# Patient Record
Sex: MALE | Race: WHITE | Employment: OTHER | URBAN - METROPOLITAN AREA
[De-identification: names, ages, dates, MRNs, and addresses within clinical notes are randomized per-mention and may not be internally consistent; named-entity substitution may affect disease eponyms.]

---

## 2017-02-28 ENCOUNTER — OFFICE VISIT (OUTPATIENT)
Dept: INTERNAL MEDICINE CLINIC | Age: 77
End: 2017-02-28

## 2017-02-28 ENCOUNTER — HOSPITAL ENCOUNTER (OUTPATIENT)
Dept: OTHER | Age: 77
Discharge: HOME | End: 2017-02-28
Attending: INTERNAL MEDICINE | Admitting: INTERNAL MEDICINE

## 2017-02-28 VITALS
HEIGHT: 74 IN | SYSTOLIC BLOOD PRESSURE: 130 MMHG | OXYGEN SATURATION: 98 % | HEART RATE: 72 BPM | RESPIRATION RATE: 14 BRPM | WEIGHT: 180 LBS | BODY MASS INDEX: 23.1 KG/M2 | DIASTOLIC BLOOD PRESSURE: 80 MMHG

## 2017-02-28 DIAGNOSIS — R43.0 ANOSMIA: ICD-10-CM

## 2017-02-28 DIAGNOSIS — I35.1 NONRHEUMATIC AORTIC VALVE INSUFFICIENCY: ICD-10-CM

## 2017-02-28 DIAGNOSIS — L71.9 ROSACEA: ICD-10-CM

## 2017-02-28 DIAGNOSIS — J20.9 ACUTE BRONCHITIS, UNSPECIFIED ORGANISM: ICD-10-CM

## 2017-02-28 DIAGNOSIS — I10 ESSENTIAL HYPERTENSION: Primary | ICD-10-CM

## 2017-02-28 DIAGNOSIS — R09.89 BILATERAL CAROTID BRUITS: ICD-10-CM

## 2017-02-28 DIAGNOSIS — I10 ESSENTIAL HYPERTENSION: ICD-10-CM

## 2017-02-28 DIAGNOSIS — N18.2 CHRONIC KIDNEY DISEASE, STAGE 2 (MILD): ICD-10-CM

## 2017-02-28 DIAGNOSIS — E78.5 HYPERLIPIDEMIA, UNSPECIFIED HYPERLIPIDEMIA TYPE: ICD-10-CM

## 2017-02-28 LAB
BASOPHILS ABSOLUTE: 0 K/UL (ref 0–0.2)
BASOPHILS RELATIVE PERCENT: 0.5 %
EOSINOPHILS ABSOLUTE: 0 K/UL (ref 0–0.6)
EOSINOPHILS RELATIVE PERCENT: 0.4 %
HCT VFR BLD CALC: 43.3 % (ref 40.5–52.5)
HEMOGLOBIN: 14.5 G/DL (ref 13.5–17.5)
LYMPHOCYTES ABSOLUTE: 1.6 K/UL (ref 1–5.1)
LYMPHOCYTES RELATIVE PERCENT: 25 %
MCH RBC QN AUTO: 29.6 PG (ref 26–34)
MCHC RBC AUTO-ENTMCNC: 33.5 G/DL (ref 31–36)
MCV RBC AUTO: 88.3 FL (ref 80–100)
MONOCYTES ABSOLUTE: 0.5 K/UL (ref 0–1.3)
MONOCYTES RELATIVE PERCENT: 7.9 %
NEUTROPHILS ABSOLUTE: 4.2 K/UL (ref 1.7–7.7)
NEUTROPHILS RELATIVE PERCENT: 66.2 %
PDW BLD-RTO: 13.5 % (ref 12.4–15.4)
PLATELET # BLD: 219 K/UL (ref 135–450)
PMV BLD AUTO: 9.5 FL (ref 5–10.5)
RBC # BLD: 4.91 M/UL (ref 4.2–5.9)
WBC # BLD: 6.4 K/UL (ref 4–11)

## 2017-02-28 PROCEDURE — 99214 OFFICE O/P EST MOD 30 MIN: CPT | Performed by: INTERNAL MEDICINE

## 2017-02-28 RX ORDER — FENOFIBRATE 160 MG/1
160 TABLET ORAL DAILY
Qty: 90 TABLET | Refills: 0 | Status: SHIPPED | OUTPATIENT
Start: 2017-02-28 | End: 2017-06-14 | Stop reason: SDUPTHER

## 2017-02-28 RX ORDER — HYDROCODONE BITARTRATE AND ACETAMINOPHEN 5; 325 MG/1; MG/1
1 TABLET ORAL 2 TIMES DAILY PRN
Qty: 60 TABLET | Refills: 0 | Status: SHIPPED | OUTPATIENT
Start: 2017-02-28 | End: 2017-04-05 | Stop reason: SDUPTHER

## 2017-02-28 RX ORDER — LISINOPRIL AND HYDROCHLOROTHIAZIDE 25; 20 MG/1; MG/1
1 TABLET ORAL DAILY
Qty: 90 TABLET | Refills: 0 | Status: SHIPPED | OUTPATIENT
Start: 2017-02-28 | End: 2017-06-14 | Stop reason: SDUPTHER

## 2017-02-28 RX ORDER — SILDENAFIL 100 MG/1
100 TABLET, FILM COATED ORAL PRN
Qty: 10 TABLET | Refills: 2 | Status: SHIPPED | OUTPATIENT
Start: 2017-02-28 | End: 2017-09-13 | Stop reason: SDUPTHER

## 2017-02-28 RX ORDER — AZITHROMYCIN 250 MG/1
TABLET, FILM COATED ORAL
Qty: 1 PACKET | Refills: 0 | Status: SHIPPED | OUTPATIENT
Start: 2017-02-28 | End: 2017-03-10

## 2017-02-28 ASSESSMENT — ENCOUNTER SYMPTOMS
WHEEZING: 1
ABDOMINAL PAIN: 0
RHINORRHEA: 0
BLOOD IN STOOL: 0
SHORTNESS OF BREATH: 0
CHEST TIGHTNESS: 0
DIARRHEA: 0
COUGH: 1
BACK PAIN: 1
NAUSEA: 0
VOMITING: 0

## 2017-02-28 ASSESSMENT — PATIENT HEALTH QUESTIONNAIRE - PHQ9
2. FEELING DOWN, DEPRESSED OR HOPELESS: 0
SUM OF ALL RESPONSES TO PHQ QUESTIONS 1-9: 0
SUM OF ALL RESPONSES TO PHQ9 QUESTIONS 1 & 2: 0
1. LITTLE INTEREST OR PLEASURE IN DOING THINGS: 0

## 2017-03-01 LAB
A/G RATIO: 1.3 (ref 1.1–2.2)
ALBUMIN SERPL-MCNC: 4 G/DL (ref 3.4–5)
ALP BLD-CCNC: 91 U/L (ref 40–129)
ALT SERPL-CCNC: 7 U/L (ref 10–40)
ANION GAP SERPL CALCULATED.3IONS-SCNC: 15 MMOL/L (ref 3–16)
AST SERPL-CCNC: 15 U/L (ref 15–37)
BILIRUB SERPL-MCNC: 0.9 MG/DL (ref 0–1)
BUN BLDV-MCNC: 17 MG/DL (ref 7–20)
CALCIUM SERPL-MCNC: 9.7 MG/DL (ref 8.3–10.6)
CHLORIDE BLD-SCNC: 99 MMOL/L (ref 99–110)
CHOLESTEROL, TOTAL: 185 MG/DL (ref 0–199)
CO2: 26 MMOL/L (ref 21–32)
CREAT SERPL-MCNC: 1.3 MG/DL (ref 0.8–1.3)
GFR AFRICAN AMERICAN: >60
GFR NON-AFRICAN AMERICAN: 53
GLOBULIN: 3.1 G/DL
GLUCOSE BLD-MCNC: 104 MG/DL (ref 70–99)
HDLC SERPL-MCNC: 24 MG/DL (ref 40–60)
LDL CHOLESTEROL CALCULATED: 134 MG/DL
POTASSIUM SERPL-SCNC: 4.3 MMOL/L (ref 3.5–5.1)
SODIUM BLD-SCNC: 140 MMOL/L (ref 136–145)
TOTAL PROTEIN: 7.1 G/DL (ref 6.4–8.2)
TRIGL SERPL-MCNC: 137 MG/DL (ref 0–150)
URIC ACID, SERUM: 5.8 MG/DL (ref 3.5–7.2)
VLDLC SERPL CALC-MCNC: 27 MG/DL

## 2017-04-05 RX ORDER — HYDROCODONE BITARTRATE AND ACETAMINOPHEN 5; 325 MG/1; MG/1
1 TABLET ORAL 2 TIMES DAILY PRN
Qty: 60 TABLET | Refills: 0 | Status: SHIPPED | OUTPATIENT
Start: 2017-04-05 | End: 2017-06-14 | Stop reason: SDUPTHER

## 2017-06-14 ENCOUNTER — OFFICE VISIT (OUTPATIENT)
Dept: INTERNAL MEDICINE CLINIC | Age: 77
End: 2017-06-14

## 2017-06-14 VITALS
BODY MASS INDEX: 24.51 KG/M2 | DIASTOLIC BLOOD PRESSURE: 80 MMHG | SYSTOLIC BLOOD PRESSURE: 130 MMHG | HEIGHT: 74 IN | RESPIRATION RATE: 14 BRPM | HEART RATE: 70 BPM | WEIGHT: 191 LBS

## 2017-06-14 DIAGNOSIS — R43.0 ANOSMIA: ICD-10-CM

## 2017-06-14 DIAGNOSIS — E78.5 HYPERLIPIDEMIA, UNSPECIFIED HYPERLIPIDEMIA TYPE: ICD-10-CM

## 2017-06-14 DIAGNOSIS — N18.2 CHRONIC KIDNEY DISEASE, STAGE 2 (MILD): ICD-10-CM

## 2017-06-14 DIAGNOSIS — I10 ESSENTIAL HYPERTENSION: Primary | ICD-10-CM

## 2017-06-14 DIAGNOSIS — L71.9 ROSACEA: ICD-10-CM

## 2017-06-14 DIAGNOSIS — I35.1 NONRHEUMATIC AORTIC VALVE INSUFFICIENCY: ICD-10-CM

## 2017-06-14 PROCEDURE — 99214 OFFICE O/P EST MOD 30 MIN: CPT | Performed by: INTERNAL MEDICINE

## 2017-06-14 RX ORDER — FENOFIBRATE 160 MG/1
160 TABLET ORAL DAILY
Qty: 90 TABLET | Refills: 0 | Status: SHIPPED | OUTPATIENT
Start: 2017-06-14 | End: 2017-09-14 | Stop reason: SDUPTHER

## 2017-06-14 RX ORDER — HYDROCODONE BITARTRATE AND ACETAMINOPHEN 5; 325 MG/1; MG/1
1 TABLET ORAL 2 TIMES DAILY PRN
Qty: 60 TABLET | Refills: 0 | Status: SHIPPED | OUTPATIENT
Start: 2017-06-14 | End: 2017-09-25 | Stop reason: SDUPTHER

## 2017-06-14 RX ORDER — LISINOPRIL AND HYDROCHLOROTHIAZIDE 25; 20 MG/1; MG/1
1 TABLET ORAL DAILY
Qty: 90 TABLET | Refills: 0 | Status: SHIPPED | OUTPATIENT
Start: 2017-06-14 | End: 2017-09-25 | Stop reason: SDUPTHER

## 2017-06-14 ASSESSMENT — ENCOUNTER SYMPTOMS
BACK PAIN: 1
RHINORRHEA: 0
NAUSEA: 0
CHEST TIGHTNESS: 0
WHEEZING: 0
DIARRHEA: 0
BLOOD IN STOOL: 0
VOMITING: 0
ABDOMINAL PAIN: 0
COUGH: 0
SHORTNESS OF BREATH: 0

## 2017-09-25 ENCOUNTER — OFFICE VISIT (OUTPATIENT)
Dept: INTERNAL MEDICINE CLINIC | Age: 77
End: 2017-09-25

## 2017-09-25 VITALS
HEART RATE: 70 BPM | DIASTOLIC BLOOD PRESSURE: 80 MMHG | RESPIRATION RATE: 14 BRPM | HEIGHT: 74 IN | WEIGHT: 186 LBS | SYSTOLIC BLOOD PRESSURE: 130 MMHG | BODY MASS INDEX: 23.87 KG/M2

## 2017-09-25 DIAGNOSIS — Z00.00 ROUTINE GENERAL MEDICAL EXAMINATION AT A HEALTH CARE FACILITY: ICD-10-CM

## 2017-09-25 DIAGNOSIS — I10 ESSENTIAL HYPERTENSION: ICD-10-CM

## 2017-09-25 DIAGNOSIS — I10 ESSENTIAL HYPERTENSION: Primary | ICD-10-CM

## 2017-09-25 LAB
A/G RATIO: 1.8 (ref 1.1–2.2)
ALBUMIN SERPL-MCNC: 4.2 G/DL (ref 3.4–5)
ALP BLD-CCNC: 105 U/L (ref 40–129)
ALT SERPL-CCNC: 8 U/L (ref 10–40)
ANION GAP SERPL CALCULATED.3IONS-SCNC: 13 MMOL/L (ref 3–16)
AST SERPL-CCNC: 18 U/L (ref 15–37)
BASOPHILS ABSOLUTE: 0.1 K/UL (ref 0–0.2)
BASOPHILS RELATIVE PERCENT: 1.1 %
BILIRUB SERPL-MCNC: 0.5 MG/DL (ref 0–1)
BUN BLDV-MCNC: 22 MG/DL (ref 7–20)
CALCIUM SERPL-MCNC: 10 MG/DL (ref 8.3–10.6)
CHLORIDE BLD-SCNC: 105 MMOL/L (ref 99–110)
CO2: 29 MMOL/L (ref 21–32)
CREAT SERPL-MCNC: 1.5 MG/DL (ref 0.8–1.3)
EOSINOPHILS ABSOLUTE: 0.1 K/UL (ref 0–0.6)
EOSINOPHILS RELATIVE PERCENT: 0.9 %
GFR AFRICAN AMERICAN: 55
GFR NON-AFRICAN AMERICAN: 45
GLOBULIN: 2.4 G/DL
GLUCOSE BLD-MCNC: 120 MG/DL (ref 70–99)
HCT VFR BLD CALC: 40.9 % (ref 40.5–52.5)
HEMOGLOBIN: 13.7 G/DL (ref 13.5–17.5)
LYMPHOCYTES ABSOLUTE: 1.6 K/UL (ref 1–5.1)
LYMPHOCYTES RELATIVE PERCENT: 25 %
MCH RBC QN AUTO: 30.7 PG (ref 26–34)
MCHC RBC AUTO-ENTMCNC: 33.6 G/DL (ref 31–36)
MCV RBC AUTO: 91.3 FL (ref 80–100)
MONOCYTES ABSOLUTE: 0.4 K/UL (ref 0–1.3)
MONOCYTES RELATIVE PERCENT: 5.6 %
NEUTROPHILS ABSOLUTE: 4.4 K/UL (ref 1.7–7.7)
NEUTROPHILS RELATIVE PERCENT: 67.4 %
PDW BLD-RTO: 13.6 % (ref 12.4–15.4)
PLATELET # BLD: 175 K/UL (ref 135–450)
PMV BLD AUTO: 10 FL (ref 5–10.5)
POTASSIUM SERPL-SCNC: 4 MMOL/L (ref 3.5–5.1)
RBC # BLD: 4.47 M/UL (ref 4.2–5.9)
SODIUM BLD-SCNC: 147 MMOL/L (ref 136–145)
TOTAL PROTEIN: 6.6 G/DL (ref 6.4–8.2)
URIC ACID, SERUM: 6.4 MG/DL (ref 3.5–7.2)
WBC # BLD: 6.5 K/UL (ref 4–11)

## 2017-09-25 PROCEDURE — G0438 PPPS, INITIAL VISIT: HCPCS | Performed by: INTERNAL MEDICINE

## 2017-09-25 RX ORDER — LISINOPRIL AND HYDROCHLOROTHIAZIDE 25; 20 MG/1; MG/1
1 TABLET ORAL DAILY
Qty: 90 TABLET | Refills: 0 | Status: SHIPPED | OUTPATIENT
Start: 2017-09-25 | End: 2017-12-26

## 2017-09-25 RX ORDER — HYDROCODONE BITARTRATE AND ACETAMINOPHEN 5; 325 MG/1; MG/1
1 TABLET ORAL 2 TIMES DAILY PRN
Qty: 60 TABLET | Refills: 0 | Status: SHIPPED | OUTPATIENT
Start: 2017-09-25 | End: 2018-01-24 | Stop reason: SDUPTHER

## 2017-09-25 ASSESSMENT — ANXIETY QUESTIONNAIRES: GAD7 TOTAL SCORE: 0

## 2017-09-25 ASSESSMENT — PATIENT HEALTH QUESTIONNAIRE - PHQ9: SUM OF ALL RESPONSES TO PHQ QUESTIONS 1-9: 0

## 2017-09-25 ASSESSMENT — LIFESTYLE VARIABLES: HOW OFTEN DO YOU HAVE A DRINK CONTAINING ALCOHOL: 0

## 2017-12-13 RX ORDER — FENOFIBRATE 160 MG/1
TABLET ORAL
Qty: 30 TABLET | Refills: 0 | Status: SHIPPED | OUTPATIENT
Start: 2017-12-13 | End: 2018-01-11 | Stop reason: SDUPTHER

## 2017-12-26 RX ORDER — LISINOPRIL AND HYDROCHLOROTHIAZIDE 25; 20 MG/1; MG/1
TABLET ORAL
Qty: 90 TABLET | Refills: 0 | Status: SHIPPED | OUTPATIENT
Start: 2017-12-26 | End: 2018-01-24 | Stop reason: SDUPTHER

## 2018-01-12 RX ORDER — FENOFIBRATE 160 MG/1
TABLET ORAL
Qty: 30 TABLET | Refills: 0 | Status: SHIPPED | OUTPATIENT
Start: 2018-01-12 | End: 2018-01-24 | Stop reason: SDUPTHER

## 2018-01-24 ENCOUNTER — OFFICE VISIT (OUTPATIENT)
Dept: INTERNAL MEDICINE CLINIC | Age: 78
End: 2018-01-24

## 2018-01-24 VITALS
DIASTOLIC BLOOD PRESSURE: 80 MMHG | RESPIRATION RATE: 14 BRPM | SYSTOLIC BLOOD PRESSURE: 128 MMHG | WEIGHT: 188 LBS | HEART RATE: 80 BPM | BODY MASS INDEX: 24.13 KG/M2 | HEIGHT: 74 IN

## 2018-01-24 DIAGNOSIS — L71.9 ROSACEA: ICD-10-CM

## 2018-01-24 DIAGNOSIS — E78.5 HYPERLIPIDEMIA, UNSPECIFIED HYPERLIPIDEMIA TYPE: ICD-10-CM

## 2018-01-24 DIAGNOSIS — N18.2 STAGE 2 CHRONIC KIDNEY DISEASE: ICD-10-CM

## 2018-01-24 DIAGNOSIS — R09.89 BILATERAL CAROTID BRUITS: ICD-10-CM

## 2018-01-24 DIAGNOSIS — G89.29 CHRONIC BILATERAL LOW BACK PAIN WITHOUT SCIATICA: ICD-10-CM

## 2018-01-24 DIAGNOSIS — I10 ESSENTIAL HYPERTENSION: Primary | ICD-10-CM

## 2018-01-24 DIAGNOSIS — I35.1 NONRHEUMATIC AORTIC VALVE INSUFFICIENCY: ICD-10-CM

## 2018-01-24 DIAGNOSIS — M54.50 CHRONIC BILATERAL LOW BACK PAIN WITHOUT SCIATICA: ICD-10-CM

## 2018-01-24 PROCEDURE — 99214 OFFICE O/P EST MOD 30 MIN: CPT | Performed by: INTERNAL MEDICINE

## 2018-01-24 RX ORDER — LISINOPRIL AND HYDROCHLOROTHIAZIDE 25; 20 MG/1; MG/1
1 TABLET ORAL DAILY
Qty: 90 TABLET | Refills: 0 | Status: SHIPPED | OUTPATIENT
Start: 2018-01-24 | End: 2019-01-23 | Stop reason: SDUPTHER

## 2018-01-24 RX ORDER — FENOFIBRATE 160 MG/1
160 TABLET ORAL DAILY
Qty: 30 TABLET | Refills: 2 | Status: SHIPPED | OUTPATIENT
Start: 2018-01-24 | End: 2019-01-23 | Stop reason: SDUPTHER

## 2018-01-24 RX ORDER — HYDROCODONE BITARTRATE AND ACETAMINOPHEN 5; 325 MG/1; MG/1
1 TABLET ORAL 2 TIMES DAILY PRN
Qty: 60 TABLET | Refills: 0 | Status: SHIPPED | OUTPATIENT
Start: 2018-01-24 | End: 2019-01-23 | Stop reason: SDUPTHER

## 2018-01-24 ASSESSMENT — ENCOUNTER SYMPTOMS
BLOOD IN STOOL: 0
WHEEZING: 0
COUGH: 0
VOMITING: 0
SHORTNESS OF BREATH: 0
BACK PAIN: 1
NAUSEA: 0
DIARRHEA: 0
CHEST TIGHTNESS: 0
ABDOMINAL PAIN: 0
RHINORRHEA: 0

## 2018-01-24 NOTE — PROGRESS NOTES
(MULTIVITAMIN PO), Take 1 tablet by mouth daily. , Disp: , Rfl:     Omega-3 Fatty Acids (FISH OIL PO), Take 1 tablet by mouth daily. , Disp: , Rfl:     Ascorbic Acid (VITAMIN C) 500 MG tablet, Take 1,000 mg by mouth daily. , Disp: , Rfl:     metronidazole (METROGEL) 1 % gel, Apply one a day, Disp: 1 Tube, Rfl: 2    /80 (Site: Right Arm, Position: Sitting, Cuff Size: Medium Adult)   Pulse 80   Resp 14   Ht 6' 2\" (1.88 m)   Wt 188 lb (85.3 kg)   BMI 24.14 kg/m²     Objective:   Physical Exam   Constitutional: He is oriented to person, place, and time. He appears well-developed and well-nourished. HENT:   Head: Normocephalic and atraumatic. Eyes: Pupils are equal, round, and reactive to light. Neck: Normal range of motion. Neck supple. Carotid bruit is present. No thyromegaly present. Cardiovascular: Normal rate and regular rhythm. Exam reveals no gallop and no friction rub. Murmur heard. Pulses:       Carotid pulses are on the right side with bruit, and on the left side with bruit. No carotid bruit   Pulmonary/Chest: Effort normal. No respiratory distress. He has wheezes. He has no rales. He exhibits no tenderness. Abdominal: Soft. Bowel sounds are normal. He exhibits no distension and no mass. There is no tenderness. There is no rebound and no guarding. Musculoskeletal: He exhibits no edema. Neurological: He is alert and oriented to person, place, and time. ECHO done on 5/18/16   Conclusions      Summary   Normal left ventricle size, wall thickness and systolic function with an   estimated ejection fraction of 55%.   No regional wall motion abnormalities are seen.   Diastolic filling parameters suggests grade I diastolic dysfunction.   Aortic valve leaflets appear mildly thickened.   Mild aortic regurgitation. Carotid US done 5/18/16      Summary        1.  There is minimal plaque seen in the right internal carotid artery with an    estimated diameter reduction of <50%.    2. There is minimal plaque seen in the left internal carotid artery with an    estimated diameter reduction of <50%.    3. The vertebral arteries are patent with antegrade flow bilaterally. Assessment:      1. Essential hypertension     2. Hyperlipidemia, unspecified hyperlipidemia type  Basic Metabolic Panel    Lipid Panel   3. Rosacea     4. Nonrheumatic aortic valve insufficiency     5. Bilateral carotid bruits     6. Stage 2 chronic kidney disease     7. Chronic bilateral low back pain without sciatica  HYDROcodone-acetaminophen (NORCO) 5-325 MG per tablet            Plan:        Bp is stable. Continue current meds. He has mild CKD. Hyperlipidemia- at goal.   Rosacea- stable on metrogel. Back pain-on vicodin prn. Carotid bruit: US is negative. Mild aortic regurgitation monitor for now. Decrease calorie intake. Exercise,weight loss recommended. The current medical regimen is effective;  continue present plan and medications. See orders.

## 2018-01-25 LAB
ANION GAP SERPL CALCULATED.3IONS-SCNC: 11 MMOL/L (ref 3–16)
BUN BLDV-MCNC: 17 MG/DL (ref 7–20)
CALCIUM SERPL-MCNC: 9.5 MG/DL (ref 8.3–10.6)
CHLORIDE BLD-SCNC: 103 MMOL/L (ref 99–110)
CHOLESTEROL, TOTAL: 176 MG/DL (ref 0–199)
CO2: 27 MMOL/L (ref 21–32)
CREAT SERPL-MCNC: 1.1 MG/DL (ref 0.8–1.3)
GFR AFRICAN AMERICAN: >60
GFR NON-AFRICAN AMERICAN: >60
GLUCOSE BLD-MCNC: 100 MG/DL (ref 70–99)
HDLC SERPL-MCNC: 36 MG/DL (ref 40–60)
LDL CHOLESTEROL CALCULATED: 109 MG/DL
POTASSIUM SERPL-SCNC: 4.1 MMOL/L (ref 3.5–5.1)
SODIUM BLD-SCNC: 141 MMOL/L (ref 136–145)
TRIGL SERPL-MCNC: 153 MG/DL (ref 0–150)
VLDLC SERPL CALC-MCNC: 31 MG/DL

## 2018-01-25 RX ORDER — ATORVASTATIN CALCIUM 10 MG/1
10 TABLET, FILM COATED ORAL DAILY
Qty: 30 TABLET | Refills: 2 | Status: SHIPPED | OUTPATIENT
Start: 2018-01-25 | End: 2019-01-23 | Stop reason: SDUPTHER

## 2018-04-13 RX ORDER — SILDENAFIL 100 MG/1
TABLET, FILM COATED ORAL
Qty: 10 TABLET | Refills: 0 | Status: SHIPPED | OUTPATIENT
Start: 2018-04-13 | End: 2019-01-23

## 2019-01-23 ENCOUNTER — OFFICE VISIT (OUTPATIENT)
Dept: INTERNAL MEDICINE CLINIC | Age: 79
End: 2019-01-23

## 2019-01-23 VITALS
DIASTOLIC BLOOD PRESSURE: 80 MMHG | RESPIRATION RATE: 14 BRPM | HEART RATE: 80 BPM | WEIGHT: 184 LBS | BODY MASS INDEX: 24.39 KG/M2 | SYSTOLIC BLOOD PRESSURE: 160 MMHG | HEIGHT: 73 IN

## 2019-01-23 DIAGNOSIS — F32.1 CURRENT MODERATE EPISODE OF MAJOR DEPRESSIVE DISORDER WITHOUT PRIOR EPISODE (HCC): ICD-10-CM

## 2019-01-23 DIAGNOSIS — I35.1 NONRHEUMATIC AORTIC VALVE INSUFFICIENCY: ICD-10-CM

## 2019-01-23 DIAGNOSIS — G89.29 CHRONIC BILATERAL LOW BACK PAIN WITHOUT SCIATICA: ICD-10-CM

## 2019-01-23 DIAGNOSIS — I10 ESSENTIAL HYPERTENSION: Primary | ICD-10-CM

## 2019-01-23 DIAGNOSIS — M54.50 CHRONIC BILATERAL LOW BACK PAIN WITHOUT SCIATICA: ICD-10-CM

## 2019-01-23 DIAGNOSIS — N18.2 STAGE 2 CHRONIC KIDNEY DISEASE: ICD-10-CM

## 2019-01-23 DIAGNOSIS — L71.9 ROSACEA: ICD-10-CM

## 2019-01-23 DIAGNOSIS — E78.5 HYPERLIPIDEMIA, UNSPECIFIED HYPERLIPIDEMIA TYPE: ICD-10-CM

## 2019-01-23 DIAGNOSIS — I10 ESSENTIAL HYPERTENSION: ICD-10-CM

## 2019-01-23 LAB
A/G RATIO: 1.7 (ref 1.1–2.2)
ALBUMIN SERPL-MCNC: 4.2 G/DL (ref 3.4–5)
ALP BLD-CCNC: 162 U/L (ref 40–129)
ALT SERPL-CCNC: 7 U/L (ref 10–40)
ANION GAP SERPL CALCULATED.3IONS-SCNC: 11 MMOL/L (ref 3–16)
AST SERPL-CCNC: 13 U/L (ref 15–37)
BASOPHILS ABSOLUTE: 0.1 K/UL (ref 0–0.2)
BASOPHILS RELATIVE PERCENT: 0.9 %
BILIRUB SERPL-MCNC: 1 MG/DL (ref 0–1)
BUN BLDV-MCNC: 12 MG/DL (ref 7–20)
CALCIUM SERPL-MCNC: 9.6 MG/DL (ref 8.3–10.6)
CHLORIDE BLD-SCNC: 102 MMOL/L (ref 99–110)
CHOLESTEROL, TOTAL: 209 MG/DL (ref 0–199)
CO2: 28 MMOL/L (ref 21–32)
CREAT SERPL-MCNC: 0.7 MG/DL (ref 0.8–1.3)
EOSINOPHILS ABSOLUTE: 0 K/UL (ref 0–0.6)
EOSINOPHILS RELATIVE PERCENT: 0.7 %
GFR AFRICAN AMERICAN: >60
GFR NON-AFRICAN AMERICAN: >60
GLOBULIN: 2.5 G/DL
GLUCOSE BLD-MCNC: 107 MG/DL (ref 70–99)
HCT VFR BLD CALC: 47.2 % (ref 40.5–52.5)
HDLC SERPL-MCNC: 44 MG/DL (ref 40–60)
HEMOGLOBIN: 15.7 G/DL (ref 13.5–17.5)
LDL CHOLESTEROL CALCULATED: 147 MG/DL
LYMPHOCYTES ABSOLUTE: 1.2 K/UL (ref 1–5.1)
LYMPHOCYTES RELATIVE PERCENT: 20.5 %
MCH RBC QN AUTO: 30.3 PG (ref 26–34)
MCHC RBC AUTO-ENTMCNC: 33.3 G/DL (ref 31–36)
MCV RBC AUTO: 90.9 FL (ref 80–100)
MONOCYTES ABSOLUTE: 0.3 K/UL (ref 0–1.3)
MONOCYTES RELATIVE PERCENT: 5.7 %
NEUTROPHILS ABSOLUTE: 4.3 K/UL (ref 1.7–7.7)
NEUTROPHILS RELATIVE PERCENT: 72.2 %
PDW BLD-RTO: 13.3 % (ref 12.4–15.4)
PLATELET # BLD: 163 K/UL (ref 135–450)
PMV BLD AUTO: 9.7 FL (ref 5–10.5)
POTASSIUM SERPL-SCNC: 4.5 MMOL/L (ref 3.5–5.1)
RBC # BLD: 5.2 M/UL (ref 4.2–5.9)
SODIUM BLD-SCNC: 141 MMOL/L (ref 136–145)
TOTAL PROTEIN: 6.7 G/DL (ref 6.4–8.2)
TRIGL SERPL-MCNC: 89 MG/DL (ref 0–150)
TSH SERPL DL<=0.05 MIU/L-ACNC: 1.33 UIU/ML (ref 0.27–4.2)
URIC ACID, SERUM: 6.6 MG/DL (ref 3.5–7.2)
VLDLC SERPL CALC-MCNC: 18 MG/DL
WBC # BLD: 6 K/UL (ref 4–11)

## 2019-01-23 PROCEDURE — 99214 OFFICE O/P EST MOD 30 MIN: CPT | Performed by: INTERNAL MEDICINE

## 2019-01-23 RX ORDER — METRONIDAZOLE 10 MG/G
GEL TOPICAL
Qty: 1 TUBE | Refills: 2 | Status: ON HOLD | OUTPATIENT
Start: 2019-01-23 | End: 2022-07-08 | Stop reason: HOSPADM

## 2019-01-23 RX ORDER — SILDENAFIL 100 MG/1
TABLET, FILM COATED ORAL
Qty: 10 TABLET | Refills: 0 | Status: CANCELLED | OUTPATIENT
Start: 2019-01-23

## 2019-01-23 RX ORDER — LISINOPRIL AND HYDROCHLOROTHIAZIDE 25; 20 MG/1; MG/1
1 TABLET ORAL DAILY
Qty: 90 TABLET | Refills: 0 | Status: SHIPPED | OUTPATIENT
Start: 2019-01-23 | End: 2020-02-27

## 2019-01-23 RX ORDER — SILDENAFIL 100 MG/1
100 TABLET, FILM COATED ORAL PRN
Qty: 10 TABLET | Refills: 0 | Status: SHIPPED | OUTPATIENT
Start: 2019-01-23 | End: 2021-04-26 | Stop reason: SDUPTHER

## 2019-01-23 RX ORDER — ESCITALOPRAM OXALATE 10 MG/1
10 TABLET ORAL DAILY
Qty: 30 TABLET | Refills: 1 | Status: SHIPPED | OUTPATIENT
Start: 2019-01-23 | End: 2020-04-06 | Stop reason: ALTCHOICE

## 2019-01-23 RX ORDER — FENOFIBRATE 160 MG/1
160 TABLET ORAL DAILY
Qty: 90 TABLET | Refills: 0 | Status: SHIPPED | OUTPATIENT
Start: 2019-01-23 | End: 2020-02-27

## 2019-01-23 RX ORDER — HYDROCODONE BITARTRATE AND ACETAMINOPHEN 5; 325 MG/1; MG/1
1 TABLET ORAL 2 TIMES DAILY PRN
Qty: 60 TABLET | Refills: 0 | Status: SHIPPED | OUTPATIENT
Start: 2019-01-23 | End: 2020-04-06 | Stop reason: SDUPTHER

## 2019-01-23 RX ORDER — ATORVASTATIN CALCIUM 10 MG/1
10 TABLET, FILM COATED ORAL DAILY
Qty: 90 TABLET | Refills: 0 | Status: SHIPPED | OUTPATIENT
Start: 2019-01-23 | End: 2020-02-27

## 2019-01-23 ASSESSMENT — ENCOUNTER SYMPTOMS
NAUSEA: 0
CHEST TIGHTNESS: 0
BACK PAIN: 1
COUGH: 0
VOMITING: 0
SHORTNESS OF BREATH: 0
RHINORRHEA: 0
ABDOMINAL PAIN: 0
WHEEZING: 0
BLOOD IN STOOL: 0
DIARRHEA: 0

## 2019-01-24 ENCOUNTER — TELEPHONE (OUTPATIENT)
Dept: INTERNAL MEDICINE CLINIC | Age: 79
End: 2019-01-24

## 2019-01-24 DIAGNOSIS — R74.8 ELEVATED ALKALINE PHOSPHATASE LEVEL: Primary | ICD-10-CM

## 2020-02-27 RX ORDER — LISINOPRIL AND HYDROCHLOROTHIAZIDE 25; 20 MG/1; MG/1
TABLET ORAL
Qty: 90 TABLET | Refills: 0 | Status: SHIPPED | OUTPATIENT
Start: 2020-02-27 | End: 2020-09-01 | Stop reason: SDUPTHER

## 2020-02-27 RX ORDER — FENOFIBRATE 160 MG/1
TABLET ORAL
Qty: 90 TABLET | Refills: 0 | Status: SHIPPED | OUTPATIENT
Start: 2020-02-27 | End: 2020-09-25 | Stop reason: SDUPTHER

## 2020-02-27 RX ORDER — ATORVASTATIN CALCIUM 10 MG/1
TABLET, FILM COATED ORAL
Qty: 90 TABLET | Refills: 0 | Status: SHIPPED | OUTPATIENT
Start: 2020-02-27 | End: 2020-09-25 | Stop reason: SDUPTHER

## 2020-04-02 ENCOUNTER — TELEPHONE (OUTPATIENT)
Dept: INTERNAL MEDICINE CLINIC | Age: 80
End: 2020-04-02

## 2020-04-03 ENCOUNTER — TELEPHONE (OUTPATIENT)
Dept: INTERNAL MEDICINE CLINIC | Age: 80
End: 2020-04-03

## 2020-04-03 NOTE — TELEPHONE ENCOUNTER
----- Message from Ana Gandara sent at 4/3/2020  8:23 AM EDT -----  Contact: [phone]  Cannot fill until seen. Needs appt. See if can make televisit per Dr. Klaus Webb  ----- Message -----  From: Ana Gandara  Sent: 4/2/2020   1:45 PM EDT  To: Mitali Ureña MD    Pt has not been seen since January 2019. Vicodin is showing in [facility] as last filled in Jan 2018. Pt requesting refill.   Please advise.  ----- Message -----  From: Brianda Petty  Sent: 4/2/2020   1:27 PM EDT  To: Ana Gandara    He needs a refill on his vicodin [facility] at [phone]-last appt- 9-44-81-next appt- 5-29-20-lr

## 2020-04-06 ENCOUNTER — VIRTUAL VISIT (OUTPATIENT)
Dept: INTERNAL MEDICINE CLINIC | Age: 80
End: 2020-04-06

## 2020-04-06 PROCEDURE — 99213 OFFICE O/P EST LOW 20 MIN: CPT | Performed by: INTERNAL MEDICINE

## 2020-04-06 RX ORDER — HYDROCODONE BITARTRATE AND ACETAMINOPHEN 5; 325 MG/1; MG/1
1 TABLET ORAL 2 TIMES DAILY PRN
Qty: 60 TABLET | Refills: 0 | Status: SHIPPED | OUTPATIENT
Start: 2020-04-06 | End: 2020-05-29 | Stop reason: SDUPTHER

## 2020-04-06 ASSESSMENT — ENCOUNTER SYMPTOMS
ABDOMINAL PAIN: 0
VOMITING: 0
RHINORRHEA: 0
SHORTNESS OF BREATH: 0
WHEEZING: 0
BACK PAIN: 1
NAUSEA: 0

## 2020-04-06 NOTE — PROGRESS NOTES
a year. Stressed compliance. Back pain  On Norco. He uses it very prn. Rosacea  Uses Metrogel prn. Mixed hyperlipidemia  He needs labs. Continue medication. Follow up with PCP    Bry Estrella is a 79 y.o. male being evaluated by a Virtual Visit (video visit) encounter to address concerns as mentioned above. A caregiver was present when appropriate. Due to this being a TeleHealth encounter ([facility]), evaluation of the following organ systems was limited: Vitals/Constitutional/EENT/Resp/CV/GI//MS/Neuro/Skin/Heme-Lymph-Imm. Pursuant to the emergency declaration under the [address] [place] [address] authority and the [employer] Act, this Virtual Visit was conducted with patient's (and/or legal guardian's) consent, to reduce the patient's risk of exposure to COVID-19 and provide necessary medical care. The patient (and/or legal guardian) has also been advised to contact this office for worsening conditions or problems, and seek emergency medical treatment and/or call 911 if deemed necessary. Services were provided through a video synchronous discussion virtually to substitute for in-person clinic visit. Patient and provider were located at their individual homes. --Robinson Alex MD on 4/6/2020 at 3:17 PM    An electronic signature was used to authenticate this note.

## 2020-05-29 ENCOUNTER — VIRTUAL VISIT (OUTPATIENT)
Dept: INTERNAL MEDICINE CLINIC | Age: 80
End: 2020-05-29

## 2020-05-29 VITALS
WEIGHT: 180 LBS | HEIGHT: 74 IN | SYSTOLIC BLOOD PRESSURE: 142 MMHG | DIASTOLIC BLOOD PRESSURE: 80 MMHG | BODY MASS INDEX: 23.1 KG/M2 | RESPIRATION RATE: 12 BRPM | HEART RATE: 70 BPM

## 2020-05-29 PROCEDURE — 99213 OFFICE O/P EST LOW 20 MIN: CPT | Performed by: INTERNAL MEDICINE

## 2020-05-29 RX ORDER — HYDROCODONE BITARTRATE AND ACETAMINOPHEN 5; 325 MG/1; MG/1
1 TABLET ORAL 2 TIMES DAILY PRN
Qty: 60 TABLET | Refills: 0 | Status: SHIPPED | OUTPATIENT
Start: 2020-05-29 | End: 2020-09-01 | Stop reason: SDUPTHER

## 2020-05-29 ASSESSMENT — ENCOUNTER SYMPTOMS
SHORTNESS OF BREATH: 0
ABDOMINAL PAIN: 0
WHEEZING: 0
BACK PAIN: 1
NAUSEA: 0
RHINORRHEA: 0
VOMITING: 0

## 2020-05-29 NOTE — PROGRESS NOTES
2020    TELEHEALTH EVALUATION -- Audio/Visual (During [facility]-27 public health emergency)    HPI:    Gaurav Arroyo (:  1940) has requested an audio/video evaluation for the following concern(s):    Patient is here for follow up. He has hypertension. He is compliant with medication. No med side effect. No chest pain or dyspnea. He has HLD. It needs to be checked. He has rosacea. It is controlled. Uses Metrogel prn. He has chronic back pain from degenerative arthritis. He uses Norco prn. No constipation. Review of Systems   Constitutional: Negative for activity change and appetite change. HENT: Negative for postnasal drip and rhinorrhea. Respiratory: Negative for shortness of breath and wheezing. Cardiovascular: Negative for chest pain, palpitations and leg swelling. Gastrointestinal: Negative for abdominal pain, nausea and vomiting. Genitourinary: Negative for difficulty urinating and frequency. Musculoskeletal: Positive for back pain. Negative for joint swelling. Skin: Negative for rash. Neurological: Negative for light-headedness. Psychiatric/Behavioral: Negative for sleep disturbance. Prior to Visit Medications    Medication Sig Taking? Authorizing Provider   HYDROcodone-acetaminophen (NORCO) 5-325 MG per tablet Take 1 tablet by mouth 2 times daily as needed for Pain for up to 30 days.   Maria Isabel Thomas MD   fenofibrate 160 MG tablet TAKE ONE TABLET BY MOUTH DAILY  Maria Isabel Thomas MD   lisinopril-hydroCHLOROthiazide (PRINZIDE;ZESTORETIC) 20-25 MG per tablet TAKE ONE TABLET BY MOUTH DAILY  Maria Isabel Thomas MD   atorvastatin (LIPITOR) 10 MG tablet TAKE ONE TABLET BY MOUTH DAILY  Maria Isabel Thomas MD   metroNIDAZOLE (METROGEL) 1 % gel Apply one a day  Maria Isabel Thomas MD   sildenafil (VIAGRA) 100 MG tablet Take 1 tablet by mouth as needed for Erectile Dysfunction  Maria Isabel Thomas MD   Multiple Vitamins-Minerals (MULTIVITAMIN PO) Take 1 tablet by mouth daily. Historical Provider, MD   Omega-3 Fatty Acids (FISH OIL PO) Take 1 tablet by mouth daily. Historical Provider, MD   Ascorbic Acid (VITAMIN C) 500 MG tablet Take 1,000 mg by mouth daily. 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    Backpain     Bilateral carotid bruits 9/6/2016    Chronic kidney disease 9/6/2016    [facility] of [facility]    Hyperlipidemia     Hypertension     Hypertrophy of prostate without urinary obstruction and other lower urinary tract symptoms (LUTS)     Nonrheumatic aortic valve insufficiency 9/6/2016    Rosacea     Taste perversion 8/19/2011   ,   Past Surgical History:   Procedure Laterality Date    COLONOSCOPY  2/11/2008    COLONOSCOPY  11/14/2014    HERNIA REPAIR     ,   Social History     Tobacco Use    Smoking status: Never Smoker    Smokeless tobacco: Never Used   Substance Use Topics    Alcohol use: No    Drug use: No   ,   Family History   Problem Relation Age of Onset    COPD Sister     COPD Brother         Birth defect       PHYSICAL EXAMINATION:  [ INSTRUCTIONS:  \"[x]\" Indicates a positive item  \"[]\" Indicates a negative item  -- DELETE ALL ITEMS NOT EXAMINED]  Vital Signs: (As obtained by patient/caregiver or practitioner observation)    BP (!) 142/80   Pulse 70   Resp 12   Ht 6' 2\" (1.88 m)   Wt 180 lb (81.6 kg)   BMI 23.11 kg/m²        Constitutional: [x] Appears well-developed and well-nourished [x] No apparent distress                           Mental status  [x] Alert and awake  [x] Oriented to person/place/time [x]Able to follow commands       Eyes:  EOM    [x]  Normal  [] Abnormal-  Sclera  [x]  Normal  [] Abnormal -             HENT:   [x] Normocephalic, atraumatic.   [] Abnormal   [x] Mouth/Throat: Mucous membranes are moist.      External Ears [x] Normal  [] Patient identification was verified at the start of the visit: Yes    Total time spent on this encounter: Not billed by time    Services were provided through a video synchronous discussion virtually to substitute for in-person clinic visit. Patient and provider were located at their individual homes. --[address], MD on 5/29/2020 at 2:34 PM    An electronic signature was used to authenticate this note.

## 2020-09-01 RX ORDER — LISINOPRIL AND HYDROCHLOROTHIAZIDE 25; 20 MG/1; MG/1
TABLET ORAL
Qty: 90 TABLET | Refills: 0 | Status: SHIPPED | OUTPATIENT
Start: 2020-09-01 | End: 2021-01-18

## 2020-09-01 RX ORDER — HYDROCODONE BITARTRATE AND ACETAMINOPHEN 5; 325 MG/1; MG/1
1 TABLET ORAL 2 TIMES DAILY PRN
Qty: 60 TABLET | Refills: 0 | Status: SHIPPED | OUTPATIENT
Start: 2020-09-01 | End: 2020-10-19 | Stop reason: SDUPTHER

## 2020-09-01 NOTE — TELEPHONE ENCOUNTER
----- Message from Edyta Petty sent at 9/1/2020 10:13 AM EDT -----  Contact: [phone]  He needs refills on his hydrocodone and lisinopril called [facility] - last appt- 5-29-20-next appt- 9-25-20-

## 2020-09-25 ENCOUNTER — OFFICE VISIT (OUTPATIENT)
Dept: INTERNAL MEDICINE CLINIC | Age: 80
End: 2020-09-25

## 2020-09-25 VITALS
RESPIRATION RATE: 12 BRPM | WEIGHT: 179 LBS | DIASTOLIC BLOOD PRESSURE: 80 MMHG | HEIGHT: 74 IN | TEMPERATURE: 97.8 F | BODY MASS INDEX: 22.97 KG/M2 | SYSTOLIC BLOOD PRESSURE: 122 MMHG | HEART RATE: 70 BPM

## 2020-09-25 DIAGNOSIS — I10 ESSENTIAL HYPERTENSION: ICD-10-CM

## 2020-09-25 DIAGNOSIS — E78.2 MIXED HYPERLIPIDEMIA: ICD-10-CM

## 2020-09-25 LAB
BASOPHILS ABSOLUTE: 0 K/UL (ref 0–0.2)
BASOPHILS RELATIVE PERCENT: 0.8 %
EOSINOPHILS ABSOLUTE: 0 K/UL (ref 0–0.6)
EOSINOPHILS RELATIVE PERCENT: 0.3 %
HCT VFR BLD CALC: 43.6 % (ref 40.5–52.5)
HEMOGLOBIN: 14.8 G/DL (ref 13.5–17.5)
LYMPHOCYTES ABSOLUTE: 1.1 K/UL (ref 1–5.1)
LYMPHOCYTES RELATIVE PERCENT: 19.5 %
MCH RBC QN AUTO: 30.8 PG (ref 26–34)
MCHC RBC AUTO-ENTMCNC: 33.9 G/DL (ref 31–36)
MCV RBC AUTO: 90.9 FL (ref 80–100)
MONOCYTES ABSOLUTE: 0.3 K/UL (ref 0–1.3)
MONOCYTES RELATIVE PERCENT: 5 %
NEUTROPHILS ABSOLUTE: 4.4 K/UL (ref 1.7–7.7)
NEUTROPHILS RELATIVE PERCENT: 74.4 %
PDW BLD-RTO: 13.8 % (ref 12.4–15.4)
PLATELET # BLD: 151 K/UL (ref 135–450)
PMV BLD AUTO: 9.6 FL (ref 5–10.5)
RBC # BLD: 4.8 M/UL (ref 4.2–5.9)
WBC # BLD: 5.9 K/UL (ref 4–11)

## 2020-09-25 PROCEDURE — 99214 OFFICE O/P EST MOD 30 MIN: CPT | Performed by: INTERNAL MEDICINE

## 2020-09-25 RX ORDER — FENOFIBRATE 160 MG/1
TABLET ORAL
Qty: 90 TABLET | Refills: 0 | Status: SHIPPED | OUTPATIENT
Start: 2020-09-25 | End: 2021-01-18

## 2020-09-25 RX ORDER — ATORVASTATIN CALCIUM 10 MG/1
TABLET, FILM COATED ORAL
Qty: 90 TABLET | Refills: 0 | Status: SHIPPED | OUTPATIENT
Start: 2020-09-25 | End: 2021-01-18

## 2020-09-25 ASSESSMENT — ENCOUNTER SYMPTOMS
DIARRHEA: 0
COUGH: 0
VOMITING: 0
BLOOD IN STOOL: 0
NAUSEA: 0
WHEEZING: 0
BACK PAIN: 1
RHINORRHEA: 0
CHEST TIGHTNESS: 0
SHORTNESS OF BREATH: 0
ABDOMINAL PAIN: 0

## 2020-09-25 NOTE — PROGRESS NOTES
Subjective:      Patient ID: Judge Bryant is a 79 y.o. male. HPI    Patient is here for follow up on hypertension. Hyerplipidemia, CKD, rosacea and back pain. Patient's BP is controlled as he has not been taking his medication. No chest pain or dyspnea. No ER visits. His hyperlipidemia is usually well controlled but he has been without medication. His rosacea has been stable with metrogel. He uses it off and on. He has issues with back pain and takes vicodin prn. No constipation. He continues to be depressed. He could not take Lexapro. It made him shaky. Review of Systems   Constitutional: Negative. Negative for activity change, appetite change, fatigue and fever. HENT: Negative for postnasal drip and rhinorrhea. Respiratory: Negative for cough, chest tightness, shortness of breath and wheezing. Cardiovascular: Negative for chest pain, palpitations and leg swelling. Gastrointestinal: Negative for abdominal pain, blood in stool, diarrhea, nausea and vomiting. Genitourinary: Negative for difficulty urinating and frequency. Musculoskeletal: Positive for back pain. Negative for joint swelling. Skin: Negative for rash. Neurological: Negative for light-headedness. Psychiatric/Behavioral: Positive for sleep disturbance. There are no changes to past medical history, family history, social history or review of systems(except as noted in the history section) since prior note (all reviewed with patient). Current Outpatient Medications:     atorvastatin (LIPITOR) 10 MG tablet, TAKE ONE TABLET BY MOUTH DAILY, Disp: 90 tablet, Rfl: 0    fenofibrate (TRIGLIDE) 160 MG tablet, TAKE ONE TABLET BY MOUTH DAILY, Disp: 90 tablet, Rfl: 0    sertraline (ZOLOFT) 50 MG tablet, Take 1 tablet by mouth nightly, Disp: 30 tablet, Rfl: 1    HYDROcodone-acetaminophen (NORCO) 5-325 MG per tablet, Take 1 tablet by mouth 2 times daily as needed for Pain for up to 30 days. , Disp: 60 tablet, Rfl: 0   lisinopril-hydroCHLOROthiazide (PRINZIDE;ZESTORETIC) 20-25 MG per tablet, TAKE ONE TABLET BY MOUTH DAILY, Disp: 90 tablet, Rfl: 0    metroNIDAZOLE (METROGEL) 1 % gel, Apply one a day, Disp: 1 Tube, Rfl: 2    sildenafil (VIAGRA) 100 MG tablet, Take 1 tablet by mouth as needed for Erectile Dysfunction, Disp: 10 tablet, Rfl: 0    Multiple Vitamins-Minerals (MULTIVITAMIN PO), Take 1 tablet by mouth daily. , Disp: , Rfl:     Omega-3 Fatty Acids (FISH OIL PO), Take 1 tablet by mouth daily. , Disp: , Rfl:     Ascorbic Acid (VITAMIN C) 500 MG tablet, Take 1,000 mg by mouth daily. , Disp: , Rfl:     /80 (Site: Right Upper Arm, Position: Sitting, Cuff Size: Medium Adult)   Pulse 70   Temp 97.8 °F (36.6 °C)   Resp 12   Ht 6' 2\" (1.88 m)   Wt 179 lb (81.2 kg)   BMI 22.98 kg/m²     Objective:   Physical Exam   Constitutional: He is oriented to person, place, and time. He appears well-developed and well-nourished. HENT:   Head: Normocephalic and atraumatic. Eyes: Pupils are equal, round, and reactive to light. Neck: Normal range of motion. Neck supple. Carotid bruit is present. No thyromegaly present. Cardiovascular: Normal rate and regular rhythm. Exam reveals no gallop and no friction rub. Murmur heard. Pulses:       Carotid pulses are on the right side with bruit and on the left side with bruit. No carotid bruit   Pulmonary/Chest: Effort normal. No respiratory distress. He has wheezes. He has no rales. He exhibits no tenderness. Abdominal: Soft. Bowel sounds are normal. He exhibits no distension and no mass. There is no abdominal tenderness. There is no rebound and no guarding. Musculoskeletal:         General: No edema. Neurological: He is alert and oriented to person, place, and time.      ECHO done on 5/18/16   Conclusions      Summary   Normal left ventricle size, wall thickness and systolic function with an   estimated ejection fraction of 55%.   No regional wall motion abnormalities are seen.   Diastolic filling parameters suggests grade I diastolic dysfunction.   Aortic valve leaflets appear mildly thickened.   Mild aortic regurgitation. Carotid US done 5/18/16      Summary        1. There is minimal plaque seen in the right internal carotid artery with an    estimated diameter reduction of <50%.    2. There is minimal plaque seen in the left internal carotid artery with an    estimated diameter reduction of <50%.    3. The vertebral arteries are patent with antegrade flow bilaterally. Assessment:       Diagnosis Orders   1. Essential hypertension  Comprehensive Metabolic Panel    CBC Auto Differential    Uric Acid   2. Mixed hyperlipidemia  Lipid Panel   3. Rosacea     4. Chronic bilateral low back pain without sciatica     5. Current moderate episode of major depressive disorder without prior episode ([facility]              Plan:        Bp is controlled. Continue current meds. He has mild CKD. Check labs. Hyperlipidemia- at goal.   Rosacea- stable on metrogel. Back pain-on vicodin prn. Carotid bruit: US is negative. Mild aortic regurgitation monitor for now. Depression. Could not take Lexapro. I will start him on Zoloft. Decrease calorie intake. Exercise,weight loss recommended. The current medical regimen is effective;  continue present plan and medications. See orders.

## 2020-09-26 LAB
A/G RATIO: 2 (ref 1.1–2.2)
ALBUMIN SERPL-MCNC: 4.3 G/DL (ref 3.4–5)
ALP BLD-CCNC: 162 U/L (ref 40–129)
ALT SERPL-CCNC: 9 U/L (ref 10–40)
ANION GAP SERPL CALCULATED.3IONS-SCNC: 12 MMOL/L (ref 3–16)
AST SERPL-CCNC: 19 U/L (ref 15–37)
BILIRUB SERPL-MCNC: 0.9 MG/DL (ref 0–1)
BUN BLDV-MCNC: 17 MG/DL (ref 7–20)
CALCIUM SERPL-MCNC: 9.4 MG/DL (ref 8.3–10.6)
CHLORIDE BLD-SCNC: 105 MMOL/L (ref 99–110)
CHOLESTEROL, TOTAL: 187 MG/DL (ref 0–199)
CO2: 25 MMOL/L (ref 21–32)
CREAT SERPL-MCNC: 1 MG/DL (ref 0.8–1.3)
GFR AFRICAN AMERICAN: >60
GFR NON-AFRICAN AMERICAN: >60
GLOBULIN: 2.1 G/DL
GLUCOSE BLD-MCNC: 107 MG/DL (ref 70–99)
HDLC SERPL-MCNC: 36 MG/DL (ref 40–60)
LDL CHOLESTEROL CALCULATED: 129 MG/DL
POTASSIUM SERPL-SCNC: 4.2 MMOL/L (ref 3.5–5.1)
SODIUM BLD-SCNC: 142 MMOL/L (ref 136–145)
TOTAL PROTEIN: 6.4 G/DL (ref 6.4–8.2)
TRIGL SERPL-MCNC: 108 MG/DL (ref 0–150)
URIC ACID, SERUM: 7.8 MG/DL (ref 3.5–7.2)
VLDLC SERPL CALC-MCNC: 22 MG/DL

## 2020-09-28 ENCOUNTER — TELEPHONE (OUTPATIENT)
Dept: INTERNAL MEDICINE CLINIC | Age: 80
End: 2020-09-28

## 2020-09-28 NOTE — TELEPHONE ENCOUNTER
----- Message from Clotilde Aggarwal MD sent at 9/28/2020  2:13 PM EDT -----  US of the RUQ diagnosis of alk phos

## 2020-10-03 ENCOUNTER — HOSPITAL ENCOUNTER (OUTPATIENT)
Dept: ULTRASOUND IMAGING | Age: 80
Discharge: HOME | End: 2020-10-03
Payer: COMMERCIAL

## 2020-10-03 PROCEDURE — 76705 ECHO EXAM OF ABDOMEN: CPT

## 2020-10-19 RX ORDER — HYDROCODONE BITARTRATE AND ACETAMINOPHEN 5; 325 MG/1; MG/1
1 TABLET ORAL 2 TIMES DAILY PRN
Qty: 60 TABLET | Refills: 0 | Status: SHIPPED | OUTPATIENT
Start: 2020-10-19 | End: 2020-12-01 | Stop reason: SDUPTHER

## 2020-12-01 RX ORDER — HYDROCODONE BITARTRATE AND ACETAMINOPHEN 5; 325 MG/1; MG/1
1 TABLET ORAL 2 TIMES DAILY PRN
Qty: 60 TABLET | Refills: 0 | Status: SHIPPED | OUTPATIENT
Start: 2020-12-01 | End: 2021-01-08 | Stop reason: SDUPTHER

## 2021-01-08 DIAGNOSIS — M54.50 CHRONIC BILATERAL LOW BACK PAIN WITHOUT SCIATICA: ICD-10-CM

## 2021-01-08 DIAGNOSIS — G89.29 CHRONIC BILATERAL LOW BACK PAIN WITHOUT SCIATICA: ICD-10-CM

## 2021-01-08 RX ORDER — HYDROCODONE BITARTRATE AND ACETAMINOPHEN 5; 325 MG/1; MG/1
1 TABLET ORAL 2 TIMES DAILY PRN
Qty: 60 TABLET | Refills: 0 | Status: SHIPPED | OUTPATIENT
Start: 2021-01-08 | End: 2021-03-15 | Stop reason: SDUPTHER

## 2021-01-08 NOTE — TELEPHONE ENCOUNTER
----- Message from Ailyn Muñoz sent at 1/8/2021 11:12 AM EST -----  Contact: [phone]  Pt called requesting a refill on his HYDROcodone-acetaminophen (VICODIN) 5-500 MG per tablet     [phone]    Pharmacy: [address], [address] [place] [address] [phone] Cristi Stallings [phone]    Future appt- 3/8/2021  Past appt- 9/25/2020

## 2021-01-18 RX ORDER — FENOFIBRATE 160 MG/1
TABLET ORAL
Qty: 90 TABLET | Refills: 0 | Status: SHIPPED | OUTPATIENT
Start: 2021-01-18 | End: 2021-03-16

## 2021-01-18 RX ORDER — LISINOPRIL AND HYDROCHLOROTHIAZIDE 25; 20 MG/1; MG/1
TABLET ORAL
Qty: 90 TABLET | Refills: 0 | Status: SHIPPED | OUTPATIENT
Start: 2021-01-18 | End: 2021-03-16

## 2021-01-18 RX ORDER — ATORVASTATIN CALCIUM 10 MG/1
TABLET, FILM COATED ORAL
Qty: 90 TABLET | Refills: 0 | Status: SHIPPED | OUTPATIENT
Start: 2021-01-18 | End: 2021-03-16

## 2021-03-15 DIAGNOSIS — G89.29 CHRONIC BILATERAL LOW BACK PAIN WITHOUT SCIATICA: ICD-10-CM

## 2021-03-15 DIAGNOSIS — M54.50 CHRONIC BILATERAL LOW BACK PAIN WITHOUT SCIATICA: ICD-10-CM

## 2021-03-15 RX ORDER — HYDROCODONE BITARTRATE AND ACETAMINOPHEN 5; 325 MG/1; MG/1
1 TABLET ORAL 2 TIMES DAILY PRN
Qty: 60 TABLET | Refills: 0 | Status: SHIPPED | OUTPATIENT
Start: 2021-03-15 | End: 2021-04-28 | Stop reason: SDUPTHER

## 2021-03-15 NOTE — TELEPHONE ENCOUNTER
----- Message from Christiane Lara sent at 3/15/2021 12:06 PM EDT -----  Contact: [phone]  Refill    HYDROcodone-acetaminophen ([facility]) 5-325 MG per tablet      Brannon Jones [address] [place] [address] [address] [facility] [phone] - F [phone]

## 2021-03-16 RX ORDER — ATORVASTATIN CALCIUM 10 MG/1
TABLET, FILM COATED ORAL
Qty: 90 TABLET | Refills: 0 | Status: SHIPPED | OUTPATIENT
Start: 2021-03-16 | End: 2021-06-22 | Stop reason: SDUPTHER

## 2021-03-16 RX ORDER — FENOFIBRATE 160 MG/1
TABLET ORAL
Qty: 90 TABLET | Refills: 0 | Status: SHIPPED | OUTPATIENT
Start: 2021-03-16 | End: 2021-06-22 | Stop reason: SDUPTHER

## 2021-03-16 RX ORDER — LISINOPRIL AND HYDROCHLOROTHIAZIDE 25; 20 MG/1; MG/1
TABLET ORAL
Qty: 90 TABLET | Refills: 0 | Status: SHIPPED | OUTPATIENT
Start: 2021-03-16 | End: 2021-06-22 | Stop reason: SDUPTHER

## 2021-04-21 ENCOUNTER — TELEPHONE (OUTPATIENT)
Dept: INTERNAL MEDICINE CLINIC | Age: 81
End: 2021-04-21

## 2021-04-21 NOTE — TELEPHONE ENCOUNTER
----- Message from Edmundo Gaming sent at 4/21/2021 11:35 AM EDT -----  Contact: Debra Stevenson [phone]  Per Dr. Jonathan Martino do.  ----- Message -----  From: Radu Martinez  Sent: 4/21/2021   8:44 AM EDT  To: Josiah Peterson MD    Patient states he went to the [facility] and got a physical for his 's License renewal. He put on his medication list that he occasionally took prescribed Vicodin for back pain. He states they are now requiring a letter from the prescribing physician that it is ok for him to  drive while taking Vicodin.    Thank you

## 2021-04-26 RX ORDER — SILDENAFIL 100 MG/1
100 TABLET, FILM COATED ORAL PRN
Qty: 10 TABLET | Refills: 0 | Status: SHIPPED | OUTPATIENT
Start: 2021-04-26 | End: 2021-06-22 | Stop reason: SDUPTHER

## 2021-04-28 DIAGNOSIS — G89.29 CHRONIC BILATERAL LOW BACK PAIN WITHOUT SCIATICA: ICD-10-CM

## 2021-04-28 DIAGNOSIS — M54.50 CHRONIC BILATERAL LOW BACK PAIN WITHOUT SCIATICA: ICD-10-CM

## 2021-04-28 RX ORDER — HYDROCODONE BITARTRATE AND ACETAMINOPHEN 5; 325 MG/1; MG/1
1 TABLET ORAL 2 TIMES DAILY PRN
Qty: 60 TABLET | Refills: 0 | Status: SHIPPED | OUTPATIENT
Start: 2021-04-28 | End: 2021-06-17 | Stop reason: SDUPTHER

## 2021-04-28 NOTE — TELEPHONE ENCOUNTER
----- Message from Lennard Mcburney sent at 4/28/2021  9:47 AM EDT -----  Contact: [phone]  Pt called requesting a refill on his HYDROcodone-acetaminophen ([facility]) 5-325 MG per tablet    [phone]    Pharmacy: [place] [phone] [facility] [phone] (Ph: [phone])    Future appt-6/22/2021  Past appt- 9/25/2020

## 2021-06-17 DIAGNOSIS — M54.50 CHRONIC BILATERAL LOW BACK PAIN WITHOUT SCIATICA: ICD-10-CM

## 2021-06-17 DIAGNOSIS — G89.29 CHRONIC BILATERAL LOW BACK PAIN WITHOUT SCIATICA: ICD-10-CM

## 2021-06-17 RX ORDER — HYDROCODONE BITARTRATE AND ACETAMINOPHEN 5; 325 MG/1; MG/1
1 TABLET ORAL 2 TIMES DAILY PRN
Qty: 60 TABLET | Refills: 0 | Status: SHIPPED | OUTPATIENT
Start: 2021-06-17 | End: 2021-07-23 | Stop reason: SDUPTHER

## 2021-06-22 ENCOUNTER — OFFICE VISIT (OUTPATIENT)
Dept: INTERNAL MEDICINE CLINIC | Age: 81
End: 2021-06-22

## 2021-06-22 VITALS
BODY MASS INDEX: 23.1 KG/M2 | RESPIRATION RATE: 12 BRPM | WEIGHT: 180 LBS | HEART RATE: 70 BPM | SYSTOLIC BLOOD PRESSURE: 138 MMHG | DIASTOLIC BLOOD PRESSURE: 80 MMHG | HEIGHT: 74 IN

## 2021-06-22 DIAGNOSIS — I10 ESSENTIAL HYPERTENSION: ICD-10-CM

## 2021-06-22 DIAGNOSIS — E78.2 MIXED HYPERLIPIDEMIA: ICD-10-CM

## 2021-06-22 DIAGNOSIS — F32.1 CURRENT MODERATE EPISODE OF MAJOR DEPRESSIVE DISORDER WITHOUT PRIOR EPISODE (HCC): ICD-10-CM

## 2021-06-22 DIAGNOSIS — L71.9 ROSACEA: ICD-10-CM

## 2021-06-22 DIAGNOSIS — M54.50 CHRONIC BILATERAL LOW BACK PAIN WITHOUT SCIATICA: ICD-10-CM

## 2021-06-22 DIAGNOSIS — G89.29 CHRONIC BILATERAL LOW BACK PAIN WITHOUT SCIATICA: ICD-10-CM

## 2021-06-22 DIAGNOSIS — I10 ESSENTIAL HYPERTENSION: Primary | ICD-10-CM

## 2021-06-22 LAB
BASOPHILS ABSOLUTE: 0 K/UL (ref 0–0.2)
BASOPHILS RELATIVE PERCENT: 0.5 %
EOSINOPHILS ABSOLUTE: 0 K/UL (ref 0–0.6)
EOSINOPHILS RELATIVE PERCENT: 0.2 %
HCT VFR BLD CALC: 41.4 % (ref 40.5–52.5)
HEMOGLOBIN: 14.2 G/DL (ref 13.5–17.5)
LYMPHOCYTES ABSOLUTE: 1.1 K/UL (ref 1–5.1)
LYMPHOCYTES RELATIVE PERCENT: 14.7 %
MCH RBC QN AUTO: 31.6 PG (ref 26–34)
MCHC RBC AUTO-ENTMCNC: 34.4 G/DL (ref 31–36)
MCV RBC AUTO: 91.9 FL (ref 80–100)
MONOCYTES ABSOLUTE: 0.3 K/UL (ref 0–1.3)
MONOCYTES RELATIVE PERCENT: 4.5 %
NEUTROPHILS ABSOLUTE: 6 K/UL (ref 1.7–7.7)
NEUTROPHILS RELATIVE PERCENT: 80.1 %
PDW BLD-RTO: 13.7 % (ref 12.4–15.4)
PLATELET # BLD: 151 K/UL (ref 135–450)
PMV BLD AUTO: 9.6 FL (ref 5–10.5)
RBC # BLD: 4.5 M/UL (ref 4.2–5.9)
WBC # BLD: 7.5 K/UL (ref 4–11)

## 2021-06-22 PROCEDURE — 99214 OFFICE O/P EST MOD 30 MIN: CPT | Performed by: INTERNAL MEDICINE

## 2021-06-22 RX ORDER — VENLAFAXINE HYDROCHLORIDE 75 MG/1
75 CAPSULE, EXTENDED RELEASE ORAL DAILY
Qty: 30 CAPSULE | Refills: 1 | Status: SHIPPED | OUTPATIENT
Start: 2021-06-22 | End: 2021-07-23 | Stop reason: SDUPTHER

## 2021-06-22 RX ORDER — ATORVASTATIN CALCIUM 10 MG/1
TABLET, FILM COATED ORAL
Qty: 90 TABLET | Refills: 0 | Status: SHIPPED | OUTPATIENT
Start: 2021-06-22 | End: 2021-09-17 | Stop reason: SDUPTHER

## 2021-06-22 RX ORDER — LISINOPRIL AND HYDROCHLOROTHIAZIDE 25; 20 MG/1; MG/1
TABLET ORAL
Qty: 90 TABLET | Refills: 0 | Status: SHIPPED | OUTPATIENT
Start: 2021-06-22 | End: 2021-07-23 | Stop reason: SDUPTHER

## 2021-06-22 RX ORDER — FENOFIBRATE 160 MG/1
TABLET ORAL
Qty: 90 TABLET | Refills: 0 | Status: SHIPPED | OUTPATIENT
Start: 2021-06-22 | End: 2021-09-17 | Stop reason: SDUPTHER

## 2021-06-22 RX ORDER — SILDENAFIL 100 MG/1
100 TABLET, FILM COATED ORAL PRN
Qty: 10 TABLET | Refills: 0 | Status: SHIPPED | OUTPATIENT
Start: 2021-06-22 | End: 2021-07-23 | Stop reason: SDUPTHER

## 2021-06-22 ASSESSMENT — ENCOUNTER SYMPTOMS
RHINORRHEA: 0
NAUSEA: 0
BLOOD IN STOOL: 0
WHEEZING: 0
ABDOMINAL PAIN: 0
VOMITING: 0
COUGH: 0
BACK PAIN: 1
SHORTNESS OF BREATH: 0
CHEST TIGHTNESS: 0
DIARRHEA: 0

## 2021-06-22 NOTE — PROGRESS NOTES
Subjective:      Patient ID: Belia Gilbert is a 79 y.o. male. HPI    Patient is here for follow up on hypertension. Hyerplipidemia, CKD, rosacea and back pain. Patient's BP is controlled as he has not been taking his medication. No chest pain or dyspnea. No ER visits. His hyperlipidemia is usually well controlled but he has been without medication. His rosacea has been stable with metrogel. He uses it off and on. He has issues with back pain and takes vicodin prn. No constipation. He takes Viagra prn for ED    Review of Systems   Constitutional: Negative. Negative for activity change, appetite change, fatigue and fever. HENT: Negative for postnasal drip and rhinorrhea. Respiratory: Negative for cough, chest tightness, shortness of breath and wheezing. Cardiovascular: Negative for chest pain, palpitations and leg swelling. Gastrointestinal: Negative for abdominal pain, blood in stool, diarrhea, nausea and vomiting. Genitourinary: Negative for difficulty urinating and frequency. Musculoskeletal: Positive for back pain. Negative for joint swelling. Skin: Negative for rash. Neurological: Negative for light-headedness. Psychiatric/Behavioral: Positive for sleep disturbance. There are no changes to past medical history, family history, social history or review of systems(except as noted in the history section) since prior note (all reviewed with patient).       Current Outpatient Medications:     sildenafil (VIAGRA) 100 MG tablet, Take 1 tablet by mouth as needed for Erectile Dysfunction, Disp: 10 tablet, Rfl: 0    atorvastatin (LIPITOR) 10 MG tablet, TAKE ONE TABLET BY MOUTH DAILY, Disp: 90 tablet, Rfl: 0    fenofibrate (TRIGLIDE) 160 MG tablet, TAKE ONE TABLET BY MOUTH DAILY, Disp: 90 tablet, Rfl: 0    lisinopril-hydroCHLOROthiazide (PRINZIDE;ZESTORETIC) 20-25 MG per tablet, TAKE ONE TABLET BY MOUTH DAILY, Disp: 90 tablet, Rfl: 0    HYDROcodone-acetaminophen (NORCO) 5-325 MG per tablet, Take 1 tablet by mouth 2 times daily as needed for Pain for up to 30 days. , Disp: 60 tablet, Rfl: 0    sertraline (ZOLOFT) 50 MG tablet, Take 1 tablet by mouth nightly, Disp: 30 tablet, Rfl: 1    metroNIDAZOLE (METROGEL) 1 % gel, Apply one a day, Disp: 1 Tube, Rfl: 2    Multiple Vitamins-Minerals (MULTIVITAMIN PO), Take 1 tablet by mouth daily. , Disp: , Rfl:     Omega-3 Fatty Acids (FISH OIL PO), Take 1 tablet by mouth daily. , Disp: , Rfl:     Ascorbic Acid (VITAMIN C) 500 MG tablet, Take 1,000 mg by mouth daily. , Disp: , Rfl:     /80 (Site: Right Upper Arm, Position: Sitting, Cuff Size: Medium Adult)   Pulse 70   Resp 12   Ht 6' 2\" (1.88 m)   Wt 180 lb (81.6 kg)   BMI 23.11 kg/m²     Objective:   Physical Exam  Constitutional:       Appearance: He is well-developed. HENT:      Head: Normocephalic and atraumatic. Eyes:      Pupils: Pupils are equal, round, and reactive to light. Neck:      Thyroid: No thyromegaly. Vascular: Carotid bruit present. Cardiovascular:      Rate and Rhythm: Normal rate and regular rhythm. Pulses:           Carotid pulses are on the right side with bruit and on the left side with bruit. Heart sounds: Murmur heard. No friction rub. No gallop. Comments: No carotid bruit  Pulmonary:      Effort: Pulmonary effort is normal. No respiratory distress. Breath sounds: Wheezing present. No rales. Chest:      Chest wall: No tenderness. Abdominal:      General: Bowel sounds are normal. There is no distension. Palpations: Abdomen is soft. There is no mass. Tenderness: There is no abdominal tenderness. There is no guarding or rebound. Musculoskeletal:      Cervical back: Normal range of motion and neck supple. Neurological:      Mental Status: He is alert and oriented to person, place, and time.        ECHO done on 5/18/16   Conclusions      Summary   Normal left ventricle size, wall thickness and systolic function with

## 2021-06-23 LAB
A/G RATIO: 1.8 (ref 1.1–2.2)
ALBUMIN SERPL-MCNC: 4.5 G/DL (ref 3.4–5)
ALP BLD-CCNC: 134 U/L (ref 40–129)
ALT SERPL-CCNC: 7 U/L (ref 10–40)
ANION GAP SERPL CALCULATED.3IONS-SCNC: 13 MMOL/L (ref 3–16)
AST SERPL-CCNC: 17 U/L (ref 15–37)
BILIRUB SERPL-MCNC: 0.8 MG/DL (ref 0–1)
BUN BLDV-MCNC: 18 MG/DL (ref 7–20)
CALCIUM SERPL-MCNC: 9.6 MG/DL (ref 8.3–10.6)
CHLORIDE BLD-SCNC: 102 MMOL/L (ref 99–110)
CO2: 27 MMOL/L (ref 21–32)
CREAT SERPL-MCNC: 1.2 MG/DL (ref 0.8–1.3)
GFR AFRICAN AMERICAN: >60
GFR NON-AFRICAN AMERICAN: 58
GLOBULIN: 2.5 G/DL
GLUCOSE BLD-MCNC: 109 MG/DL (ref 70–99)
POTASSIUM SERPL-SCNC: 4.5 MMOL/L (ref 3.5–5.1)
SODIUM BLD-SCNC: 142 MMOL/L (ref 136–145)
TOTAL PROTEIN: 7 G/DL (ref 6.4–8.2)
URIC ACID, SERUM: 6 MG/DL (ref 3.5–7.2)

## 2021-07-23 DIAGNOSIS — G89.29 CHRONIC BILATERAL LOW BACK PAIN WITHOUT SCIATICA: ICD-10-CM

## 2021-07-23 DIAGNOSIS — M54.50 CHRONIC BILATERAL LOW BACK PAIN WITHOUT SCIATICA: ICD-10-CM

## 2021-07-23 RX ORDER — SILDENAFIL 100 MG/1
100 TABLET, FILM COATED ORAL PRN
Qty: 10 TABLET | Refills: 0 | Status: SHIPPED | OUTPATIENT
Start: 2021-07-23 | End: 2021-09-17 | Stop reason: SDUPTHER

## 2021-07-23 RX ORDER — LISINOPRIL AND HYDROCHLOROTHIAZIDE 25; 20 MG/1; MG/1
TABLET ORAL
Qty: 90 TABLET | Refills: 0 | Status: SHIPPED | OUTPATIENT
Start: 2021-07-23 | End: 2021-09-17 | Stop reason: SDUPTHER

## 2021-07-23 RX ORDER — HYDROCODONE BITARTRATE AND ACETAMINOPHEN 5; 325 MG/1; MG/1
1 TABLET ORAL 2 TIMES DAILY PRN
Qty: 60 TABLET | Refills: 0 | Status: SHIPPED | OUTPATIENT
Start: 2021-07-23 | End: 2021-09-17 | Stop reason: SDUPTHER

## 2021-07-23 RX ORDER — VENLAFAXINE HYDROCHLORIDE 75 MG/1
75 CAPSULE, EXTENDED RELEASE ORAL DAILY
Qty: 30 CAPSULE | Refills: 1 | Status: SHIPPED | OUTPATIENT
Start: 2021-07-23 | End: 2021-09-17

## 2021-07-26 ENCOUNTER — TELEPHONE (OUTPATIENT)
Dept: INTERNAL MEDICINE CLINIC | Age: 81
End: 2021-07-26

## 2021-07-26 NOTE — TELEPHONE ENCOUNTER
----- Message from Prosper Ayoub MD sent at 7/26/2021 10:24 AM EDT -----  yes  ----- Message -----  From: Manuel Carrero  Sent: 7/23/2021  10:23 AM EDT  To: Prosper Ayoub MD    Pt wanting to know if he should be taking Lipitor? Please advise.

## 2021-09-17 ENCOUNTER — OFFICE VISIT (OUTPATIENT)
Dept: INTERNAL MEDICINE CLINIC | Age: 81
End: 2021-09-17

## 2021-09-17 VITALS
WEIGHT: 175 LBS | HEIGHT: 74 IN | BODY MASS INDEX: 22.46 KG/M2 | SYSTOLIC BLOOD PRESSURE: 130 MMHG | DIASTOLIC BLOOD PRESSURE: 80 MMHG | HEART RATE: 70 BPM | RESPIRATION RATE: 12 BRPM

## 2021-09-17 DIAGNOSIS — G89.29 CHRONIC BILATERAL LOW BACK PAIN WITHOUT SCIATICA: ICD-10-CM

## 2021-09-17 DIAGNOSIS — Z23 NEED FOR INFLUENZA VACCINATION: ICD-10-CM

## 2021-09-17 DIAGNOSIS — I10 ESSENTIAL HYPERTENSION: Primary | ICD-10-CM

## 2021-09-17 DIAGNOSIS — M54.50 CHRONIC BILATERAL LOW BACK PAIN WITHOUT SCIATICA: ICD-10-CM

## 2021-09-17 DIAGNOSIS — L71.9 ROSACEA: ICD-10-CM

## 2021-09-17 DIAGNOSIS — N52.9 ERECTILE DYSFUNCTION, UNSPECIFIED ERECTILE DYSFUNCTION TYPE: ICD-10-CM

## 2021-09-17 DIAGNOSIS — R09.89 BILATERAL CAROTID BRUITS: ICD-10-CM

## 2021-09-17 DIAGNOSIS — E78.2 MIXED HYPERLIPIDEMIA: ICD-10-CM

## 2021-09-17 PROCEDURE — 90662 IIV NO PRSV INCREASED AG IM: CPT | Performed by: INTERNAL MEDICINE

## 2021-09-17 PROCEDURE — 99214 OFFICE O/P EST MOD 30 MIN: CPT | Performed by: INTERNAL MEDICINE

## 2021-09-17 PROCEDURE — G0008 ADMIN INFLUENZA VIRUS VAC: HCPCS | Performed by: INTERNAL MEDICINE

## 2021-09-17 RX ORDER — FENOFIBRATE 160 MG/1
TABLET ORAL
Qty: 90 TABLET | Refills: 0 | Status: SHIPPED | OUTPATIENT
Start: 2021-09-17 | End: 2022-01-05 | Stop reason: SDUPTHER

## 2021-09-17 RX ORDER — ATORVASTATIN CALCIUM 10 MG/1
TABLET, FILM COATED ORAL
Qty: 90 TABLET | Refills: 0 | Status: SHIPPED
Start: 2021-09-17 | End: 2021-09-20 | Stop reason: DRUGHIGH

## 2021-09-17 RX ORDER — HYDROCODONE BITARTRATE AND ACETAMINOPHEN 5; 325 MG/1; MG/1
1 TABLET ORAL 2 TIMES DAILY PRN
Qty: 60 TABLET | Refills: 0 | Status: SHIPPED | OUTPATIENT
Start: 2021-09-17 | End: 2021-12-01 | Stop reason: SDUPTHER

## 2021-09-17 RX ORDER — SILDENAFIL 100 MG/1
100 TABLET, FILM COATED ORAL PRN
Qty: 10 TABLET | Refills: 0 | Status: SHIPPED | OUTPATIENT
Start: 2021-09-17 | End: 2021-12-01 | Stop reason: SDUPTHER

## 2021-09-17 RX ORDER — LISINOPRIL AND HYDROCHLOROTHIAZIDE 25; 20 MG/1; MG/1
TABLET ORAL
Qty: 90 TABLET | Refills: 0 | Status: SHIPPED | OUTPATIENT
Start: 2021-09-17 | End: 2022-01-05 | Stop reason: SDUPTHER

## 2021-09-17 ASSESSMENT — ENCOUNTER SYMPTOMS
DIARRHEA: 0
BACK PAIN: 1
NAUSEA: 0
COUGH: 0
BLOOD IN STOOL: 0
WHEEZING: 0
CHEST TIGHTNESS: 0
ABDOMINAL PAIN: 0
RHINORRHEA: 0
SHORTNESS OF BREATH: 0
VOMITING: 0

## 2021-09-17 NOTE — PROGRESS NOTES
Subjective:      Patient ID: Gama Fox is a 79 y.o. male. HPI    Patient is here for follow up on hypertension. Hyerplipidemia, CKD, rosacea and back pain. Patient's BP is controlled as he has not been taking his medication. No chest pain or dyspnea. No ER visits. His hyperlipidemia is usually well controlled but he has been without medication. His rosacea has been stable with metrogel. He uses it off and on. He has issues with back pain and takes vicodin prn. No constipation. He takes Viagra prn for ED. It is not working well. He wants to try injections. Review of Systems   Constitutional: Negative. Negative for activity change, appetite change, fatigue and fever. HENT: Negative for postnasal drip and rhinorrhea. Respiratory: Negative for cough, chest tightness, shortness of breath and wheezing. Cardiovascular: Negative for chest pain, palpitations and leg swelling. Gastrointestinal: Negative for abdominal pain, blood in stool, diarrhea, nausea and vomiting. Genitourinary: Negative for difficulty urinating and frequency. Musculoskeletal: Positive for back pain. Negative for joint swelling. Skin: Negative for rash. Neurological: Negative for light-headedness. Psychiatric/Behavioral: Positive for sleep disturbance. There are no changes to past medical history, family history, social history or review of systems(except as noted in the history section) since prior note (all reviewed with patient). Current Outpatient Medications:     HYDROcodone-acetaminophen (NORCO) 5-325 MG per tablet, Take 1 tablet by mouth 2 times daily as needed for Pain for up to 30 days. , Disp: 60 tablet, Rfl: 0    sildenafil (VIAGRA) 100 MG tablet, Take 1 tablet by mouth as needed for Erectile Dysfunction, Disp: 10 tablet, Rfl: 0    venlafaxine (EFFEXOR XR) 75 MG extended release capsule, Take 1 capsule by mouth daily, Disp: 30 capsule, Rfl: 1    lisinopril-hydroCHLOROthiazide alert and oriented to person, place, and time. ECHO done on 5/18/16   Conclusions      Summary   Normal left ventricle size, wall thickness and systolic function with an   estimated ejection fraction of 55%.   No regional wall motion abnormalities are seen.   Diastolic filling parameters suggests grade I diastolic dysfunction.   Aortic valve leaflets appear mildly thickened.   Mild aortic regurgitation. Carotid US done 5/18/16      Summary        1. There is minimal plaque seen in the right internal carotid artery with an    estimated diameter reduction of <50%.    2. There is minimal plaque seen in the left internal carotid artery with an    estimated diameter reduction of <50%.    3. The vertebral arteries are patent with antegrade flow bilaterally. Assessment:       Diagnosis Orders   1. Essential hypertension     2. Chronic bilateral low back pain without sciatica  HYDROcodone-acetaminophen (NORCO) 5-325 MG per tablet   3. Erectile dysfunction, unspecified erectile dysfunction type  AFL - Kb Tirado MD, Urology, [facility]   4. Mixed hyperlipidemia  Lipid Panel   5. Rosacea     6. Need for influenza vaccination  INFLUENZA, HIGH-DOSE, QUADV, 65 YRS +, IM, PF, 0.7ML (FLUZONE)   7. Bilateral carotid bruits              Plan:        Bp is controlled. Continue current meds. He has mild CKD. Hyperlipidemia- at goal.   Rosacea- stable on metrogel. Back pain-on vicodin prn. Carotid bruit: US is negative. Mild aortic regurgitation monitor for now. Depression. Stable. Stop Effexor. Doing ok without it. Decrease calorie intake. Exercise,weight loss recommended. The current medical regimen is effective;  continue present plan and medications. See orders.

## 2021-09-20 ENCOUNTER — TELEPHONE (OUTPATIENT)
Dept: INTERNAL MEDICINE CLINIC | Age: 81
End: 2021-09-20

## 2021-09-20 RX ORDER — ATORVASTATIN CALCIUM 40 MG/1
40 TABLET, FILM COATED ORAL DAILY
Qty: 30 TABLET | Refills: 1 | Status: SHIPPED | OUTPATIENT
Start: 2021-09-20 | End: 2022-01-05 | Stop reason: SDUPTHER

## 2021-09-20 NOTE — TELEPHONE ENCOUNTER
----- Message from Luis Oleary MD sent at 9/19/2021  7:55 PM EDT -----  Increase Lipitor to 40 daily check liver and lipids in 6 weeks

## 2021-12-01 DIAGNOSIS — M54.50 CHRONIC BILATERAL LOW BACK PAIN WITHOUT SCIATICA: ICD-10-CM

## 2021-12-01 DIAGNOSIS — G89.29 CHRONIC BILATERAL LOW BACK PAIN WITHOUT SCIATICA: ICD-10-CM

## 2021-12-01 RX ORDER — SILDENAFIL 100 MG/1
100 TABLET, FILM COATED ORAL PRN
Qty: 10 TABLET | Refills: 1 | Status: SHIPPED | OUTPATIENT
Start: 2021-12-01 | End: 2022-01-05 | Stop reason: SDUPTHER

## 2021-12-01 RX ORDER — HYDROCODONE BITARTRATE AND ACETAMINOPHEN 5; 325 MG/1; MG/1
1 TABLET ORAL 2 TIMES DAILY PRN
Qty: 60 TABLET | Refills: 0 | Status: SHIPPED | OUTPATIENT
Start: 2021-12-01 | End: 2022-01-05 | Stop reason: SDUPTHER

## 2022-01-05 ENCOUNTER — OFFICE VISIT (OUTPATIENT)
Dept: INTERNAL MEDICINE CLINIC | Age: 82
End: 2022-01-05

## 2022-01-05 VITALS
BODY MASS INDEX: 23.49 KG/M2 | SYSTOLIC BLOOD PRESSURE: 130 MMHG | WEIGHT: 183 LBS | HEIGHT: 74 IN | HEART RATE: 70 BPM | DIASTOLIC BLOOD PRESSURE: 80 MMHG | RESPIRATION RATE: 12 BRPM

## 2022-01-05 DIAGNOSIS — E78.2 MIXED HYPERLIPIDEMIA: ICD-10-CM

## 2022-01-05 DIAGNOSIS — Z71.89 ACP (ADVANCE CARE PLANNING): ICD-10-CM

## 2022-01-05 DIAGNOSIS — Z00.00 ROUTINE GENERAL MEDICAL EXAMINATION AT A HEALTH CARE FACILITY: ICD-10-CM

## 2022-01-05 DIAGNOSIS — I35.1 NONRHEUMATIC AORTIC VALVE INSUFFICIENCY: ICD-10-CM

## 2022-01-05 DIAGNOSIS — N40.1 ENLARGED PROSTATE WITH URINARY OBSTRUCTION: ICD-10-CM

## 2022-01-05 DIAGNOSIS — F32.1 CURRENT MODERATE EPISODE OF MAJOR DEPRESSIVE DISORDER WITHOUT PRIOR EPISODE (HCC): ICD-10-CM

## 2022-01-05 DIAGNOSIS — N13.8 ENLARGED PROSTATE WITH URINARY OBSTRUCTION: ICD-10-CM

## 2022-01-05 DIAGNOSIS — M54.50 CHRONIC BILATERAL LOW BACK PAIN WITHOUT SCIATICA: ICD-10-CM

## 2022-01-05 DIAGNOSIS — Z00.00 MEDICARE ANNUAL WELLNESS VISIT, SUBSEQUENT: Primary | ICD-10-CM

## 2022-01-05 DIAGNOSIS — G89.29 CHRONIC BILATERAL LOW BACK PAIN WITHOUT SCIATICA: ICD-10-CM

## 2022-01-05 DIAGNOSIS — I10 PRIMARY HYPERTENSION: ICD-10-CM

## 2022-01-05 DIAGNOSIS — L71.9 ROSACEA: ICD-10-CM

## 2022-01-05 PROCEDURE — 99497 ADVNCD CARE PLAN 30 MIN: CPT | Performed by: INTERNAL MEDICINE

## 2022-01-05 PROCEDURE — G0439 PPPS, SUBSEQ VISIT: HCPCS | Performed by: INTERNAL MEDICINE

## 2022-01-05 RX ORDER — HYDROCODONE BITARTRATE AND ACETAMINOPHEN 5; 325 MG/1; MG/1
1 TABLET ORAL 2 TIMES DAILY PRN
Qty: 60 TABLET | Refills: 0 | Status: SHIPPED | OUTPATIENT
Start: 2022-01-05 | End: 2022-01-11 | Stop reason: SDUPTHER

## 2022-01-05 RX ORDER — FENOFIBRATE 160 MG/1
TABLET ORAL
Qty: 90 TABLET | Refills: 0 | Status: SHIPPED | OUTPATIENT
Start: 2022-01-05 | End: 2022-07-28 | Stop reason: SDUPTHER

## 2022-01-05 RX ORDER — LISINOPRIL AND HYDROCHLOROTHIAZIDE 25; 20 MG/1; MG/1
TABLET ORAL
Qty: 90 TABLET | Refills: 0 | Status: SHIPPED | OUTPATIENT
Start: 2022-01-05 | End: 2022-06-06

## 2022-01-05 RX ORDER — ATORVASTATIN CALCIUM 40 MG/1
40 TABLET, FILM COATED ORAL DAILY
Qty: 90 TABLET | Refills: 0 | Status: ON HOLD
Start: 2022-01-05 | End: 2022-07-08 | Stop reason: HOSPADM

## 2022-01-05 RX ORDER — SILDENAFIL 100 MG/1
100 TABLET, FILM COATED ORAL PRN
Qty: 10 TABLET | Refills: 0 | Status: SHIPPED | OUTPATIENT
Start: 2022-01-05 | End: 2022-03-25 | Stop reason: SDUPTHER

## 2022-01-05 ASSESSMENT — PATIENT HEALTH QUESTIONNAIRE - PHQ9
SUM OF ALL RESPONSES TO PHQ9 QUESTIONS 1 & 2: 0
2. FEELING DOWN, DEPRESSED OR HOPELESS: 1
2. FEELING DOWN, DEPRESSED OR HOPELESS: 0
SUM OF ALL RESPONSES TO PHQ QUESTIONS 1-9: 2
SUM OF ALL RESPONSES TO PHQ QUESTIONS 1-9: 0
SUM OF ALL RESPONSES TO PHQ QUESTIONS 1-9: 0
1. LITTLE INTEREST OR PLEASURE IN DOING THINGS: 0
SUM OF ALL RESPONSES TO PHQ QUESTIONS 1-9: 2
SUM OF ALL RESPONSES TO PHQ9 QUESTIONS 1 & 2: 2
SUM OF ALL RESPONSES TO PHQ QUESTIONS 1-9: 2
SUM OF ALL RESPONSES TO PHQ QUESTIONS 1-9: 2
SUM OF ALL RESPONSES TO PHQ QUESTIONS 1-9: 0
1. LITTLE INTEREST OR PLEASURE IN DOING THINGS: 1
SUM OF ALL RESPONSES TO PHQ QUESTIONS 1-9: 0

## 2022-01-05 ASSESSMENT — LIFESTYLE VARIABLES: HOW OFTEN DO YOU HAVE A DRINK CONTAINING ALCOHOL: 0

## 2022-01-05 NOTE — PROGRESS NOTES
Medicare Annual Wellness Visit  Name: Ángel Figueroa Date: 2022   MRN: [id number] Sex: Male   Age: 79 y.o. Ethnicity: Non- / Non    : 1940 Race: White (non-)      Lisandra Santana is here for Medicare AWV    Screenings for behavioral, psychosocial and functional/safety risks, and cognitive dysfunction are all negative except as indicated below. These results, as well as other patient data from the [address] [facility] [address] form, are documented in Flowsheets linked to this Encounter. He is doing well. He has chronic back pain. He takes a pain med prn. No constipation. He has HTN. It is controlled. He has HLD. He takes Lipitor. He takes Zoloft for depression. No Known Allergies      Prior to Visit Medications    Medication Sig Taking? Authorizing Provider   sertraline (ZOLOFT) 50 MG tablet Take 1 tablet by mouth nightly Yes Tanvir Perry MD   fenofibrate (TRIGLIDE) 160 MG tablet TAKE ONE TABLET BY MOUTH DAILY Yes Tanvir Perry MD   lisinopril-hydroCHLOROthiazide (PRINZIDE;ZESTORETIC) 20-25 MG per tablet TAKE ONE TABLET BY MOUTH DAILY Yes Tanvir Perry MD   atorvastatin (LIPITOR) 40 MG tablet Take 1 tablet by mouth daily Yes Tanvir Perry MD   sildenafil (VIAGRA) 100 MG tablet Take 1 tablet by mouth as needed for Erectile Dysfunction Yes Tanvir Perry MD   HYDROcodone-acetaminophen (NORCO) 5-325 MG per tablet Take 1 tablet by mouth 2 times daily as needed for Pain for up to 30 days. Yes Tanvir Perry MD   metroNIDAZOLE (METROGEL) 1 % gel Apply one a day  Tanvir Perry MD   Multiple Vitamins-Minerals (MULTIVITAMIN PO) Take 1 tablet by mouth daily. Historical Provider, MD   Omega-3 Fatty Acids (FISH OIL PO) Take 1 tablet by mouth daily. Historical Provider, MD   Ascorbic Acid (VITAMIN C) 500 MG tablet Take 1,000 mg by mouth daily.   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    Backpain     Bilateral carotid bruits 9/6/2016    Chronic kidney disease 9/6/2016    [facility] of [facility] 1/23/2019    Hyperlipidemia     Hypertension     Hypertrophy of prostate without urinary obstruction and other lower urinary tract symptoms (LUTS)     Nonrheumatic aortic valve insufficiency 9/6/2016    Rosacea     Taste perversion 8/19/2011       Past Surgical History:   Procedure Laterality Date    COLONOSCOPY  2/11/2008    COLONOSCOPY  11/14/2014    HERNIA REPAIR           Family History   Problem Relation Age of Onset    COPD Sister     COPD Brother         Birth defect       CareTeam (Including outside providers/suppliers regularly involved in providing care):   Patient Care Team:  Alysha Ruiz MD as PCP - General (Internal Medicine)  Alysha Ruiz MD as PCP - [facility]    Wt Readings from Last 3 Encounters:   01/05/22 183 lb (83 kg)   09/17/21 175 lb (79.4 kg)   06/22/21 180 lb (81.6 kg)     Vitals:    01/05/22 1621   BP: 130/80   Site: Left Upper Arm   Position: Sitting   Cuff Size: Medium Adult   Pulse: 70   Resp: 12   Weight: 183 lb (83 kg)   Height: 6' 2\" (1.88 m)     Body mass index is 23.5 kg/m². Based upon direct observation of the patient, evaluation of cognition reveals recent and remote memory intact.     General Appearance: alert and oriented to person, place and time, well developed and well- nourished, in no acute distress  Skin: warm and dry, no rash or erythema  Head: normocephalic and atraumatic  Eyes: pupils equal, round, and reactive to light, extraocular eye movements intact, conjunctivae normal  ENT: tympanic membrane, external ear and ear canal normal bilaterally, nose without deformity, nasal mucosa and turbinates normal without polyps  Neck: supple and non-tender without mass, no thyromegaly or thyroid nodules, no cervical lymphadenopathy  Pulmonary/Chest: clear to auscultation bilaterally- no wheezes, rales or rhonchi, normal air movement, no respiratory distress  Cardiovascular: normal rate, regular rhythm, normal S1 and S2, EDM aortic area, no rubs, clicks, or gallops, distal pulses intact, no carotid bruits  Abdomen: soft, non-tender, non-distended, normal bowel sounds, no masses or organomegaly  Extremities: no cyanosis, clubbing or edema  Musculoskeletal: normal range of motion, no joint swelling, deformity or tenderness  Neurologic: reflexes normal and symmetric, no cranial nerve deficit, gait, coordination and speech normal    Patient's complete Health Risk Assessment and screening values have been reviewed and are found in Flowsheets. The following problems were reviewed today and where indicated follow up appointments were made and/or referrals ordered. Positive Risk Factor Screenings with Interventions:             Opioid Risk: (Low risk score <55)  Opioid risk score: 5    Patient is low risk for opioid use disorder or overdose. Click here to Document Controlled Substance Monitoring. Last PDMP Jb Preston as Reviewed:  Review User Review Instant Review Result   Cristina Padilla 2022  4:35 PM Reviewed PDMP [1]           General Health and ACP:  General  In general, how would you say your health is?: Very Good  In the past 7 days, have you experienced any of the following?  New or Increased Pain, New or Increased Fatigue, Loneliness, Social Isolation, Stress or Anger?: (!) Loneliness,Social Isolation  Do you get the social and emotional support that you need?: (!) No  Do you have a Living Will?: (!) No  Advance Directives     Power of  Living Will ACP-Advance Directive ACP-Power of     Not on File Not on File Not on File Not on File      General Health Risk Interventions:  · Social isolation: he feels lonely after his wife   · No Living Will: Patient referred to [place] Habits/Nutrition:  Health Habits/Nutrition  Do you exercise for at least 20 minutes 2-3 times per week?: Yes  Have you lost any weight without trying in the past 3 months?: No  Do you eat only one meal per day?: (!) Yes  Have you seen the dentist within the past year?: N/A - wear dentures  Body mass index: 23.49  Health Habits/Nutrition Interventions:  · Nutritional issues:  educational materials for healthy, well-balanced diet provided    Hearing/Vision:  No exam data present  Hearing/Vision  Do you or your family notice any trouble with your hearing that hasn't been managed with hearing aids?: (!) Yes  Do you have difficulty driving, watching TV, or doing any of your daily activities because of your eyesight?: No  Have you had an eye exam within the past year?: Yes  Hearing/Vision Interventions:  · Hearing concerns:  patient declines any further evaluation/treatment for hearing issues    Safety:  Safety  Do you have working smoke detectors?: (!) No  Have all throw rugs been removed or fastened?: Yes  Do you have non-slip mats or surfaces in all bathtubs/showers?: (!) No  Do all of your stairways have a railing or banister?: Yes  Are your doorways, halls and stairs free of clutter?: Yes  Do you always fasten your seatbelt when you are in a car?: (!) No  Safety Interventions:  · Home safety tips provided       Personalized Preventive Plan   Current Health Maintenance Status  Immunization History   Administered Date(s) Administered    COVID-19, Reed Peter, PF, 30mcg/0.3mL 03/04/2021, 03/25/2021    Influenza 08/27/2012, 10/30/2013    Influenza Virus Vaccine 11/17/2014, 09/16/2015    Influenza, High Dose (Fluzone 65 yrs and older) 09/06/2016    Influenza, High-dose, Quadv, 65 yrs +, IM (Fluzone) 09/17/2021    Pneumococcal Conjugate 13-valent ([id number]) 05/03/2016    Pneumococcal Polysaccharide ([id number]) 08/27/2012        Health Maintenance   Topic Date Due    Depression Monitoring  Never done    DTaP/Tdap/Td vaccine (1 - Tdap) Never done    Shingles Vaccine (1 of 2) Never done   [employer] Visit (AWV)  Never done    COVID-19 Vaccine (3 - Booster for Pfizer series) 09/25/2021    Potassium monitoring  06/22/2022    Creatinine monitoring  06/22/2022    Lipid screen  09/17/2022    Flu vaccine  Completed    Pneumococcal 65+ years Vaccine  Completed    Hepatitis A vaccine  Aged Out    Hepatitis B vaccine  Aged Out    Hib vaccine  Aged Out    Meningococcal (ACWY) vaccine  Aged Out     Recommendations for [employer] Due: see orders and patient instructions/AVS.  . Recommended screening schedule for the next 5-10 years is provided to the patient in written form: see Patient Instructions/AVS.    Isidro Elaine was seen today for medicare awv. Diagnoses and all orders for this visit:    Medicare annual wellness visit, subsequent    Chronic bilateral low back pain without sciatica  -     HYDROcodone-acetaminophen (NORCO) 5-325 MG per tablet; Take 1 tablet by mouth 2 times daily as needed for Pain for up to 30 days. Current moderate episode of major depressive disorder without prior episode ([facility]    Primary hypertension    Enlarged prostate with urinary obstruction    Mixed hyperlipidemia  -     Comprehensive Metabolic Panel; Future  -     CBC Auto Differential; Future  -     Uric Acid; Future    Nonrheumatic aortic valve insufficiency    Rosacea    ACP (advance care planning)  -     [place] ADVANCED CARE PLAN FACE TO FACE, 1ST 30MIN [id number]    Routine general medical examination at a health care facility    Other orders  -     sertraline (ZOLOFT) 50 MG tablet; Take 1 tablet by mouth nightly  -     fenofibrate (TRIGLIDE) 160 MG tablet; TAKE ONE TABLET BY MOUTH DAILY  -     lisinopril-hydroCHLOROthiazide (PRINZIDE;ZESTORETIC) 20-25 MG per tablet; TAKE ONE TABLET BY MOUTH DAILY  -     atorvastatin (LIPITOR) 40 MG tablet; Take 1 tablet by mouth daily  -     sildenafil (VIAGRA) 100 MG tablet;  Take 1 tablet by mouth as needed for Erectile Dysfunction                 Advance Care Planning   Advanced Care Planning: Discussed the patients choices for care and treatment in case of a health event that adversely affects decision-making abilities. Also discussed the patients long-term treatment options. Reviewed with the patient the [address] [place] [address] Declaration forms  Reviewed the process of designating a competent adult as an Agent (or -in-fact) that could take make health care decisions for the patient if incompetent. Patient was asked to complete the declaration forms, either acknowledge the forms by a public notary or an eligible witness and provide a signed copy to the practice office.   Time spent (minutes): 3

## 2022-01-05 NOTE — PATIENT INSTRUCTIONS
Advance Directives: Care Instructions  Overview  An advance directive is a legal way to state your wishes at the end of your life. It tells your family and your doctor what to do if you can't say what you want. There are two main types of advance directives. You can change them any time your wishes change. Living will. This form tells your family and your doctor your wishes about life support and other treatment. The form is also called a declaration. Medical power of . This form lets you name a person to make treatment decisions for you when you can't speak for yourself. This person is called a health care agent (health care proxy, health care surrogate). The form is also called a durable power of  for health care. If you do not have an advance directive, decisions about your medical care may be made by a family member, or by a doctor or a  who doesn't know you. It may help to think of an advance directive as a gift to the people who care for you. If you have one, they won't have to make tough decisions by themselves. Follow-up care is a key part of your treatment and safety. Be sure to make and go to all appointments, and call your doctor if you are having problems. It's also a good idea to know your test results and keep a list of the medicines you take. What should you include in an advance directive? Many states have a unique advance directive form. (It may ask you to address specific issues.) Or you might use a universal form that's approved by many states. If your form doesn't tell you what to address, it may be hard to know what to include in your advance directive. Use the questions below to help you get started. · Who do you want to make decisions about your medical care if you are not able to? · What life-support measures do you want if you have a serious illness that gets worse over time or can't be cured? · What are you most afraid of that might happen?  (Maybe you're afraid of having pain, losing your independence, or being kept alive by machines.)  · Where would you prefer to die? (Your home? A hospital? A nursing home?)  · Do you want to donate your organs when you die? · Do you want certain Rastafarian practices performed before you die? When should you call for help? Be sure to contact your doctor if you have any questions. Where can you learn more? Go to [email or web address] and sign in to your [employer] account. Enter R264 in the [employer] box to learn more about \"Advance Directives: Care Instructions. \"     If you do not have an account, please click on the \"Sign Up Now\" link. Current as of: March 17, 2021               Content Version: 13.1  © [id number] [employer]. Care instructions adapted under license by [facility] ([facility]). If you have questions about a medical condition or this instruction, always ask your healthcare professional. [place] [address] any warranty or liability for your use of this information. Learning About Medical Power of   What is a medical power of ? A medical power of , also called a durable power of  for health care, is one type of the legal forms called advance directives. It lets you name the person you want to make treatment decisions for you if you can't speak or decide for yourself. The person you choose is called your health care agent. This person is also called a health care proxy or health care surrogate. A medical power of  may be called something else in your state. How do you choose a health care agent? Choose your health care agent carefully. This person may or may not be a family member. Talk to the person before you make your final decision. Make sure he or she is comfortable with this responsibility. It's a good idea to choose someone who:  · Is at least 25years old.   · Knows you well and understands what makes life meaningful for you. · Understands your Gnosticism and moral values. · Will do what you want, not what he or she wants. · Will be able to make difficult choices at a stressful time. · Will be able to refuse or stop treatment, if that is what you would want, even if you could die. · Will be firm and confident with health professionals if needed. · Will ask questions to get needed information. · Lives near you or agrees to travel to you if needed. Your family may help you make medical decisions while you can still be part of that process. But it's important to choose one person to be your health care agent in case you aren't able to make decisions for yourself. If you don't fill out the legal form and name a health care agent, the decisions your family can make may be limited. A health care agent may be called something else in your state. Who will make decisions for you if you don't have a health care agent? If you don't have a health care agent or a living will, you may not get the care you want. Decisions may be made by family members who disagree about your medical care. Or decisions may be made by a medical professional who doesn't know you well. In some cases, a  makes the decisions. When you name a health care agent, it is very clear who has the power to make health decisions for you. How do you name a health care agent? You name your health care agent on a legal form. This form is usually called a medical power of . Ask your hospital, state bar association, or office on aging where to find these forms. You must sign the form to make it legal. Some states require you to get the form notarized. This means that a person called a  watches you sign the form and then he or she signs the form. Some states also require that two or more witnesses sign the form. Be sure to tell your family members and doctors who your health care agent is.   Where can you learn will.  · Some states may limit your right to refuse treatment in certain cases. For example, you may need to clearly state in your living will that you don't want artificial hydration and nutrition, such as being fed through a tube. Is a living will a legal document? A living will is a legal document. Each state has its own laws about living little. And a living will may be called something else in your state. Here are some things to know about living little. · You don't need an  to complete a living will. But legal advice can be helpful if your state's laws are unclear. It can also help if your health history is complicated or your family can't agree on what should be in your living will. · You can change your living will at any time. Some people find that their wishes about end-of-life care change as their health changes. If you make big changes to your living will, complete a new form. · If you move to another state, make sure that your living will is legal in the state where you now live. In most cases, doctors will respect your wishes even if you have a form from a different state. · You might use a universal form that has been approved by many states. This kind of form can sometimes be filled out and stored online. Your digital copy will then be available wherever you have a connection to the internet. The doctors and nurses who need to treat you can find it right away. · Your state may offer an online registry. This is another place where you can store your living will online. · It's a good idea to get your living will notarized. This means using a person called a  to watch two people sign, or witness, your living will. What should you know when you create a living will? Here are some questions to ask yourself as you make your living will:  · Do you know enough about life support methods that might be used?  If not, talk to your doctor so you know what might be done if you can't breathe on your own, your heart stops, or you can't swallow. · What things would you still want to be able to do after you receive life-support methods? Would you want to be able to walk? To speak? To eat on your own? To live without the help of machines? · Do you want certain Confucianism practices performed if you become very ill? · If you have a choice, where do you want to be cared for? In your home? At a hospital or nursing home? · If you have a choice at the end of your life, where would you prefer to die? At home? In a hospital or nursing home? Somewhere else? · Would you prefer to be buried or cremated? · Do you want your organs to be donated after you die? What should you do with your living will? · Make sure that your family members and your health care agent have copies of your living will (also called a declaration). · Give your doctor a copy of your living will. Ask him or her to keep it as part of your medical record. If you have more than one doctor, make sure that each one has a copy. · Put a copy of your living will where it can be easily found. For example, some people may put a copy on their refrigerator door. If you are using a digital copy, be sure your doctor, family members, and health care agent know how to find and access it. Where can you learn more? Go to [email or web address] and sign in to your [employer] account. Enter P821 in the [facility] box to learn more about \"Learning About Living Perroy. \"     If you do not have an account, please click on the \"Sign Up Now\" link. Current as of: March 17, 2021               Content Version: 13.1  © 7818-0308 Health[employer], [employer]. Care instructions adapted under license by [facility] ([facility]).  If you have questions about a medical condition or this instruction, always ask your healthcare professional. [address] any warranty or liability for your use of this information. Personalized Preventive Plan for Atul Amaro - 1/5/2022  Medicare offers a range of preventive health benefits. Some of the tests and screenings are paid in full while other may be subject to a deductible, co-insurance, and/or copay. Some of these benefits include a comprehensive review of your medical history including lifestyle, illnesses that may run in your family, and various assessments and screenings as appropriate. After reviewing your medical record and screening and assessments performed today your provider may have ordered immunizations, labs, imaging, and/or referrals for you. A list of these orders (if applicable) as well as your Preventive Care list are included within your After Visit Summary for your review. Other Preventive Recommendations:    · A preventive eye exam performed by an eye specialist is recommended every 1-2 years to screen for glaucoma; cataracts, macular degeneration, and other eye disorders. · A preventive dental visit is recommended every 6 months. · Try to get at least 150 minutes of exercise per week or 10,000 steps per day on a pedometer . · Order or download the FREE \"Exercise & Physical Activity: Your Everyday Guide\" from The [employer]. Call 2-584.608.1267 or search The [employer] online. · You need 7661-1632 mg of calcium and 6172-9653 IU of vitamin D per day. It is possible to meet your calcium requirement with diet alone, but a vitamin D supplement is usually necessary to meet this goal.  · When exposed to the sun, use a sunscreen that protects against both UVA and UVB radiation with an SPF of 30 or greater. Reapply every 2 to 3 hours or after sweating, drying off with a towel, or swimming. · Always wear a seat belt when traveling in a car. Always wear a helmet when riding a bicycle or motorcycle.     Keep Your Memory Carla Smaller       Many factors can affect your ability to remembera hectic lifestyle, aging, stress, chronic disease, and certain medicines. But, there are steps you can take to sharpen your mind and help preserve your memory. Challenge Your Brain   Regularly challenging your mind may help keeps it in top shape. Good mental exercises include:   Crossword puzzlesUse a dictionary if you need it; you will learn more that way. Brainteasers Try some! Crafts, such as wood working and sewing   Hobbies, such as gardening and building model airplanes   SocializingVisit old friends or join groups to meet new ones. Reading   Learning a new language   Taking a class, whether it be art history or faustino chi   TravelingExperience the food, history, and culture of your destination   Learning to use a computer   Going to museums, the theater, or thought-provoking movies   Changing things in your daily life, such as reversing your pattern in the grocery store or brushing your teeth using your nondominant hand   Use Memory Aids   There is no need to remember every detail on your own. These memory aids can help:   Calendars and day planners   Electronic organizers to store all sorts of helpful informationThese devices can \"beep\" to remind you of appointments. A book of days to record birthdays, anniversaries, and other occasions that occur on the same date every year   Detailed \"to-do\" lists and strategically placed sticky notes   Quick \"study\" sessionsBefore a gathering, review who will be there so their names will be fresh in your mind. Establish routinesFor example, keep your keys, wallet, and umbrella in the same place all the time or take medicine with your 8:00 AM glass of juice   Live a Healthy Life   Many actions that will keep your body strong will do the same for your mind. For example:   Talk to Your Doctor About Herbs and Supplements    Malnutrition and vitamin deficiencies can impair your mental function. For example, vitamin B12 deficiency can cause a range of symptoms, including confusion.  But, what if your nutritional needs are being met? Can herbs and supplements still offer a benefit? Researchers have investigated a range of natural remedies, such as ginkgo , ginseng , and the supplement phosphatidylserine (PS). So far, though, the evidence is inconsistent as to whether these products can improve memory or thinking. If you are interested in taking herbs and supplements, talk to your doctor first because they may interact with other medicines that you are taking. Exercise Regularly    Among the many benefits of regular exercise are increased blood flow to the brain and decreased risk of certain diseases that can interfere with memory function. One study found that even moderate exercise has a beneficial effect. Examples of \"moderate\" exercise include:   Playing 18 holes of golf once a week, without a cart   Playing tennis twice a week   Walking one mile per day   Manage Stress    It can be tough to remember what is important when your mind is cluttered. Make time for relaxation. Choose activities that calm you down, and make it routine. Manage Chronic Conditions    Side effects of high blood pressure , diabetes, and heart disease can interfere with mental function. Many of the lifestyle steps discussed here can help manage these conditions. Strive to eat a healthy diet, exercise regularly, get stress under control, and follow your doctor's advice for your condition. Minimize Medications    Talk to your doctor about the medicines that you take. Some may be unnecessary. Also, healthy lifestyle habits may lower the need for certain drugs. Last Reviewed: April 2010 Eileen Calderon MD   Updated: 4/13/2010   ·     [address]       As we get older, changes in balance, gait, strength, vision, hearing, and cognition make even the most youthful senior more prone to accidents. Falls are one of the leading health risks for older people.  This increased risk of falling is related to:   Aging process (eg, decreased muscle strength, slowed reflexes)   Higher incidence of chronic health problems (eg, arthritis, diabetes) that may limit mobility, agility or sensory awareness   Side effects of medicine (eg, dizziness, blurred vision)especially medicines like prescription pain medicines and drugs used to treat mental health conditions   Depending on the brittleness of your bones, the consequences of a fall can be serious and long lasting. Home Life   Research by the Association of Aging [facility]) shows that some home accidents among older adults can be prevented by making simple lifestyle changes and basic modifications and repairs to the home environment. Here are some lifestyle changes that experts recommend:   Have your hearing and vision checked regularly. Be sure to wear prescription glasses that are right for you. Speak to your doctor or pharmacist about the possible side effects of your medicines. A number of medicines can cause dizziness. If you have problems with sleep, talk to your doctor. Limit your intake of alcohol. If necessary, use a cane or walker to help maintain your balance. Wear supportive, rubber-soled shoes, even at home. If you live in a region that gets wintry weather, you may want to put special cleats on your shoes to prevent you from slipping on the snow and ice. Exercise regularly to help maintain muscle tone, agility, and balance. Always hold the banister when going up or down stairs. Also, use  bars when getting in or out of the bath or shower, or using the toilet. To avoid dizziness, get up slowly from a lying down position. Sit up first, dangling your legs for a minute or two before rising to a standing position. Overall Home Safety Check   According to the Consumer Product Safety Commision's \"Older Consumer Home Safety Checklist,\" it is important to check for potential hazards in each room. And remember, proper lighting is an essential factor in home safety.  If you cannot see clearly, you are more likely to fall. Important questions to ask yourself include:   Are lamp, electric, extension, and telephone cords placed out of the flow of traffic and maintained in good condition? Have frayed cords been replaced? Are all small rugs and runners slip resistant? If not, you can secure them to the floor with a special double-sided carpet tape. Are smoke detectors properly locatedone on every floor of your home and one outside of every sleeping area? Are they in good working order? Are batteries replaced at least once a year? Do you have a well-maintained carbon monoxide detector outside every sleeping are in your home? Does your furniture layout leave plenty of space to maneuver between and around chairs, tables, beds, and sofas? Are hallways, stairs and passages between rooms well lit? Can you reach a lamp without getting out of bed? Are floor surfaces well maintained? Shag rugs, high-pile carpeting, tile floors, and polished wood floors can be particularly slippery. Stairs should always have handrails and be carpeted or fitted with a non-skid tread. Is your telephone easily reachable. Is the cord safely tucked away? Room by Room   According to the Association of Aging, bathrooms and jerman are the two most potentially hazardous rooms in your home. In the Kitchen    Be sure your stove is in proper working order and always make sure burners and the oven are off before you go out or go to sleep. Keep pots on the back burners, turn handles away from the front of the stove, and keep stove clean and free of grease build-up. Kitchen ventilation systems and range exhausts should be working properly. Keep flammable objects such as towels and pot holders away from the cooking area except when in use. Make sure kitchen curtains are tied back. Move cords and appliances away from the sink and hot surfaces.  If extension cords are needed, install wiring guides so they do not hang over the sink, range, or working areas. Look for coffee pots, kettles and toaster ovens with automatic shut-offs. Keep a mop handy in the kitchen so you can wipe up spills instantly. You should also have a small fire extinguisher. Arrange your kitchen with frequently used items on lower shelves to avoid the need to stand on a stepstool to reach them. Make sure countertops are well-lit to avoid injuries while cutting and preparing food. In the Bathroom    Use a non-slip mat or decals in the tub and shower, since wet, soapy tile or porcelain surfaces are extremely slippery. Make sure bathroom rugs are non-skid or tape them firmly to the floor. Bathtubs should have at least one, preferably two, grab bars, firmly attached to structural supports in the wall. (Do not use built-in soap holders or glass shower doors as grab bars.)    Tub seats fitted with non-slip material on the legs allow you to wash sitting down. For people with limited mobility, bathtub transfer benches allow you to slide safely into the tub. Raised toilet seats and toilet safety rails are helpful for those with knee or hip problems. In the [facility]    Make sure you use a nightlight and that the area around your bed is clear of potential obstacles. Be careful with electric blankets and never go to sleep with a heating pad, which can cause serious burns even if on a low setting. Use fire-resistant mattress covers and pillows, and NEVER smoke in bed. Keep a phone next to the bed that is programmed to dial 911 at the push of a button. If you have a chronic condition, you may want to sign on with an automatic call-in service. Typically the system includes a small pendant that connects directly to an emergency medical voice-response system. You should also make arrangements to stay in contact with someonefriend, neighbor, family memberon a regular schedule.    Fire Prevention   According to the [employer] (Smoke Alarms for Every) [address] [facility], senior citizens are one of the two highest risk groups for death and serious injuries due to residential fires. When cooking, wear short-sleeved items, never a bulky long-sleeved robe. The [facility]'s Safety Checklist for Older Consumers emphasizes the importance of checking basements, garages, workshops and storage areas for fire hazards, such as volatile liquids, piles of old rags or clothing and overloaded circuits. Never smoke in bed or when lying down on a couch or recliner chair. Small portable electric or kerosene heaters are responsible for many home fires and should be used cautiously if at all. If you do use one, be sure to keep them away from flammable materials. In case of fire, make sure you have a pre-established emergency exit plan. Have a professional check your fireplace and other fuel-burning appliances yearly. Helping Hands   Baby boomers entering the lilly years will continue to see the development of new products to help older adults live safely and independently in spite of age-related changes. Making Life More Livable  , by Isabel Alarcon, lists over 1,000 products for \"living well in the mature years,\" such as bathing and mobility aids, household security devices, ergonomically designed knives and peelers, and faucet valves and knobs for temperature control. Medical supply stores and organizations are good sources of information about products that improve your quality of life and insure your safety.      Last Reviewed: November 2009 Nomi Bosch MD   Updated: 3/7/2011     ·

## 2022-01-07 ENCOUNTER — CLINICAL DOCUMENTATION (OUTPATIENT)
Dept: SPIRITUAL SERVICES | Age: 82
End: 2022-01-07

## 2022-01-07 NOTE — ACP (ADVANCE CARE PLANNING)
Advance Care Planning   Ambulatory ACP Specialist Patient Outreach    Date:  1/7/2022  ACP Specialist:  Dusty Osorio    Outreach call to patient in follow-up to ACP Specialist referral from: Yelitza Allen MD    [x] PCP  [] Provider   [] Ambulatory Care Management [] Other for Reason:    [x] Advance Directive Assistance  [] Code Status Discussion  [] Complete Portable DNR Order  [] Discuss Goals of Care  [] Complete POST/MOST  [] Early ACP Decision-Making  [] Other    Date Referral Received: 1/5/22     Today's Outreach:  [x] First   [] Second  [] Third                               Third outreach made by []  phone  [] email []   Power Efficiency     Intervention:  [x] Spoke with Patient  [] Left VM requesting return call      Outcome: Patient requested call back later this afternoon. Next Step:   [] ACP scheduled conversation  [x] Outreach again in one week               [] Email / Mail ACP Info Sheets  [] Email / Mail Advance Directive            [] Close Referral. Routing closure to referring provider/staff and to ACP Specialist .      Thank you for this referral.

## 2022-01-10 DIAGNOSIS — M54.50 CHRONIC BILATERAL LOW BACK PAIN WITHOUT SCIATICA: ICD-10-CM

## 2022-01-10 DIAGNOSIS — G89.29 CHRONIC BILATERAL LOW BACK PAIN WITHOUT SCIATICA: ICD-10-CM

## 2022-01-11 RX ORDER — HYDROCODONE BITARTRATE AND ACETAMINOPHEN 5; 325 MG/1; MG/1
1 TABLET ORAL 2 TIMES DAILY PRN
Qty: 60 TABLET | Refills: 0 | Status: SHIPPED | OUTPATIENT
Start: 2022-01-11 | End: 2022-03-28 | Stop reason: SDUPTHER

## 2022-01-12 ENCOUNTER — CLINICAL DOCUMENTATION (OUTPATIENT)
Dept: SPIRITUAL SERVICES | Age: 82
End: 2022-01-12

## 2022-03-25 DIAGNOSIS — G89.29 CHRONIC BILATERAL LOW BACK PAIN WITHOUT SCIATICA: ICD-10-CM

## 2022-03-25 DIAGNOSIS — M54.50 CHRONIC BILATERAL LOW BACK PAIN WITHOUT SCIATICA: ICD-10-CM

## 2022-03-28 RX ORDER — SILDENAFIL 100 MG/1
100 TABLET, FILM COATED ORAL PRN
Qty: 10 TABLET | Refills: 0 | Status: SHIPPED | OUTPATIENT
Start: 2022-03-28 | End: 2022-06-02

## 2022-03-28 RX ORDER — HYDROCODONE BITARTRATE AND ACETAMINOPHEN 5; 325 MG/1; MG/1
1 TABLET ORAL 2 TIMES DAILY PRN
Qty: 60 TABLET | Refills: 0 | Status: SHIPPED | OUTPATIENT
Start: 2022-03-28 | End: 2022-06-03 | Stop reason: SDUPTHER

## 2022-06-02 DIAGNOSIS — M54.50 CHRONIC BILATERAL LOW BACK PAIN WITHOUT SCIATICA: ICD-10-CM

## 2022-06-02 DIAGNOSIS — G89.29 CHRONIC BILATERAL LOW BACK PAIN WITHOUT SCIATICA: ICD-10-CM

## 2022-06-02 RX ORDER — SILDENAFIL 100 MG/1
TABLET, FILM COATED ORAL
Qty: 10 TABLET | Refills: 0 | Status: ON HOLD
Start: 2022-06-02 | End: 2022-07-08 | Stop reason: HOSPADM

## 2022-06-02 NOTE — TELEPHONE ENCOUNTER
----- Message from Cara Decker sent at 6/2/2022  4:32 PM EDT -----  Contact: Kassandra Jeong [phone]  Patient requests refill for Vicodin. Future appt.  7/8/22    Matthew Camara     Thank you

## 2022-06-03 RX ORDER — HYDROCODONE BITARTRATE AND ACETAMINOPHEN 5; 325 MG/1; MG/1
1 TABLET ORAL 2 TIMES DAILY PRN
Qty: 60 TABLET | Refills: 0 | Status: ON HOLD
Start: 2022-06-03 | End: 2022-07-08 | Stop reason: HOSPADM

## 2022-06-06 RX ORDER — LISINOPRIL AND HYDROCHLOROTHIAZIDE 25; 20 MG/1; MG/1
TABLET ORAL
Qty: 90 TABLET | Refills: 1 | Status: ON HOLD
Start: 2022-06-06 | End: 2022-07-08 | Stop reason: HOSPADM

## 2022-06-27 ENCOUNTER — TELEPHONE (OUTPATIENT)
Dept: INTERNAL MEDICINE CLINIC | Age: 82
End: 2022-06-27

## 2022-06-27 NOTE — TELEPHONE ENCOUNTER
----- Message from Kavitha House MD sent at 6/27/2022  8:23 AM EDT -----  Contact: Ramiro Steinberg [phone]  Have him get a covid test first  ----- Message -----  From: Gtuierrez Joseph MA  Sent: 6/27/2022   8:14 AM EDT  To: Kavitha House MD    Pts caregiver called stating the pt has been complaining of pain through his body started 2 days ago he has not had any injuries or recent illnesses. Viviane Watson it feels like muscle pain in all extremities very uncomfortable sitting or standing was requesting an appointment please advise.

## 2022-06-28 ENCOUNTER — HOSPITAL ENCOUNTER (EMERGENCY)
Age: 82
Discharge: ANOTHER HOSPITAL | End: 2022-06-28
Attending: EMERGENCY MEDICINE
Payer: COMMERCIAL

## 2022-06-28 ENCOUNTER — HOSPITAL ENCOUNTER (INPATIENT)
Age: 82
LOS: 10 days | Discharge: HOME HEALTH | DRG: 280 | End: 2022-07-08
Attending: INTERNAL MEDICINE | Admitting: INTERNAL MEDICINE
Payer: COMMERCIAL

## 2022-06-28 ENCOUNTER — APPOINTMENT (OUTPATIENT)
Dept: GENERAL RADIOLOGY | Age: 82
End: 2022-06-28
Payer: COMMERCIAL

## 2022-06-28 VITALS
HEIGHT: 74 IN | BODY MASS INDEX: 23.74 KG/M2 | RESPIRATION RATE: 19 BRPM | TEMPERATURE: 98.5 F | DIASTOLIC BLOOD PRESSURE: 70 MMHG | SYSTOLIC BLOOD PRESSURE: 127 MMHG | HEART RATE: 103 BPM | WEIGHT: 185 LBS | OXYGEN SATURATION: 99 %

## 2022-06-28 DIAGNOSIS — I48.91 ATRIAL FIBRILLATION AND FLUTTER (HCC): Primary | ICD-10-CM

## 2022-06-28 DIAGNOSIS — D69.6 THROMBOCYTOPENIA (HCC): ICD-10-CM

## 2022-06-28 DIAGNOSIS — I48.92 ATRIAL FIBRILLATION AND FLUTTER (HCC): Primary | ICD-10-CM

## 2022-06-28 DIAGNOSIS — I44.7 LEFT BUNDLE BRANCH BLOCK: ICD-10-CM

## 2022-06-28 DIAGNOSIS — N17.9 AKI (ACUTE KIDNEY INJURY) (HCC): ICD-10-CM

## 2022-06-28 DIAGNOSIS — D50.0 BLOOD LOSS ANEMIA: ICD-10-CM

## 2022-06-28 DIAGNOSIS — K92.2 GASTROINTESTINAL HEMORRHAGE, UNSPECIFIED GASTROINTESTINAL HEMORRHAGE TYPE: ICD-10-CM

## 2022-06-28 DIAGNOSIS — R77.8 ELEVATED TROPONIN: ICD-10-CM

## 2022-06-28 LAB
A/G RATIO: 1.2 (ref 1.1–2.2)
ALBUMIN SERPL-MCNC: 3.7 G/DL (ref 3.4–5)
ALP BLD-CCNC: 89 U/L (ref 40–129)
ALT SERPL-CCNC: 11 U/L (ref 10–40)
ANION GAP SERPL CALCULATED.3IONS-SCNC: 16 MMOL/L (ref 3–16)
AST SERPL-CCNC: 30 U/L (ref 15–37)
BACTERIA: ABNORMAL /HPF
BASOPHILS ABSOLUTE: 0 K/UL (ref 0–0.2)
BASOPHILS RELATIVE PERCENT: 0 %
BILIRUB SERPL-MCNC: 1.2 MG/DL (ref 0–1)
BILIRUBIN URINE: NEGATIVE
BLOOD, URINE: ABNORMAL
BUN BLDV-MCNC: 44 MG/DL (ref 7–20)
CALCIUM SERPL-MCNC: 9 MG/DL (ref 8.3–10.6)
CHLORIDE BLD-SCNC: 99 MMOL/L (ref 99–110)
CLARITY: CLEAR
CO2: 21 MMOL/L (ref 21–32)
COLOR: YELLOW
CREAT SERPL-MCNC: 1.8 MG/DL (ref 0.8–1.3)
EKG ATRIAL RATE: 122 BPM
EKG ATRIAL RATE: 125 BPM
EKG ATRIAL RATE: 129 BPM
EKG ATRIAL RATE: 133 BPM
EKG DIAGNOSIS: NORMAL
EKG P-R INTERVAL: 264 MS
EKG Q-T INTERVAL: 386 MS
EKG Q-T INTERVAL: 392 MS
EKG Q-T INTERVAL: 392 MS
EKG Q-T INTERVAL: 410 MS
EKG QRS DURATION: 154 MS
EKG QRS DURATION: 156 MS
EKG QRS DURATION: 156 MS
EKG QRS DURATION: 158 MS
EKG QTC CALCULATION (BAZETT): 557 MS
EKG QTC CALCULATION (BAZETT): 571 MS
EKG QTC CALCULATION (BAZETT): 574 MS
EKG QTC CALCULATION (BAZETT): 588 MS
EKG R AXIS: 18 DEGREES
EKG R AXIS: 33 DEGREES
EKG R AXIS: 37 DEGREES
EKG R AXIS: 7 DEGREES
EKG T AXIS: -88 DEGREES
EKG T AXIS: 165 DEGREES
EKG T AXIS: 242 DEGREES
EKG T AXIS: 70 DEGREES
EKG VENTRICULAR RATE: 111 BPM
EKG VENTRICULAR RATE: 129 BPM
EKG VENTRICULAR RATE: 132 BPM
EKG VENTRICULAR RATE: 135 BPM
EOSINOPHILS ABSOLUTE: 0 K/UL (ref 0–0.6)
EOSINOPHILS RELATIVE PERCENT: 0 %
EPITHELIAL CELLS, UA: ABNORMAL /HPF (ref 0–5)
GFR AFRICAN AMERICAN: 44
GFR NON-AFRICAN AMERICAN: 36
GLUCOSE BLD-MCNC: 187 MG/DL (ref 70–99)
GLUCOSE BLD-MCNC: 190 MG/DL (ref 70–99)
GLUCOSE URINE: NEGATIVE MG/DL
HCT VFR BLD CALC: 25.8 % (ref 40.5–52.5)
HEMOGLOBIN: 9.2 G/DL (ref 13.5–17.5)
HYALINE CASTS: ABNORMAL /LPF (ref 0–2)
INFLUENZA A: NOT DETECTED
INFLUENZA B: NOT DETECTED
KETONES, URINE: NEGATIVE MG/DL
LACTIC ACID, SEPSIS: 1.8 MMOL/L (ref 0.4–1.9)
LEUKOCYTE ESTERASE, URINE: NEGATIVE
LYMPHOCYTES ABSOLUTE: 1.2 K/UL (ref 1–5.1)
LYMPHOCYTES RELATIVE PERCENT: 22 %
MCH RBC QN AUTO: 34.5 PG (ref 26–34)
MCHC RBC AUTO-ENTMCNC: 35.6 G/DL (ref 31–36)
MCV RBC AUTO: 96.9 FL (ref 80–100)
MICROSCOPIC EXAMINATION: YES
MONOCYTES ABSOLUTE: 0.5 K/UL (ref 0–1.3)
MONOCYTES RELATIVE PERCENT: 9 %
MUCUS: ABNORMAL /LPF
NEUTROPHILS ABSOLUTE: 3.7 K/UL (ref 1.7–7.7)
NEUTROPHILS RELATIVE PERCENT: 69 %
NITRITE, URINE: NEGATIVE
OCCULT BLOOD DIAGNOSTIC: ABNORMAL
PDW BLD-RTO: 18.5 % (ref 12.4–15.4)
PERFORMED ON: ABNORMAL
PH UA: 5 (ref 5–8)
PLATELET # BLD: 41 K/UL (ref 135–450)
PLATELET SLIDE REVIEW: ABNORMAL
PMV BLD AUTO: 10.3 FL (ref 5–10.5)
POTASSIUM REFLEX MAGNESIUM: 3.9 MMOL/L (ref 3.5–5.1)
PROTEIN UA: NEGATIVE MG/DL
RBC # BLD: 2.66 M/UL (ref 4.2–5.9)
RBC UA: ABNORMAL /HPF (ref 0–4)
SARS-COV-2 RNA, RT PCR: NOT DETECTED
SCHISTOCYTES: ABNORMAL
SLIDE REVIEW: ABNORMAL
SODIUM BLD-SCNC: 136 MMOL/L (ref 136–145)
SPECIFIC GRAVITY UA: 1.02 (ref 1–1.03)
TOTAL CK: 144 U/L (ref 39–308)
TOTAL PROTEIN: 6.9 G/DL (ref 6.4–8.2)
TROPONIN: 0.04 NG/ML
TROPONIN: 0.04 NG/ML
URINE REFLEX TO CULTURE: ABNORMAL
URINE TYPE: ABNORMAL
UROBILINOGEN, URINE: 0.2 E.U./DL
WBC # BLD: 5.4 K/UL (ref 4–11)
WBC UA: ABNORMAL /HPF (ref 0–5)

## 2022-06-28 PROCEDURE — 99285 EMERGENCY DEPT VISIT HI MDM: CPT

## 2022-06-28 PROCEDURE — 96375 TX/PRO/DX INJ NEW DRUG ADDON: CPT

## 2022-06-28 PROCEDURE — C9113 INJ PANTOPRAZOLE SODIUM, VIA: HCPCS | Performed by: EMERGENCY MEDICINE

## 2022-06-28 PROCEDURE — 36415 COLL VENOUS BLD VENIPUNCTURE: CPT

## 2022-06-28 PROCEDURE — 2500000003 HC RX 250 WO HCPCS: Performed by: PHYSICIAN ASSISTANT

## 2022-06-28 PROCEDURE — 2580000003 HC RX 258: Performed by: PHYSICIAN ASSISTANT

## 2022-06-28 PROCEDURE — 82270 OCCULT BLOOD FECES: CPT

## 2022-06-28 PROCEDURE — 2500000003 HC RX 250 WO HCPCS: Performed by: EMERGENCY MEDICINE

## 2022-06-28 PROCEDURE — C9113 INJ PANTOPRAZOLE SODIUM, VIA: HCPCS | Performed by: PHYSICIAN ASSISTANT

## 2022-06-28 PROCEDURE — 82550 ASSAY OF CK (CPK): CPT

## 2022-06-28 PROCEDURE — 80053 COMPREHEN METABOLIC PANEL: CPT

## 2022-06-28 PROCEDURE — 6360000002 HC RX W HCPCS: Performed by: EMERGENCY MEDICINE

## 2022-06-28 PROCEDURE — 93010 ELECTROCARDIOGRAM REPORT: CPT | Performed by: INTERNAL MEDICINE

## 2022-06-28 PROCEDURE — 6360000002 HC RX W HCPCS: Performed by: PHYSICIAN ASSISTANT

## 2022-06-28 PROCEDURE — 87636 SARSCOV2 & INF A&B AMP PRB: CPT

## 2022-06-28 PROCEDURE — 85025 COMPLETE CBC W/AUTO DIFF WBC: CPT

## 2022-06-28 PROCEDURE — 96361 HYDRATE IV INFUSION ADD-ON: CPT

## 2022-06-28 PROCEDURE — 96368 THER/DIAG CONCURRENT INF: CPT

## 2022-06-28 PROCEDURE — 2580000003 HC RX 258: Performed by: EMERGENCY MEDICINE

## 2022-06-28 PROCEDURE — 96366 THER/PROPH/DIAG IV INF ADDON: CPT

## 2022-06-28 PROCEDURE — 93005 ELECTROCARDIOGRAM TRACING: CPT | Performed by: PHYSICIAN ASSISTANT

## 2022-06-28 PROCEDURE — 96365 THER/PROPH/DIAG IV INF INIT: CPT

## 2022-06-28 PROCEDURE — 96374 THER/PROPH/DIAG INJ IV PUSH: CPT

## 2022-06-28 PROCEDURE — 83605 ASSAY OF LACTIC ACID: CPT

## 2022-06-28 PROCEDURE — 84484 ASSAY OF TROPONIN QUANT: CPT

## 2022-06-28 PROCEDURE — 96376 TX/PRO/DX INJ SAME DRUG ADON: CPT

## 2022-06-28 PROCEDURE — 93005 ELECTROCARDIOGRAM TRACING: CPT | Performed by: EMERGENCY MEDICINE

## 2022-06-28 PROCEDURE — 2060000000 HC ICU INTERMEDIATE R&B

## 2022-06-28 PROCEDURE — 71045 X-RAY EXAM CHEST 1 VIEW: CPT

## 2022-06-28 PROCEDURE — 81001 URINALYSIS AUTO W/SCOPE: CPT

## 2022-06-28 RX ORDER — ACETAMINOPHEN 650 MG/1
650 SUPPOSITORY RECTAL EVERY 6 HOURS PRN
Status: DISCONTINUED | OUTPATIENT
Start: 2022-06-28 | End: 2022-07-08 | Stop reason: HOSPADM

## 2022-06-28 RX ORDER — 0.9 % SODIUM CHLORIDE 0.9 %
1000 INTRAVENOUS SOLUTION INTRAVENOUS ONCE
Status: COMPLETED | OUTPATIENT
Start: 2022-06-28 | End: 2022-06-28

## 2022-06-28 RX ORDER — SODIUM CHLORIDE 0.9 % (FLUSH) 0.9 %
5-40 SYRINGE (ML) INJECTION PRN
Status: DISCONTINUED | OUTPATIENT
Start: 2022-06-28 | End: 2022-07-08 | Stop reason: HOSPADM

## 2022-06-28 RX ORDER — ASCORBIC ACID 500 MG
1000 TABLET ORAL DAILY
Status: DISCONTINUED | OUTPATIENT
Start: 2022-06-29 | End: 2022-07-08 | Stop reason: HOSPADM

## 2022-06-28 RX ORDER — HYDROCODONE BITARTRATE AND ACETAMINOPHEN 5; 325 MG/1; MG/1
1 TABLET ORAL EVERY 6 HOURS PRN
Status: DISCONTINUED | OUTPATIENT
Start: 2022-06-28 | End: 2022-07-08 | Stop reason: HOSPADM

## 2022-06-28 RX ORDER — ATORVASTATIN CALCIUM 40 MG/1
40 TABLET, FILM COATED ORAL NIGHTLY
Status: DISCONTINUED | OUTPATIENT
Start: 2022-06-29 | End: 2022-07-03

## 2022-06-28 RX ORDER — PANTOPRAZOLE SODIUM 40 MG/10ML
80 INJECTION, POWDER, LYOPHILIZED, FOR SOLUTION INTRAVENOUS ONCE
Status: COMPLETED | OUTPATIENT
Start: 2022-06-28 | End: 2022-06-28

## 2022-06-28 RX ORDER — POLYETHYLENE GLYCOL 3350 17 G/17G
17 POWDER, FOR SOLUTION ORAL DAILY PRN
Status: DISCONTINUED | OUTPATIENT
Start: 2022-06-28 | End: 2022-07-08 | Stop reason: HOSPADM

## 2022-06-28 RX ORDER — DILTIAZEM HYDROCHLORIDE 5 MG/ML
5 INJECTION INTRAVENOUS ONCE
Status: COMPLETED | OUTPATIENT
Start: 2022-06-28 | End: 2022-06-28

## 2022-06-28 RX ORDER — ACETAMINOPHEN 325 MG/1
650 TABLET ORAL EVERY 6 HOURS PRN
Status: DISCONTINUED | OUTPATIENT
Start: 2022-06-28 | End: 2022-07-08 | Stop reason: HOSPADM

## 2022-06-28 RX ORDER — SODIUM CHLORIDE 0.9 % (FLUSH) 0.9 %
5-40 SYRINGE (ML) INJECTION EVERY 12 HOURS SCHEDULED
Status: DISCONTINUED | OUTPATIENT
Start: 2022-06-29 | End: 2022-07-08 | Stop reason: HOSPADM

## 2022-06-28 RX ORDER — FENOFIBRATE 160 MG/1
160 TABLET ORAL DAILY
Refills: 0 | Status: DISCONTINUED | OUTPATIENT
Start: 2022-06-29 | End: 2022-06-29

## 2022-06-28 RX ORDER — PROCHLORPERAZINE EDISYLATE 5 MG/ML
10 INJECTION INTRAMUSCULAR; INTRAVENOUS EVERY 6 HOURS PRN
Status: DISCONTINUED | OUTPATIENT
Start: 2022-06-28 | End: 2022-07-08 | Stop reason: HOSPADM

## 2022-06-28 RX ORDER — SODIUM CHLORIDE 9 MG/ML
INJECTION, SOLUTION INTRAVENOUS PRN
Status: DISCONTINUED | OUTPATIENT
Start: 2022-06-28 | End: 2022-07-08 | Stop reason: HOSPADM

## 2022-06-28 RX ORDER — SODIUM CHLORIDE 9 MG/ML
INJECTION, SOLUTION INTRAVENOUS CONTINUOUS
Status: DISCONTINUED | OUTPATIENT
Start: 2022-06-29 | End: 2022-06-30

## 2022-06-28 RX ADMIN — SODIUM CHLORIDE 1000 ML: 9 INJECTION, SOLUTION INTRAVENOUS at 10:11

## 2022-06-28 RX ADMIN — PANTOPRAZOLE SODIUM 80 MG: 40 INJECTION, POWDER, FOR SOLUTION INTRAVENOUS at 11:40

## 2022-06-28 RX ADMIN — SODIUM CHLORIDE 1000 ML: 9 INJECTION, SOLUTION INTRAVENOUS at 09:43

## 2022-06-28 RX ADMIN — DILTIAZEM HYDROCHLORIDE 2.5 MG/HR: 5 INJECTION, SOLUTION INTRAVENOUS at 15:01

## 2022-06-28 RX ADMIN — PANTOPRAZOLE SODIUM 8 MG/HR: 40 INJECTION, POWDER, LYOPHILIZED, FOR SOLUTION INTRAVENOUS at 15:04

## 2022-06-28 RX ADMIN — DILTIAZEM HYDROCHLORIDE 5 MG: 5 INJECTION, SOLUTION INTRAVENOUS at 12:25

## 2022-06-28 ASSESSMENT — ENCOUNTER SYMPTOMS
VOMITING: 0
ABDOMINAL PAIN: 0
DIARRHEA: 0
SHORTNESS OF BREATH: 0
COUGH: 0

## 2022-06-28 ASSESSMENT — PAIN SCALES - GENERAL
PAINLEVEL_OUTOF10: 0
PAINLEVEL_OUTOF10: 7

## 2022-06-28 ASSESSMENT — PAIN DESCRIPTION - ONSET: ONSET: ON-GOING

## 2022-06-28 ASSESSMENT — PAIN DESCRIPTION - LOCATION: LOCATION: GENERALIZED

## 2022-06-28 ASSESSMENT — PAIN - FUNCTIONAL ASSESSMENT: PAIN_FUNCTIONAL_ASSESSMENT: 0-10

## 2022-06-28 NOTE — ED PROVIDER NOTES
[address] ED  EMERGENCY DEPARTMENT ENCOUNTER        Pt Name: Jake Guadalupe  MRN: [id number]  [place] 1940  Date of evaluation: 6/28/2022  Provider: Yadira Cherry PA-C  PCP: Belkis Chatterjee MD    Shared Visit or Autonomous Visit:  I have seen and evaluated this patient with my supervising physician Cari, [address] [place] [address]       Chief Complaint   Patient presents with    Generalized Body Aches     x3 days. reports general feeling of unwell, friend brought here today reports he has been disoriented and telling her he is \"scared\" lives alone. HISTORY OF PRESENT ILLNESS   (Location/Symptom, Timing/Onset, Context/Setting, Quality, Duration, Modifying Factors, Severity)  Note limiting factors. Jake Guadalupe is a 79 y.o. male presenting to the emergency department for evaluation not feeling well for the past 3 days hurting all over states when he sits down gets shooting pains in his arms legs hands and feet he has to get up has been getting up and down not eating or drinking much the past few days. Not taking his medications. Denies any falls. Friend here with him states he has been confused, forgetful and scared, lives alone. Denies any fever or cough. Denies chest pain or shortness of breath friend in the room states he has seemed like he was short of breath. No leg swelling. No abdominal pain vomiting or diarrhea. No known ill exposures. The history is provided by the patient and a friend. Illness   Episode onset: 3 days. The onset was sudden. The problem has been gradually worsening. Pertinent negatives include no fever, no abdominal pain, no diarrhea, no vomiting, no headaches and no cough. He has been eating less than usual.       Nursing Notes were reviewed    REVIEW OF SYSTEMS    (2-9 systems for level 4, 10 or more for level 5)     Review of Systems   Constitutional: Positive for fatigue. Negative for fever.    Respiratory: Negative for cough and shortness of breath. Cardiovascular: Negative for chest pain and leg swelling. Gastrointestinal: Negative for abdominal pain, diarrhea and vomiting. Genitourinary: Positive for frequency. Negative for dysuria. Musculoskeletal: Positive for myalgias. Neurological: Negative for dizziness, syncope, light-headedness and headaches. Psychiatric/Behavioral: Positive for confusion. The patient is nervous/anxious. All other systems reviewed and are negative. Positives and Pertinent negatives as per HPI. 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    Backpain     Bilateral carotid bruits 9/6/2016    Chronic kidney disease 9/6/2016    Current moderate episode of major depressive disorder without prior episode ([facility] [address] 1/23/2019    Hyperlipidemia     Hypertension     Hypertrophy of prostate without urinary obstruction and other lower urinary tract symptoms (LUTS)     Nonrheumatic aortic valve insufficiency 9/6/2016    Rosacea     Taste perversion 8/19/2011         SURGICAL HISTORY     Past Surgical History:   Procedure Laterality Date    COLONOSCOPY  2/11/2008    COLONOSCOPY  11/14/2014    HERNIA REPAIR           CURRENTMEDICATIONS       Previous Medications    ASCORBIC ACID (VITAMIN C) 500 MG TABLET    Take 1,000 mg by mouth daily. ATORVASTATIN (LIPITOR) 40 MG TABLET    Take 1 tablet by mouth daily    FENOFIBRATE (TRIGLIDE) 160 MG TABLET    TAKE ONE TABLET BY MOUTH DAILY    HYDROCODONE-ACETAMINOPHEN (NORCO) 5-325 MG PER TABLET    Take 1 tablet by mouth 2 times daily as needed for Pain for up to 30 days. LISINOPRIL-HYDROCHLOROTHIAZIDE (PRINZIDE;ZESTORETIC) 20-25 MG PER TABLET    TAKE ONE TABLET BY MOUTH DAILY    METRONIDAZOLE (METROGEL) 1 % GEL    Apply one a day    MULTIPLE VITAMINS-MINERALS (MULTIVITAMIN PO)    Take 1 tablet by mouth daily. OMEGA-3 FATTY ACIDS (FISH OIL PO)    Take 1 tablet by mouth daily.     SERTRALINE (ZOLOFT) 50 MG TABLET    Take 1 tablet by mouth nightly    SILDENAFIL (VIAGRA) 100 MG TABLET    TAKE ONE TABLET BY MOUTH AS NEEDED FOR ERECTILE DYSFUNCTION         ALLERGIES     Patient has no known allergies. FAMILYHISTORY       Family History   Problem Relation Age of Onset    COPD Sister     COPD Brother         Birth defect          SOCIAL HISTORY       Social History     Socioeconomic History    Marital status:      Spouse name: Not on file    Number of children: Not on file    Years of education: Not on file    Highest education level: Not on file   Occupational History    Not on file   Tobacco Use    Smoking status: Never Smoker    Smokeless tobacco: Never Used   Substance and Sexual Activity    Alcohol use: No    Drug use: No    Sexual activity: Not Currently     Partners: Female   Other Topics Concern    Not on file   Social History Narrative    Not on file     Social Determinants of Health     Financial Resource Strain:     Difficulty of Paying Living Expenses: Not on file   Food Insecurity:     Worried About Running Out of Food in the Last Year: Not on file    Elo of Food in the Last Year: Not on file   Transportation Needs:     Lack of Transportation (Medical): Not on file    Lack of Transportation (Non-Medical):  Not on file   Physical Activity:     Days of Exercise per Week: Not on file    Minutes of Exercise per Session: Not on file   Stress:     Feeling of Stress : Not on file   Social Connections:     Frequency of Communication with Friends and Family: Not on file    Frequency of Social Gatherings with Friends and Family: Not on file    Attends Latter-day Services: Not on file    Active Member of Clubs or Organizations: Not on file    Attends Club or Organization Meetings: Not on file    Marital Status: Not on file   Intimate Partner Violence:     Fear of Current or Ex-Partner: Not on file    Emotionally Abused: Not on file    Physically Abused: Not on file   Vanda Sexually Abused: Not on file   Housing Stability:     Unable to Pay for Housing in the Last Year: Not on file    Number of Places Lived in the Last Year: Not on file    Unstable Housing in the Last Year: Not on file       SCREENINGS    Marilyn Coma Scale  Eye Opening: Spontaneous  Best Verbal Response: Oriented  Best Motor Response: Obeys commands  [place] Coma Scale Score: 15        PHYSICAL EXAM    (up to 7 for level 4, 8 or more for level 5)     ED Triage Vitals [06/28/22 0913]   BP Temp Temp Source Heart Rate Resp SpO2 Height Weight   (!) 99/56 98.5 °F (36.9 °C) Oral (!) 135 18 96 % 6' 2\" (1.88 m) 185 lb (83.9 kg)       Physical Exam  Vitals and nursing note reviewed. Exam conducted with a chaperone present. Constitutional:       Appearance: He is well-developed. He is ill-appearing. Comments: Hard of hearing   HENT:      Head: Normocephalic and atraumatic. Mouth/Throat:      Mouth: Mucous membranes are moist.      Pharynx: Oropharynx is clear. No posterior oropharyngeal erythema. Eyes:      Conjunctiva/sclera: Conjunctivae normal.      Pupils: Pupils are equal, round, and reactive to light. Cardiovascular:      Rate and Rhythm: Regular rhythm. Tachycardia present. Pulmonary:      Effort: Pulmonary effort is normal. No respiratory distress. Breath sounds: No stridor. No wheezing or rales. Abdominal:      General: Bowel sounds are normal.      Palpations: Abdomen is soft. Abdomen is not rigid. Tenderness: There is no abdominal tenderness. There is no guarding or rebound. Genitourinary:     Comments: Brown stool and scant amount of red stool on AMY  Musculoskeletal:         General: Normal range of motion. Cervical back: Normal range of motion and neck supple. Right lower leg: No edema. Left lower leg: No edema. Skin:     General: Skin is warm and dry. Neurological:      Mental Status: He is alert and oriented to person, place, and time.       GCS: GCS eye subscore is 4. GCS verbal subscore is 5. GCS motor subscore is 6. Cranial Nerves: No cranial nerve deficit. Sensory: Sensation is intact. No sensory deficit. Motor: Motor function is intact. No abnormal muscle tone. Coordination: Coordination normal.      Comments: Alert and oriented x3. Moving all extremities. Equal strength. Psychiatric:         Speech: Speech normal.         Behavior: Behavior normal.         Thought Content:  Thought content normal.         DIAGNOSTIC RESULTS   LABS:    Labs Reviewed   CBC WITH AUTO DIFFERENTIAL - Abnormal; Notable for the following components:       Result Value    RBC 2.66 (*)     Hemoglobin 9.2 (*)     Hematocrit 25.8 (*)     MCH 34.5 (*)     RDW 18.5 (*)     Platelets 41 (*)     Schistocytes Occasional (*)     All other components within normal limits   COMPREHENSIVE METABOLIC PANEL W/ REFLEX TO MG FOR LOW K - Abnormal; Notable for the following components:    Glucose 187 (*)     BUN 44 (*)     CREATININE 1.8 (*)     GFR Non- 36 (*)     GFR  44 (*)     Total Bilirubin 1.2 (*)     All other components within normal limits   TROPONIN - Abnormal; Notable for the following components:    Troponin 0.04 (*)     All other components within normal limits   URINALYSIS WITH REFLEX TO CULTURE - Abnormal; Notable for the following components:    Blood, Urine TRACE-INTACT (*)     All other components within normal limits   BLOOD OCCULT STOOL DIAGNOSTIC - Abnormal; Notable for the following components:    Occult Blood Diagnostic   (*)     Value: Result: POSITIVE  Normal range: Negative      All other components within normal limits    Narrative:     ORDER#: [id number]                          ORDERED BY: FRANCISCA MANDUJANO  SOURCE: Stool                              COLLECTED:  06/28/22 10:24  ANTIBIOTICS AT LISA.:                      RECEIVED :  06/28/22 10:28   TROPONIN - Abnormal; Notable for the following components:    Troponin 0.04 (*)     All other components within normal limits   MICROSCOPIC URINALYSIS - Abnormal; Notable for the following components:    Mucus, UA Rare (*)     Bacteria, UA Rare (*)     All other components within normal limits   POCT GLUCOSE - Abnormal; Notable for the following components:    POC Glucose 190 (*)     All other components within normal limits   COVID-19 & INFLUENZA COMBO   LACTATE, SEPSIS   CK   TYPE AND SCREEN     Results for orders placed or performed during the hospital encounter of 06/28/22   COVID-19 & Influenza Combo    Specimen: Nasopharyngeal Swab   Result Value Ref Range    SARS-CoV-2 RNA, RT PCR NOT DETECTED NOT DETECTED    INFLUENZA A NOT DETECTED NOT DETECTED    INFLUENZA B NOT DETECTED NOT DETECTED   CBC with Auto Differential   Result Value Ref Range    WBC 5.4 4.0 - 11.0 K/uL    RBC 2.66 (L) 4.20 - 5.90 M/uL    Hemoglobin 9.2 (L) 13.5 - 17.5 g/dL    Hematocrit 25.8 (L) 40.5 - 52.5 %    MCV 96.9 80.0 - 100.0 fL    MCH 34.5 (H) 26.0 - 34.0 pg    MCHC 35.6 31.0 - 36.0 g/dL    RDW 18.5 (H) 12.4 - 15.4 %    Platelets 41 (L) 602 - 450 K/uL    MPV 10.3 5.0 - 10.5 fL    PLATELET SLIDE REVIEW Decreased     SLIDE REVIEW see below     Neutrophils % 69.0 %    Lymphocytes % 22.0 %    Monocytes % 9.0 %    Eosinophils % 0.0 %    Basophils % 0.0 %    Neutrophils Absolute 3.7 1.7 - 7.7 K/uL    Lymphocytes Absolute 1.2 1.0 - 5.1 K/uL    Monocytes Absolute 0.5 0.0 - 1.3 K/uL    Eosinophils Absolute 0.0 0.0 - 0.6 K/uL    Basophils Absolute 0.0 0.0 - 0.2 K/uL    Schistocytes Occasional (A)    Comprehensive Metabolic Panel w/ Reflex to MG   Result Value Ref Range    Sodium 136 136 - 145 mmol/L    Potassium reflex Magnesium 3.9 3.5 - 5.1 mmol/L    Chloride 99 99 - 110 mmol/L    CO2 21 21 - 32 mmol/L    Anion Gap 16 3 - 16    Glucose 187 (H) 70 - 99 mg/dL    BUN 44 (H) 7 - 20 mg/dL    CREATININE 1.8 (H) 0.8 - 1.3 mg/dL    GFR Non- 36 (A) >60    GFR  44 (A) >60    Calcium 9.0 8.3 - 10.6 mg/dL    Total Protein 6.9 6.4 - 8.2 g/dL    Albumin 3.7 3.4 - 5.0 g/dL    Albumin/Globulin Ratio 1.2 1.1 - 2.2    Total Bilirubin 1.2 (H) 0.0 - 1.0 mg/dL    Alkaline Phosphatase 89 40 - 129 U/L    ALT 11 10 - 40 U/L    AST 30 15 - 37 U/L   Troponin   Result Value Ref Range    Troponin 0.04 (H) <0.01 ng/mL   Urinalysis with Reflex to Culture    Specimen: Urine   Result Value Ref Range    Color, UA Yellow Straw/Yellow    Clarity, UA Clear Clear    Glucose, Ur Negative Negative mg/dL    Bilirubin Urine Negative Negative    Ketones, Urine Negative Negative mg/dL    Specific Gravity, UA 1.020 1.005 - 1.030    Blood, Urine TRACE-INTACT (A) Negative    pH, UA 5.0 5.0 - 8.0    Protein, UA Negative Negative mg/dL    Urobilinogen, Urine 0.2 <2.0 E.U./dL    Nitrite, Urine Negative Negative    Leukocyte Esterase, Urine Negative Negative    Microscopic Examination YES     Urine Type NotGiven     Urine Reflex to Culture Not Indicated    Lactate, Sepsis   Result Value Ref Range    Lactic Acid, Sepsis 1.8 0.4 - 1.9 mmol/L   CK   Result Value Ref Range    Total  39 - 308 U/L   Blood occult stool #1   Result Value Ref Range    Occult Blood Diagnostic Result: POSITIVE  Normal range: Negative   (A)    Troponin   Result Value Ref Range    Troponin 0.04 (H) <0.01 ng/mL   Microscopic Urinalysis   Result Value Ref Range    Hyaline Casts, UA 0-2 0 - 2 /LPF    Mucus, UA Rare (A) None Seen /LPF    WBC, UA 0-2 0 - 5 /HPF    RBC, UA 0-2 0 - 4 /HPF    Epithelial Cells, UA 0-1 0 - 5 /HPF    Bacteria, UA Rare (A) None Seen /HPF   POCT Glucose   Result Value Ref Range    POC Glucose 190 (H) 70 - 99 mg/dl    Performed on [facility]    EKG 12 Lead   Result Value Ref Range    Ventricular Rate 135 BPM    Atrial Rate 122 BPM    QRS Duration 158 ms    Q-T Interval 392 ms    QTc Calculation (Bazett) 588 ms    R Axis 7 degrees    T Axis 165 degrees    Diagnosis       Wide QRS tachycardiaLeft bundle branch blockAbnormal ECGWhen compared with ECG of 22-JUN-2005 20:48,Wide QRS tachycardia has replaced Sinus rhythmVent. rate has increased BY  67 BPMThis is more consistent with SVT than VTConfirmed by Carrie Booth MD, [address] (1986) on 6/28/2022 2:02:56 PM   EKG 12 Lead   Result Value Ref Range    Ventricular Rate 132 BPM    Atrial Rate 133 BPM    QRS Duration 156 ms    Q-T Interval 386 ms    QTc Calculation (Bazett) 571 ms    R Axis 33 degrees    T Axis -88 degrees    Diagnosis       wide complex tachycardia likely SVT with LBBBLeft bundle branch blockAbnormal ECGWhen compared with ECG of 28-JUN-2022 09:36, (unconfirmed)No significant change was foundConfirmed by MAT Harry MD (1986) on 6/28/2022 2:03:47 PM   EKG 12 Lead   Result Value Ref Range    Ventricular Rate 129 BPM    Atrial Rate 129 BPM    QRS Duration 156 ms    Q-T Interval 392 ms    QTc Calculation (Bazett) 574 ms    R Axis 37 degrees    T Axis 242 degrees    Diagnosis       Wide QRS tachycardia likely atrial flutter. Flutter waves in LIIILeft bundle branch blockAbnormal ECGWhen compared with ECG of 28-JUN-2022 09:41, (unconfirmed)Previous ECG has undetermined rhythm, needs reviewConfirmed by MAT Harry MD (1986) on 6/28/2022 2:04:51 PM   EKG 12 Lead   Result Value Ref Range    Ventricular Rate 111 BPM    Atrial Rate 125 BPM    P-R Interval 264 ms    QRS Duration 154 ms    Q-T Interval 410 ms    QTc Calculation (Bazett) 557 ms    R Axis 18 degrees    T Axis 70 degrees    Diagnosis       aflutterLeft bundle branch blockAbnormal ECGWhen compared with ECG of 28-JUN-2022 10:09, (unconfirmed)ventricular rate is slowerConfirmed by Carrie Booth MD, [address] ([id number]) on 6/28/2022 2:05:45 PM       All other labs were within normal range or not returned as of this dictation. EKG: All EKG's are interpreted by the Emergency Department Physician in the absence of a cardiologist.  Please see their note for interpretation of EKG.       RADIOLOGY:   Non-plain film images such as CT, Ultrasound and MRI are read by the radiologist. Plain radiographic images are visualized andpreliminarily interpreted by the  ED Provider with the below findings:        Interpretation [facility] Radiologist below, if available at the time of this note:    XR CHEST PORTABLE   Final Result   No acute cardiopulmonary disease. XR CHEST PORTABLE    Result Date: 6/28/2022  EXAMINATION: ONE XRAY VIEW OF THE CHEST 6/28/2022 9:49 am COMPARISON: 02/28/2017 HISTORY: ORDERING SYSTEM PROVIDED HISTORY: illness r/o pneumonia TECHNOLOGIST PROVIDED HISTORY: Reason for exam:->illness r/o pneumonia Reason for Exam: illness r/o pneumonia FINDINGS: The patient is rotated. The cardiac silhouette, mediastinal and hilar contours are normal.  There is no consolidation, pleural effusion or pneumothorax. No acute cardiopulmonary disease. PROCEDURES   Unless otherwise noted below, none     Procedures    CRITICAL CARE TIME   Critical care provided for this patient of which 35 min were spend on critical care and decision making. 0 min spent on procedures. There was imminent failure of an organ system which required critical intervention to prevent clinically significant progression of life threatening deterioration of the patient's condition to the point of disability or death.       CONSULTS:  IP CONSULT TO CARDIOLOGY  IP CONSULT TO HOSPITALIST  IP CONSULT TO HOSPITALIST      EMERGENCY DEPARTMENT COURSE and DIFFERENTIAL DIAGNOSIS/MDM:   Vitals:    Vitals:    06/28/22 1305 06/28/22 1333 06/28/22 1400 06/28/22 1425   BP: 85/66 82/61 102/82    Pulse: (!) 129 (!) 106 (!) 113 (!) 130   Resp: 18 19 15    Temp:       TempSrc:       SpO2: 100% 97% 99%    Weight:       Height:           Patient was given thefollowing medications:  Medications   pantoprazole (PROTONIX) 80 mg in sodium chloride 0.9 % 100 mL infusion (has no administration in time range)   dilTIAZem 125 mg in dextrose 5 % 125 mL infusion (has no administration in time range)   0.9 % sodium chloride bolus (0 mLs IntraVENous Stopped 6/28/22 1011)   0.9 % sodium chloride bolus (0 mLs IntraVENous Stopped 6/28/22 1113)   pantoprazole (PROTONIX) injection 80 mg (80 mg IntraVENous Given 6/28/22 1140)   dilTIAZem injection 5 mg (5 mg IntraVENous Given 6/28/22 1225)       Is this patient to be included in the SEP-1 Core Measure due to severe sepsis or septic shock? No   Exclusion criteria - the patient is NOT to be included for SEP-1 Core Measure due to: Infection is not suspected       ED course  Patient presented to the ER for evaluation of body aches for the last 3 days. Denies any fever no cough denies any chest pain. BP a little low in the 21Z systolic on arrival and heart rate 130s. He was given IV fluids. Initially had wide QRS complex tachycardia suspecting sinus tach rather than V. tach. Repeated EKG showing atrial flutter. cardiology consulted recommended small dose diltiazem was given 5 mg bolus and heart rate stayed in the 110s for a while but has since gone back up to 130s diltiazem drip will be started. Patient asymptomatic here. He does have left bundle branch block on EKG no priors for comparison. Troponin was 0.04 repeated is the same no change. Held off on any antiplatelets or anticoagulants due to GI bleed. He also has VINNY creatinine 1.8. Chest x-ray no acute cardiopulmonary disease. Hgb is 9.2, previous was 14.2 1 yr ago. Platelets low 41. Hemoccult stool is positive. Protonix bolus and drip will be started. Plan for admission raegan Yates. Patient is stable. FINAL IMPRESSION      1. Atrial fibrillation and flutter ([address]    2. Blood loss anemia    3. Gastrointestinal hemorrhage, unspecified gastrointestinal hemorrhage type    4. Thrombocytopenia ([address]    5. VINNY (acute kidney injury) ([address]    6. Elevated troponin    7. Left bundle branch block          DISPOSITION/PLAN   DISPOSITION     PATIENT REFERREDTO:  No follow-up provider specified.     DISCHARGE MEDICATIONS:  New Prescriptions    No medications on file       DISCONTINUED MEDICATIONS:  Discontinued Medications    No medications on file              (Please note that portions ofthis note were completed with a voice recognition program.  Efforts were made to edit the dictations but occasionally words are mis-transcribed.)    aKna Barry PA-C (electronically signed)                Marielle Lo PA-C  06/28/22 0310

## 2022-06-28 NOTE — ED NOTES
Transport needs for transfer are:  No Oxygen  Yes Cardiac Monitor  IV Yes Drips Yes      Bety Horner  06/28/22 1422    New Drips started Protonix drip and Diltiazam Drip     Bety Horner  06/28/22 1434

## 2022-06-28 NOTE — ED NOTES
1211 - Called On call cell spoke with Emma Whitlock, She will get hold of Dr. Adama Sim  06/28/22 1212    21  - Dr. Maria Ines Velazco called back.       Cher Monahan  06/28/22 1243

## 2022-06-28 NOTE — ED NOTES
[facility] contacted, Hesham Carroll RN, for patient transfer to [address] [place] [address] hospitalist consult     [facility]  06/28/22 1341        1404  [facility] called back, Hesham Carroll RN, with Dr. Bernabe Newberry, hospitalist at [address]  06/28/22 1406

## 2022-06-29 PROBLEM — K92.2 GI BLEED: Status: ACTIVE | Noted: 2022-06-29

## 2022-06-29 PROBLEM — D64.9 ACUTE ANEMIA: Status: ACTIVE | Noted: 2022-06-29

## 2022-06-29 PROBLEM — D69.6 THROMBOCYTOPENIA (HCC): Status: ACTIVE | Noted: 2022-06-29

## 2022-06-29 PROBLEM — N17.9 AKI (ACUTE KIDNEY INJURY) (HCC): Status: ACTIVE | Noted: 2022-06-29

## 2022-06-29 LAB
A/G RATIO: 1.8 (ref 1.1–2.2)
ABO/RH: NORMAL
ABO/RH: NORMAL
ALBUMIN SERPL-MCNC: 3.7 G/DL (ref 3.4–5)
ALP BLD-CCNC: 74 U/L (ref 40–129)
ALT SERPL-CCNC: 10 U/L (ref 10–40)
ANION GAP SERPL CALCULATED.3IONS-SCNC: 10 MMOL/L (ref 3–16)
ANION GAP SERPL CALCULATED.3IONS-SCNC: 16 MMOL/L (ref 3–16)
ANTIBODY SCREEN: NORMAL
AST SERPL-CCNC: 30 U/L (ref 15–37)
BANDED NEUTROPHILS RELATIVE PERCENT: 1 % (ref 0–7)
BASOPHILS ABSOLUTE: 0 K/UL (ref 0–0.2)
BASOPHILS RELATIVE PERCENT: 0 %
BILIRUB SERPL-MCNC: 1 MG/DL (ref 0–1)
BUN BLDV-MCNC: 44 MG/DL (ref 7–20)
BUN BLDV-MCNC: 44 MG/DL (ref 7–20)
CALCIUM SERPL-MCNC: 8.7 MG/DL (ref 8.3–10.6)
CALCIUM SERPL-MCNC: 8.9 MG/DL (ref 8.3–10.6)
CHLORIDE BLD-SCNC: 102 MMOL/L (ref 99–110)
CHLORIDE BLD-SCNC: 104 MMOL/L (ref 99–110)
CO2: 21 MMOL/L (ref 21–32)
CO2: 24 MMOL/L (ref 21–32)
CREAT SERPL-MCNC: 1.5 MG/DL (ref 0.8–1.3)
CREAT SERPL-MCNC: 1.6 MG/DL (ref 0.8–1.3)
EKG ATRIAL RATE: 156 BPM
EKG ATRIAL RATE: 82 BPM
EKG DIAGNOSIS: NORMAL
EKG DIAGNOSIS: NORMAL
EKG P AXIS: 45 DEGREES
EKG P-R INTERVAL: 214 MS
EKG Q-T INTERVAL: 376 MS
EKG Q-T INTERVAL: 382 MS
EKG QRS DURATION: 110 MS
EKG QRS DURATION: 148 MS
EKG QTC CALCULATION (BAZETT): 446 MS
EKG QTC CALCULATION (BAZETT): 527 MS
EKG R AXIS: -7 DEGREES
EKG R AXIS: 48 DEGREES
EKG T AXIS: -32 DEGREES
EKG T AXIS: 94 DEGREES
EKG VENTRICULAR RATE: 118 BPM
EKG VENTRICULAR RATE: 82 BPM
EOSINOPHILS ABSOLUTE: 0 K/UL (ref 0–0.6)
EOSINOPHILS RELATIVE PERCENT: 0 %
GFR AFRICAN AMERICAN: 50
GFR AFRICAN AMERICAN: 54
GFR NON-AFRICAN AMERICAN: 41
GFR NON-AFRICAN AMERICAN: 45
GLUCOSE BLD-MCNC: 115 MG/DL (ref 70–99)
GLUCOSE BLD-MCNC: 122 MG/DL (ref 70–99)
HCT VFR BLD CALC: 22 % (ref 40.5–52.5)
HCT VFR BLD CALC: 25.6 % (ref 40.5–52.5)
HCT VFR BLD CALC: 25.6 % (ref 40.5–52.5)
HEMOGLOBIN: 7.7 G/DL (ref 13.5–17.5)
HEMOGLOBIN: 8.8 G/DL (ref 13.5–17.5)
HEMOGLOBIN: 8.8 G/DL (ref 13.5–17.5)
INR BLD: 1.22 (ref 0.87–1.14)
LV EF: 48 %
LVEF MODALITY: NORMAL
LYMPHOCYTES ABSOLUTE: 0.4 K/UL (ref 1–5.1)
LYMPHOCYTES RELATIVE PERCENT: 9 %
MAGNESIUM: 2.4 MG/DL (ref 1.8–2.4)
MAGNESIUM: 2.4 MG/DL (ref 1.8–2.4)
MCH RBC QN AUTO: 34.1 PG (ref 26–34)
MCHC RBC AUTO-ENTMCNC: 35.2 G/DL (ref 31–36)
MCV RBC AUTO: 97 FL (ref 80–100)
MONOCYTES ABSOLUTE: 0.6 K/UL (ref 0–1.3)
MONOCYTES RELATIVE PERCENT: 13 %
NEUTROPHILS ABSOLUTE: 3.4 K/UL (ref 1.7–7.7)
NEUTROPHILS RELATIVE PERCENT: 77 %
PDW BLD-RTO: 19 % (ref 12.4–15.4)
PLATELET # BLD: 40 K/UL (ref 135–450)
PLATELET SLIDE REVIEW: ABNORMAL
PMV BLD AUTO: 10.7 FL (ref 5–10.5)
POTASSIUM SERPL-SCNC: 3.8 MMOL/L (ref 3.5–5.1)
POTASSIUM SERPL-SCNC: 4.3 MMOL/L (ref 3.5–5.1)
PRO-BNP: ABNORMAL PG/ML (ref 0–449)
PROTHROMBIN TIME: 15.3 SEC (ref 11.7–14.5)
RBC # BLD: 2.27 M/UL (ref 4.2–5.9)
SCHISTOCYTES: ABNORMAL
SLIDE REVIEW: ABNORMAL
SODIUM BLD-SCNC: 138 MMOL/L (ref 136–145)
SODIUM BLD-SCNC: 139 MMOL/L (ref 136–145)
T4 FREE: 1.3 NG/DL (ref 0.9–1.8)
TOTAL PROTEIN: 5.8 G/DL (ref 6.4–8.2)
TROPONIN: 0.06 NG/ML
TROPONIN: 0.1 NG/ML
TROPONIN: 0.1 NG/ML
TSH SERPL DL<=0.05 MIU/L-ACNC: 1.65 UIU/ML (ref 0.27–4.2)
WBC # BLD: 4.3 K/UL (ref 4–11)

## 2022-06-29 PROCEDURE — 83880 ASSAY OF NATRIURETIC PEPTIDE: CPT

## 2022-06-29 PROCEDURE — 93005 ELECTROCARDIOGRAM TRACING: CPT | Performed by: INTERNAL MEDICINE

## 2022-06-29 PROCEDURE — 85610 PROTHROMBIN TIME: CPT

## 2022-06-29 PROCEDURE — 86850 RBC ANTIBODY SCREEN: CPT

## 2022-06-29 PROCEDURE — 6360000004 HC RX CONTRAST MEDICATION: Performed by: NURSE PRACTITIONER

## 2022-06-29 PROCEDURE — 93010 ELECTROCARDIOGRAM REPORT: CPT | Performed by: INTERNAL MEDICINE

## 2022-06-29 PROCEDURE — 6370000000 HC RX 637 (ALT 250 FOR IP): Performed by: NURSE PRACTITIONER

## 2022-06-29 PROCEDURE — 84439 ASSAY OF FREE THYROXINE: CPT

## 2022-06-29 PROCEDURE — 36415 COLL VENOUS BLD VENIPUNCTURE: CPT

## 2022-06-29 PROCEDURE — 6360000002 HC RX W HCPCS: Performed by: NURSE PRACTITIONER

## 2022-06-29 PROCEDURE — 80061 LIPID PANEL: CPT

## 2022-06-29 PROCEDURE — 85014 HEMATOCRIT: CPT

## 2022-06-29 PROCEDURE — 2060000000 HC ICU INTERMEDIATE R&B

## 2022-06-29 PROCEDURE — 99223 1ST HOSP IP/OBS HIGH 75: CPT | Performed by: INTERNAL MEDICINE

## 2022-06-29 PROCEDURE — C9113 INJ PANTOPRAZOLE SODIUM, VIA: HCPCS | Performed by: NURSE PRACTITIONER

## 2022-06-29 PROCEDURE — 85025 COMPLETE CBC W/AUTO DIFF WBC: CPT

## 2022-06-29 PROCEDURE — 93005 ELECTROCARDIOGRAM TRACING: CPT | Performed by: NURSE PRACTITIONER

## 2022-06-29 PROCEDURE — 84443 ASSAY THYROID STIM HORMONE: CPT

## 2022-06-29 PROCEDURE — 2500000003 HC RX 250 WO HCPCS: Performed by: NURSE PRACTITIONER

## 2022-06-29 PROCEDURE — 80053 COMPREHEN METABOLIC PANEL: CPT

## 2022-06-29 PROCEDURE — 83735 ASSAY OF MAGNESIUM: CPT

## 2022-06-29 PROCEDURE — 2580000003 HC RX 258: Performed by: NURSE PRACTITIONER

## 2022-06-29 PROCEDURE — 84484 ASSAY OF TROPONIN QUANT: CPT

## 2022-06-29 PROCEDURE — C8929 TTE W OR WO FOL WCON,DOPPLER: HCPCS

## 2022-06-29 PROCEDURE — 86901 BLOOD TYPING SEROLOGIC RH(D): CPT

## 2022-06-29 PROCEDURE — 86900 BLOOD TYPING SEROLOGIC ABO: CPT

## 2022-06-29 PROCEDURE — 85018 HEMOGLOBIN: CPT

## 2022-06-29 RX ORDER — PANTOPRAZOLE SODIUM 40 MG/10ML
40 INJECTION, POWDER, LYOPHILIZED, FOR SOLUTION INTRAVENOUS DAILY
Status: DISCONTINUED | OUTPATIENT
Start: 2022-06-29 | End: 2022-07-08 | Stop reason: HOSPADM

## 2022-06-29 RX ORDER — METOPROLOL SUCCINATE 25 MG/1
25 TABLET, EXTENDED RELEASE ORAL DAILY
Status: DISCONTINUED | OUTPATIENT
Start: 2022-06-30 | End: 2022-07-02

## 2022-06-29 RX ORDER — DILTIAZEM HYDROCHLORIDE 120 MG/1
120 CAPSULE, COATED, EXTENDED RELEASE ORAL DAILY
Status: DISCONTINUED | OUTPATIENT
Start: 2022-06-29 | End: 2022-06-30

## 2022-06-29 RX ADMIN — OXYCODONE HYDROCHLORIDE AND ACETAMINOPHEN 1000 MG: 500 TABLET ORAL at 08:58

## 2022-06-29 RX ADMIN — SERTRALINE 50 MG: 50 TABLET, FILM COATED ORAL at 20:27

## 2022-06-29 RX ADMIN — DILTIAZEM HYDROCHLORIDE 120 MG: 120 CAPSULE, COATED, EXTENDED RELEASE ORAL at 09:00

## 2022-06-29 RX ADMIN — SODIUM CHLORIDE 8 MG/HR: 9 INJECTION, SOLUTION INTRAVENOUS at 00:42

## 2022-06-29 RX ADMIN — PANTOPRAZOLE SODIUM 40 MG: 40 INJECTION, POWDER, FOR SOLUTION INTRAVENOUS at 15:01

## 2022-06-29 RX ADMIN — DILTIAZEM HYDROCHLORIDE 7.5 MG/HR: 5 INJECTION, SOLUTION INTRAVENOUS at 00:41

## 2022-06-29 RX ADMIN — ATORVASTATIN CALCIUM 40 MG: 40 TABLET, FILM COATED ORAL at 20:27

## 2022-06-29 RX ADMIN — SODIUM CHLORIDE, PRESERVATIVE FREE 10 ML: 5 INJECTION INTRAVENOUS at 08:58

## 2022-06-29 RX ADMIN — FENOFIBRATE 160 MG: 160 TABLET ORAL at 08:58

## 2022-06-29 RX ADMIN — SODIUM CHLORIDE: 9 INJECTION, SOLUTION INTRAVENOUS at 00:19

## 2022-06-29 RX ADMIN — PERFLUTREN 1.65 MG: 6.52 INJECTION, SUSPENSION INTRAVENOUS at 08:58

## 2022-06-29 RX ADMIN — HYDROCODONE BITARTRATE AND ACETAMINOPHEN 1 TABLET: 5; 325 TABLET ORAL at 20:26

## 2022-06-29 ASSESSMENT — PAIN SCALES - GENERAL
PAINLEVEL_OUTOF10: 5
PAINLEVEL_OUTOF10: 0

## 2022-06-29 ASSESSMENT — PAIN DESCRIPTION - LOCATION: LOCATION: BUTTOCKS

## 2022-06-29 NOTE — CONSULTS
Consultation Note    Patient Name: Sherrel Jeans  : 1940  Age: 79 y.o. Admitting Physician: Kristel Alexis MD   Date of Admission: 2022 11:01 PM   Primary Care Physician: Javier Rao MD        Sherrel Jeans is being seen at the request of Kristel Alexis MD for Acute blood loss anemia. History of Present Illness:  80 yo M w/ PMHx significant for HTN, HLD, CKD. Presented to hospital with generalized pain and fatigue. On admission found to be in AF with RVR and VINNY. Profound anemia thus reason for consult. Patient endorses presented with all overall body aches and discomfort, reports \"hard to describe\". Ended up taking 2 Lortabs and fell asleep, when he woke the following day found to be in a sweat but pain improved. No reported chills or known fever. Denies any nausea/vomiting, abdominal pain, hematemesis, hematochezia, melena by history. Chronic intermittent constipation for which he takes Dulcolax for as needed OTC. Reports some constipation about 1 week ago followed by Rectal discomfort, again no bleeding. Reports weight as stable. No dysphagia/odnynophagia by history. Previous Heavy ETOH use in past--stopped about 40 years ago. No tobacco abuse reported. Intermittent use of Advil for discomforts but states some weeks will take 1-2 daily and other weeks none at all. On admission: WBC 5.4, Hgb 9.2 down from baseline 14.2 last check 2021 Currently 7.7; MCV 96.9; Platelet ct 41 down from 151 last year, BUN 44, Creatinine 1.8 p from 1.2, T. Bili 1.2, AP 89, ALT 11, AST 30; FOBT POSITIVE; INR 1.22; Albumin 3.7. GI History:  · 2014 Colonoscopy: IMPRESSION:  Small colon polyp, PATH: Hyperplastic. · 10/3/2020 RUQ US: Increased echogenicity compatible with fatty steatosis on imaging, gallstones with 4 mm Duct noted. · Denies Family history of GI Malignancies.      Past Medical History:  Past Medical History:   Diagnosis Date    Anosmia 2011    Backpain     Bilateral carotid bruits 9/6/2016    Chronic kidney disease 9/6/2016    Current moderate episode of major depressive disorder without prior episode ([facility] [place] [address] 1/23/2019    Hyperlipidemia     Hypertension     Hypertrophy of prostate without urinary obstruction and other lower urinary tract symptoms (LUTS)     Nonrheumatic aortic valve insufficiency 9/6/2016    Rosacea     Taste perversion 8/19/2011        Past Surgical History:  Past Surgical History:   Procedure Laterality Date    COLONOSCOPY  2/11/2008    COLONOSCOPY  11/14/2014    HERNIA REPAIR          Historical Medications:  Prior to Visit Medications    Medication Sig Taking? Authorizing Provider   lisinopril-hydroCHLOROthiazide (PRINZIDE;ZESTORETIC) 20-25 MG per tablet TAKE ONE TABLET BY MOUTH DAILY  Laci Guy MD   HYDROcodone-acetaminophen (NORCO) 5-325 MG per tablet Take 1 tablet by mouth 2 times daily as needed for Pain for up to 30 days. Laci Guy MD   sildenafil (VIAGRA) 100 MG tablet TAKE ONE TABLET BY MOUTH AS NEEDED FOR ERECTILE DYSFUNCTION  Alix Crouch MD   sertraline (ZOLOFT) 50 MG tablet Take 1 tablet by mouth nightly  Laci Guy MD   fenofibrate (TRIGLIDE) 160 MG tablet TAKE ONE TABLET BY MOUTH DAILY  Laci Guy MD   atorvastatin (LIPITOR) 40 MG tablet Take 1 tablet by mouth daily  Laci Guy MD   metroNIDAZOLE (METROGEL) 1 % gel Apply one a day  Patient not taking: Reported on 6/28/2022  Laci Guy MD   Multiple Vitamins-Minerals (MULTIVITAMIN PO) Take 1 tablet by mouth daily. Historical Provider, MD   Omega-3 Fatty Acids (FISH OIL PO) Take 1 tablet by mouth daily. Historical Provider, MD   Ascorbic Acid (VITAMIN C) 500 MG tablet Take 1,000 mg by mouth daily.   Historical Provider, MD        Hospital Medications:  Current Facility-Administered Medications: dilTIAZem (CARDIZEM CD) extended release capsule 120 mg, 120 mg, Oral, Daily  pantoprazole (PROTONIX) injection 40 mg, 40 mg, IntraVENous, Daily  ascorbic acid (VITAMIN C) tablet 1,000 mg, 1,000 mg, Oral, Daily  atorvastatin (LIPITOR) tablet 40 mg, 40 mg, Oral, Nightly  HYDROcodone-acetaminophen (NORCO) 5-325 MG per tablet 1 tablet, 1 tablet, Oral, Q6H PRN  sertraline (ZOLOFT) tablet 50 mg, 50 mg, Oral, Nightly  dilTIAZem 125 mg in dextrose 5 % 125 mL infusion, 2.5-15 mg/hr, IntraVENous, Continuous  sodium chloride flush 0.9 % injection 5-40 mL, 5-40 mL, IntraVENous, 2 times per day  sodium chloride flush 0.9 % injection 5-40 mL, 5-40 mL, IntraVENous, PRN  0.9 % sodium chloride infusion, , IntraVENous, PRN  polyethylene glycol (GLYCOLAX) packet 17 g, 17 g, Oral, Daily PRN  acetaminophen (TYLENOL) tablet 650 mg, 650 mg, Oral, Q6H PRN **OR** acetaminophen (TYLENOL) suppository 650 mg, 650 mg, Rectal, Q6H PRN  0.9 % sodium chloride infusion, , IntraVENous, Continuous  prochlorperazine (COMPAZINE) injection 10 mg, 10 mg, IntraVENous, Q6H PRN     Social History:   Social History     Tobacco History     Smoking Status  Never Smoker    Smokeless Tobacco Use  Never Used          Alcohol History     Alcohol Use Status  No          Drug Use     Drug Use Status  No          Sexual Activity     Sexually Active  Not Currently Partners  Female                 Family History:  Family History   Problem Relation Age of Onset    COPD Sister     COPD Brother         Birth defect        Allergies:  No Known Allergies     ROS:   General: No fever or weight change  Hematologic: No unexpected submucosal bleeding or bruising  HEENT: No sore throat or facial pain  Respiratory: No cough or dyspnea  Cardiovascular: No angina or dependent edema  Gastrointestinal: See HPI  Musculoskeletal: No usual joint pain or stiffness  Skin: No skin eruptions or changing lesions  Neurologic: No focal weakness or numbness  Psychiatric: No anxiety or sleep disturbance    Physical Exam:  Vital Signs:   Vitals:    06/29/22 1147 BP: 139/64   Pulse: 73   Resp: 17   Temp: 98.2 °F (36.8 °C)   SpO2: 98%       General: Chronically ill appearance, no acute distress. HEENT: Sclera anicteric, mucosal membranes moist  Cardiovascular: Regular rate and rhythm. No murmurs. Respiratory: Respirations nonlabored, no crepitus  GI: Abdomen nondistended, soft, and nontender. Normal active bowel sounds. No masses palpable. Rectal: small Firm area posterior rectum on exam. No external hemorrhoids appreciated, Brown stool and hard stool on exam.   Musculoskeletal: + 1-2  pitting edema of the lower legs. Neurological: Gross memory appears intact. Patient is alert and oriented    Recent labs and imaging reviewed. Assessment:  80 yo M w/ PMHx significant for HTN, HLD, CKD. Presented to hospital with generalized pain and fatigue. On admission found to be in AF with RVR and VINNY. Profound anemia thus reason for consult. Hgb 7.7 down from 9.2 on admission and baseline of 14 one year ago. FOBT Positive. Noted thrombocytopenia as well with Platelet ct 40 this AM. Prior heavy ETOH Years ago, nothing in past 40 years. Prior Imaging reviewed from 2020 revealing increased echogenicity of Liver per US report. Denies any known Liver issues/cirrhosis. Brown stool on exam, small firm area posterior aspect on rectal exam.     Plan:  1. Recommend Hematology to further evaluate profound Thrombocytopenia. 2. Anticipate need for EGD/Colonoscopy (atypical region on rectal exam) to further evaluate Anemia but will wait until thrombocytopenia improves       MD Cristina Edge    [phone].  Also available via [employer]

## 2022-06-29 NOTE — ED NOTES
Report called to [facility] at [facility].       Lauren Mccarthy RN  06/28/22 2953

## 2022-06-29 NOTE — CONSULTS
Called [facility] and placed a consult with Lauren Rider regarding VINNY Ranjit Villa

## 2022-06-29 NOTE — CONSULTS
Patient seen and examined, consult note dictated. Assessment and Plan:    1- A/CKD: The patient has chronic kidney disease with a baseline creatinine of 1.2 to 1.4. His VINNY is likely secondary to a pre-renal component, along with renal hypoperfusion in the setting of hemodynamic instability. His urinary output is well maintained and his serum creatinine is slowly trending down. - Continue volume expansion.  - Avoid all nephrotoxic agents at this time. - Maintain systolic blood pressure > 120 mmHg. 2- No significant electrolytes disorders noted. 3- HTN: Blood pressure within acceptable range. 4- Atrial fibrillation: Management per cardiology.

## 2022-06-29 NOTE — ED NOTES
Patient called out angry wanting to leave. Provider made aware. Writer and provider offered patient a more comfortable bed, food, and medications and patient still refusing and wanting to leave AMA.       Adis Mcgrath RN  06/28/22 2028

## 2022-06-29 NOTE — CONSULTS
[facility], on protonix gtt  on 6/29 @ 9:42AM Sherrie Anaya

## 2022-06-29 NOTE — H&P
Hospital Medicine History & Physical      PCP: Alcira Alejandre MD    Date of Admission: 6/29/2022    Time of Service: 0100    Date of Service: Pt seen/examined on 6/29/2022 and admitted to Inpatient with expected LOS greater than two midnights due to medical therapy. Historian: Self    Chief Concern:  Direct admit from [facility];  AF w/ RVR, VINNY, and acute anemia with positive FOBT. History Of Present Illness:      Dhruv Lezama is an 23-year-old male with significant past medical history of hypertension, hyperlipidemia, and CKD who presents to [facility] as a direct admit from [facility].  He presented there earlier yesterday with concerns for 2 days worth of pain everywhere in his body. He states that he has been working hard at his job the last few months in order to try to make more money. 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 He denies any other symptoms such as true chest pain, nausea vomiting or diarrhea, or any fevers. He states simply just had malaise and myalgias. Upon arrival to [facility], he was noted to be in A. fib with RVR and have VINNY and anemia. He was started on Cardizem infusion, Protonix infusion, and IV fluids. He was transferred here for higher level care. Pertinent lab values prior to arrival include BUN 44, creatinine 1.8, GFR 36, and blood sugar 187. He had a troponin of 0.04 there. Cell count there showed hemoglobin of 9.2, hematocrit 24.8, WBC 5.4, and platelets 41. Fecal occult was positive. Flu a and B were negative, COVID was also negative. Urinalysis yesterday afternoon was negative for any acute findings to suggest possible infection. Hospital team was consulted to admit.     Past Medical History:          Diagnosis Date    Anosmia 8/19/2011    Backpain     Bilateral carotid bruits 9/6/2016    Chronic kidney disease 9/6/2016    Current moderate episode of major depressive disorder without prior episode ([facility] [address] 1/23/2019  Hyperlipidemia     Hypertension     Hypertrophy of prostate without urinary obstruction and other lower urinary tract symptoms (LUTS)     Nonrheumatic aortic valve insufficiency 9/6/2016    Rosacea     Taste perversion 8/19/2011     Past Surgical History:          Procedure Laterality Date    COLONOSCOPY  2/11/2008    COLONOSCOPY  11/14/2014    HERNIA REPAIR       Medications Prior to Admission:      Prior to Admission medications    Medication Sig Start Date End Date Taking? Authorizing Provider   lisinopril-hydroCHLOROthiazide (PRINZIDE;ZESTORETIC) 20-25 MG per tablet TAKE ONE TABLET BY MOUTH DAILY 6/6/22   Neha Carranza MD   HYDROcodone-acetaminophen (NORCO) 5-325 MG per tablet Take 1 tablet by mouth 2 times daily as needed for Pain for up to 30 days. 6/3/22 7/3/22  Neha Carranza MD   sildenafil (VIAGRA) 100 MG tablet TAKE ONE TABLET BY MOUTH AS NEEDED FOR ERECTILE DYSFUNCTION 6/2/22   Jostin Muniz MD   sertraline (ZOLOFT) 50 MG tablet Take 1 tablet by mouth nightly 1/5/22   Neha Carranza MD   fenofibrate (TRIGLIDE) 160 MG tablet TAKE ONE TABLET BY MOUTH DAILY 1/5/22   Neha Carranza MD   atorvastatin (LIPITOR) 40 MG tablet Take 1 tablet by mouth daily 1/5/22   Neha Carranza MD   metroNIDAZOLE (METROGEL) 1 % gel Apply one a day  Patient not taking: Reported on 6/28/2022 1/23/19   Neha Carranza MD   Multiple Vitamins-Minerals (MULTIVITAMIN PO) Take 1 tablet by mouth daily. Historical Provider, MD   Omega-3 Fatty Acids (FISH OIL PO) Take 1 tablet by mouth daily. Historical Provider, MD   Ascorbic Acid (VITAMIN C) 500 MG tablet Take 1,000 mg by mouth daily. Historical Provider, MD     Allergies:  Patient has no known allergies. Social History:      The patient currently lives at home. TOBACCO:   reports that he has never smoked.  He has never used smokeless tobacco.  ETOH:   reports no history of alcohol use.  E-cigarette/Vaping     Questions Responses    E-cigarette/Vaping Use Never User    Start Date     Passive Exposure     Quit Date     Counseling Given     Comments         Family History:      Reviewed in detail at bedside with patient; positive as follows:        Problem Relation Age of Onset    COPD Sister     COPD Brother         Birth defect     REVIEW OF SYSTEMS COMPLETED:   Pertinent positives as noted in the HPI. All other systems reviewed and negative. PHYSICAL EXAM PERFORMED:    BP (!) 140/68   Pulse 87   Temp 99.3 °F (37.4 °C) (Oral)   Resp 20   Ht 6' 2\" (1.88 m)   Wt 180 lb 14.4 oz (82.1 kg)   SpO2 98%   BMI 23.23 kg/m²     General appearance:  No apparent distress, appears stated age and cooperative. HEENT:  Normal cephalic, atraumatic without obvious deformity. Pupils equal, round, and reactive to light. Extra ocular muscles intact. Conjunctivae/corneas clear. Neck: Supple, with full range of motion. No jugular venous distention noted. Trachea midline. Respiratory:  Normal respiratory effort. Clear to auscultation, bilaterally without Rales/Wheezes/Rhonchi. Cardiovascular:  Regular rate and rhythm with normal S1/S2 without murmurs, rubs or gallops, had converted to NSR prior to entry to room, EKG noted. No LE edema. Abdomen: Soft, non-tender, non-distended with normal bowel sounds. Abdominal wall hernia noted, non-tender, reducible  Musculoskeletal:  No clubbing, cyanosis or edema bilaterally. Full range of motion without deformity. Skin: Skin color, texture, turgor normal.  No rashes or lesions. Neurologic:  Neurovascularly intact without any focal sensory/motor deficits.  Cranial nerves: II-XII intact, grossly non-focal.  Psychiatric:  Alert and oriented, thought content appropriate, normal insight  Capillary Refill: Brisk,3 seconds, normal  Peripheral Pulses: +2 palpable, equal bilaterally     Labs:     Recent Labs     06/28/22  0931 06/29/22  0031   WBC 5.4  --    HGB 9.2* 8.8*   HCT 25.8* 25.6*   PLT 41*  --      Recent Labs     06/28/22  0931 06/29/22  0031    139   K 3.9 3.8   CL 99 102   CO2 21 21   BUN 44* 44*   CREATININE 1.8* 1.5*   CALCIUM 9.0 8.9     Recent Labs     06/28/22  0931 06/29/22  0031   AST 30 30   ALT 11 10   BILITOT 1.2* 1.0   ALKPHOS 89 74     No results for input(s): INR in the last 72 hours.   Recent Labs     06/28/22  0931 06/28/22  1144 06/29/22  0031   CKTOTAL 144  --   --    TROPONINI 0.04* 0.04* 0.06*     Urinalysis:      Lab Results   Component Value Date    NITRU Negative 06/28/2022    WBCUA 0-2 06/28/2022    BACTERIA Rare 06/28/2022    RBCUA 0-2 06/28/2022    BLOODU TRACE-INTACT 06/28/2022    SPECGRAV 1.020 06/28/2022    GLUCOSEU Negative 06/28/2022     Radiology:     I have reviewed the radiographic results for this encounter with the following interpretation(s); see report(s) below:    No orders to display       EKG:  I have reviewed the EKG with the following interpretation in the absence of a cardiologist: Most recent EKG showed NSR, non-specific EKG    Consults:    IP CONSULT TO CARDIOLOGY  IP CONSULT TO GI  IP CONSULT TO NEPHROLOGY    ASSESSMENT:    Active Hospital Problems    Diagnosis Date Noted    VINNY (acute kidney injury) ([facility] [address] [N17.9] 06/29/2022     Priority: High    Acute anemia [D64.9] 06/29/2022     Priority: High    GI bleed [K92.2] 06/29/2022     Priority: High    Atrial fibrillation with RVR ([facility] [address] [I48.91] 06/28/2022     Priority: High    Thrombocytopenia ([facility] [address] [D69.6] 06/29/2022     Priority: Medium    Hypertension [I10]      Priority: Medium     PLAN:    Atrial Fibrillation with RVR, resolving  - Converted to sinus rhythm w/ 1st degree AV block  - Chest pain-free  - Trend troponin here  - Ordered echocardiogram for AM  - Consulted Cardiology for AM  - Continue to titrate Cardizem down as tolerated by HR    VINNY on CKD  - Unclear cause, notes excessive heat exposure at work and possible dehydration  - Continue IVF with NS  - Consulted Nephro for AM  - Repeat labs now and then in AM  - Holding Zestoretic    Acute blood loss anemia/GI bleed POA   - No report of hematemesis or hematochezia/melena, notes history of hemorrhoids but don't think they're bleeding, denies seeing blood   - Continue Protonix infusion  - Positive FOBT at [facility]  - Consulted GI in AM  - Check H/H now - 7.7 , then CBC in AM  -Continue to monitor serial hemoglobin and transfuse PRBC for hemoglobin <7    Thrombocytopenia  - No prior history on labs  - Check CBC in AM  - Consulted Heme/Onc in AM    DVT Prophylaxis: SCDs  Diet: Diet NPO  Code Status: Full Code    PT/OT Eval Status: N/A    Dispo - 2-3 days per clinical improvement    Jennifer Feng, APRN - CNP    Thank you Javier Rao MD for the opportunity to be involved in this patient's care.  If you have any questions or concerns please feel free to contact me at [phone].---Anticipated co-signer, Dr. Katlyn Edgar    (Please note that portions of this note were completed with a voice recognition program. Efforts were made to edit the dictations but occasionally words are mis-transcribed.)

## 2022-06-29 NOTE — CONSULTS
Electrophysiology Consultation   Date: 6/29/2022  Admit Date:  6/28/2022  Reason for Consultation: Paroxysmal atrial fibrillation/flutter/tachycardia  Consult Requesting Physician: Abhishek Ferrera MD     No chief complaint on file. HPI:   Mr. Nicole Jimenez is a pleasant 79year old male, without any family support, with a medical history significant for hypertension, hyperlipidemia, chronic renal insufficiency, and depression who presented to [facility] with generalized body aches. According to patient he began to suffer from with several days of body aches and weakness. He lives alone and reportedly was not able to care for himself. He presented to [facility] with a friend. Patient denies fevers, chest pain, orthopnea, PND, lower extremity edema, abdominal swelling, shortness of breath, dyspnea on exertion, chills, visual changes, headaches, sore throat, cough, abdominal pain, nausea, vomiting, bleeding, bruising, dysuria, muscle/joint pain, confusion, depression, anxiety, skin lesions, etc.    Emergency Room/Hospital Course:  Patient presented to [facility] from [facility] for atrial arrhythmias with RVR and wide complex. His labs were grossly abnormal.  His CMP was notable for elevated creatinine. His troponin enzymes were elevated but stable. His BNP was markedly elevated at 10,156. His CBC was notable for new normocytic anemia, and thrombocytopenia. His chest radiograph was reassuring. His FOB T was positive. GI (anemia and FOBT), cardiology (elevated troponin enzymes) and electrophysiology was consulted (atrial arrhythmias). Current rhythm: Paroxysmal atrial fibrillation, flutter, and paroxysms of atrial tachycardia.   Known history of atrial fibrillation: no  Valvular disease: No  Associated symptoms: unclear  Heart rate is  controlled  Previous cardioversion and/or ablation: no  History of CAD: No  History of sleep apnea: No  History of ETOH abuse/drug abuse: No  History of thyroid disease: No  Elevated BNP: Yes  Left atrial size is Normal    Past Medical History:   Diagnosis Date    Anosmia 8/19/2011    Backpain     Bilateral carotid bruits 9/6/2016    Chronic kidney disease 9/6/2016    Current moderate episode of major depressive disorder without prior episode ([facility] [address] 1/23/2019    Hyperlipidemia     Hypertension     Hypertrophy of prostate without urinary obstruction and other lower urinary tract symptoms (LUTS)     Nonrheumatic aortic valve insufficiency 9/6/2016    Rosacea     Taste perversion 8/19/2011        Past Surgical History:   Procedure Laterality Date    COLONOSCOPY  2/11/2008    COLONOSCOPY  11/14/2014    HERNIA REPAIR         No Known Allergies    Social History:  Reviewed. reports that he has never smoked. He has never used smokeless tobacco. He reports that he does not drink alcohol and does not use drugs. Family History:  Reviewed. family history includes COPD in his brother and sister. No premature CAD. Review of System:  All other systems reviewed except for that noted above. Pertinent negatives and positives are:     · General: negative for fever, chills   · Ophthalmic ROS: negative for - eye pain or loss of vision  · ENT ROS: negative for - headaches, sore throat   · Respiratory: negative for - cough, sputum  · Cardiovascular: Reviewed in HPI  · Gastrointestinal: negative for - abdominal pain, diarrhea, N/V  · Hematology: negative for - bleeding, blood clots, bruising or jaundice  · Genito-Urinary:  negative for - Dysuria or incontinence  · Musculoskeletal: negative for - Joint swelling, muscle pain  · Neurological: negative for - confusion, dizziness, headaches   · Psychiatric: No anxiety, no depression.   · Dermatological: negative for - rash    Physical Examination:  Vitals:    06/29/22 1643   BP: (!) 134/55   Pulse: 75   Resp: 16   Temp: 99.7 °F (37.6 °C)   SpO2: 100%        Intake/Output Summary (Last 24 hours) at 6/29/2022 1803  Last data filed at 2022 1744  Gross per 24 hour   Intake 120 ml   Output 520 ml   Net -400 ml     In: 120 [P.O.:120]  Out: 520    Wt Readings from Last 3 Encounters:   22 180 lb 14.4 oz (82.1 kg)   22 185 lb (83.9 kg)   22 183 lb (83 kg)     Temp  Av.8 °F (37.1 °C)  Min: 98.2 °F (36.8 °C)  Max: 99.7 °F (37.6 °C)  Pulse  Av.3  Min: 73  Max: 145  BP  Min: 109/50  Max: 150/87  SpO2  Av.3 %  Min: 96 %  Max: 100 %    · Telemetry: Sinus rhythm, atrial fibrillation with rate related LBBB, paroxysms of atrial tachycardia, and atrial flutter. · Constitutional: Alert. Oriented to person, place, and time. No distress. · Head: Normocephalic and atraumatic. · Mouth/Throat: Lips appear moist. Oropharynx is clear and moist.  · Eyes: Conjunctivae normal. EOM are normal.   · Neck: Neck supple. No lymphadenopathy. No rigidity. No JVD present. · Cardiovascular: Normal rate, regular rhythm. Normal S1&S2. · Pulmonary/Chest: Bilateral respiratory sounds present. No respiratory accessory muscle use. No wheezes, No rhonchi. · Abdominal: Soft. Normal bowel sounds present. No distension, No tenderness. · Musculoskeletal: No tenderness. No edema    · Neurological: Alert and oriented. Cranial nerve II-XII grossly intact. · Skin: Skin is warm and dry. No rash, lesions, ulcerations noted. · Psychiatric: No anxiety nor agitation. Labs:  Reviewed.    Recent Labs     22  0931 22  0031 22  0410    139 138   K 3.9 3.8 4.3   CL 99 102 104   CO2 21 21 24   BUN 44* 44* 44*   CREATININE 1.8* 1.5* 1.6*     Recent Labs     22  0931 22  0031 22  0410   WBC 5.4  --  4.3   HGB 9.2* 8.8* 7.7*   HCT 25.8* 25.6* 22.0*   MCV 96.9  --  97.0   PLT 41*  --  40*     Lab Results   Component Value Date/Time    CKTOTAL 144 2022 09:31 AM    TROPONINI 0.10 2022 04:10 AM    TROPONINI 0.10 2022 04:10 AM     No results found for: BNP  Lab Results   Component Value Assessment:   Patient Active Problem List    Diagnosis Date Noted    VINNY (acute kidney injury) ([address] 06/29/2022    Acute anemia 06/29/2022    GI bleed 06/29/2022    Thrombocytopenia ([address] 06/29/2022    Atrial fibrillation with RVR ([address] 06/28/2022    Current moderate episode of major depressive disorder without prior episode ([address] 01/23/2019    Nonrheumatic aortic valve insufficiency 09/06/2016    Bilateral carotid bruits 09/06/2016    Back pain 01/12/2012    Taste perversion 08/19/2011    Anosmia 08/19/2011    Hypertension     Rosacea     Mixed hyperlipidemia     Enlarged prostate with urinary obstruction       Active Hospital Problems    Diagnosis Date Noted    VINNY (acute kidney injury) ([address] [N17.9] 06/29/2022     Priority: Medium    Acute anemia [D64.9] 06/29/2022     Priority: Medium    GI bleed [K92.2] 06/29/2022     Priority: Medium    Thrombocytopenia ([address] [D69.6] 06/29/2022     Priority: Medium    Atrial fibrillation with RVR ([address] [I48.91] 06/28/2022     Priority: Medium    Hypertension [I10]      Mr. Yamil Pretty is a pleasant 79year old male, without any family support, with a medical history significant for hypertension, hyperlipidemia, chronic renal insufficiency, and depression who presented to [facility] with generalized body aches. Problem List:  1. Atrial fibrillation (course)/tachycardia/flutter. 2. New onset cardiomyopathy. 3. Normocytic anemia/thrombocytopenia. 4. Acute on chronic renal failure. Assessment and Plan:  1. Atrial fibrillation (course)/tachycardia/flutter. Patient is a pleasant 79year old male with a medical history significant for hypertension, hyperlipidemia, and chronic renal insufficiency who presents from home with generalized and was found to have thrombocytopenia, anemia, new onset atrial arrhythmia, and new onset cardiomyopathy.   Patient's EKG and telemetry shows salvos of atrial tachycardia, fibrillation and flutter with rate related bundle branch blockage. Afib risk factors including age, HTN, obesity, inactivity and ARIA were discussed with patient. Risk factor modification recommended. Patient's [id number] score is 4 with a stroke risk of 4.8%. We discussed oral anticoagulation to decrease the risk of thromboembolic events including stroke. Benefits and alternatives were discussed with patient. Risk of bleeding was discussed. Patient verbalized understanding. Different forms of anticoagulants including warfarin (Coumadin), Dabigatran (Pradaxa), Rivaroxaban (Xarelto), and Apixaban (Eliquis) were discussed. We will hold off on [facility] given anemia and possible GI bleeding. Rate control strategy options including cardioversion, atrial fibrillation ablation, pacemaker with AVN ablation, anti-arrhythmic strategy, and rate control strategy were discussed with patient. Risks, benefits and alternative of each treatment options were explained. All questions were answered. Patient has opted for rate control. - I will order thyroid function testing.  - I will order hemoglobin A1c.  - Start metoprolol succinate given heart failure and wean off diltiazem. - Hold off on [facility]. - We will continue to follow along with you. 2. New onset cardiomyopathy. Stable with stable troponin enzymes with elevated BNP and creatinine. Clinically doesn't appear to be volume overloaded and chest radiograph reassuring along with echocardiogram (from filling pressure standpoint). - Per cardiology. - Will need vasodilator but will hold off on lisinopril for now given renal failure but hopeful to add as renal function not so bad that we will need to avoid. 3. Normocytic anemia/thrombocytopenia. - Per primary team.    4. Acute on chronic renal failure. - Per primary team.    Thank you for allowing me to participate in the care of Gi Mcclendon . If you have any questions/comments, please do not hesitate to contact us.     Jenna Fong MD  Cardiac Electrophysiology  [address] [facility]  [phone] [facility]

## 2022-06-29 NOTE — PROGRESS NOTES
4 Eyes Skin Assessment     The patient is being assess for  Admission    I agree that 2 RN's have performed a thorough Head to Toe Skin Assessment on the patient. ALL assessment sites listed below have been assessed. Areas assessed by both nurses:   []   Head, Face, and Ears   []   Shoulders, Back, and Chest  []   Arms, Elbows, and Hands   []   Coccyx, Sacrum, and Ischum  []   Legs, Feet, and Heels        Does the Patient have Skin Breakdown?   No         Isma Prevention initiated:  No   Wound Care Orders initiated:  No      [facility] nurse consulted for Pressure Injury (Stage 3,4, Unstageable, DTI, NWPT, and Complex wounds):  No     Nurse 1 eSignature: Electronically signed by Ronald Marsh RN on 6/29/22 at 12:30 AM EDT    **SHARE this note so that the co-signing nurse is able to place an eSignature**    Nurse 2 eSignature: {Esignature:[id number]}

## 2022-06-29 NOTE — CONSULTS
[facility] [address]                                  CONSULTATION    PATIENT NAME: Vy Rodriguez                       :        1940  MED REC NO:   [id number]                          ROOM:       0  ACCOUNT NO:   [de-identified]                           ADMIT DATE: 2022  PROVIDER:     Michell Navarro MD    CONSULT DATE:  2022    REASON FOR CONSULTATION:  Acute-on-chronic kidney disease. HISTORY OF PRESENT ILLNESS:  The patient is an 79-year-old   male patient with a past medical history significant for mild chronic  kidney disease and a baseline creatinine ranging between 1.2 and 1.4  mg/dL. The patient presented to [facility] complaining of  progressive weakness and fatigue. He was noted to be in atrial  fibrillation with rapid ventricular response. His renal panel also  revealed an acute-on-chronic kidney injury with a serum creatinine of  1.8 mg/dL, which prompted Nephrology consultation. PAST MEDICAL HISTORY:  1. Chronic kidney disease. 2.  Hypertension. 3.  Hyperlipidemia. 4.  Major depressive disorder. 5.  Osteoarthritis. PAST SURGICAL HISTORY:  1. Hernia repair. 2.  Colonoscopy. ALLERGIES:  The patient has no known allergies. SOCIAL HISTORY:  The patient does not smoke or drink alcohol. FAMILY HISTORY:  Significant for COPD. REVIEW OF SYSTEMS:  The patient denied any fevers, chills, cough or  expectorations. Otherwise, a 10-point review of systems was relatively  unremarkable. PHYSICAL EXAMINATION:  VITAL SIGNS:  Blood pressure 139/64, heart rate 73, respirations 17,  temperature 98.2 Fahrenheit. The patient is satting 98% on room air. GENERAL APPEARANCE:  The patient is alert and oriented x3, not in acute  distress. HEENT:  Eyes revealed normal conjunctivae, reactive pupils. NECK:  Revealed midline trachea, nonpalpable thyroid.   LUNGS: Clear to anterior auscultation bilaterally, nonlabored  breathing. CARDIOVASCULAR EXAM:  Revealed S1 and S2, regular rate and rhythm. No  added murmurs or rubs. No peripheral edema. ABDOMEN:  Exam revealed soft abdomen. No organomegaly. SKIN:  Revealed no lesions or rashes. Warm to touch. PSYCHIATRIC:  Revealed good judgment and insight. LYMPHATICS:  Revealed no cervical or axillary adenopathies. ASSESSMENT AND PLAN:  1. Acute-on-chronic kidney disease. The patient has chronic kidney  disease with a baseline creatinine of 1.2 to 1.4 mg/dL. His acute  kidney injury is likely secondary to renal hypoperfusion in the setting  of hemodynamic instability. His urine output is well-maintained and his  serum creatinine is slowly trending down. RECOMMENDATIONS:  1. Continue volume expansion. 2.  Avoid all nephrotoxic agents at this time. 3.  Maintain systolic blood pressure above 120 mmHg. 4.  No significant electrolyte disorders noted. 5.  Hypertension, blood pressure within acceptable range. 6.  Atrial fibrillation, management per Cardiology.         Joe Finnegan MD    D: 06/29/2022 15:29:30       T: 06/29/2022 15:33:05     [email or web address]  Job#: [id number]     Doc#: [id number]    CC:

## 2022-06-29 NOTE — PROGRESS NOTES
Pt converted to SR at this time. EKG collected and writer titrating gtt down. Will continue to monitor.

## 2022-06-29 NOTE — CONSULTS
CARDIOLOGY CONSULTATION        Patient Name: Sherrel Jeans  Date of admission: 6/28/2022 11:01 PM  Admission Dx: Atrial fibrillation with RVR [facility]) [I48.91]  Requesting Physician: Lety Bray MD  Primary Care physician: Javier Rao MD    Reason for Consultation/Chief Complaint: Elevated troponin    History of Present Illness:     Sherrel Jeans is a 79 y.o. patient with a previous medical history notable for hypertension, hyperlipidemia, chronic kidney disease, who presented as a transfer from [facility] for atrial fibrillation with rapid ventricular rates, acute kidney injury and acute anemia with positive fecal occult. ED course/Vital signs on presentation: Afebrile, heart rate 135 bpm, BP 99/56, 96% on room air. Patient presents with complaints of malaise and myalgias ongoing for 2 days prior to arrival.  On admission, work-up notable for BUN 44, creatinine 1.8 > 1.6, troponin 0.04 >0.06 > 0.10 now peaked, Pro-BNP 10k, hemoglobin 9.2 > 8.8 > 7.7, platelets 40. Trace blood in urine. CK was normal.  Flu and COVID-19 testing were negative. ECG's reviewed. WCT with LBBB pattern ,regular on presentation. Converted to atrial flutter with narrowing of complex with lower rates (rate-dep LBBB). EKG today is sinus with 1st deg block, LVH, ST&T wave abnormality lateral leads, and possible pre-excitation pattern noted. Today, he reports he came in for diffuse pain of his legs, shoulders and arms. This was going on for 1 day prior to admission. 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 He is doing similar level exertion as has been his baseline. He is noted no chest pressure or heaviness with activity. He has no dyspnea with exertion since the spring, somewhat progressive. Notes he had to walk from his front to his backyard several times the other day because his lawnmower broke.   He was carrying things like batteries and other heavy materials no significant shortness of breath. Denies palpitations, presyncope, dizziness or loss of consciousness. Denies lower extremity edema that is worse than baseline. Denies rapid weight gain. No PND or orthopnea. He denies any evidence of hematemesis, hemoptysis, melena, hematochezia or hematuria with blood thinner. Review of systems negative for infectious symptoms, B symptoms. Past Medical History:   has a past medical history of Anosmia, Backpain, Bilateral carotid bruits, Chronic kidney disease, Current moderate episode of major depressive disorder without prior episode ([facility] [address] Hyperlipidemia, Hypertension, Hypertrophy of prostate without urinary obstruction and other lower urinary tract symptoms (LUTS), Nonrheumatic aortic valve insufficiency, Rosacea, and Taste perversion. Surgical History:   has a past surgical history that includes hernia repair; Colonoscopy (2/11/2008); and Colonoscopy (11/14/2014). Social History:   reports that he has never smoked. He has never used smokeless tobacco. He reports that he does not drink alcohol and does not use drugs. Tobacco use in teens, otherwise non-smoker most of his life. Drank alcohol heavily earlier in his life but has not done so for many decades. Family History:  family history includes COPD in his brother and sister. Reports no history of early onset coronary artery disease, myocardial infarction, cerebrovascular accident or sudden cardiac death among primary relatives. Home Medications:  Were reviewed and are listed in nursing record and/or below  Prior to Admission medications    Medication Sig Start Date End Date Taking? Authorizing Provider   lisinopril-hydroCHLOROthiazide (PRINZIDE;ZESTORETIC) 20-25 MG per tablet TAKE ONE TABLET BY MOUTH DAILY 6/6/22   Norma Jackman MD   HYDROcodone-acetaminophen (NORCO) 5-325 MG per tablet Take 1 tablet by mouth 2 times daily as needed for Pain for up to 30 days.  6/3/22 7/3/22  Maria Fernanda Thomas MD   sildenafil (VIAGRA) 100 MG tablet TAKE ONE TABLET BY MOUTH AS NEEDED FOR ERECTILE DYSFUNCTION 6/2/22   Jose Vasquez MD   sertraline (ZOLOFT) 50 MG tablet Take 1 tablet by mouth nightly 1/5/22   Maria Fernanda Thomas MD   fenofibrate (TRIGLIDE) 160 MG tablet TAKE ONE TABLET BY MOUTH DAILY 1/5/22   Maria Fernanda Thomas MD   atorvastatin (LIPITOR) 40 MG tablet Take 1 tablet by mouth daily 1/5/22   Maria Fernanda Thomas MD   metroNIDAZOLE (METROGEL) 1 % gel Apply one a day  Patient not taking: Reported on 6/28/2022 1/23/19   Maria Fernanda Thomas MD   Multiple Vitamins-Minerals (MULTIVITAMIN PO) Take 1 tablet by mouth daily. Historical Provider, MD   Omega-3 Fatty Acids (FISH OIL PO) Take 1 tablet by mouth daily. Historical Provider, MD   Ascorbic Acid (VITAMIN C) 500 MG tablet Take 1,000 mg by mouth daily. Historical Provider, MD        CURRENT Medications:  dilTIAZem (CARDIZEM CD) extended release capsule 120 mg, Daily  ascorbic acid (VITAMIN C) tablet 1,000 mg, Daily  atorvastatin (LIPITOR) tablet 40 mg, Nightly  fenofibrate (TRIGLIDE) tablet 160 mg, Daily  HYDROcodone-acetaminophen (NORCO) 5-325 MG per tablet 1 tablet, Q6H PRN  sertraline (ZOLOFT) tablet 50 mg, Nightly  dilTIAZem 125 mg in dextrose 5 % 125 mL infusion, Continuous  pantoprazole (PROTONIX) 80 mg in sodium chloride 0.9 % 100 mL infusion, Continuous  sodium chloride flush 0.9 % injection 5-40 mL, 2 times per day  sodium chloride flush 0.9 % injection 5-40 mL, PRN  0.9 % sodium chloride infusion, PRN  polyethylene glycol (GLYCOLAX) packet 17 g, Daily PRN  acetaminophen (TYLENOL) tablet 650 mg, Q6H PRN   Or  acetaminophen (TYLENOL) suppository 650 mg, Q6H PRN  [Held by provider] 0.9 % sodium chloride infusion, Continuous  prochlorperazine (COMPAZINE) injection 10 mg, Q6H PRN        Allergies:  Patient has no known allergies.      Review of Systems:   A 14 point review of symptoms completed. Pertinent positives identified in the HPI, all other review of symptoms negative as below. Objective:     Vitals:    06/29/22 0130 06/29/22 0445 06/29/22 0855 06/29/22 1147   BP: (!) 140/68 123/61 138/66 139/64   Pulse: 87 80 75 73   Resp:  18 18 17   Temp:  98.4 °F (36.9 °C) 98.2 °F (36.8 °C) 98.2 °F (36.8 °C)   TempSrc:  Oral  Oral   SpO2:  99% 99% 98%   Weight:       Height:          Weight: 180 lb 14.4 oz (82.1 kg)       PHYSICAL EXAM:      General:  Alert, cooperative, no distress, appears stated age   Head:  Normocephalic, atraumatic   Eyes:  Conjunctiva/corneas clear, anicteric sclerae    Nose: Nares normal, no drainage or sinus tenderness   Throat: No abnormalities of the lips, oral mucosa or tongue. Neck: Trachea midline. Neck supple with no lymphadenopathy, thyroid not enlarged, symmetric, no tenderness/mass/nodules   Lungs:   Clear to auscultation bilaterally, faint rales at the bases. Chest Wall:  No deformity or tenderness to palpation   Heart:  Regular rate and rhythm, normal S1, normal S2, 2/6 systolic ejection murmur at the left sternal border, no rub, no S3/S4, PMI non-palpable. Abdomen:   Soft, non-tender, with normoactive bowel sounds. No masses, no hepatosplenomegaly   Extremities: No cyanosis, clubbing or pitting edema. Vascular: 2+ radial, dorsalis pedis and posterior tibial pulses bilaterally. Positive carotid bruit. Skin: Skin color, texture, turgor are normal with no rashes or ulceration. Pysch: Euthymic mood, appropriate affect   Neurologic: Oriented to person, place and time. No slurred speech or facial asymmetry. No motor or sensory deficits on gross examination.          Labs:   CBC:   Lab Results   Component Value Date    WBC 4.3 06/29/2022    RBC 2.27 06/29/2022    HGB 7.7 06/29/2022    HCT 22.0 06/29/2022    MCV 97.0 06/29/2022    RDW 19.0 06/29/2022    PLT 40 06/29/2022     CMP:  Lab Results   Component Value Date     06/29/2022    K 4.3 06/29/2022    K 3.9 06/28/2022     06/29/2022    CO2 24 06/29/2022    BUN 44 06/29/2022    CREATININE 1.6 06/29/2022    GFRAA 50 06/29/2022    AGRATIO 1.8 06/29/2022    LABGLOM 41 06/29/2022    LABGLOM 53 05/03/2016    GLUCOSE 122 06/29/2022    GLUCOSE 78 01/12/2012    PROT 5.8 06/29/2022    PROT 7.3 01/12/2012    CALCIUM 8.7 06/29/2022    BILITOT 1.0 06/29/2022    ALKPHOS 74 06/29/2022    AST 30 06/29/2022    ALT 10 06/29/2022     PT/INR:  No results found for: PTINR  HgBA1c:No results found for: LABA1C  Lab Results   Component Value Date    CKTOTAL 144 06/28/2022    TROPONINI 0.10 (H) 06/29/2022    TROPONINI 0.10 (H) 06/29/2022       Lab Results   Component Value Date    CHOL 199 09/17/2021    CHOL 187 09/25/2020    CHOL 209 (H) 01/23/2019     Lab Results   Component Value Date    TRIG 100 09/17/2021    TRIG 108 09/25/2020    TRIG 89 01/23/2019     Lab Results   Component Value Date    HDL 39 (L) 09/17/2021    HDL 36 (L) 09/25/2020    HDL 44 01/23/2019     Lab Results   Component Value Date    LDLCALC 140 (H) 09/17/2021    LDLCALC 129 (H) 09/25/2020    LDLCALC 147 (H) 01/23/2019     Lab Results   Component Value Date    LABVLDL 20 09/17/2021    LABVLDL 22 09/25/2020    LABVLDL 18 01/23/2019     Lab Results   Component Value Date    CHOLHDLRATIO 4.8 12/18/2015    CHOLHDLRATIO 5.6 (H) 08/13/2014    CHOLHDLRATIO 3.9 01/12/2012        Cardiac Data:     EKGs were personally reviewed with my reads as above. Telemetry personally reviewed: Atrial flutter with variable block, left bundle branch block, with conversion to sinus rhythm around 1 in the morning with narrowing of QRS once heart rate slowed to the 80s (rate dependent left bundle branch block)    Echo: today   Summary   Normal left ventricular size with mild concentric left ventricular   hypertrophy. The left ventricular systolic function is mildly reduced with an ejection   fraction of 45 - 50 %.    Basal to mid inferior, basal inferoseptal hypokinesis. Septal bounce noted. Grade I diastolic dysfunction with normal filling pressure. Normal right ventricular size with normal function. Mild mitral and tricuspid regurgitation. Mild aortic stenosis. Systolic pulmonic artery pressure (SPAP) is estimated at 48 mmHg consistent   with mild pulmonary hypertension (Right atrial pressure of 8 mmHg). Compared with the previous study performed 5-, left ventricular   function decreased with new regional wall motion abnormalities noted. Echo 5/18/2016      Conclusions      Summary   Normal left ventricle size, wall thickness and systolic function with an   estimated ejection fraction of 55%. No regional wall motion abnormalities are seen. Diastolic filling parameters suggests grade I diastolic dysfunction. Aortic valve leaflets appear mildly thickened. Mild aortic regurgitation. Carotid disease 2016  Summary        1. There is minimal plaque seen in the right internal carotid artery with an    estimated diameter reduction of <50%.    2. There is minimal plaque seen in the left internal carotid artery with an    estimated diameter reduction of <50%.    3. The vertebral arteries are patent with antegrade flow bilaterally.               Impression and Plan:      NSTEMI Type I vs. Type II -favor type II event due to demand ischemia in the setting of acute anemia, hypotension, reduced renal clearance. He has not had any recent chest pain.   He likely has underlying coronary artery disease that we are just now discovering incidentally.  -troponin rise/fall and presumed new RWMA  -Challenging case as he is not a candidate for antiplatelet and anticoagulation based on hematologic abnormalities  -Continue statin and diltiazem   -Addressing anemia with GI and hematology to limit ongoing ischemia  -Heart rhythm now converted to sinus; EP consulted, will discuss   -Have Normalized BP  -Not a candidate for invasive angiography at this with RVR ([facility] [address]    VINNY (acute kidney injury) ([facility] [address]    Acute anemia    GI bleed    Thrombocytopenia ([address]         I will address the patient's cardiac risk factors and adjusted pharmacologic treatment as needed. In addition, I have reinforced the need for patient directed risk factor modification. All questions and concerns were addressed to the patient/family. Alternatives to my treatment were discussed. Thank you for allowing us to participate in the care of Kathlene Epley. Please call me with any questions [phone].     Nayely Deluca MD, [facility]  Cardiovascular Disease  [address]  [phone] [facility]  [phone] [address] [place] [address]  6/29/2022 11:37 AM

## 2022-06-29 NOTE — PLAN OF CARE
Pt seen. Full note to come.     Naz Evangelista MD  Cardiac Electrophysiology  [address]  [phone] [facility]

## 2022-06-29 NOTE — PROGRESS NOTES
Patient admitted to room 220 from [facility]. Patient oriented to room, call light, bed rails, phone, lights and bathroom. Patient instructed about the schedule of the day including: vital sign frequency, lab draws, possible tests, frequency of MD and staff rounds, including RN/MD rounding together at bedside, daily weights, and I &O's. Patient instructed about prescribed diet, how to use MENU, and television. bed alarm in place, patient aware of placement and reason. Telemetry box in place, patient aware of placement and reason. Bed locked, in lowest position, side rails up 2/4, call light within reach. Will continue to monitor.

## 2022-06-29 NOTE — CARE COORDINATION
CASE MANAGEMENT INITIAL ASSESSMENT    Reviewed chart and completed assessment with patient at bedside  Family present:  Yes, friends x 2  Explained Case Management role/services. yes    Primary contact information: friend, Darshana Rendon :   Primary Decision Maker: Hermelinda Nicolas - [place] - [phone]        Admit date/status: IPA 6/28/22  Diagnosis: atrial fibrillation     Is this a Readmission?:  No      Insurance: [facility] required for SNF: Yes       3 night stay required: No    Living arrangements, Adls, care needs, prior to admission: lives alone, [place], active     Durable Medical Equipment at home:  none    Services in the home and/or outpatient, prior to admission: none    Current PCP: Gissel Brown               Medications: Prescription coverage? Yes Will pt require financial assistance with medications No     Transportation needs:  Pt states friends can provide     PT/OT recs: [facility] Exemption Notification (HEN): na    Barriers to discharge: none    Plan/comments: pt intends to dc home without needs from [facility] [address] team. Please consult CM team if needs arise.      Sharon Cox RN

## 2022-06-29 NOTE — PROGRESS NOTES
Paged cross-cover at this time:     \"FYI  pt is here for afib RVR, converted to NSR on floor. Also has GIB. His trops have been bumped at 0.04 x2 then 0.06 but most recent just resulted at 0.10. \"         MD response: \"He needs a Heparin drip, but cant have one because of his GI bleed Ask the daytime RN to notify his attending so he/she is aware.  Thanks\"

## 2022-06-29 NOTE — CONSULTS
Sent PS to Almas Tamayo MD for Requesting eval for patient with ABLA likely 2/2 GI bleed, but new onset thrombocytopenia as well consult on 6/29 @ 9:28 AM  Heidy Gaming

## 2022-06-30 ENCOUNTER — APPOINTMENT (OUTPATIENT)
Dept: GENERAL RADIOLOGY | Age: 82
DRG: 280 | End: 2022-06-30
Attending: INTERNAL MEDICINE
Payer: COMMERCIAL

## 2022-06-30 ENCOUNTER — APPOINTMENT (OUTPATIENT)
Dept: ULTRASOUND IMAGING | Age: 82
DRG: 280 | End: 2022-06-30
Attending: INTERNAL MEDICINE
Payer: COMMERCIAL

## 2022-06-30 LAB
ANION GAP SERPL CALCULATED.3IONS-SCNC: 11 MMOL/L (ref 3–16)
ATYPICAL LYMPHOCYTE RELATIVE PERCENT: 1 % (ref 0–6)
BANDED NEUTROPHILS RELATIVE PERCENT: 5 % (ref 0–7)
BASOPHILS ABSOLUTE: 0 K/UL (ref 0–0.2)
BASOPHILS RELATIVE PERCENT: 0 %
BLOOD SMEAR REVIEW: NORMAL
BUN BLDV-MCNC: 33 MG/DL (ref 7–20)
CALCIUM SERPL-MCNC: 9 MG/DL (ref 8.3–10.6)
CHLORIDE BLD-SCNC: 105 MMOL/L (ref 99–110)
CHOLESTEROL, FASTING: 103 MG/DL (ref 0–199)
CO2: 22 MMOL/L (ref 21–32)
CREAT SERPL-MCNC: 1.2 MG/DL (ref 0.8–1.3)
EKG ATRIAL RATE: 79 BPM
EKG DIAGNOSIS: NORMAL
EKG P AXIS: 56 DEGREES
EKG P-R INTERVAL: 202 MS
EKG Q-T INTERVAL: 416 MS
EKG QRS DURATION: 114 MS
EKG QTC CALCULATION (BAZETT): 477 MS
EKG R AXIS: -6 DEGREES
EKG T AXIS: 84 DEGREES
EKG VENTRICULAR RATE: 79 BPM
EOSINOPHILS ABSOLUTE: 0 K/UL (ref 0–0.6)
EOSINOPHILS RELATIVE PERCENT: 0 %
ESTIMATED AVERAGE GLUCOSE: 119.8 MG/DL
FERRITIN: 1296 NG/ML (ref 30–400)
FOLATE: 12 NG/ML (ref 4.78–24.2)
GFR AFRICAN AMERICAN: >60
GFR NON-AFRICAN AMERICAN: 58
GLUCOSE BLD-MCNC: 140 MG/DL (ref 70–99)
HAPTOGLOBIN: 246 MG/DL (ref 30–200)
HBA1C MFR BLD: 5.8 %
HCT VFR BLD CALC: 23.5 % (ref 40.5–52.5)
HDLC SERPL-MCNC: 15 MG/DL (ref 40–60)
HEMATOLOGY PATH CONSULT: YES
HEMOGLOBIN: 8 G/DL (ref 13.5–17.5)
HEPATITIS C ANTIBODY INTERPRETATION: NORMAL
IMMATURE RETIC FRACT: 0.33 (ref 0.21–0.37)
IRON SATURATION: 39 % (ref 20–50)
IRON: 61 UG/DL (ref 59–158)
LACTATE DEHYDROGENASE: 269 U/L (ref 100–190)
LDL CHOLESTEROL CALCULATED: 54 MG/DL
LYMPHOCYTES ABSOLUTE: 1.1 K/UL (ref 1–5.1)
LYMPHOCYTES RELATIVE PERCENT: 29 %
MAGNESIUM: 2.2 MG/DL (ref 1.8–2.4)
MCH RBC QN AUTO: 34.1 PG (ref 26–34)
MCHC RBC AUTO-ENTMCNC: 34.2 G/DL (ref 31–36)
MCV RBC AUTO: 99.8 FL (ref 80–100)
MONOCYTES ABSOLUTE: 0.3 K/UL (ref 0–1.3)
MONOCYTES RELATIVE PERCENT: 9 %
NEUTROPHILS ABSOLUTE: 2.3 K/UL (ref 1.7–7.7)
NEUTROPHILS RELATIVE PERCENT: 56 %
PDW BLD-RTO: 18.9 % (ref 12.4–15.4)
PLATELET # BLD: 54 K/UL (ref 135–450)
PLATELET SLIDE REVIEW: ABNORMAL
PMV BLD AUTO: 10 FL (ref 5–10.5)
POTASSIUM SERPL-SCNC: 4.1 MMOL/L (ref 3.5–5.1)
RBC # BLD: 2.35 M/UL (ref 4.2–5.9)
RETICULOCYTE ABSOLUTE COUNT: 0.03 M/UL
RETICULOCYTE COUNT PCT: 1.11 % (ref 0.5–2.18)
SLIDE REVIEW: ABNORMAL
SODIUM BLD-SCNC: 138 MMOL/L (ref 136–145)
TOTAL IRON BINDING CAPACITY: 155 UG/DL (ref 260–445)
TRIGLYCERIDE, FASTING: 172 MG/DL (ref 0–150)
TSH REFLEX FT4: 0.88 UIU/ML (ref 0.27–4.2)
VITAMIN B-12: >2000 PG/ML (ref 211–911)
VLDLC SERPL CALC-MCNC: 34 MG/DL
WBC # BLD: 3.8 K/UL (ref 4–11)

## 2022-06-30 PROCEDURE — 6370000000 HC RX 637 (ALT 250 FOR IP): Performed by: INTERNAL MEDICINE

## 2022-06-30 PROCEDURE — 76705 ECHO EXAM OF ABDOMEN: CPT

## 2022-06-30 PROCEDURE — 99232 SBSQ HOSP IP/OBS MODERATE 35: CPT | Performed by: NURSE PRACTITIONER

## 2022-06-30 PROCEDURE — 2580000003 HC RX 258: Performed by: NURSE PRACTITIONER

## 2022-06-30 PROCEDURE — 83883 ASSAY NEPHELOMETRY NOT SPEC: CPT

## 2022-06-30 PROCEDURE — 94761 N-INVAS EAR/PLS OXIMETRY MLT: CPT

## 2022-06-30 PROCEDURE — 93005 ELECTROCARDIOGRAM TRACING: CPT | Performed by: INTERNAL MEDICINE

## 2022-06-30 PROCEDURE — 83540 ASSAY OF IRON: CPT

## 2022-06-30 PROCEDURE — 2500000003 HC RX 250 WO HCPCS: Performed by: NURSE PRACTITIONER

## 2022-06-30 PROCEDURE — 86702 HIV-2 ANTIBODY: CPT

## 2022-06-30 PROCEDURE — 84238 ASSAY NONENDOCRINE RECEPTOR: CPT

## 2022-06-30 PROCEDURE — 2060000000 HC ICU INTERMEDIATE R&B

## 2022-06-30 PROCEDURE — 85045 AUTOMATED RETICULOCYTE COUNT: CPT

## 2022-06-30 PROCEDURE — 6360000002 HC RX W HCPCS: Performed by: NURSE PRACTITIONER

## 2022-06-30 PROCEDURE — 82607 VITAMIN B-12: CPT

## 2022-06-30 PROCEDURE — 6370000000 HC RX 637 (ALT 250 FOR IP): Performed by: NURSE PRACTITIONER

## 2022-06-30 PROCEDURE — 87390 HIV-1 AG IA: CPT

## 2022-06-30 PROCEDURE — 80048 BASIC METABOLIC PNL TOTAL CA: CPT

## 2022-06-30 PROCEDURE — 36415 COLL VENOUS BLD VENIPUNCTURE: CPT

## 2022-06-30 PROCEDURE — 84443 ASSAY THYROID STIM HORMONE: CPT

## 2022-06-30 PROCEDURE — 82728 ASSAY OF FERRITIN: CPT

## 2022-06-30 PROCEDURE — 83036 HEMOGLOBIN GLYCOSYLATED A1C: CPT

## 2022-06-30 PROCEDURE — 86803 HEPATITIS C AB TEST: CPT

## 2022-06-30 PROCEDURE — 86038 ANTINUCLEAR ANTIBODIES: CPT

## 2022-06-30 PROCEDURE — 2700000000 HC OXYGEN THERAPY PER DAY

## 2022-06-30 PROCEDURE — C9113 INJ PANTOPRAZOLE SODIUM, VIA: HCPCS | Performed by: NURSE PRACTITIONER

## 2022-06-30 PROCEDURE — 84165 PROTEIN E-PHORESIS SERUM: CPT

## 2022-06-30 PROCEDURE — 83615 LACTATE (LD) (LDH) ENZYME: CPT

## 2022-06-30 PROCEDURE — 83735 ASSAY OF MAGNESIUM: CPT

## 2022-06-30 PROCEDURE — 85025 COMPLETE CBC W/AUTO DIFF WBC: CPT

## 2022-06-30 PROCEDURE — 83010 ASSAY OF HAPTOGLOBIN QUANT: CPT

## 2022-06-30 PROCEDURE — 71045 X-RAY EXAM CHEST 1 VIEW: CPT

## 2022-06-30 PROCEDURE — 86701 HIV-1ANTIBODY: CPT

## 2022-06-30 PROCEDURE — 82746 ASSAY OF FOLIC ACID SERUM: CPT

## 2022-06-30 PROCEDURE — 83550 IRON BINDING TEST: CPT

## 2022-06-30 PROCEDURE — 93010 ELECTROCARDIOGRAM REPORT: CPT | Performed by: INTERNAL MEDICINE

## 2022-06-30 PROCEDURE — 84155 ASSAY OF PROTEIN SERUM: CPT

## 2022-06-30 RX ORDER — METOPROLOL TARTRATE 5 MG/5ML
5 INJECTION INTRAVENOUS ONCE
Status: COMPLETED | OUTPATIENT
Start: 2022-06-30 | End: 2022-06-30

## 2022-06-30 RX ORDER — VALSARTAN 80 MG/1
80 TABLET ORAL DAILY
Status: DISCONTINUED | OUTPATIENT
Start: 2022-06-30 | End: 2022-07-02

## 2022-06-30 RX ORDER — FUROSEMIDE 10 MG/ML
40 INJECTION INTRAMUSCULAR; INTRAVENOUS ONCE
Status: COMPLETED | OUTPATIENT
Start: 2022-06-30 | End: 2022-06-30

## 2022-06-30 RX ADMIN — OXYCODONE HYDROCHLORIDE AND ACETAMINOPHEN 1000 MG: 500 TABLET ORAL at 09:53

## 2022-06-30 RX ADMIN — ATORVASTATIN CALCIUM 40 MG: 40 TABLET, FILM COATED ORAL at 20:59

## 2022-06-30 RX ADMIN — METOPROLOL SUCCINATE 25 MG: 25 TABLET, EXTENDED RELEASE ORAL at 09:53

## 2022-06-30 RX ADMIN — METOPROLOL TARTRATE 5 MG: 5 INJECTION INTRAVENOUS at 23:34

## 2022-06-30 RX ADMIN — SODIUM CHLORIDE, PRESERVATIVE FREE 10 ML: 5 INJECTION INTRAVENOUS at 21:00

## 2022-06-30 RX ADMIN — VALSARTAN 80 MG: 80 TABLET, FILM COATED ORAL at 16:23

## 2022-06-30 RX ADMIN — PROCHLORPERAZINE EDISYLATE 10 MG: 5 INJECTION INTRAMUSCULAR; INTRAVENOUS at 08:09

## 2022-06-30 RX ADMIN — ACETAMINOPHEN 650 MG: 325 TABLET ORAL at 09:52

## 2022-06-30 RX ADMIN — SERTRALINE 50 MG: 50 TABLET, FILM COATED ORAL at 20:59

## 2022-06-30 RX ADMIN — FUROSEMIDE 40 MG: 10 INJECTION, SOLUTION INTRAMUSCULAR; INTRAVENOUS at 20:59

## 2022-06-30 RX ADMIN — SODIUM CHLORIDE, PRESERVATIVE FREE 10 ML: 5 INJECTION INTRAVENOUS at 09:53

## 2022-06-30 RX ADMIN — ACETAMINOPHEN 650 MG: 325 TABLET ORAL at 23:33

## 2022-06-30 RX ADMIN — DILTIAZEM HYDROCHLORIDE 120 MG: 120 CAPSULE, COATED, EXTENDED RELEASE ORAL at 09:53

## 2022-06-30 RX ADMIN — PANTOPRAZOLE SODIUM 40 MG: 40 INJECTION, POWDER, FOR SOLUTION INTRAVENOUS at 09:52

## 2022-06-30 ASSESSMENT — PAIN SCALES - GENERAL
PAINLEVEL_OUTOF10: 0

## 2022-06-30 NOTE — CARE COORDINATION
Pt a/o, able to ambulate, has PCP and insurance. Pt w/multiple specialty consults and at this time has not DCP needs at this time. CM will continue follow for needs.   Yazmin Horne RN

## 2022-06-30 NOTE — PLAN OF CARE
Problem: Discharge Planning  Goal: Discharge to home or other facility with appropriate resources  Outcome: Not Progressing     Problem: Pain  Goal: Verbalizes/displays adequate comfort level or baseline comfort level  Outcome: Progressing

## 2022-06-30 NOTE — PROGRESS NOTES
Sherie Leon, APRN - CNP   40 mg at 06/29/22 2027    HYDROcodone-acetaminophen (NORCO) 5-325 MG per tablet 1 tablet  1 tablet Oral Q6H PRN Jennifer Samaniegoding, APRN - CNP   1 tablet at 06/29/22 2026    sertraline (ZOLOFT) tablet 50 mg  50 mg Oral Nightly Jennifer Samaniegoding, APRN - CNP   50 mg at 06/29/22 2027    dilTIAZem 125 mg in dextrose 5 % 125 mL infusion  2.5-15 mg/hr IntraVENous Continuous Jennifer Adending, APRN - CNP   Stopped at 06/29/22 0134    sodium chloride flush 0.9 % injection 5-40 mL  5-40 mL IntraVENous 2 times per day Jennifer Feng, APRN - CNP   10 mL at 06/30/22 0953    sodium chloride flush 0.9 % injection 5-40 mL  5-40 mL IntraVENous PRN Jennifer Samaniegoding, APRN - CNP        0.9 % sodium chloride infusion   IntraVENous PRN Jennifer Samaniegoding, APRN - CNP        polyethylene glycol (GLYCOLAX) packet 17 g  17 g Oral Daily PRN Jennifer Samaniegoding, APRN - CNP        acetaminophen (TYLENOL) tablet 650 mg  650 mg Oral Q6H PRN Jennifer Samaniegoding, APRN - CNP   650 mg at 06/30/22 4886    Or    acetaminophen (TYLENOL) suppository 650 mg  650 mg Rectal Q6H PRN Jennifer Samaniegoding, APRN - CNP        prochlorperazine (COMPAZINE) injection 10 mg  10 mg IntraVENous Q6H PRN Jennifer Samaniegoding, APRN - CNP   10 mg at 06/30/22 0809         Recent labs and imaging reviewed. Assessment:  78 yo M w/ PMHx significant for HTN, HLD, CKD. Presented to hospital with generalized pain and fatigue. On admission found to be in AF with RVR and VINNY. Profound anemia thus reason for consult. Hgb 7.7 down from 9.2 on admission and baseline of 14 one year ago. FOBT Positive. Noted thrombocytopenia. Currently Platelet ct 54 (40), Hgb 8.0(8.8); WBC 3.8. No overt signs of bleeding.    -- 6/30/2022 Abdominal US:   1. Cholelithiasis with equivocal cholecystitis. 2. Fatty infiltration of the liver. Plan:  1. Hematology currently with W/U underway, appreciate assistance. 2. No signs of overt bleeding.   Will Hold off on Endoscopic evaluation presently. Will need EGD/Colonoscopy at some point, possibly OP. 3. Ongoing supportive care. Dorinda Bourgeosi MD    [address] [facility]    [phone].  Also available via [employer]

## 2022-06-30 NOTE — PROGRESS NOTES
Department of Internal Medicine  Nephrology Progress Note        SUBJECTIVE:    We are following this patient for A/CKD. The patient was seen and examined; he was resting comfortably with no acute events noted overnight. ROS: No fever or chills. Social: No family at bedside. Physical Exam:    VITALS:  BP (!) 155/60   Pulse 76   Temp 99 °F (37.2 °C) (Oral)   Resp 16   Ht 6' 2\" (1.88 m)   Wt 180 lb 14.4 oz (82.1 kg)   SpO2 93%   BMI 23.23 kg/m²     General appearance: Seems comfortable, no acute distress. Neck: Trachea midline, thyroid normal.   Lungs:  Non labored breathing, CTA to anterior auscultation. Heart:  S1S2 normal, rub or gallop. No peripheral edema. Abdomen: Soft, non-tender, no organomegaly. Skin: No lesions or rashes, warm to touch. DATA:    CBC:   Lab Results   Component Value Date/Time    WBC 3.8 06/30/2022 09:14 AM    RBC 2.35 06/30/2022 09:14 AM    HGB 8.0 06/30/2022 09:14 AM    HCT 23.5 06/30/2022 09:14 AM    MCV 99.8 06/30/2022 09:14 AM    MCH 34.1 06/30/2022 09:14 AM    MCHC 34.2 06/30/2022 09:14 AM    RDW 18.9 06/30/2022 09:14 AM    PLT 54 06/30/2022 09:14 AM    MPV 10.0 06/30/2022 09:14 AM     BMP:    Lab Results   Component Value Date/Time     06/30/2022 05:32 AM    K 4.1 06/30/2022 05:32 AM    K 3.9 06/28/2022 09:31 AM     06/30/2022 05:32 AM    CO2 22 06/30/2022 05:32 AM    BUN 33 06/30/2022 05:32 AM    LABALBU 3.7 06/29/2022 12:31 AM    CREATININE 1.2 06/30/2022 05:32 AM    CALCIUM 9.0 06/30/2022 05:32 AM    GFRAA >60 06/30/2022 05:32 AM    LABGLOM 58 06/30/2022 05:32 AM    LABGLOM 53 05/03/2016 02:55 PM    GLUCOSE 140 06/30/2022 05:32 AM    GLUCOSE 78 01/12/2012 02:47 PM       IMPRESSION/RECOMMENDATIONS:      1- A/CKD: The patient has chronic kidney disease with a baseline creatinine of 1.2 to 1.4. His VINNY is likely secondary to a pre-renal component, along with renal hypoperfusion in the setting of hemodynamic instability.  His urinary output is well maintained and his serum creatinine is back to baseline, monitor. 2- No significant electrolytes disorders noted. 3- HTN: Blood pressure within acceptable range. 4- Atrial fibrillation: Management per cardiology.

## 2022-06-30 NOTE — ED PROVIDER NOTES
I personally saw Yesy Asencio and performed a substantive portion of the visit including all aspects of the medical decision making. .    In brief, this is an 79-year-old male presenting with fatigue and generalized body aches. The patient states he has been feeling this way for the past few weeks. A friend who lives in his apartment building checked on him, saw that he was not feeling well and has been a little bit confused recently apparently and she subsequently brought him here. The patient denies any chest pain, shortness of breath or dizziness he does states he has had body aches. He describes recent sweats and has had urinary frequency but otherwise denies fevers. On exam, he is ill but nontoxic appearing. He has a tachycardic rate and regular rhythm. Lungs are clear to auscultation diffusely. Distal pulses are intact. The Ekg interpreted by me shows  atrial fibrillation RVR with a rate of 111  Axis is   Normal  QTc is  563  Intervals and Durations show wide QRS  ST Segments: LBBB pattern with appropriate discordance   No prior ekg for comparison     The Ekg @ 1009 interpreted by me shows  Atrial flutter with 2:1 conduction with a rate of 129  Axis is   Normal  QTc is  574  Intervals and Durations show wide QRS  ST Segments: nonspecific changes    The Ekg @ 0941 interpreted by me shows  Tachycardia undetermined rhythm with a rate of 132  Axis is   Normal  QTc is  571  Intervals and Durations show wide QRS      ST Segments: nonspecific changes    The Ekg interpreted by me shows  Undetermined rhythm with a rate of 135  Axis is   Normal  QTc is  588  Intervals and Durations show wide QRS  ST Segments: nonspecific changes  No prior ekg for comparison       ED course: The patient is an 79-year-old male presenting for evaluation of fatigue and generalized weakness. He is noted to have a soft blood pressure on arrival, heart rate is 135.   Initial EKG shows a wide-complex tachycardia, similar QRS morphology is I do not see P waves however this does not appear to be V. tach I suspect more atrial flutter with aberrancy. The patient's blood pressure is soft but within normal limits and does improve with fluids he was given a total of 3 L of fluids. His heart rate did not really budge. I did give him a small dose of diltiazem to slow his rate down and have a more accurate representation of the underlying rhythm. The patient was found to be in A. fib I suspect he is flipping between A. fib and a flutter. He again denies any chest pain or shortness of breath. There are no obvious ischemic changes on his EKG however he does have a left bundle branch block and there are no prior EKGs for comparison and this could represent new ischemia, unclear at this time. His troponin is elevated at 0.04 he does have CKD and his troponin could be related to renal function it is flat here. He also was noted to have a drop in hemoglobin from prior, fecal occult was positive. He appears to be having a GI bleed as well and is given Protonix. We held off on giving aspirin or blood thinners due to the GI bleed. The patient will require admission for further work-up of both his arrhythmia as well as GI bleed. He was recommended that he be transferred to [facility] for EP. He remained hemodynamically stable here in the ED, was placed on a diltiazem drip with heart rates in the high 90s and low 100s. I spoke with Dr. Niles Hudson at [facility] who accepts the patient for transfer. I personally saw the patient and independently provided 35 minutes of non-concurrent critical care out of the total shared critical care time provided. All diagnostic, treatment, and disposition decisions were made by myself in conjunction with the advanced practice provider. For all further details of the patient's emergency department visit, please see the advanced practice provider's documentation.     Comment: Please note this report has been produced using speech recognition software and may contain errors related to that system including errors in grammar, punctuation, and spelling, as well as words and phrases that may be inappropriate. If there are any questions or concerns please feel free to contact the dictating provider for clarification.          Adri Al [place]  90/46/17 [id number]

## 2022-06-30 NOTE — PROGRESS NOTES
Hospitalist Progress Note    CC: Atrial fibrillation with RVR [facility])    Hospital course:  79-year-old male with significant past medical history of hypertension, hyperlipidemia, and CKD who presents to [address] [place] [address] as a direct admit from [facility].  He presented there earlier yesterday with concerns for 2 days worth of pain everywhere in his body. He states that he has been working hard at his job the last few months in order to try to make more money. 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 He denies any other symptoms such as true chest pain, nausea vomiting or diarrhea, or any fevers. He states simply just had malaise and myalgias. [place] [facility] he was noted to be in A. fib with RVR and have VINNY and anemia. He was started on Cardizem infusion, Protonix infusion, and IV fluids. He was transferred here for higher level care. Has had ongoing fevers and started on IV abx. Pancytopenia worsening. Pt feels worse - still in afib with RVR. Admit date: 6/28/2022  Days in hospital:  2  Status:  Inpatient       24 Hour Events: pt appears to be getting worse    Subjective: sleepy today, not feeling well per nursing - did not wake up for me    ROS:   Review of systems not obtained due to patient factors. pt sleeping. Objective:    BP (!) 155/60   Pulse 76   Temp 99 °F (37.2 °C) (Oral)   Resp 16   Ht 6' 2\" (1.88 m)   Wt 180 lb 14.4 oz (82.1 kg)   SpO2 93%   BMI 23.23 kg/m²     Gen: ill appearing  HEENT: NC/AT, moist mucous membranes, no oropharyngeal erythema or exudate  Neck: supple, trachea midline, no anterior cervical or SC LAD  Heart:  Normal s1/s2, IRR, no murmurs, gallops, or rubs.  no leg edema  Lungs:  diminished bilaterally, no wheeze, no rales, no rhonchi, no crackles, no use of accessory muscles  Abd: bowel sounds present, soft, nontender, nondistended, no masses  Extrem:  No clubbing, cyanosis, no edema  Skin: no rashes or lesions  Psych: unable to assess, but apparently alert and oriented x 3 when awake  Neuro:  Unable to fully assess due to pt sleeping. Assessment:    Principal Problem:    Atrial fibrillation with RVR (HCC)  Active Problems:    VINNY (acute kidney injury) ([facility] [address]    Acute anemia    GI bleed    Thrombocytopenia (HCC)    Hypertension  Resolved Problems:    * No resolved hospital problems. *      Plan:  1.  afib with RVR - on toprol XL and diltiazem drip - still with rapid HR intermittently - sepsis is driving this  2. Pancytopenia - worsening - viral?  Bone marrow likely would help - WBC worsening, hgb stable at 8.2, plts rebounding at 62 from low of 40  3. Fevers - unclear of source - pt on IV abx  4. Acute renal failure on CKDIII - stable  5. Sepsis based on fever, tachypnea, tachycardia, leukopenia, suspected pulm source -     Prognosis:  Fair    Code status:  Full code    DVT prophylaxis: SCDs  GI prophylaxis: Proton Pump Inhibitor  Antibiotic prophylaxis indicated:   yes - lactobacillus  Diet:  ADULT DIET; Regular;  Low Sodium (2 gm)    Disposition:  Other unclear - likely SNF    Medications:  Scheduled Meds:   dilTIAZem  120 mg Oral Daily    pantoprazole  40 mg IntraVENous Daily    metoprolol succinate  25 mg Oral Daily    vitamin C  1,000 mg Oral Daily    atorvastatin  40 mg Oral Nightly    sertraline  50 mg Oral Nightly    sodium chloride flush  5-40 mL IntraVENous 2 times per day       PRN Meds:  HYDROcodone-acetaminophen, sodium chloride flush, sodium chloride, polyethylene glycol, acetaminophen **OR** acetaminophen, prochlorperazine    IV:   dilTIAZem (CARDIZEM) 125 mg in dextrose 5% 125 mL infusion Stopped (06/29/22 0134)    sodium chloride      sodium chloride 60 mL/hr at 06/29/22 1501         Intake/Output Summary (Last 24 hours) at 6/30/2022 1315  Last data filed at 6/30/2022 0616  Gross per 24 hour   Intake 510 ml   Output 1295 ml   Net -785 ml Results:  CBC:   Recent Labs     06/28/22  0931 06/29/22  0031 06/29/22  0410 06/29/22  1803 06/30/22  0914   WBC 5.4  --  4.3  --  3.8*   HGB 9.2*   < > 7.7* 8.8* 8.0*   HCT 25.8*   < > 22.0* 25.6* 23.5*   MCV 96.9  --  97.0  --  99.8   PLT 41*  --  40*  --  54*    < > = values in this interval not displayed. BMP:   Recent Labs     06/29/22  0031 06/29/22  0410 06/30/22  0532    138 138   K 3.8 4.3 4.1    104 105   CO2 21 24 22   BUN 44* 44* 33*   CREATININE 1.5* 1.6* 1.2     Mag: No results for input(s): MAG in the last 72 hours. Phos: No results found for: PHOS  No results found for: GLU    LIVER PROFILE:   Recent Labs     06/28/22  0931 06/29/22  0031   AST 30 30   ALT 11 10   BILITOT 1.2* 1.0   ALKPHOS 89 74     PT/INR:   Recent Labs     06/29/22  0410   PROTIME 15.3*   INR 1.22*     APTT: No results for input(s): APTT in the last 72 hours.   UA:  Recent Labs     06/28/22  1415   COLORU Yellow   PHUR 5.0   WBCUA 0-2   RBCUA 0-2   MUCUS Rare*   BACTERIA Rare*   CLARITYU Clear   SPECGRAV 1.020   LEUKOCYTESUR Negative   UROBILINOGEN 0.2   BILIRUBINUR Negative   BLOODU TRACE-INTACT*   GLUCOSEU Negative       Invalid input(s): ABG  Lab Results   Component Value Date    CALCIUM 9.0 06/30/2022               Electronically signed by Georgie Mcardle, MD on 6/30/2022 at 1:15 PM

## 2022-06-30 NOTE — NURSE NAVIGATOR
Pt stated could not breathe well, hypoxic upon rn entering room.  b/p 160/77 temp 98.7 sp02 86-88% on RA rr 28. Placed 3L of 02, sp02 97% took 2 minutes to recover.

## 2022-06-30 NOTE — CONSULTS
ONCOLOGY HEMATOLOGY CARE CONSULT NOTE      Requesting Physician:  Dr. Darren Hurst:  Abnromal CBC        HISTORY OF PRESENT ILLNESS:      Mr. Abner Valle  is a 79 y.o. male we are seeing in consultation for an abnormal CBC. This patient was admitted to [facility] on 6/28/2022. He was accepted from [facility] as a direct admit due to atrial fib w/ RVR and VINNY. Admit CBC:WBC 5.4 K/mcL, HGB 9.2 g/dL, HCT 25.8%, PLT 41 K/mcL, ANC 3.7 K/mcL. The CBC has been stable. No transfusions. He reports that he has felt poorly for several days prior to admission. No energy. No appetite. He does not feel improved.     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    Backpain     Bilateral carotid bruits 9/6/2016    Chronic kidney disease 9/6/2016    Current moderate episode of major depressive disorder without prior episode ([facility] [address] 1/23/2019    Hyperlipidemia     Hypertension     Hypertrophy of prostate without urinary obstruction and other lower urinary tract symptoms (LUTS)     Nonrheumatic aortic valve insufficiency 9/6/2016    Rosacea     Taste perversion 8/19/2011     Past Surgical History:    Past Surgical History:   Procedure Laterality Date    COLONOSCOPY  2/11/2008    COLONOSCOPY  11/14/2014    HERNIA REPAIR         Current Medications:    Current Facility-Administered Medications   Medication Dose Route Frequency Provider Last Rate Last Admin    dilTIAZem (CARDIZEM CD) extended release capsule 120 mg  120 mg Oral Daily ALESSIA Quinones - CNP   120 mg at 06/29/22 0900    pantoprazole (PROTONIX) injection 40 mg  40 mg IntraVENous Daily ALESSIA Durand - CNP   40 mg at 06/29/22 1501    metoprolol succinate (TOPROL XL) extended release tablet 25 mg  25 mg Oral Daily VINITA Schmidt MD        ascorbic acid (VITAMIN C) tablet 1,000 mg  1,000 mg Oral Daily ALESSIA Wiley - CNP   1,000 mg at 06/29/22 0858    atorvastatin (LIPITOR) tablet 40 mg  40 mg Oral Nightly Kong Eubanks, APRN - CNP   40 mg at 06/29/22 2027    HYDROcodone-acetaminophen (NORCO) 5-325 MG per tablet 1 tablet  1 tablet Oral Q6H PRN Kong Eubanks, APRN - CNP   1 tablet at 06/29/22 2026    sertraline (ZOLOFT) tablet 50 mg  50 mg Oral Nightly Kong Eubanks, APRN - CNP   50 mg at 06/29/22 2027    dilTIAZem 125 mg in dextrose 5 % 125 mL infusion  2.5-15 mg/hr IntraVENous Continuous Kong Eubanks, APRN - CNP   Stopped at 06/29/22 0134    sodium chloride flush 0.9 % injection 5-40 mL  5-40 mL IntraVENous 2 times per day Kong Eubanks, APRN - CNP   10 mL at 06/29/22 0858    sodium chloride flush 0.9 % injection 5-40 mL  5-40 mL IntraVENous PRN Kong Eubanks, APRN - CNP        0.9 % sodium chloride infusion   IntraVENous PRN Kong Eubanks APRN - CNP        polyethylene glycol (GLYCOLAX) packet 17 g  17 g Oral Daily PRN Kong Eubanks, APRN - CNP        acetaminophen (TYLENOL) tablet 650 mg  650 mg Oral Q6H PRN Kong Eubanks, APRN - CNP        Or    acetaminophen (TYLENOL) suppository 650 mg  650 mg Rectal Q6H PRN Kong Eubanks, APRN - CNP        0.9 % sodium chloride infusion   IntraVENous Continuous Ana Fitch MD 60 mL/hr at 06/29/22 1501 Rate Change at 06/29/22 1501    prochlorperazine (COMPAZINE) injection 10 mg  10 mg IntraVENous Q6H PRN Kong Eubanks, APRN - CNP         Allergies:  No Known Allergies    Social History:   Social History     Socioeconomic History    Marital status:       Spouse name: Not on file    Number of children: Not on file    Years of education: Not on file    Highest education level: Not on file   Occupational History    Not on file   Tobacco Use    Smoking status: Never Smoker    Smokeless tobacco: Never Used   Vaping Use    Vaping Use: Never used   Substance and Sexual Activity    Alcohol use: No    Drug use: No    Sexual activity: Not Currently     Partners: Female   Other Topics Concern    Not on file   Social History Narrative    Not on file     Social Determinants of Health     Financial Resource Strain:     Difficulty of Paying Living Expenses: Not on file   Food Insecurity:     Worried About Running Out of Food in the Last Year: Not on file    Elo of Food in the Last Year: Not on file   Transportation Needs:     Lack of Transportation (Medical): Not on file    Lack of Transportation (Non-Medical):  Not on file   Physical Activity:     Days of Exercise per Week: Not on file    Minutes of Exercise per Session: Not on file   Stress:     Feeling of Stress : Not on file   Social Connections:     Frequency of Communication with Friends and Family: Not on file    Frequency of Social Gatherings with Friends and Family: Not on file    Attends Judaism Services: Not on file    Active Member of [address] [place] [address] [employer] or Organizations: Not on file    Attends Club or Organization Meetings: Not on file    Marital Status: Not on file   Intimate Partner Violence:     Fear of Current or Ex-Partner: Not on file    Emotionally Abused: Not on file    Physically Abused: Not on file    Sexually Abused: Not on file   Housing Stability:     Unable to Pay for Housing in the Last Year: Not on file    Number of [place] in the Last Year: Not on file    Unstable Housing in the Last Year: Not on file          Family History:     Family History   Problem Relation Age of Onset    COPD Sister     COPD Brother         Birth defect     REVIEW OF SYSTEMS:    Review of Systems    PHYSICAL EXAM:      Vitals:  BP (!) 161/65   Pulse 88   Temp 100.2 °F (37.9 °C) (Oral)   Resp 18   Ht 6' 2\" (1.88 m)   Wt 180 lb 14.4 oz (82.1 kg)   SpO2 93%   BMI 23.23 kg/m²     CONSTITUTIONAL:  awake, alert, cooperative, no apparent distress, and appears stated age NAD  EYES:  pupils equal, round and reactive to light, extra ocular muscles intact,sclera clear, conjunctiva normal  NECK:  Supple, symmetrical, trachea midline, no adenopathy, thyroid symmetric, not enlarged and no tenderness, skin normal  HEMATOLOGIC/LYMPHATICS:  no cervical lymphadenopathy, nosupraclavicular lymphadenopathy, no axillary lymphadenopathy and no inguinal lymphadenopathy  LUNGS:  No increased work of breathing, good air exchange, clear to auscultation bilaterally, no crackles or wheezing  CARDIOVASCULAR:  , regular rate and rhythm, normal S1 and S2, no S3 or S4, and no murmur noted  ABDOMEN:  No scars, normal bowel sounds, soft, non-distended, non-tender, no masses palpated, no hepatosplenomegally      MUSCULOSKELETAL:  There is no redness, warmth, or swelling of the joints. Full range of motion noted. Motor strength is 5 out of 5 all extremities bilaterally. NEUROLOGIC:  Awake, alert, oriented to name, place andtime. Cranial nerves II-XII are grossly intact. Motor is 5 out of 5 bilaterally. SKIN:  no bruising or bleeding      DATA:    PT/INR:    Recent Labs     06/28/22  0931 06/29/22  0031 06/29/22  0410   PROT 6.9 5.8*  --    INR  --   --  1.22*     PTT:No results for input(s): APTT in the last 72 hours.   CMP:    Lab Results   Component Value Date/Time     06/30/2022 05:32 AM    K 4.1 06/30/2022 05:32 AM    K 3.9 06/28/2022 09:31 AM     06/30/2022 05:32 AM    CO2 22 06/30/2022 05:32 AM    BUN 33 06/30/2022 05:32 AM    PROT 5.8 06/29/2022 12:31 AM    PROT 7.3 01/12/2012 02:47 PM     Magnesium:    Lab Results   Component Value Date/Time    MG 2.20 06/30/2022 05:32 AM     Phosphorus:  No components found for: PO4  Calcium:  No results found for: CA  CBC:    Lab Results   Component Value Date/Time    WBC 4.3 06/29/2022 04:10 AM    RBC 2.27 06/29/2022 04:10 AM    HGB 8.8 06/29/2022 06:03 PM    HCT 25.6 06/29/2022 06:03 PM    MCV 97.0 06/29/2022 04:10 AM    RDW 19.0 06/29/2022 04:10 AM    PLT 40 06/29/2022 04:10 AM     DIFF:    Lab Results   Component Value Date/Time    MCV 97.0 06/29/2022 04:10 AM    RDW 19.0 06/29/2022 04:10 AM      LDH: @labcrnt(LDH)@  Uric Acid:  @labcrnt(URIC)@    Radiology Review:  XR CHEST PORTABLE (6/28/2022): Impression   No acute cardiopulmonary disease.         Problem List  Patient Active Problem List   Diagnosis    Hypertension    Rosacea    Mixed hyperlipidemia    Enlarged prostate with urinary obstruction    Taste perversion    Anosmia    Back pain    Nonrheumatic aortic valve insufficiency    Bilateral carotid bruits    Current moderate episode of major depressive disorder without prior episode (HCC)    Atrial fibrillation with RVR (HCC)    VINNY (acute kidney injury) (HCC)    Acute anemia    GI bleed    Thrombocytopenia (HCC)       IMPRESSION/RECOMMENDATIONS:  1.) Abnormal CBC  - Admit CBC on 6/28/2022 showed: WBC 5.4 K/mcL, HGB 9.2 g/dL, HCT 25.8%, PLT 41 K/mcL, ANC 3.7 K/mcL. - Has not been transfused and CBC has been stable. - FOBT pos on 6/28/2022. - Check micronutrients, retic, LDH, hapto, SPEP, immunofixation, smear to path, ultrasound of the abdomen.  - Check antiplatelet antibodies, HIV, hep C, ESTEFANÍA.    2.) Atrial fib  - EP is managing rate control.  - Would hold off on any anticoag, as I am not clear that the PLTs can be improved. 3.) CM  - Echocardiogram, 6/28/2022, showed LVEF 45-50% w/ basal to mid inferior, basal inferoseptal hypokinesis. Grade 1 DD.  - Abnormal CBC is limiting the use of antiplatelet and LHC. 4.) CKD  - IVF per nephrology. - Avoid nephrotoxins.  - Creat 1.8 --> 1.6 --> 1.2    Thank you for asking me to see the patient.        Cj Christensen MD  PleaseContact Through [employer]

## 2022-06-30 NOTE — PROGRESS NOTES
[address]     Electrophysiology                                     Progress Note    Admission date:  2022    Reason for follow up visit: atrial arrhythmias    HPI/CC: Sabina Lefort was evaluated at [facility] on 2022 with generalized body aches and weakness. He was transferred to [facility] for atrial arrhythmias and WCT. Creatinine was elevated. Troponin and BNP were elevated. Echo showed an EF of 45-50%. Laboratory data also revealed anemia and thrombocytopenia. Hemoccult was positive. On 2022, he spontaneously converted from AT to sinus. Rhythm has been sinus with intermittent rate related bundle branch block. Subjective: He reports in some improvement in how he is feeling. Denies chest pain, palpitations, shortness of breath, and dizziness. Vitals:  Blood pressure (!) 155/60, pulse 76, temperature 99 °F (37.2 °C), temperature source Oral, resp. rate 16, height 6' 2\" (1.88 m), weight 180 lb 14.4 oz (82.1 kg), SpO2 93 %.   Temp  Av.7 °F (37.6 °C)  Min: 98.8 °F (37.1 °C)  Max: 100.8 °F (38.2 °C)  Pulse  Av.2  Min: 75  Max: 101  BP  Min: 134/55  Max: 175/54  SpO2  Av.3 %  Min: 93 %  Max: 100 %    24 hour I/O    Intake/Output Summary (Last 24 hours) at 2022 1249  Last data filed at 2022 0616  Gross per 24 hour   Intake 510 ml   Output 1295 ml   Net -785 ml     Current Facility-Administered Medications   Medication Dose Route Frequency Provider Last Rate Last Admin    dilTIAZem (CARDIZEM CD) extended release capsule 120 mg  120 mg Oral Daily ALESSIA Quinones CNP   120 mg at 22 0953    pantoprazole (PROTONIX) injection 40 mg  40 mg IntraVENous Daily ALESSIA Dawn - CNP   40 mg at 22 3083    metoprolol succinate (TOPROL XL) extended release tablet 25 mg  25 mg Oral Daily VINITA Franco MD   25 mg at 22    ascorbic acid (VITAMIN C) tablet 1,000 mg  1,000 mg Oral Daily ALESSAI Lau CNP   1,000 mg at 22 0192    atorvastatin (LIPITOR) tablet 40 mg  40 mg Oral Nightly Rosenda Froylanjose, APRN - CNP   40 mg at 06/29/22 2027    HYDROcodone-acetaminophen (NORCO) 5-325 MG per tablet 1 tablet  1 tablet Oral Q6H PRN Rosenda Luciano, APRN - CNP   1 tablet at 06/29/22 2026    sertraline (ZOLOFT) tablet 50 mg  50 mg Oral Nightly Rosenda Luciano, APRN - CNP   50 mg at 06/29/22 2027    dilTIAZem 125 mg in dextrose 5 % 125 mL infusion  2.5-15 mg/hr IntraVENous Continuous Otcayla Luciano, APRN - CNP   Stopped at 06/29/22 0134    sodium chloride flush 0.9 % injection 5-40 mL  5-40 mL IntraVENous 2 times per day Rosenda Luciano, APRN - CNP   10 mL at 06/30/22 0953    sodium chloride flush 0.9 % injection 5-40 mL  5-40 mL IntraVENous PRN Rosenda Luciano APRN - CNP        0.9 % sodium chloride infusion   IntraVENous PRN Rosenda Luciano APRN - CNP        polyethylene glycol (GLYCOLAX) packet 17 g  17 g Oral Daily PRN Rosenda Luciano, APRN - CNP        acetaminophen (TYLENOL) tablet 650 mg  650 mg Oral Q6H PRN Rosenda Luciano, APRN - CNP   650 mg at 06/30/22 5032    Or    acetaminophen (TYLENOL) suppository 650 mg  650 mg Rectal Q6H PRN Rosenda Luciano, APRN - CNP        0.9 % sodium chloride infusion   IntraVENous Continuous Susannah Wright MD 60 mL/hr at 06/29/22 1501 Rate Change at 06/29/22 1501    prochlorperazine (COMPAZINE) injection 10 mg  10 mg IntraVENous Q6H PRN Rosenda Froylanjoaquinaas, APRN - CNP   10 mg at 06/30/22 0809       Objective:     Telemetry monitor: SR    Physical Exam:  Constitutional and general appearance: alert, cooperative, no distress and appears stated age  [de-identified]: PERRL, no cervical lymphadenopathy. No masses palpable.  Normal oral mucosa  Respiratory:  · Normal excursion and expansion without use of accessory muscles  · Resp auscultation: Normal breath sounds without wheezing, rhonchi, and rales  Cardiovascular:  · The apical impulse is not displaced  · Heart tones are crisp and normal. regular S1 and S2.  · Jugular venous pulsation Normal  · The carotid upstroke is normal in amplitude and contour without delay or bruit  · Peripheral pulses are symmetrical and full   Abdomen:  · No masses or tenderness  · Bowel sounds present  Extremities:  ·  No cyanosis or clubbing  ·  No lower extremity edema  ·  Skin: warm and dry  Neurological:  · Alert and oriented  · Moves all extremities well  · No abnormalities of mood, affect, memory, mentation, or behavior are noted    Data  Echo 6/29/2022:     Summary   Normal left ventricular size with mild concentric left ventricular   hypertrophy. The left ventricular systolic function is mildly reduced with an ejection   fraction of 45 - 50 %. Basal to mid inferior, basal inferoseptal hypokinesis. Septal bounce noted. Grade I diastolic dysfunction with normal filling pressure. Normal right ventricular size with normal function. Mild mitral and tricuspid regurgitation. Mild aortic stenosis. Systolic pulmonic artery pressure (SPAP) is estimated at 48 mmHg consistent   with mild pulmonary hypertension (Right atrial pressure of 8 mmHg). Compared with the previous study performed 5-, left ventricular   function decreased with new regional wall motion abnormalities noted. All labs and testing reviewed.   Lab Review     Renal Profile:   Lab Results   Component Value Date/Time    CREATININE 1.2 06/30/2022 05:32 AM    BUN 33 06/30/2022 05:32 AM     06/30/2022 05:32 AM    K 4.1 06/30/2022 05:32 AM    K 3.9 06/28/2022 09:31 AM     06/30/2022 05:32 AM    CO2 22 06/30/2022 05:32 AM     CBC:    Lab Results   Component Value Date/Time    WBC 3.8 06/30/2022 09:14 AM    RBC 2.35 06/30/2022 09:14 AM    HGB 8.0 06/30/2022 09:14 AM    HCT 23.5 06/30/2022 09:14 AM    MCV 99.8 06/30/2022 09:14 AM    RDW 18.9 06/30/2022 09:14 AM    PLT 54 06/30/2022 09:14 AM     BNP:  No results found for: BNP  Fasting Lipid Panel:    Lab Results   Component Value Date/Time CHOL 199 09/17/2021 12:03 PM    HDL 15 06/29/2022 01:42 PM    TRIG 100 09/17/2021 12:03 PM     Cardiac Enzymes:  CK/MbTroponin  Lab Results   Component Value Date/Time    CKTOTAL 144 06/28/2022 09:31 AM    TROPONINI 0.10 06/29/2022 04:10 AM    TROPONINI 0.10 06/29/2022 04:10 AM     PT/ INR   Lab Results   Component Value Date/Time    INR 1.22 06/29/2022 04:10 AM    PROTIME 15.3 06/29/2022 04:10 AM     PTT No results found for: PTT   Lab Results   Component Value Date/Time    MG 2.20 06/30/2022 05:32 AM      Lab Results   Component Value Date/Time    TSH 1.65 06/29/2022 12:31 AM       Assessment:  Paroxsymal atrial arrhythmias (AF/AFL/AT):   -[id number] score 3 (age, HTN   -TSH 1.65  Rate related LBBB  Presumed CAD  Cardiomyopathy, unclear etiology:    -EF 45-50% on echo 6/2022  Elevated troponin (NSTEMI type I vs type II)  HTN: hypotensive this admission   HLD  VINNY on CKD: IVF per nephrology   Depression   GIB: GI following    Normocytic anemia: HemOnc following   Thrombocytopenia       Plan:   1. Continue Toprol   2. Stop Cardizem due to reduced EF   3. No AC given anemia and thrombocytopenia. Risk for bleeding and worsening anemia currently outweighs the benefit of stroke protection. 4. Continue to monitor on telemetry   5. Nothing further to add from am EP standpoint. We will sign off but remain available if needed.      Sudha Aponte, APRN-CNP  [address]  [phone]

## 2022-06-30 NOTE — PROGRESS NOTES
CARDIOLOGY FOLLOW UP        Patient Name: Zack Yadav  Date of admission: 6/28/2022 11:01 PM  Admission Dx: Atrial fibrillation with RVR [facility]) [I48.91]  Requesting Physician: Crys Gallego MD  Primary Care physician: Mindy Pimentel MD    Reason for Consultation/Chief Complaint: Elevated troponin    History of Present Illness:     Zack Yadav is a 79 y.o. patient with a previous medical history notable for hypertension, hyperlipidemia, chronic kidney disease, who presented as a transfer from [facility] for atrial fibrillation with rapid ventricular rates, acute kidney injury and acute anemia with positive fecal occult. ED course/Vital signs on presentation: Afebrile, heart rate 135 bpm, BP 99/56, 96% on room air. Patient presents with complaints of malaise and myalgias ongoing for 2 days prior to arrival.  On admission, work-up notable for BUN 44, creatinine 1.8 > 1.6, troponin 0.04 >0.06 > 0.10 now peaked, Pro-BNP 10k, hemoglobin 9.2 > 8.8 > 7.7, platelets 40. Trace blood in urine. CK was normal.  Flu and COVID-19 testing were negative. ECG's reviewed. WCT with LBBB pattern ,regular on presentation. Converted to atrial flutter with narrowing of complex with lower rates (rate-dep LBBB). EKG today is sinus with 1st deg block, LVH, ST&T wave abnormality lateral leads, and possible pre-excitation pattern noted. Patient was evaluated yesterday in and noted complaint of diffuse aches and pains. No chest pressure heaviness endorsed. Was having worsening dyspnea with exertion with time. EF noted 45 to 50%, basal to mid inferior/basal inferoseptal hypokinesis. Mild AS, mild MR. PASP 48. Regional wall motion normalities are new. Unfortunately patient noted to be with worsening pancytopenia limiting treatments for new atrial arrhythmia and possible NSTEMI. Hematology evaluated today, recommended holding off on anticoagulation in this setting.   Continue best medical management. Renal function is improving creatinine now 1.2. Hemoglobin 8.0. Platelets 50 4K. Patient evaluated this afternoon and was noted sleeping. Difficult to wake. Allowed him to rest.  Blood pressure is noted elevated today. Past Medical History:   has a past medical history of Anosmia, Backpain, Bilateral carotid bruits, Chronic kidney disease, Current moderate episode of major depressive disorder without prior episode ([address] Hyperlipidemia, Hypertension, Hypertrophy of prostate without urinary obstruction and other lower urinary tract symptoms (LUTS), Nonrheumatic aortic valve insufficiency, Rosacea, and Taste perversion. Surgical History:   has a past surgical history that includes hernia repair; Colonoscopy (2/11/2008); and Colonoscopy (11/14/2014). Social History:   reports that he has never smoked. He has never used smokeless tobacco. He reports that he does not drink alcohol and does not use drugs. Tobacco use in teens, otherwise non-smoker most of his life. Drank alcohol heavily earlier in his life but has not done so for many decades. Family History:  family history includes COPD in his brother and sister. Reports no history of early onset coronary artery disease, myocardial infarction, cerebrovascular accident or sudden cardiac death among primary relatives. Home Medications:  Were reviewed and are listed in nursing record and/or below  Prior to Admission medications    Medication Sig Start Date End Date Taking? Authorizing Provider   lisinopril-hydroCHLOROthiazide (PRINZIDE;ZESTORETIC) 20-25 MG per tablet TAKE ONE TABLET BY MOUTH DAILY 6/6/22   Nilson Lo MD   HYDROcodone-acetaminophen (NORCO) 5-325 MG per tablet Take 1 tablet by mouth 2 times daily as needed for Pain for up to 30 days.  6/3/22 7/3/22  Nilson Lo MD   sildenafil (VIAGRA) 100 MG tablet TAKE ONE TABLET BY MOUTH AS NEEDED FOR ERECTILE DYSFUNCTION 6/2/22   Alvarez Chiu MD   sertraline (ZOLOFT) 50 MG tablet Take 1 tablet by mouth nightly 1/5/22   Brent Ruffin MD   fenofibrate (TRIGLIDE) 160 MG tablet TAKE ONE TABLET BY MOUTH DAILY 1/5/22   Brent Ruffin MD   atorvastatin (LIPITOR) 40 MG tablet Take 1 tablet by mouth daily 1/5/22   Brent Ruffin MD   metroNIDAZOLE (METROGEL) 1 % gel Apply one a day  Patient not taking: Reported on 6/28/2022 1/23/19   Brent Ruffin MD   Multiple Vitamins-Minerals (MULTIVITAMIN PO) Take 1 tablet by mouth daily. Historical Provider, MD   Omega-3 Fatty Acids (FISH OIL PO) Take 1 tablet by mouth daily. Historical Provider, MD   Ascorbic Acid (VITAMIN C) 500 MG tablet Take 1,000 mg by mouth daily. Historical Provider, MD        CURRENT Medications:  dilTIAZem (CARDIZEM CD) extended release capsule 120 mg, Daily  pantoprazole (PROTONIX) injection 40 mg, Daily  metoprolol succinate (TOPROL XL) extended release tablet 25 mg, Daily  ascorbic acid (VITAMIN C) tablet 1,000 mg, Daily  atorvastatin (LIPITOR) tablet 40 mg, Nightly  HYDROcodone-acetaminophen (NORCO) 5-325 MG per tablet 1 tablet, Q6H PRN  sertraline (ZOLOFT) tablet 50 mg, Nightly  dilTIAZem 125 mg in dextrose 5 % 125 mL infusion, Continuous  sodium chloride flush 0.9 % injection 5-40 mL, 2 times per day  sodium chloride flush 0.9 % injection 5-40 mL, PRN  0.9 % sodium chloride infusion, PRN  polyethylene glycol (GLYCOLAX) packet 17 g, Daily PRN  acetaminophen (TYLENOL) tablet 650 mg, Q6H PRN   Or  acetaminophen (TYLENOL) suppository 650 mg, Q6H PRN  prochlorperazine (COMPAZINE) injection 10 mg, Q6H PRN        Allergies:  Patient has no known allergies. Review of Systems:   A 14 point review of symptoms completed. Pertinent positives identified in the HPI, all other review of symptoms negative as below.       Objective:     Vitals:    06/30/22 0026 06/30/22 0500 06/30/22 0800 06/30/22 1140   BP: (!) 147/66 (!) 161/65 (!) 174/83 (!) 155/60   Pulse: 81 88 (!) 101 76   Resp: 16 18 16 16   Temp: 98.8 °F (37.1 °C) 100.2 °F (37.9 °C) (!) 100.8 °F (38.2 °C) 99 °F (37.2 °C)   TempSrc: Oral Oral Oral Oral   SpO2: 94% 93% 94% 93%   Weight:       Height:          Weight: 180 lb 14.4 oz (82.1 kg)       PHYSICAL EXAM:      General:   Sleepy, difficult to arouse   Head:  Normocephalic, atraumatic   Eyes:  Conjunctiva/corneas clear, anicteric sclerae    Nose: Nares normal, no drainage or sinus tenderness   Throat: No abnormalities of the lips, oral mucosa or tongue. Neck: Trachea midline. Neck supple with no lymphadenopathy, thyroid not enlarged, symmetric, no tenderness/mass/nodules   Lungs:   Clear to auscultation bilaterally    Chest Wall:  No deformity or tenderness to palpation   Heart:  Regular rate and rhythm, normal S1, normal S2, 2/6 systolic ejection murmur at the left sternal border, no rub, no S3/S4, PMI non-palpable. Abdomen:   Soft, non-tender, with normoactive bowel sounds. No masses, no hepatosplenomegaly   Extremities: No cyanosis, clubbing or pitting edema. Vascular: 2+ radial, dorsalis pedis and posterior tibial pulses bilaterally. Positive carotid bruit. Skin: Skin color, texture, turgor are normal with no rashes or ulceration.     Pysch:  Did not assess   Neurologic:  Limited exam/did not assess        Labs:   CBC:   Lab Results   Component Value Date/Time    WBC 3.8 06/30/2022 09:14 AM    RBC 2.35 06/30/2022 09:14 AM    HGB 8.0 06/30/2022 09:14 AM    HCT 23.5 06/30/2022 09:14 AM    MCV 99.8 06/30/2022 09:14 AM    RDW 18.9 06/30/2022 09:14 AM    PLT 54 06/30/2022 09:14 AM     CMP:  Lab Results   Component Value Date/Time     06/30/2022 05:32 AM    K 4.1 06/30/2022 05:32 AM    K 3.9 06/28/2022 09:31 AM     06/30/2022 05:32 AM    CO2 22 06/30/2022 05:32 AM    BUN 33 06/30/2022 05:32 AM    CREATININE 1.2 06/30/2022 05:32 AM    GFRAA >60 06/30/2022 05:32 AM    AGRATIO 1.8 06/29/2022 12:31 AM    LABGLOM 58 06/30/2022 05:32 AM LABGLOM 53 05/03/2016 02:55 PM    GLUCOSE 140 06/30/2022 05:32 AM    GLUCOSE 78 01/12/2012 02:47 PM    PROT 5.8 06/29/2022 12:31 AM    PROT 7.3 01/12/2012 02:47 PM    CALCIUM 9.0 06/30/2022 05:32 AM    BILITOT 1.0 06/29/2022 12:31 AM    ALKPHOS 74 06/29/2022 12:31 AM    AST 30 06/29/2022 12:31 AM    ALT 10 06/29/2022 12:31 AM     PT/INR:  No results found for: PTINR  HgBA1c:  Lab Results   Component Value Date    LABA1C 5.8 06/30/2022     Lab Results   Component Value Date    CKTOTAL 144 06/28/2022    TROPONINI 0.10 (H) 06/29/2022    TROPONINI 0.10 (H) 06/29/2022       Lab Results   Component Value Date    CHOL 199 09/17/2021    CHOL 187 09/25/2020    CHOL 209 (H) 01/23/2019     Lab Results   Component Value Date    TRIG 100 09/17/2021    TRIG 108 09/25/2020    TRIG 89 01/23/2019     Lab Results   Component Value Date    HDL 15 (L) 06/29/2022    HDL 39 (L) 09/17/2021    HDL 36 (L) 09/25/2020     Lab Results   Component Value Date    LDLCALC 54 06/29/2022    LDLCALC 140 (H) 09/17/2021    LDLCALC 129 (H) 09/25/2020     Lab Results   Component Value Date    LABVLDL 34 06/29/2022    LABVLDL 20 09/17/2021    LABVLDL 22 09/25/2020     Lab Results   Component Value Date    CHOLHDLRATIO 4.8 12/18/2015    CHOLHDLRATIO 5.6 (H) 08/13/2014    CHOLHDLRATIO 3.9 01/12/2012        Cardiac Data:     EKGs were personally reviewed with my reads as above. Telemetry personally reviewed: Maintaining sinus    Echo: today   Summary   Normal left ventricular size with mild concentric left ventricular   hypertrophy. The left ventricular systolic function is mildly reduced with an ejection   fraction of 45 - 50 %. Basal to mid inferior, basal inferoseptal hypokinesis. Septal bounce noted. Grade I diastolic dysfunction with normal filling pressure. Normal right ventricular size with normal function. Mild mitral and tricuspid regurgitation. Mild aortic stenosis.    Systolic pulmonic artery pressure (SPAP) is estimated at 48 mmHg consistent   with mild pulmonary hypertension (Right atrial pressure of 8 mmHg). Compared with the previous study performed 5-, left ventricular   function decreased with new regional wall motion abnormalities noted. Echo 5/18/2016      Conclusions      Summary   Normal left ventricle size, wall thickness and systolic function with an   estimated ejection fraction of 55%. No regional wall motion abnormalities are seen. Diastolic filling parameters suggests grade I diastolic dysfunction. Aortic valve leaflets appear mildly thickened. Mild aortic regurgitation. Carotid disease 2016  Summary        1. There is minimal plaque seen in the right internal carotid artery with an    estimated diameter reduction of <50%.    2. There is minimal plaque seen in the left internal carotid artery with an    estimated diameter reduction of <50%.    3. The vertebral arteries are patent with antegrade flow bilaterally.               Impression and Plan:      NSTEMI Type I vs. Type II -favor type II event due to demand ischemia in the setting of acute anemia, hypotension, reduced renal clearance. He has not had any recent chest pain. He likely has underlying coronary artery disease that we are just now discovering incidentally.  -troponin rise/fall and presumed new RWMA  -Challenging case as he is not a candidate for antiplatelet and anticoagulation based on hematologic abnormalities  -Continue statin   -Addressing anemia with GI and hematology to limit ongoing ischemia  -Heart rhythm now converted to sinus; EP consulted, rate controlled this time with no anticoagulation  -Not a candidate for invasive angiography at this time likewise due to the above and renal injury    Cardiomyopathy, possibly ischemic  -Metoprolol XL once daily, adding valsartan for GDMT. Consider transition to [facility] and/or spironolactone pending course.  We will re-evaluate for myopathy and ischemic assessment on OP basis once acute illness addressed. WCT at presentation -favor atrial flutter with rate dependent left bundle branch block as telemetry supports. Noted atrial fibrillation as well  Possible pre-excitation pattern  -EP consulted for evaluation  -merits AC for Atrial flutter/fib but will withhold at this time given anemia    -TSH WNL    Hypotension  -Above goal.    Acute kidney injury  -Improving, appears back to baseline 6/22    Acute blood loss anemia   -Rx for GI bleed with PPI gtt at present, FOBT reportedly +  -GI and hematology consulted    Pancytopenia  -Hematology evaluated    [id number] Score for Atrial Fibrillation Stroke Risk   Risk   Factors  Component Value   C CHF No 0   H HTN Yes 1   A2 Age >= 76 Yes,  (79 y.o.) 2   D DM No 0   S2 Prior Stroke/TIA No 0   V Vascular Disease No 0   A Age 74-69 No,  (79 y.o.) 0   Sc Sex male 0    [id number]  Score  3   Score last updated 6/29/22 02:01 PM EDT    Click here for a link to the UpToDate guideline \"Atrial Fibrillation: Anticoagulation therapy to prevent embolization    Disclaimer: Risk Score calculation is dependent on accuracy of patient problem list and past encounter diagnosis. Cardiology will sign off. We will re-evaluate for myopathy and ischemic assessment on OP basis once acute illness addressed. Patient Active Problem List   Diagnosis    Hypertension    Rosacea    Mixed hyperlipidemia    Enlarged prostate with urinary obstruction    Taste perversion    Anosmia    Back pain    Nonrheumatic aortic valve insufficiency    Bilateral carotid bruits    Current moderate episode of major depressive disorder without prior episode (HCC)    Atrial fibrillation with RVR (HCC)    VINNY (acute kidney injury) ([address]    Acute anemia    GI bleed    Thrombocytopenia (HCC)         I will address the patient's cardiac risk factors and adjusted pharmacologic treatment as needed.  In addition, I have reinforced the need for patient directed risk factor modification. All questions and concerns were addressed to the patient/family. Alternatives to my treatment were discussed. Thank you for allowing us to participate in the care of Lor Brower. Please call me with any questions [phone].     Brigid Estrada MD, [facility]  Cardiovascular Disease  [facility]  [phone] [address] [facility]  [phone] [address] [place] [address]  6/30/2022 2:53 PM

## 2022-07-01 LAB
ALBUMIN SERPL-MCNC: 2.7 G/DL (ref 3.1–4.9)
ALPHA-1-GLOBULIN: 0.5 G/DL (ref 0.2–0.4)
ALPHA-2-GLOBULIN: 0.9 G/DL (ref 0.4–1.1)
ANION GAP SERPL CALCULATED.3IONS-SCNC: 11 MMOL/L (ref 3–16)
ANISOCYTOSIS: ABNORMAL
ANTI-NUCLEAR ANTIBODY (ANA): NEGATIVE
BASE EXCESS ARTERIAL: 0.3 MMOL/L (ref -3–3)
BASOPHILS ABSOLUTE: 0 K/UL (ref 0–0.2)
BASOPHILS RELATIVE PERCENT: 0 %
BETA GLOBULIN: 1 G/DL (ref 0.9–1.6)
BUN BLDV-MCNC: 35 MG/DL (ref 7–20)
CALCIUM SERPL-MCNC: 9 MG/DL (ref 8.3–10.6)
CARBOXYHEMOGLOBIN ARTERIAL: 0.4 % (ref 0–1.5)
CHLORIDE BLD-SCNC: 106 MMOL/L (ref 99–110)
CO2: 24 MMOL/L (ref 21–32)
CREAT SERPL-MCNC: 1.4 MG/DL (ref 0.8–1.3)
EKG ATRIAL RATE: 125 BPM
EKG ATRIAL RATE: 150 BPM
EKG DIAGNOSIS: NORMAL
EKG DIAGNOSIS: NORMAL
EKG Q-T INTERVAL: 368 MS
EKG Q-T INTERVAL: 376 MS
EKG QRS DURATION: 150 MS
EKG QRS DURATION: 156 MS
EKG QTC CALCULATION (BAZETT): 526 MS
EKG QTC CALCULATION (BAZETT): 540 MS
EKG R AXIS: 47 DEGREES
EKG R AXIS: 52 DEGREES
EKG T AXIS: -77 DEGREES
EKG T AXIS: 269 DEGREES
EKG VENTRICULAR RATE: 123 BPM
EKG VENTRICULAR RATE: 124 BPM
EOSINOPHILS ABSOLUTE: 0 K/UL (ref 0–0.6)
EOSINOPHILS RELATIVE PERCENT: 0 %
GAMMA GLOBULIN: 0.8 G/DL (ref 0.6–1.8)
GFR AFRICAN AMERICAN: 58
GFR NON-AFRICAN AMERICAN: 48
GLUCOSE BLD-MCNC: 185 MG/DL (ref 70–99)
HCO3 ARTERIAL: 24.8 MMOL/L (ref 21–29)
HCT VFR BLD CALC: 23.6 % (ref 40.5–52.5)
HEMATOLOGY PATH CONSULT: NO
HEMATOLOGY PATH CONSULT: NORMAL
HEMOGLOBIN, ART, EXTENDED: 8.3 G/DL (ref 13.5–17.5)
HEMOGLOBIN: 8.2 G/DL (ref 13.5–17.5)
HIV AG/AB: NORMAL
HIV ANTIGEN: NORMAL
HIV-1 ANTIBODY: NORMAL
HIV-2 AB: NORMAL
KAPPA, FREE LIGHT CHAINS, SERUM: 53.81 MG/L (ref 3.3–19.4)
KAPPA/LAMBDA RATIO: 1.3 (ref 0.26–1.65)
KAPPA/LAMBDA TEST COMMENT: ABNORMAL
LACTIC ACID, SEPSIS: 1.1 MMOL/L (ref 0.4–1.9)
LACTIC ACID, SEPSIS: 1.2 MMOL/L (ref 0.4–1.9)
LAMBDA, FREE LIGHT CHAINS, SERUM: 41.51 MG/L (ref 5.71–26.3)
LYMPHOCYTES ABSOLUTE: 0.3 K/UL (ref 1–5.1)
LYMPHOCYTES RELATIVE PERCENT: 13 %
MAGNESIUM: 2 MG/DL (ref 1.8–2.4)
MCH RBC QN AUTO: 34.4 PG (ref 26–34)
MCHC RBC AUTO-ENTMCNC: 34.6 G/DL (ref 31–36)
MCV RBC AUTO: 99.4 FL (ref 80–100)
METHEMOGLOBIN ARTERIAL: 1 %
MONOCYTES ABSOLUTE: 0.5 K/UL (ref 0–1.3)
MONOCYTES RELATIVE PERCENT: 23 %
NEUTROPHILS ABSOLUTE: 1.5 K/UL (ref 1.7–7.7)
NEUTROPHILS RELATIVE PERCENT: 64 %
O2 SAT, ARTERIAL: 93.5 %
O2 THERAPY: ABNORMAL
PCO2 ARTERIAL: 39.3 MMHG (ref 35–45)
PDW BLD-RTO: 18.9 % (ref 12.4–15.4)
PH ARTERIAL: 7.42 (ref 7.35–7.45)
PLATELET # BLD: 62 K/UL (ref 135–450)
PLATELET SLIDE REVIEW: ABNORMAL
PMV BLD AUTO: 10 FL (ref 5–10.5)
PO2 ARTERIAL: 71.9 MMHG (ref 75–108)
POTASSIUM SERPL-SCNC: 4.2 MMOL/L (ref 3.5–5.1)
RBC # BLD: 2.37 M/UL (ref 4.2–5.9)
SLIDE REVIEW: ABNORMAL
SODIUM BLD-SCNC: 141 MMOL/L (ref 136–145)
SPE/IFE INTERPRETATION: NORMAL
TCO2 ARTERIAL: 26 MMOL/L
TOTAL PROTEIN: 5.9 G/DL (ref 6.4–8.2)
WBC # BLD: 2.3 K/UL (ref 4–11)

## 2022-07-01 PROCEDURE — 83735 ASSAY OF MAGNESIUM: CPT

## 2022-07-01 PROCEDURE — 6360000002 HC RX W HCPCS: Performed by: INTERNAL MEDICINE

## 2022-07-01 PROCEDURE — 6370000000 HC RX 637 (ALT 250 FOR IP): Performed by: INTERNAL MEDICINE

## 2022-07-01 PROCEDURE — 6360000002 HC RX W HCPCS: Performed by: NURSE PRACTITIONER

## 2022-07-01 PROCEDURE — 93005 ELECTROCARDIOGRAM TRACING: CPT | Performed by: INTERNAL MEDICINE

## 2022-07-01 PROCEDURE — 36415 COLL VENOUS BLD VENIPUNCTURE: CPT

## 2022-07-01 PROCEDURE — 2060000000 HC ICU INTERMEDIATE R&B

## 2022-07-01 PROCEDURE — 36600 WITHDRAWAL OF ARTERIAL BLOOD: CPT

## 2022-07-01 PROCEDURE — 82803 BLOOD GASES ANY COMBINATION: CPT

## 2022-07-01 PROCEDURE — 6370000000 HC RX 637 (ALT 250 FOR IP): Performed by: NURSE PRACTITIONER

## 2022-07-01 PROCEDURE — 93010 ELECTROCARDIOGRAM REPORT: CPT | Performed by: INTERNAL MEDICINE

## 2022-07-01 PROCEDURE — 2700000000 HC OXYGEN THERAPY PER DAY

## 2022-07-01 PROCEDURE — 2580000003 HC RX 258: Performed by: NURSE PRACTITIONER

## 2022-07-01 PROCEDURE — C9113 INJ PANTOPRAZOLE SODIUM, VIA: HCPCS | Performed by: NURSE PRACTITIONER

## 2022-07-01 PROCEDURE — 2500000003 HC RX 250 WO HCPCS: Performed by: INTERNAL MEDICINE

## 2022-07-01 PROCEDURE — 94761 N-INVAS EAR/PLS OXIMETRY MLT: CPT

## 2022-07-01 PROCEDURE — 85025 COMPLETE CBC W/AUTO DIFF WBC: CPT

## 2022-07-01 PROCEDURE — 80048 BASIC METABOLIC PNL TOTAL CA: CPT

## 2022-07-01 PROCEDURE — 83605 ASSAY OF LACTIC ACID: CPT

## 2022-07-01 PROCEDURE — 2580000003 HC RX 258: Performed by: INTERNAL MEDICINE

## 2022-07-01 PROCEDURE — 87040 BLOOD CULTURE FOR BACTERIA: CPT

## 2022-07-01 RX ORDER — LACTOBACILLUS RHAMNOSUS GG 10B CELL
1 CAPSULE ORAL 2 TIMES DAILY WITH MEALS
Status: DISCONTINUED | OUTPATIENT
Start: 2022-07-01 | End: 2022-07-08 | Stop reason: HOSPADM

## 2022-07-01 RX ORDER — METOPROLOL TARTRATE 5 MG/5ML
5 INJECTION INTRAVENOUS ONCE
Status: DISCONTINUED | OUTPATIENT
Start: 2022-07-01 | End: 2022-07-02

## 2022-07-01 RX ORDER — METOPROLOL TARTRATE 5 MG/5ML
5 INJECTION INTRAVENOUS EVERY 6 HOURS PRN
Status: DISCONTINUED | OUTPATIENT
Start: 2022-07-01 | End: 2022-07-08 | Stop reason: HOSPADM

## 2022-07-01 RX ORDER — FUROSEMIDE 10 MG/ML
40 INJECTION INTRAMUSCULAR; INTRAVENOUS 2 TIMES DAILY
Status: DISCONTINUED | OUTPATIENT
Start: 2022-07-01 | End: 2022-07-01

## 2022-07-01 RX ORDER — FUROSEMIDE 10 MG/ML
40 INJECTION INTRAMUSCULAR; INTRAVENOUS 2 TIMES DAILY
Status: DISCONTINUED | OUTPATIENT
Start: 2022-07-01 | End: 2022-07-02

## 2022-07-01 RX ADMIN — ATORVASTATIN CALCIUM 40 MG: 40 TABLET, FILM COATED ORAL at 20:28

## 2022-07-01 RX ADMIN — CEFEPIME 2000 MG: 2 INJECTION, POWDER, FOR SOLUTION INTRAMUSCULAR; INTRAVENOUS at 08:21

## 2022-07-01 RX ADMIN — SODIUM CHLORIDE, PRESERVATIVE FREE 10 ML: 5 INJECTION INTRAVENOUS at 08:17

## 2022-07-01 RX ADMIN — Medication 1 CAPSULE: at 19:21

## 2022-07-01 RX ADMIN — VALSARTAN 80 MG: 80 TABLET, FILM COATED ORAL at 08:16

## 2022-07-01 RX ADMIN — FUROSEMIDE 40 MG: 10 INJECTION, SOLUTION INTRAMUSCULAR; INTRAVENOUS at 15:28

## 2022-07-01 RX ADMIN — METOPROLOL SUCCINATE 25 MG: 25 TABLET, EXTENDED RELEASE ORAL at 08:16

## 2022-07-01 RX ADMIN — OXYCODONE HYDROCHLORIDE AND ACETAMINOPHEN 1000 MG: 500 TABLET ORAL at 08:16

## 2022-07-01 RX ADMIN — SODIUM CHLORIDE, PRESERVATIVE FREE 10 ML: 5 INJECTION INTRAVENOUS at 20:28

## 2022-07-01 RX ADMIN — PANTOPRAZOLE SODIUM 40 MG: 40 INJECTION, POWDER, FOR SOLUTION INTRAVENOUS at 08:16

## 2022-07-01 RX ADMIN — CEFEPIME 2000 MG: 2 INJECTION, POWDER, FOR SOLUTION INTRAMUSCULAR; INTRAVENOUS at 20:31

## 2022-07-01 RX ADMIN — SERTRALINE 50 MG: 50 TABLET, FILM COATED ORAL at 20:28

## 2022-07-01 RX ADMIN — METOPROLOL TARTRATE 5 MG: 5 INJECTION INTRAVENOUS at 19:50

## 2022-07-01 ASSESSMENT — PAIN SCALES - GENERAL
PAINLEVEL_OUTOF10: 0

## 2022-07-01 ASSESSMENT — ENCOUNTER SYMPTOMS: TACHYPNEA: 1

## 2022-07-01 NOTE — PROGRESS NOTES
Paged cross-cover at this time: \"Hey, this is the same alonzo that is fluid overloaded that we gave IVP lasix too he is now in Afib RVR rates between 120-130s. I already got a EKG. Do you want to do a IVP of dilt?  Thanks\"

## 2022-07-01 NOTE — PROGRESS NOTES
Patient flipped back into NSR prior to administering metoprolol.  Medication held at this time and will address need to administer if patients rhythm changes

## 2022-07-01 NOTE — PROGRESS NOTES
Hospitalist Progress Note    CC: Atrial fibrillation with RVR [facility])    Hospital course:  79-year-old male with significant past medical history of hypertension, hyperlipidemia, and CKD who presents to [facility] as a direct admit from [facility].  He presented there earlier yesterday with concerns for 2 days worth of pain everywhere in his body. He states that he has been working hard at his job the last few months in order to try to make more money. 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 He denies any other symptoms such as true chest pain, nausea vomiting or diarrhea, or any fevers. He states simply just had malaise and myalgias. Upon arrival to [facility], he was noted to be in A. fib with RVR and have VINNY and anemia. He was started on Cardizem infusion, Protonix infusion, and IV fluids. He was transferred here for higher level care. Has had ongoing fevers and started on IV abx. Pancytopenia worsening. Pt feels worse - still in afib with RVR. Admit date: 6/28/2022  Days in hospital:  3  Status:  Inpatient       24 Hour Events: pt still worsening    Subjective: awake and alert for me - states he never slept yesterday. .. Still with afib with RVR which worsens with fever - will be difficult to control HR until source is controlled    ROS:   A comprehensive review of systems was negative except for: tired, short of breath, weak     Objective:    BP (!) 159/85   Pulse 85   Temp 98.7 °F (37.1 °C)   Resp 28   Ht 6' 2\" (1.88 m)   Wt 174 lb 14.4 oz (79.3 kg)   SpO2 93%   BMI 22.46 kg/m²     Gen: ill appearing  HEENT: NC/AT, moist mucous membranes, no oropharyngeal erythema or exudate  Neck: supple, trachea midline, no anterior cervical or SC LAD  Heart:  Normal s1/s2, tachy, IRR, no murmurs, gallops, or rubs.  no leg edema  Lungs:  Very diminished bilaterally, no wheeze, no rales, some basilar rhonchi, no crackles, sodium chloride           Intake/Output Summary (Last 24 hours) at 7/1/2022 1722  Last data filed at 7/1/2022 1215  Gross per 24 hour   Intake 240 ml   Output 700 ml   Net -460 ml       Results:  CBC:   Recent Labs     06/29/22  0410 06/29/22  0410 06/29/22  1803 06/30/22  0914 07/01/22  1527   WBC 4.3  --   --  3.8* 2.3*   HGB 7.7*   < > 8.8* 8.0* 8.2*   HCT 22.0*   < > 25.6* 23.5* 23.6*   MCV 97.0  --   --  99.8 99.4   PLT 40*  --   --  54* 62*    < > = values in this interval not displayed. BMP:   Recent Labs     06/29/22  0410 06/30/22  0532 07/01/22  0653    138 141   K 4.3 4.1 4.2    105 106   CO2 24 22 24   BUN 44* 33* 35*   CREATININE 1.6* 1.2 1.4*     Mag: No results for input(s): MAG in the last 72 hours. Phos: No results found for: PHOS  No results found for: GLU    LIVER PROFILE:   Recent Labs     06/29/22  0031   AST 30   ALT 10   BILITOT 1.0   ALKPHOS 74     PT/INR:   Recent Labs     06/29/22 0410   PROTIME 15.3*   INR 1.22*     APTT: No results for input(s): APTT in the last 72 hours. UA:  No results for input(s): NITRITE, COLORU, PHUR, LABCAST, WBCUA, RBCUA, MUCUS, TRICHOMONAS, YEAST, BACTERIA, CLARITYU, SPECGRAV, LEUKOCYTESUR, UROBILINOGEN, BILIRUBINUR, BLOODU, GLUCOSEU, AMORPHOUS in the last 72 hours.     Invalid input(s): Philomena Kehr input(s): ABG  Lab Results   Component Value Date    CALCIUM 9.0 07/01/2022               Electronically signed by Belkis Jim MD on 7/1/2022 at 5:22 PM

## 2022-07-01 NOTE — CARE COORDINATION
Pt NPO for bone biopsy, now on 3 L O2, wears 0 at home. Pt remains ambulatory in room, however, CM will continue to follow for needs, including possible HHC and home O2.   Domo Vargas RN

## 2022-07-01 NOTE — PROGRESS NOTES
Paged cross-cover at this time: \"Hey this pt is getting more and more confused, doesn't know where he is and is getting out of bed and peeing on the floor. Can we get a ABG ordered? \"

## 2022-07-01 NOTE — PLAN OF CARE
Problem: Pain  Goal: Verbalizes/displays adequate comfort level or baseline comfort level  Outcome: Progressing  Note: Pt will be satisfied with pain control. Pt uses numeric pain rating scale with reassessments after pain med administration. Will continue to monitor progression throughout shift. Problem: Safety - Adult  Goal: Free from fall injury  Outcome: Progressing  Note: Pt will remain free from falls throughout hospital stay. Fall precautions in place, bed alarm on, bed in lowest position with wheels locked and side rails 2/4 up. Room door open and hourly rounding completed. Will continue to monitor throughout shift.

## 2022-07-01 NOTE — PROGRESS NOTES
Progress Note    Patient Reinaldo Phalen  MRN: [id number]  YOB: 1940 Age: 79 y.o. Sex: male  Room: [address] [place] [address]       Admitting Physician: Charles Sanchez MD   Date of Admission: 6/28/2022 11:01 PM   Primary Care Physician: Esequiel Aguilar MD     Subjective:  Reinaldo Phalen was seen and examined. We are following for Anemia. -- no significant events overnight. -- no overt signs of bleeding.   -- intermittent confusion    ROS:  Constitutional: Denies fever, no change in appetite  Respiratory: Denies cough or shortness of breath  Cardiovascular: Denies chest pain or edema    Objective:  Vital Signs:   Vitals:    07/02/22 1107   BP: (!) 175/86   Pulse: 85   Resp: 16   Temp: 98.1 °F (36.7 °C)   SpO2: 96%         Physical Exam:  Constitutional: Alert and oriented x 4. No acute distress. Respiratory: Respirations nonlabored, no crepitus  GI: Abdomen nondistended, soft, and nontender. Neurological: No focal deficits noted. No asterixis.     Intake/Output:    Intake/Output Summary (Last 24 hours) at 7/2/2022 1235  Last data filed at 7/2/2022 1107  Gross per 24 hour   Intake 120 ml   Output 375 ml   Net -255 ml        Current Medications:  Current Facility-Administered Medications   Medication Dose Route Frequency Provider Last Rate Last Admin    [START ON 7/3/2022] metoprolol succinate (TOPROL XL) extended release tablet 50 mg  50 mg Oral Daily Tanner Silvestre MD        hydrALAZINE (APRESOLINE) tablet 50 mg  50 mg Oral 3 times per day Tanner Silvestre MD        cefepime (MAXIPIME) 2000 mg IVPB minibag  2,000 mg IntraVENous Q12H Venkat Martin MD   Stopped at 07/02/22 0045    lactobacillus (CULTURELLE) capsule 1 capsule  1 capsule Oral BID  Dominic Alex MD   1 capsule at 07/01/22 1921    metoprolol (LOPRESSOR) injection 5 mg  5 mg IntraVENous Q6H PRN Dominic Alex MD   5 mg at 07/02/22 0200    pantoprazole (PROTONIX) injection 40 mg  40 mg IntraVENous Daily ALESSIA Denton - CNP   40 mg at 07/01/22 0816    ascorbic acid (VITAMIN C) tablet 1,000 mg  1,000 mg Oral Daily Graham Monahan, APRN - CNP   1,000 mg at 07/01/22 0816    atorvastatin (LIPITOR) tablet 40 mg  40 mg Oral Nightly Graham Monahan, APRN - CNP   40 mg at 07/01/22 2028    HYDROcodone-acetaminophen (NORCO) 5-325 MG per tablet 1 tablet  1 tablet Oral Q6H PRN Graham Monahan, APRN - CNP   1 tablet at 06/29/22 2026    sertraline (ZOLOFT) tablet 50 mg  50 mg Oral Nightly Graham Monahan, APRN - CNP   50 mg at 07/01/22 2028    dilTIAZem 125 mg in dextrose 5 % 125 mL infusion  2.5-15 mg/hr IntraVENous Continuous Graham Monahan, APRN - CNP   Stopped at 06/29/22 0134    sodium chloride flush 0.9 % injection 5-40 mL  5-40 mL IntraVENous 2 times per day Graham Monahan, APRN - CNP   10 mL at 07/01/22 2028    sodium chloride flush 0.9 % injection 5-40 mL  5-40 mL IntraVENous PRN Graham Monahan, APRN - CNP        0.9 % sodium chloride infusion   IntraVENous PRN Graham Monahan, APRN - CNP        polyethylene glycol (GLYCOLAX) packet 17 g  17 g Oral Daily PRN Graham Monahan, APRN - CNP        acetaminophen (TYLENOL) tablet 650 mg  650 mg Oral Q6H PRN Graham Monahan, APRN - CNP   650 mg at 06/30/22 2333    Or    acetaminophen (TYLENOL) suppository 650 mg  650 mg Rectal Q6H PRN Graham Monahan, APRN - CNP        prochlorperazine (COMPAZINE) injection 10 mg  10 mg IntraVENous Q6H PRN Graham Monahan, APRN - CNP   10 mg at 06/30/22 0809         Recent labs and imaging reviewed. Assessment:  78 yo M w/ PMHx significant for HTN, HLD, CKD. Presented to hospital with generalized pain and fatigue. On admission found to be in AF with RVR and VINNY. Profound anemia thus reason for consult. Hgb 7.7 down from 9.2 on admission and baseline of 14 one year ago. FOBT Positive. Noted thrombocytopenia. Currently Platelet ct 54 (40), Hgb 8.0(8.8); WBC 3.8. No overt signs of bleeding.    -- 6/30/2022 Abdominal US:   1.  Cholelithiasis with equivocal cholecystitis. 2. Fatty infiltration of the liver. Renal Function Creatinine 1.4/BUN 35; Continues to be pancytopenic with WBC 2.3; Hgb 8.2; Platelet count 62. Plans for BM Bx to further evaluate. Plan:  1. Hematology currently with W/U underway, appreciate assistance. Plans for CT Guided Bone Marrow Biopsy. 2. No signs of overt bleeding. Will Hold off on Endoscopic evaluation until hematology evaluation complete, consider in OP setting as well. 3. Ongoing supportive care. Bethanie Nguyen MD    [facility]    [phone].  Also available via [employer]

## 2022-07-01 NOTE — PROGRESS NOTES
Paged cross-cover at this time:     \"stat cxr obtained for pts new SOB still waiting to be read. Pt is also hypertensive and tachycardic do you want to add anything for that? BP 1698/72 and HR varying between 120-140s. \"       Response:     \"Give 40mg Lasix IVP x 1 dose please\"

## 2022-07-01 NOTE — PROGRESS NOTES
Department of Internal Medicine  Nephrology Progress Note        SUBJECTIVE:    We are following this patient for A/CKD. The patient was seen and examined; he feels well today with no CP, SOB, nausea or vomiting. ROS: No fever or chills. Social: No family at bedside. Physical Exam:    VITALS:  /73   Pulse (!) 105   Temp 98.4 °F (36.9 °C) (Oral)   Resp 20   Ht 6' 2\" (1.88 m)   Wt 174 lb 14.4 oz (79.3 kg)   SpO2 97%   BMI 22.46 kg/m²     General appearance: Seems comfortable, no acute distress. Neck: Trachea midline, thyroid normal.   Lungs:  Non labored breathing, CTA to anterior auscultation. Heart:  S1S2 normal, rub or gallop. No peripheral edema. Abdomen: Soft, non-tender, no organomegaly. Skin: No lesions or rashes, warm to touch. DATA:    CBC:   Lab Results   Component Value Date/Time    WBC 3.8 06/30/2022 09:14 AM    RBC 2.35 06/30/2022 09:14 AM    HGB 8.0 06/30/2022 09:14 AM    HCT 23.5 06/30/2022 09:14 AM    MCV 99.8 06/30/2022 09:14 AM    MCH 34.1 06/30/2022 09:14 AM    MCHC 34.2 06/30/2022 09:14 AM    RDW 18.9 06/30/2022 09:14 AM    PLT 54 06/30/2022 09:14 AM    MPV 10.0 06/30/2022 09:14 AM     BMP:    Lab Results   Component Value Date/Time     07/01/2022 06:53 AM    K 4.2 07/01/2022 06:53 AM    K 3.9 06/28/2022 09:31 AM     07/01/2022 06:53 AM    CO2 24 07/01/2022 06:53 AM    BUN 35 07/01/2022 06:53 AM    LABALBU 2.7 06/30/2022 09:14 AM    CREATININE 1.4 07/01/2022 06:53 AM    CALCIUM 9.0 07/01/2022 06:53 AM    GFRAA 58 07/01/2022 06:53 AM    LABGLOM 48 07/01/2022 06:53 AM    LABGLOM 53 05/03/2016 02:55 PM    GLUCOSE 185 07/01/2022 06:53 AM    GLUCOSE 78 01/12/2012 02:47 PM       IMPRESSION/RECOMMENDATIONS:      1- A/CKD: The patient has chronic kidney disease with a baseline creatinine of 1.2 to 1.4. His VINNY is likely secondary to a pre-renal component, along with renal hypoperfusion in the setting of hemodynamic instability.  His urinary output is well maintained

## 2022-07-01 NOTE — ONCOLOGY
ONCOLOGY HEMATOLOGY CARE PROGRESS NOTE      SUBJECTIVE:     Febrile to 101.2 deg F overnight. On 3 LPM by NC.    RN notes state that he was confused overnight. This morning, he is A/O x 3. He has not eaten yet. ROS:   The remaining 10 point review of symptoms is unremarkable. OBJECTIVE        Physical    VITALS:  BP (!) 158/73   Pulse 79   Temp 98.6 °F (37 °C) (Oral)   Resp 20   Ht 6' 2\" (1.88 m)   Wt 174 lb 14.4 oz (79.3 kg)   SpO2 96%   BMI 22.46 kg/m²   TEMPERATURE:  Current - Temp: 98.6 °F (37 °C); Max - Temp  Av.4 °F (37.4 °C)  Min: 98.1 °F (36.7 °C)  Max: 101.2 °F (38.4 °C)  PULSE OXIMETRY RANGE: SpO2  Av.4 %  Min: 93 %  Max: 96 %  24HR INTAKE/OUTPUT:    Intake/Output Summary (Last 24 hours) at 2022 0992  Last data filed at 2022 2343  Gross per 24 hour   Intake 490 ml   Output 600 ml   Net -110 ml       CONSTITUTIONAL:  awake, alert, cooperative, no apparent distress, HEENT oral pharynx , no scleral icterus  HEMATOLOGIC/LYMPHATICS:  no cervical lymphadenopathy, no supraclavicular lymphadenopathy, no axillary lymphadenopathy and no inguinal lymphadenopathy  LUNGS:  No increased work of breathing, good air exchange, clear to auscultation bilaterally, no crackles or wheezing  CARDIOVASCULAR:  , regular rate and rhythm, normal S1 and S2, no S3 or S4, and no murmur noted  ABDOMEN:  No scars, normal bowel sounds, soft, non-distended, non-tender, no masses palpated, no hepatosplenomegally  MUSCULOSKELETAL:  There is no redness, warmth, or swelling of the joints. EXTREMETIES: No clubbing cynosis or edema  NEUROLOGIC:  Awake, alert, oriented to name, place and time. Cranial nerves II-XII are grossly intact. Motor is 5 out of 5 bilaterally.    SKIN:  no bruising or bleeding      Data      Recent Labs     22  0931 22  0031 22  0410 22  1803 22  0914   WBC 5.4  --  4.3  --  3.8*   HGB 9.2*   < > 7.7* 8.8* 8.0*   HCT 25.8*   < > 22.0* 25.6* 23.5*   PLT 41*  --  40*  --  54*   MCV 96.9  --  97.0  --  99.8    < > = values in this interval not displayed. Recent Labs     06/29/22  0031 06/29/22  0410 06/30/22  0532    138 138   K 3.8 4.3 4.1    104 105   CO2 21 24 22   BUN 44* 44* 33*   CREATININE 1.5* 1.6* 1.2     Recent Labs     06/28/22  0931 06/29/22  0031   AST 30 30   ALT 11 10   BILITOT 1.2* 1.0   ALKPHOS 89 74       Magnesium:    Lab Results   Component Value Date/Time    MG 2.20 06/30/2022 05:32 AM    MG 2.40 06/29/2022 04:10 AM    MG 2.40 06/29/2022 12:31 AM         Problem List  Patient Active Problem List   Diagnosis    Hypertension    Rosacea    Mixed hyperlipidemia    Enlarged prostate with urinary obstruction    Taste perversion    Anosmia    Back pain    Nonrheumatic aortic valve insufficiency    Bilateral carotid bruits    Current moderate episode of major depressive disorder without prior episode (HCC)    Atrial fibrillation with RVR (HCC)    VINNY (acute kidney injury) (HCC)    Acute anemia    GI bleed    Thrombocytopenia (HCC)       ASSESSMENT AND PLAN    1.) Abnormal CBC  - Admit CBC on 6/28/2022 showed: WBC 5.4 K/mcL, HGB 9.2 g/dL, HCT 25.8%, PLT 41 K/mcL, ANC 3.7 K/mcL. - Has not been transfused and CBC has been stable. - Micronutrients: Ferritin 1296 ng/mL, iron sat 39%, B12 >2000 pg/mL, folate 12 ng/mL. No deficiencies. - Hemolysis/blood loss eval: Abs retic 26 K/mcL,  U/L, hapto 246 mg/dL, FOBT pos. Seems most consistent with an underproduction scenario, though is FOBT pos.  - Ultrasound of the abdomen showed a fatty liver. No comment on the spleen despite the request to do so.   - Hep C neg.  - SPEP, immunofixation, smear to path, antiplatelet antibodies, HIV,  ESTEFANÍA are pending.  - May need a marrow.     2.) Atrial fib  - EP is managing rate control.  - Would hold off on any anticoag, as I am not clear that the PLTs can be improved.     3.) CM  - Echocardiogram, 6/28/2022, showed LVEF 45-50% w/ basal to mid inferior, basal inferoseptal hypokinesis. Grade 1 DD.  - Abnormal CBC is limiting the use of antiplatelet and LHC.     4.) CKD  - IVF per nephrology. - Avoid nephrotoxins.  - Creat 1.8 --> 1.6 --> 1.2 -->     5.) Fever  - Blood culture x 2 collected on 6/30 - PENDING  - CXR, 6/30/2022, showed interstitial prominence and patchy opacities, suggestive of edema.  - Would start antibiotics.     ONCOLOGIC DISPOSITION:    Hudson Monge MD  Please contact through [address]

## 2022-07-01 NOTE — PROGRESS NOTES
Spoke with IR about bone marrow biopsy. RN stated that patient wont be able to get it done until Tuesday while inpatient due to weekend and holiday on Monday.

## 2022-07-01 NOTE — PROGRESS NOTES
Secure message to Danielle Wallace \"We got a stat ekg at this time and patients heart rhythm is afib RVR with rates in the 120s-130s. Would you like to try IV metoprolol like overnight or a different plan? \"    Awaiting response       1511- orders in for 5mg IV metoprolol.

## 2022-07-02 ENCOUNTER — APPOINTMENT (OUTPATIENT)
Dept: GENERAL RADIOLOGY | Age: 82
DRG: 280 | End: 2022-07-02
Attending: INTERNAL MEDICINE
Payer: COMMERCIAL

## 2022-07-02 LAB
EKG ATRIAL RATE: 90 BPM
EKG DIAGNOSIS: NORMAL
EKG Q-T INTERVAL: 380 MS
EKG QRS DURATION: 148 MS
EKG QTC CALCULATION (BAZETT): 545 MS
EKG R AXIS: 49 DEGREES
EKG T AXIS: -55 DEGREES
EKG VENTRICULAR RATE: 124 BPM
SOLUBLE TRANSFERRIN RECEPT: 1.4 MG/L (ref 2.2–5)

## 2022-07-02 PROCEDURE — 93010 ELECTROCARDIOGRAM REPORT: CPT | Performed by: INTERNAL MEDICINE

## 2022-07-02 PROCEDURE — 94761 N-INVAS EAR/PLS OXIMETRY MLT: CPT

## 2022-07-02 PROCEDURE — 93005 ELECTROCARDIOGRAM TRACING: CPT | Performed by: FAMILY MEDICINE

## 2022-07-02 PROCEDURE — 6370000000 HC RX 637 (ALT 250 FOR IP): Performed by: NURSE PRACTITIONER

## 2022-07-02 PROCEDURE — 2500000003 HC RX 250 WO HCPCS: Performed by: INTERNAL MEDICINE

## 2022-07-02 PROCEDURE — 2580000003 HC RX 258: Performed by: INTERNAL MEDICINE

## 2022-07-02 PROCEDURE — 2060000000 HC ICU INTERMEDIATE R&B

## 2022-07-02 PROCEDURE — 6360000002 HC RX W HCPCS: Performed by: INTERNAL MEDICINE

## 2022-07-02 PROCEDURE — 2700000000 HC OXYGEN THERAPY PER DAY

## 2022-07-02 PROCEDURE — 71045 X-RAY EXAM CHEST 1 VIEW: CPT

## 2022-07-02 PROCEDURE — 2580000003 HC RX 258: Performed by: NURSE PRACTITIONER

## 2022-07-02 PROCEDURE — C9113 INJ PANTOPRAZOLE SODIUM, VIA: HCPCS | Performed by: NURSE PRACTITIONER

## 2022-07-02 PROCEDURE — 6360000002 HC RX W HCPCS: Performed by: NURSE PRACTITIONER

## 2022-07-02 PROCEDURE — 6370000000 HC RX 637 (ALT 250 FOR IP): Performed by: INTERNAL MEDICINE

## 2022-07-02 PROCEDURE — 99233 SBSQ HOSP IP/OBS HIGH 50: CPT | Performed by: NURSE PRACTITIONER

## 2022-07-02 RX ORDER — METOPROLOL SUCCINATE 50 MG/1
50 TABLET, EXTENDED RELEASE ORAL 2 TIMES DAILY
Status: DISCONTINUED | OUTPATIENT
Start: 2022-07-03 | End: 2022-07-03

## 2022-07-02 RX ORDER — METOPROLOL SUCCINATE 50 MG/1
50 TABLET, EXTENDED RELEASE ORAL DAILY
Status: DISCONTINUED | OUTPATIENT
Start: 2022-07-02 | End: 2022-07-02

## 2022-07-02 RX ORDER — HYDRALAZINE HYDROCHLORIDE 50 MG/1
50 TABLET, FILM COATED ORAL EVERY 8 HOURS SCHEDULED
Status: DISCONTINUED | OUTPATIENT
Start: 2022-07-02 | End: 2022-07-03

## 2022-07-02 RX ORDER — METOPROLOL SUCCINATE 50 MG/1
50 TABLET, EXTENDED RELEASE ORAL DAILY
Status: DISCONTINUED | OUTPATIENT
Start: 2022-07-03 | End: 2022-07-02

## 2022-07-02 RX ADMIN — SERTRALINE 50 MG: 50 TABLET, FILM COATED ORAL at 20:29

## 2022-07-02 RX ADMIN — CEFEPIME 2000 MG: 2 INJECTION, POWDER, FOR SOLUTION INTRAMUSCULAR; INTRAVENOUS at 20:30

## 2022-07-02 RX ADMIN — ATORVASTATIN CALCIUM 40 MG: 40 TABLET, FILM COATED ORAL at 20:29

## 2022-07-02 RX ADMIN — METOPROLOL TARTRATE 5 MG: 5 INJECTION INTRAVENOUS at 02:00

## 2022-07-02 RX ADMIN — SODIUM CHLORIDE: 9 INJECTION, SOLUTION INTRAVENOUS at 20:30

## 2022-07-02 RX ADMIN — HYDRALAZINE HYDROCHLORIDE 50 MG: 50 TABLET, FILM COATED ORAL at 20:29

## 2022-07-02 RX ADMIN — CEFEPIME 2000 MG: 2 INJECTION, POWDER, FOR SOLUTION INTRAMUSCULAR; INTRAVENOUS at 09:12

## 2022-07-02 RX ADMIN — PANTOPRAZOLE SODIUM 40 MG: 40 INJECTION, POWDER, FOR SOLUTION INTRAVENOUS at 09:12

## 2022-07-02 RX ADMIN — METOPROLOL SUCCINATE 50 MG: 50 TABLET, EXTENDED RELEASE ORAL at 13:44

## 2022-07-02 ASSESSMENT — ENCOUNTER SYMPTOMS
GASTROINTESTINAL NEGATIVE: 1
RESPIRATORY NEGATIVE: 1

## 2022-07-02 NOTE — PROGRESS NOTES
Hospitalist Progress Note    CC: Atrial fibrillation with RVR [facility])    Hospital course:  79-year-old male with significant past medical history of hypertension, hyperlipidemia, and CKD who presents to [facility] as a direct admit from [facility].  He presented there earlier yesterday with concerns for 2 days worth of pain everywhere in his body. He states that he has been working hard at his job the last few months in order to try to make more money. 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 He denies any other symptoms such as true chest pain, nausea vomiting or diarrhea, or any fevers. He states simply just had malaise and myalgias. Upon arrival to [address] [place] [address], he was noted to be in A. fib with RVR and have VINNY and anemia. He was started on Cardizem infusion, Protonix infusion, and IV fluids. He was transferred here for higher level care. Has had ongoing fevers and started on IV abx. Pancytopenia worsening. Pt feels worse - still in afib with RVR. Admit date: 6/28/2022  Days in hospital:  4  Status:  Inpatient       24 Hour Events: pt still worsening    Subjective: c/o weakness   denies any cough     ROS:   A comprehensive review of systems was negative except for: tired, short of breath, weak     Objective:    BP (!) 175/86   Pulse 85   Temp 98.1 °F (36.7 °C) (Oral)   Resp 16   Ht 6' 2\" (1.88 m)   Wt 172 lb 3.2 oz (78.1 kg)   SpO2 96%   BMI 22.11 kg/m²     Gen: ill appearing  HEENT: NC/AT, moist mucous membranes, no oropharyngeal erythema or exudate  Neck: supple, trachea midline, no anterior cervical or SC LAD  Heart:  Normal s1/s2, tachy, IRR, no murmurs, gallops, or rubs.  no leg edema  Lungs:  Very diminished bilaterally, no wheeze, no rales, some basilar rhonchi, no crackles, no use of accessory muscles  Abd: bowel sounds present, soft, nontender, nondistended, no masses  Extrem:  No clubbing, cyanosis,  no edema  Skin: no rashes or lesions  Psych: AXOX3  Neuro:  weak  Assessment:    Principal Problem:    Atrial fibrillation with RVR (HCC)  Active Problems:    VINNY (acute kidney injury) ([facility] [address]    Acute anemia    GI bleed    Thrombocytopenia (HCC)    Hypertension  Resolved Problems:    * No resolved hospital problems. *      Plan:  1.  afib with RVR - on toprol XL and diltiazem drip - still with rapid HR intermittently - sepsis is driving this - will give IV metoprolol as needed, dose is adjusted, d/w cardiology   2. Pancytopenia - worsening - viral?  Bone marrow likely would help - WBC worsening, hgb stable at 8.2, plts rebounding at 62 from low of 40  3. Fevers - unclear of source - pt on IV abx- cefepime from 7/1, bl cx NGTD ,rpt cxr , UA 7/2 ,  poss viral infection given pancytopenia    4. Acute renal failure on CKDIII - stable  5. Sepsis based on fever, tachypnea, tachycardia, leukopenia, suspected pulm source - cultures negative so far    Prognosis:  Fair    Code status:  Full code    DVT prophylaxis: SCDs  GI prophylaxis: Proton Pump Inhibitor  Antibiotic prophylaxis indicated:   yes - lactobacillus  Diet:  ADULT DIET; Regular;  Low Sodium (2 gm)    Disposition:  Other unclear - likely SNF    Medications:  Scheduled Meds:   hydrALAZINE  50 mg Oral 3 times per day    metoprolol succinate  50 mg Oral Daily    cefepime  2,000 mg IntraVENous Q12H    lactobacillus  1 capsule Oral BID WC    pantoprazole  40 mg IntraVENous Daily    vitamin C  1,000 mg Oral Daily    atorvastatin  40 mg Oral Nightly    sertraline  50 mg Oral Nightly    sodium chloride flush  5-40 mL IntraVENous 2 times per day       PRN Meds:  metoprolol, HYDROcodone-acetaminophen, sodium chloride flush, sodium chloride, polyethylene glycol, acetaminophen **OR** acetaminophen, prochlorperazine    IV:   dilTIAZem (CARDIZEM) 125 mg in dextrose 5% 125 mL infusion Stopped (06/29/22 0134)    sodium chloride           Intake/Output Summary (Last 24 hours) at 7/2/2022 1337  Last data filed at 7/2/2022 1107  Gross per 24 hour   Intake 120 ml   Output 375 ml   Net -255 ml       Results:  CBC:   Recent Labs     06/29/22  1803 06/30/22  0914 07/01/22  1527   WBC  --  3.8* 2.3*   HGB 8.8* 8.0* 8.2*   HCT 25.6* 23.5* 23.6*   MCV  --  99.8 99.4   PLT  --  54* 62*     BMP:   Recent Labs     06/30/22  0532 07/01/22  0653    141   K 4.1 4.2    106   CO2 22 24   BUN 33* 35*   CREATININE 1.2 1.4*     Mag: No results for input(s): MAG in the last 72 hours. Phos: No results found for: PHOS  No results found for: GLU    LIVER PROFILE:   No results for input(s): AST, ALT, LIPASE, BILIDIR, BILITOT, ALKPHOS in the last 72 hours. Invalid input(s): AMYLASE,  ALB  PT/INR:   No results for input(s): PROTIME, INR in the last 72 hours. APTT: No results for input(s): APTT in the last 72 hours. UA:  No results for input(s): NITRITE, COLORU, PHUR, LABCAST, WBCUA, RBCUA, MUCUS, TRICHOMONAS, YEAST, BACTERIA, CLARITYU, SPECGRAV, LEUKOCYTESUR, UROBILINOGEN, BILIRUBINUR, BLOODU, GLUCOSEU, AMORPHOUS in the last 72 hours.     Invalid input(s): Cecille Barillas input(s): ABG  Lab Results   Component Value Date    CALCIUM 9.0 07/01/2022               Electronically signed by Humaira Sprague MD on 7/2/2022 at 1:37 PM

## 2022-07-02 NOTE — PROGRESS NOTES
Department of Internal Medicine  Nephrology Progress Note        SUBJECTIVE:    We are following this patient for A/CKD. The patient was seen and examined; he was resting comfortably with no acute events noted overnight. ROS: No fever or chills. Social: No family at bedside. Physical Exam:    VITALS:  BP (!) 161/70   Pulse (!) 113   Temp 98.3 °F (36.8 °C) (Oral)   Resp 22   Ht 6' 2\" (1.88 m)   Wt 172 lb 3.2 oz (78.1 kg)   SpO2 98%   BMI 22.11 kg/m²     General appearance: Seems comfortable, no acute distress. Neck: Trachea midline, thyroid normal.   Lungs:  Non labored breathing, CTA to anterior auscultation. Heart:  S1S2 normal, rub or gallop. No peripheral edema. Abdomen: Soft, non-tender, no organomegaly. Skin: No lesions or rashes, warm to touch. DATA:    CBC:   Lab Results   Component Value Date/Time    WBC 2.3 07/01/2022 03:27 PM    RBC 2.37 07/01/2022 03:27 PM    HGB 8.2 07/01/2022 03:27 PM    HCT 23.6 07/01/2022 03:27 PM    MCV 99.4 07/01/2022 03:27 PM    MCH 34.4 07/01/2022 03:27 PM    MCHC 34.6 07/01/2022 03:27 PM    RDW 18.9 07/01/2022 03:27 PM    PLT 62 07/01/2022 03:27 PM    MPV 10.0 07/01/2022 03:27 PM     BMP:    Lab Results   Component Value Date/Time     07/01/2022 06:53 AM    K 4.2 07/01/2022 06:53 AM    K 3.9 06/28/2022 09:31 AM     07/01/2022 06:53 AM    CO2 24 07/01/2022 06:53 AM    BUN 35 07/01/2022 06:53 AM    LABALBU 2.7 06/30/2022 09:14 AM    CREATININE 1.4 07/01/2022 06:53 AM    CALCIUM 9.0 07/01/2022 06:53 AM    GFRAA 58 07/01/2022 06:53 AM    LABGLOM 48 07/01/2022 06:53 AM    LABGLOM 53 05/03/2016 02:55 PM    GLUCOSE 185 07/01/2022 06:53 AM    GLUCOSE 78 01/12/2012 02:47 PM       IMPRESSION/RECOMMENDATIONS:      1- A/CKD: The patient has chronic kidney disease with a baseline creatinine of 1.2 to 1.4. His VINNY is likely secondary to a pre-renal component, along with renal hypoperfusion in the setting of hemodynamic instability.  His urinary output is well maintained and his serum creatinine is within baseline range, monitor. 2- No significant electrolytes disorders noted. 3- HTN: Blood pressure within acceptable range. 4- Atrial fibrillation: Management per cardiology.

## 2022-07-02 NOTE — PROGRESS NOTES
Perfect served Joel Romero MD: \"FYI the patient just flipped into afib RVR. Do you want to reconsult cardiology? EKG obtained to confirm. Thank you. \"

## 2022-07-02 NOTE — FLOWSHEET NOTE
07/01/22 1945   Vital Signs   Temp 98.2 °F (36.8 °C)   Temp Source Oral   Heart Rate (!) 124   Heart Rate Source Monitor   Resp 26   BP (!) 161/97   BP Location Right upper arm   BP Method Automatic   MAP (Calculated) 118.33   Patient Position Sitting   Level of Consciousness Alert (0)   MEWS Score 4   Pain Assessment   Pain Assessment None - Denies Pain   Pain Level 0   Patient's Stated Pain Goal 0 - No pain     Pt hr sustaining 120's afib. Rn administered PRN iv metoprolol 5mg.  Will continue to monitor

## 2022-07-02 NOTE — PROGRESS NOTES
[facility]  Cardiology  Progress Note    Admission date:  2022    Reason for follow up visit: AF RVR    HPI/CC: Libby Villatoro is a 79 y.o. male who presented to [facility] on 2022 with generalized body aches and weakness. He was transferred to [facility] for atrial arrhythmias and WCT. Creatinine was elevated. Troponin and BNP were elevated. Echo showed an EF of 45-50%. Laboratory data also revealed anemia and thrombocytopenia. Hemoccult was positive. On 2022, he spontaneously converted from AT to sinus. Cardiology asked to see today for recurrent AF RVR. Subjective: Denies chest pain, shortness of breath, palpitations and dizziness. Unaware of RVR. Vitals:  Blood pressure (!) 175/86, pulse 85, temperature 98.1 °F (36.7 °C), temperature source Oral, resp. rate 16, height 6' 2\" (1.88 m), weight 172 lb 3.2 oz (78.1 kg), SpO2 96 %.   Temp  Av.3 °F (36.8 °C)  Min: 98.1 °F (36.7 °C)  Max: 98.7 °F (37.1 °C)  Pulse  Av.6  Min: 78  Max: 131  BP  Min: 132/65  Max: 175/86  SpO2  Av %  Min: 93 %  Max: 98 %    24 hour I/O    Intake/Output Summary (Last 24 hours) at 2022 1327  Last data filed at 2022 1107  Gross per 24 hour   Intake 120 ml   Output 375 ml   Net -255 ml     Current Facility-Administered Medications   Medication Dose Route Frequency Provider Last Rate Last Admin    hydrALAZINE (APRESOLINE) tablet 50 mg  50 mg Oral 3 times per day Margoth Smith MD        metoprolol succinate (TOPROL XL) extended release tablet 50 mg  50 mg Oral Daily ALESSIA Jeffries - CNP        cefepime (MAXIPIME) 2000 mg IVPB minibag  2,000 mg IntraVENous Q12H David Maldonado MD 12.5 mL/hr at 22 0912 2,000 mg at 22 0912    lactobacillus (CULTURELLE) capsule 1 capsule  1 capsule Oral BID  Christina Mcnally MD   1 capsule at 22 1921    metoprolol (LOPRESSOR) injection 5 mg  5 mg IntraVENous Q6H PRN Christina Mcnally MD   5 mg at 22 0200    pantoprazole (PROTONIX) injection 40 mg  40 mg IntraVENous Daily Armida Billingsley, APRN - CNP   40 mg at 07/02/22 0912    ascorbic acid (VITAMIN C) tablet 1,000 mg  1,000 mg Oral Daily ALESSIA Lopez - CNP   1,000 mg at 07/01/22 0816    atorvastatin (LIPITOR) tablet 40 mg  40 mg Oral Nightly ALESSIA Lopez - CNP   40 mg at 07/01/22 2028    HYDROcodone-acetaminophen (NORCO) 5-325 MG per tablet 1 tablet  1 tablet Oral Q6H PRN ALESSIA Lopez - CNP   1 tablet at 06/29/22 2026    sertraline (ZOLOFT) tablet 50 mg  50 mg Oral Nightly ALESSIA Lopez - CNP   50 mg at 07/01/22 2028    dilTIAZem 125 mg in dextrose 5 % 125 mL infusion  2.5-15 mg/hr IntraVENous Continuous ALESSIA Lopez CNP   Stopped at 06/29/22 0134    sodium chloride flush 0.9 % injection 5-40 mL  5-40 mL IntraVENous 2 times per day ALESSIA Lopez - CNP   10 mL at 07/01/22 2028    sodium chloride flush 0.9 % injection 5-40 mL  5-40 mL IntraVENous PRN ALESSIA Lopez - CNP        0.9 % sodium chloride infusion   IntraVENous PRN ALESSIA Lopez - MADELIN        polyethylene glycol (GLYCOLAX) packet 17 g  17 g Oral Daily PRN ALESSIA Lopez - MADELIN        acetaminophen (TYLENOL) tablet 650 mg  650 mg Oral Q6H PRN ALESSIA Lopez - CNP   650 mg at 06/30/22 2333    Or    acetaminophen (TYLENOL) suppository 650 mg  650 mg Rectal Q6H PRN Johann Henriquez APRN - CNP        prochlorperazine (COMPAZINE) injection 10 mg  10 mg IntraVENous Q6H PRN ALESSIA Lopez - CNP   10 mg at 06/30/22 0809     Review of Systems   Constitutional: Negative. Respiratory: Negative. Cardiovascular: Negative. Gastrointestinal: Negative. Neurological: Negative.       Objective:     Telemetry monitor: AF RVR    Physical Exam:  Constitutional:  Comfortable and alert, NAD, appears stated age  Eyes: PERRL, sclera nonicteric  Neck:  Supple, no masses, no thyroidmegaly, no JVD  Skin:  Warm and dry; no rash or lesions  Heart: Irregular, tachycardic, normal apex, S1 and S2 normal, no M/G/R  Lungs:  Normal respiratory effort; clear; no wheezing/rhonchi/rales  Abdomen: soft, non tender, + bowel sounds  Extremities:  No edema or cyanosis; no clubbing  Neuro: alert, some disorientation, moves legs and arms equally, normal mood and affect    Data Reviewed:    Echo 6/29/2022:   Summary   Normal left ventricular size with mild concentric left ventricular   hypertrophy.   The left ventricular systolic function is mildly reduced with an ejection   fraction of 45 - 50 %.   Basal to mid inferior, basal inferoseptal hypokinesis.   Septal bounce noted.   Grade I diastolic dysfunction with normal filling pressure.   Normal right ventricular size with normal function.   Mild mitral and tricuspid regurgitation.   Mild aortic stenosis.   Systolic pulmonic artery pressure (SPAP) is estimated at 48 mmHg consistent   with mild pulmonary hypertension (Right atrial pressure of 8 mmHg).   Compared with the previous study performed 5-, left ventricular   function decreased with new regional wall motion abnormalities noted.     Lab Reviewed:     Renal Profile:  Lab Results   Component Value Date/Time    CREATININE 1.4 07/01/2022 06:53 AM    BUN 35 07/01/2022 06:53 AM     07/01/2022 06:53 AM    K 4.2 07/01/2022 06:53 AM    K 3.9 06/28/2022 09:31 AM     07/01/2022 06:53 AM    CO2 24 07/01/2022 06:53 AM     CBC:    Lab Results   Component Value Date/Time    WBC 2.3 07/01/2022 03:27 PM    RBC 2.37 07/01/2022 03:27 PM    HGB 8.2 07/01/2022 03:27 PM    HCT 23.6 07/01/2022 03:27 PM    MCV 99.4 07/01/2022 03:27 PM    RDW 18.9 07/01/2022 03:27 PM    PLT 62 07/01/2022 03:27 PM     BNP:    Lab Results   Component Value Date/Time    PROBNP 10,156 06/29/2022 12:31 AM     Fasting Lipid Panel:    Lab Results   Component Value Date/Time    CHOL 199 09/17/2021 12:03 PM    HDL 15 06/29/2022 01:42 PM    TRIG 100 09/17/2021 12:03 PM     Cardiac Enzymes:  CK/MbTroponin  Lab Results   Component Value Date/Time    CKTOTAL 144 06/28/2022 09:31 AM    TROPONINI 0.10 06/29/2022 04:10 AM    TROPONINI 0.10 06/29/2022 04:10 AM     PT/ INR   Lab Results   Component Value Date/Time    INR 1.22 06/29/2022 04:10 AM    PROTIME 15.3 06/29/2022 04:10 AM     PTT No results found for: PTT   Lab Results   Component Value Date/Time    MG 2.00 07/01/2022 06:53 AM      Lab Results   Component Value Date/Time    TSH 1.65 06/29/2022 12:31 AM     All labs and imaging reviewed today    Assessment:  Elevated troponin: likely type II due to anemia, renal insufficiency  Paroxsymal atrial arrhythmias (AF/AFL/AT): RVR today              -[id number] score 3 (age, HTN)  Rate related LBBB  Presumed CAD  Cardiomyopathy, unclear etiology: new diagnosis, EF 45-50% on echo 6/2022  HTN: stable  HLD  A/CKD  Depression   GIB: GI following    Normocytic anemia: HemOnc following   Thrombocytopenia     Plan:   1. Toprol increased to 50 mg daily, give now for RVR  2. Avoiding diltiazem due to cardiomyopathy but ok to use short term if needed  3. Not on anticoagulation due to anemia, R>B  4. No ischemic evaluation for cardiomyopathy at this time due to anemia  5.  Monitor telemetry    TEJAS Perry 80  [phone]

## 2022-07-02 NOTE — PROGRESS NOTES
Perfect served cross cover: 'The patient just had 15 beats of vtach symptomatically. EKG has been obtained. He is also back in NSR from Afib RVR. Please advise. Thank you! \"

## 2022-07-02 NOTE — PROGRESS NOTES
Vitals:    07/01/22 1202 07/01/22 1445 07/01/22 1530 07/01/22 1945   BP: 125/73 (!) 148/86 (!) 159/85 (!) 161/97   Pulse: (!) 105 (!) 131 85 (!) 124   Resp: 20 28 26   Temp: 98.4 °F (36.9 °C) 98.7 °F (37.1 °C)  98.2 °F (36.8 °C)   TempSrc: Oral   Oral   SpO2: 97% 93%     Weight:       Height:         Recent Results (from the past 24 hour(s))   CBC with Auto Differential    Collection Time: 07/01/22  3:27 PM   Result Value Ref Range    WBC 2.3 (L) 4.0 - 11.0 K/uL   Blood gas, arterial    Collection Time: 07/01/22  4:48 AM   Result Value Ref Range    pCO2, Arterial 39.3 35.0 - 45.0 mmHg       1455-Sepsis alert initiated protocol

## 2022-07-02 NOTE — PROGRESS NOTES
Pt found to be in Afib RVR at this time writer confirmed with Christophe. PRN lopressor given will continue to monitor.

## 2022-07-02 NOTE — PROGRESS NOTES
Progress Note    Patient Becky Ocasio  MRN: [id number]  YOB: 1940 Age: 79 y.o. Sex: male  Room: [address] [place] [address]       Admitting Physician: Myrna Hussein MD   Date of Admission: 6/28/2022 11:01 PM   Primary Care Physician: Yaritza Wade MD     Subjective:  Becky Ocasio was seen and examined. We are following for Anemia. -- no significant events overnight. -- no overt signs of bleeding.   -- intermittent confusion    ROS:  Constitutional: Denies fever, no change in appetite  Respiratory: Denies cough or shortness of breath  Cardiovascular: Denies chest pain or edema    Objective:  Vital Signs:   Vitals:    07/02/22 1107   BP: (!) 175/86   Pulse: 85   Resp: 16   Temp: 98.1 °F (36.7 °C)   SpO2: 96%         Physical Exam:  Constitutional: Alert and oriented x 4. No acute distress. Respiratory: Respirations nonlabored, no crepitus  GI: Abdomen nondistended, soft, and nontender. Neurological: No focal deficits noted. No asterixis.     Intake/Output:    Intake/Output Summary (Last 24 hours) at 7/2/2022 1236  Last data filed at 7/2/2022 1107  Gross per 24 hour   Intake 120 ml   Output 375 ml   Net -255 ml        Current Medications:  Current Facility-Administered Medications   Medication Dose Route Frequency Provider Last Rate Last Admin    [START ON 7/3/2022] metoprolol succinate (TOPROL XL) extended release tablet 50 mg  50 mg Oral Daily Kelly Patrick MD        hydrALAZINE (APRESOLINE) tablet 50 mg  50 mg Oral 3 times per day Kelly Patrick MD        cefepime (MAXIPIME) 2000 mg IVPB minibag  2,000 mg IntraVENous Q12H Jenna Guerra MD   Stopped at 07/02/22 0045    lactobacillus (CULTURELLE) capsule 1 capsule  1 capsule Oral BID WC Oli Ladd MD   1 capsule at 07/01/22 1921    metoprolol (LOPRESSOR) injection 5 mg  5 mg IntraVENous Q6H PRN Oli Ladd MD   5 mg at 07/02/22 0200    pantoprazole (PROTONIX) injection 40 mg  40 mg IntraVENous Daily ALESSIA Campo - CNP   40 mg at 07/01/22 0816    ascorbic acid (VITAMIN C) tablet 1,000 mg  1,000 mg Oral Daily Raj Manners, APRN - CNP   1,000 mg at 07/01/22 0816    atorvastatin (LIPITOR) tablet 40 mg  40 mg Oral Nightly Raj Manners, APRN - CNP   40 mg at 07/01/22 2028    HYDROcodone-acetaminophen (NORCO) 5-325 MG per tablet 1 tablet  1 tablet Oral Q6H PRN Raj Manners, APRN - CNP   1 tablet at 06/29/22 2026    sertraline (ZOLOFT) tablet 50 mg  50 mg Oral Nightly Raj Manners, APRN - CNP   50 mg at 07/01/22 2028    dilTIAZem 125 mg in dextrose 5 % 125 mL infusion  2.5-15 mg/hr IntraVENous Continuous Raj Manners, APRN - CNP   Stopped at 06/29/22 0134    sodium chloride flush 0.9 % injection 5-40 mL  5-40 mL IntraVENous 2 times per day Raj Manners, APRN - CNP   10 mL at 07/01/22 2028    sodium chloride flush 0.9 % injection 5-40 mL  5-40 mL IntraVENous PRN Raj Manners, APRN - CNP        0.9 % sodium chloride infusion   IntraVENous PRN Raj Manners, APRN - CNP        polyethylene glycol (GLYCOLAX) packet 17 g  17 g Oral Daily PRN Raj Manners, APRN - CNP        acetaminophen (TYLENOL) tablet 650 mg  650 mg Oral Q6H PRN Raj Manners, APRN - CNP   650 mg at 06/30/22 2333    Or    acetaminophen (TYLENOL) suppository 650 mg  650 mg Rectal Q6H PRN Raj Manners, APRN - CNP        prochlorperazine (COMPAZINE) injection 10 mg  10 mg IntraVENous Q6H PRN Raj Manners, APRN - CNP   10 mg at 06/30/22 0809         Recent labs and imaging reviewed. Assessment:  78 yo M w/ PMHx significant for HTN, HLD, CKD. Presented to hospital with generalized pain and fatigue. On admission found to be in AF with RVR and VINNY. Profound anemia thus reason for consult. Hgb 7.7 down from 9.2 on admission and baseline of 14 one year ago. FOBT Positive. Noted thrombocytopenia. Currently Platelet ct 54 (40), Hgb 8.0(8.8); WBC 3.8. No overt signs of bleeding.    -- 6/30/2022 Abdominal US:   1.  Cholelithiasis with equivocal cholecystitis. 2. Fatty infiltration of the liver. Renal Function Creatinine 1.4/BUN 35; Continues to be pancytopenic with WBC 2.3; Hgb 8.2; Platelet count 62. Plans for BM Bx to further evaluate. Plan:  1. Hematology currently with W/U underway, appreciate assistance. Plans for CT Guided Bone Marrow Biopsy. 2. No signs of overt bleeding. Will Hold off on Endoscopic evaluation until hematology evaluation complete, consider in OP setting as well. 3. Ongoing supportive care. Loretta Baig MD    Johnson Jessica    [phone].  Also available via Perfect Serve

## 2022-07-02 NOTE — ONCOLOGY
ONCOLOGY HEMATOLOGY CARE PROGRESS NOTE      SUBJECTIVE:     He states that he feels a little better today. He did not have a fever overnight. ROS:   The remaining 10 point review of symptoms is unremarkable. OBJECTIVE        Physical    VITALS:  BP (!) 161/70   Pulse (!) 113   Temp 98.3 °F (36.8 °C) (Oral)   Resp 22   Ht 6' 2\" (1.88 m)   Wt 172 lb 3.2 oz (78.1 kg)   SpO2 98%   BMI 22.11 kg/m²   TEMPERATURE:  Current - Temp: 98.3 °F (36.8 °C); Max - Temp  Av.3 °F (36.8 °C)  Min: 98.1 °F (36.7 °C)  Max: 98.7 °F (37.1 °C)  PULSE OXIMETRY RANGE: SpO2  Av.2 %  Min: 93 %  Max: 98 %  24HR INTAKE/OUTPUT:      Intake/Output Summary (Last 24 hours) at 2022 8745  Last data filed at 2022 2112  Gross per 24 hour   Intake 120 ml   Output 275 ml   Net -155 ml       CONSTITUTIONAL:  awake, alert, cooperative, no apparent distress, HEENT oral pharynx , no scleral icterus  HEMATOLOGIC/LYMPHATICS:  no cervical lymphadenopathy, no supraclavicular lymphadenopathy, no axillary lymphadenopathy and no inguinal lymphadenopathy  LUNGS:  No increased work of breathing, good air exchange, clear to auscultation bilaterally, no crackles or wheezing  CARDIOVASCULAR:  , regular rate and rhythm, normal S1 and S2, no S3 or S4, and no murmur noted  ABDOMEN:  No scars, normal bowel sounds, soft, non-distended, non-tender, no masses palpated, no hepatosplenomegally  MUSCULOSKELETAL:  There is no redness, warmth, or swelling of the joints. EXTREMETIES: No clubbing cynosis or edema  NEUROLOGIC:  Awake, alert, oriented to name, place and time. Cranial nerves II-XII are grossly intact. Motor is 5 out of 5 bilaterally.    SKIN:  no bruising or bleeding      Data      Recent Labs     22  1803 22  0914 22  1527   WBC  --  3.8* 2.3*   HGB 8.8* 8.0* 8.2*   HCT 25.6* 23.5* 23.6*   PLT  --  54* 62*   MCV  --  99.8 99.4        Recent Labs     22  0532 22  0653    141   K 4.1 4.2    106   CO2 22 24   BUN 33* 35*   CREATININE 1.2 1.4*     No results for input(s): AST, ALT, ALB, BILIDIR, BILITOT, ALKPHOS in the last 72 hours. Magnesium:    Lab Results   Component Value Date/Time    MG 2.00 07/01/2022 06:53 AM    MG 2.20 06/30/2022 05:32 AM    MG 2.40 06/29/2022 04:10 AM         Problem List  Patient Active Problem List   Diagnosis    Hypertension    Rosacea    Mixed hyperlipidemia    Enlarged prostate with urinary obstruction    Taste perversion    Anosmia    Back pain    Nonrheumatic aortic valve insufficiency    Bilateral carotid bruits    Current moderate episode of major depressive disorder without prior episode (HCC)    Atrial fibrillation with RVR (HCC)    VINNY (acute kidney injury) (HCC)    Acute anemia    GI bleed    Thrombocytopenia (HCC)       ASSESSMENT AND PLAN    1.) Abnormal CBC  - Admit CBC on 6/28/2022 showed: WBC 5.4 K/mcL, HGB 9.2 g/dL, HCT 25.8%, PLT 41 K/mcL, ANC 3.7 K/mcL. - Has not been transfused and CBC has been stable. - Micronutrients: Ferritin 1296 ng/mL, iron sat 39%, B12 >2000 pg/mL, folate 12 ng/mL. No deficiencies. - Hemolysis/blood loss eval: Abs retic 26 K/mcL,  U/L, hapto 246 mg/dL, FOBT pos. Seems most consistent with an underproduction scenario, though is FOBT pos.  - Ultrasound of the abdomen showed a fatty liver. No comment on the spleen despite the request to do so. - Hep C neg. -SPEP and kappa lambda light chains are unremarkable. - Bone marrow biopsy has been ordered. I discussed this with the patient today.  -White blood count is trending lower and it is questionable whether this is significant. I am concerned because he has a rising monocytosis and this can be a regenerating bone marrow.     2.) Atrial fib  - EP is managing rate control.  - Would hold off on any anticoag, as I am not clear that the PLTs can be improved.   -Possibly some contribution from fatty infiltration of the liver, but as these are trending down ordered I doubt that is the entire cause.     3.) CM  - Echocardiogram, 6/28/2022, showed LVEF 45-50% w/ basal to mid inferior, basal inferoseptal hypokinesis. Grade 1 DD.  - Abnormal CBC is limiting the use of antiplatelet and LHC.     4.) CKD  - IVF per nephrology.   - Avoid nephrotoxins.  - Creat 1.8 --> 1.6 --> 1.2 -->     5.) Fever  - Blood culture x 2 collected on 6/30 - PENDING  -COVID 6/28/2022 is negative  - CXR, 6/30/2022, showed interstitial prominence and patchy opacities, suggestive of edema.  -He is on cefepime    ONCOLOGIC DISPOSITION:    -Once the cause of his pancytopenia has been determined    Joseluis Maria MD  May be reached through [address] [place] [address]

## 2022-07-03 LAB
ANION GAP SERPL CALCULATED.3IONS-SCNC: 12 MMOL/L (ref 3–16)
ANISOCYTOSIS: ABNORMAL
BANDED NEUTROPHILS RELATIVE PERCENT: 6 % (ref 0–7)
BASOPHILS ABSOLUTE: 0 K/UL (ref 0–0.2)
BASOPHILS RELATIVE PERCENT: 0 %
BLASTS RELATIVE PERCENT: 2 %
BUN BLDV-MCNC: 38 MG/DL (ref 7–20)
CALCIUM SERPL-MCNC: 8.7 MG/DL (ref 8.3–10.6)
CHLORIDE BLD-SCNC: 106 MMOL/L (ref 99–110)
CO2: 24 MMOL/L (ref 21–32)
CREAT SERPL-MCNC: 1 MG/DL (ref 0.8–1.3)
EKG ATRIAL RATE: 71 BPM
EKG DIAGNOSIS: NORMAL
EKG P AXIS: 40 DEGREES
EKG P-R INTERVAL: 202 MS
EKG Q-T INTERVAL: 446 MS
EKG QRS DURATION: 124 MS
EKG QTC CALCULATION (BAZETT): 484 MS
EKG R AXIS: -5 DEGREES
EKG T AXIS: 82 DEGREES
EKG VENTRICULAR RATE: 71 BPM
EOSINOPHILS ABSOLUTE: 0 K/UL (ref 0–0.6)
EOSINOPHILS RELATIVE PERCENT: 0 %
GFR AFRICAN AMERICAN: >60
GFR NON-AFRICAN AMERICAN: >60
GLUCOSE BLD-MCNC: 148 MG/DL (ref 70–99)
HCT VFR BLD CALC: 21.7 % (ref 40.5–52.5)
HEMATOLOGY PATH CONSULT: NO
HEMOGLOBIN: 7.4 G/DL (ref 13.5–17.5)
LYMPHOCYTES ABSOLUTE: 0.3 K/UL (ref 1–5.1)
LYMPHOCYTES RELATIVE PERCENT: 35 %
MCH RBC QN AUTO: 33.5 PG (ref 26–34)
MCHC RBC AUTO-ENTMCNC: 34 G/DL (ref 31–36)
MCV RBC AUTO: 98.7 FL (ref 80–100)
MONOCYTES ABSOLUTE: 0.2 K/UL (ref 0–1.3)
MONOCYTES RELATIVE PERCENT: 23 %
NEUTROPHILS ABSOLUTE: 0.4 K/UL (ref 1.7–7.7)
NEUTROPHILS RELATIVE PERCENT: 33 %
PDW BLD-RTO: 18.9 % (ref 12.4–15.4)
PLASMA CELLS PERCENT: 1 %
PLATELET # BLD: 88 K/UL (ref 135–450)
PMV BLD AUTO: 10.5 FL (ref 5–10.5)
POTASSIUM REFLEX MAGNESIUM: 3.9 MMOL/L (ref 3.5–5.1)
RBC # BLD: 2.19 M/UL (ref 4.2–5.9)
SARS-COV-2, NAAT: NOT DETECTED
SODIUM BLD-SCNC: 142 MMOL/L (ref 136–145)
WBC # BLD: 0.9 K/UL (ref 4–11)

## 2022-07-03 PROCEDURE — 85025 COMPLETE CBC W/AUTO DIFF WBC: CPT

## 2022-07-03 PROCEDURE — 87449 NOS EACH ORGANISM AG IA: CPT

## 2022-07-03 PROCEDURE — 1200000000 HC SEMI PRIVATE

## 2022-07-03 PROCEDURE — 99233 SBSQ HOSP IP/OBS HIGH 50: CPT | Performed by: NURSE PRACTITIONER

## 2022-07-03 PROCEDURE — 2700000000 HC OXYGEN THERAPY PER DAY

## 2022-07-03 PROCEDURE — C9113 INJ PANTOPRAZOLE SODIUM, VIA: HCPCS | Performed by: NURSE PRACTITIONER

## 2022-07-03 PROCEDURE — 94761 N-INVAS EAR/PLS OXIMETRY MLT: CPT

## 2022-07-03 PROCEDURE — 87324 CLOSTRIDIUM AG IA: CPT

## 2022-07-03 PROCEDURE — 2580000003 HC RX 258: Performed by: NURSE PRACTITIONER

## 2022-07-03 PROCEDURE — 6360000002 HC RX W HCPCS: Performed by: INTERNAL MEDICINE

## 2022-07-03 PROCEDURE — 2580000003 HC RX 258: Performed by: INTERNAL MEDICINE

## 2022-07-03 PROCEDURE — 6370000000 HC RX 637 (ALT 250 FOR IP): Performed by: NURSE PRACTITIONER

## 2022-07-03 PROCEDURE — 6370000000 HC RX 637 (ALT 250 FOR IP): Performed by: INTERNAL MEDICINE

## 2022-07-03 PROCEDURE — 6360000002 HC RX W HCPCS: Performed by: NURSE PRACTITIONER

## 2022-07-03 PROCEDURE — 87635 SARS-COV-2 COVID-19 AMP PRB: CPT

## 2022-07-03 PROCEDURE — 93010 ELECTROCARDIOGRAM REPORT: CPT | Performed by: INTERNAL MEDICINE

## 2022-07-03 PROCEDURE — 80048 BASIC METABOLIC PNL TOTAL CA: CPT

## 2022-07-03 PROCEDURE — 36415 COLL VENOUS BLD VENIPUNCTURE: CPT

## 2022-07-03 RX ORDER — HYDRALAZINE HYDROCHLORIDE 50 MG/1
100 TABLET, FILM COATED ORAL EVERY 8 HOURS SCHEDULED
Status: DISCONTINUED | OUTPATIENT
Start: 2022-07-03 | End: 2022-07-08 | Stop reason: HOSPADM

## 2022-07-03 RX ORDER — ISOSORBIDE MONONITRATE 30 MG/1
30 TABLET, EXTENDED RELEASE ORAL DAILY
Status: DISCONTINUED | OUTPATIENT
Start: 2022-07-03 | End: 2022-07-04

## 2022-07-03 RX ADMIN — SODIUM CHLORIDE: 9 INJECTION, SOLUTION INTRAVENOUS at 10:12

## 2022-07-03 RX ADMIN — SERTRALINE 50 MG: 50 TABLET, FILM COATED ORAL at 20:40

## 2022-07-03 RX ADMIN — METOPROLOL SUCCINATE 75 MG: 50 TABLET, EXTENDED RELEASE ORAL at 20:40

## 2022-07-03 RX ADMIN — SODIUM CHLORIDE, PRESERVATIVE FREE 10 ML: 5 INJECTION INTRAVENOUS at 10:06

## 2022-07-03 RX ADMIN — CEFEPIME 2000 MG: 2 INJECTION, POWDER, FOR SOLUTION INTRAMUSCULAR; INTRAVENOUS at 10:13

## 2022-07-03 RX ADMIN — OXYCODONE HYDROCHLORIDE AND ACETAMINOPHEN 1000 MG: 500 TABLET ORAL at 10:06

## 2022-07-03 RX ADMIN — HYDRALAZINE HYDROCHLORIDE 50 MG: 50 TABLET, FILM COATED ORAL at 05:34

## 2022-07-03 RX ADMIN — Medication 1 CAPSULE: at 10:05

## 2022-07-03 RX ADMIN — METOPROLOL SUCCINATE 50 MG: 50 TABLET, EXTENDED RELEASE ORAL at 10:06

## 2022-07-03 RX ADMIN — SODIUM CHLORIDE, PRESERVATIVE FREE 10 ML: 5 INJECTION INTRAVENOUS at 20:40

## 2022-07-03 RX ADMIN — METOPROLOL SUCCINATE 50 MG: 50 TABLET, EXTENDED RELEASE ORAL at 00:54

## 2022-07-03 RX ADMIN — HYDRALAZINE HYDROCHLORIDE 100 MG: 50 TABLET, FILM COATED ORAL at 20:41

## 2022-07-03 RX ADMIN — PANTOPRAZOLE SODIUM 40 MG: 40 INJECTION, POWDER, FOR SOLUTION INTRAVENOUS at 10:05

## 2022-07-03 ASSESSMENT — ENCOUNTER SYMPTOMS
RESPIRATORY NEGATIVE: 1
GASTROINTESTINAL NEGATIVE: 1

## 2022-07-03 NOTE — PROGRESS NOTES
Progress Note    Patient Nona Pike  MRN: [id number]  YOB: 1940 Age: 79 y.o. Sex: male  Room: [address] [place] [address]       Admitting Physician: French Ramesh MD   Date of Admission: 6/28/2022 11:01 PM   Primary Care Physician: Aaliyah Carroll MD     Subjective:  Nona Pike was seen and examined. We are following for Anemia. -- no significant events overnight. -- no overt signs of bleeding.   -- moreconfusion  -- worsening neutropenia now     ROS:  Constitutional: Denies fever, no change in appetite  Respiratory: Denies cough or shortness of breath  Cardiovascular: Denies chest pain or edema    Objective:  Vital Signs:   Vitals:    07/03/22 0801   BP: (!) 180/73   Pulse: 74   Resp: 18   Temp: 97.7 °F (36.5 °C)   SpO2: 95%         Physical Exam:  Constitutional: Alert and oriented x 2. No acute distress. Respiratory: Respirations nonlabored, no crepitus  GI: Abdomen nondistended, soft, and nontender. Neurological: No focal deficits noted. No asterixis.     Intake/Output:    Intake/Output Summary (Last 24 hours) at 7/3/2022 1009  Last data filed at 7/3/2022 0248  Gross per 24 hour   Intake 0 ml   Output 600 ml   Net -600 ml        Current Medications:  Current Facility-Administered Medications   Medication Dose Route Frequency Provider Last Rate Last Admin    hydrALAZINE (APRESOLINE) tablet 50 mg  50 mg Oral 3 times per day Den Thurman MD   50 mg at 07/03/22 0534    metoprolol succinate (TOPROL XL) extended release tablet 50 mg  50 mg Oral BID ALESSIA Dean - CNP   50 mg at 07/03/22 1006    cefepime (MAXIPIME) 2000 mg IVPB minibag  2,000 mg IntraVENous Q12H Farooq Nicole MD   Stopped at 07/03/22 0054    lactobacillus (CULTURELLE) capsule 1 capsule  1 capsule Oral BID WC Kris Lopez MD   1 capsule at 07/03/22 1005    metoprolol (LOPRESSOR) injection 5 mg  5 mg IntraVENous Q6H PRN Kris Lopez MD   5 mg at 07/02/22 0200    pantoprazole (PROTONIX) injection 40 mg 40 mg IntraVENous Daily Mehnaz Washington, APRN - CNP   40 mg at 07/03/22 1005    ascorbic acid (VITAMIN C) tablet 1,000 mg  1,000 mg Oral Daily Sheldon Rumple, APRN - CNP   1,000 mg at 07/03/22 1006    atorvastatin (LIPITOR) tablet 40 mg  40 mg Oral Nightly Sheldon Rumple, APRN - CNP   40 mg at 07/02/22 2029    HYDROcodone-acetaminophen (NORCO) 5-325 MG per tablet 1 tablet  1 tablet Oral Q6H PRN Sheldon Rumple, APRN - CNP   1 tablet at 06/29/22 2026    sertraline (ZOLOFT) tablet 50 mg  50 mg Oral Nightly Sheldon Rumple, APRN - CNP   50 mg at 07/02/22 2029    dilTIAZem 125 mg in dextrose 5 % 125 mL infusion  2.5-15 mg/hr IntraVENous Continuous Sheldon Rumple, APRN - CNP   Stopped at 06/29/22 0134    sodium chloride flush 0.9 % injection 5-40 mL  5-40 mL IntraVENous 2 times per day Sheldon Rumple, APRN - CNP   10 mL at 07/03/22 1006    sodium chloride flush 0.9 % injection 5-40 mL  5-40 mL IntraVENous PRN Sheldon Rumple, APRN - CNP        0.9 % sodium chloride infusion   IntraVENous PRN Sheldon Rumple, APRN - CNP 25 mL/hr at 07/02/22 2030 New Bag at 07/02/22 2030    polyethylene glycol (GLYCOLAX) packet 17 g  17 g Oral Daily PRN Sheldon Rumple, APRN - CNP        acetaminophen (TYLENOL) tablet 650 mg  650 mg Oral Q6H PRN Sheldon Rumple, APRN - CNP   650 mg at 06/30/22 2333    Or    acetaminophen (TYLENOL) suppository 650 mg  650 mg Rectal Q6H PRN Sheldon Rumple, APRN - CNP        prochlorperazine (COMPAZINE) injection 10 mg  10 mg IntraVENous Q6H PRN Sheldon Rumple, APRN - CNP   10 mg at 06/30/22 0809         Recent labs and imaging reviewed. Assessment:  78 yo M w/ PMHx significant for HTN, HLD, CKD. Presented to hospital with generalized pain and fatigue. On admission found to be in AF with RVR and VINNY. Profound anemia thus reason for consult. Hgb 7.7 down from 9.2 on admission and baseline of 14 one year ago. FOBT Positive. Noted thrombocytopenia. Worsening CBC over the weekend.   Now neutropenic. Bone marrow biopsy planned for Monday. No overt GI bleeding. Patient is more confused this AM.  -- 6/30/2022 Abdominal US:   1. Cholelithiasis with equivocal cholecystitis. 2. Fatty infiltration of the liver. Plan:  1. Hematology currently with W/U underway, appreciate assistance. Plans for CT Guided Bone Marrow Biopsy. 2. No signs of overt bleeding. Will Hold off on Endoscopic evaluation until hematology evaluation complete, consider in OP setting as well. 3. Ongoing supportive care. Romain Glover MD    [address]    [phone].  Also available via [employer]

## 2022-07-03 NOTE — PROGRESS NOTES
Chart extensively reviewed (again)  -declining wbc and recovering platelet count  -most commonly seen in a bone marrow injury-new medication, viral injury, infection  -lipitor stopped  -wbc decreased prior to cefepime so cefepime is an unlikely culprit  -repeat Covid  -with platelet increasing, I suspect wbc will follow  -will not start granix in case MDS or leukemia which is less likely  -marrow bx on Mon or Tuesday  -neutropenic precautions.      Anemia:  -no evidence of iron, b12 or folate deficiency  -no evidence of hemolysis or myeloma  -marrow bx ordered    Bhavana Salas MD

## 2022-07-03 NOTE — PROGRESS NOTES
Hospitalist Progress Note    CC: Atrial fibrillation with RVR [facility])    Hospital course:  69-year-old male with significant past medical history of hypertension, hyperlipidemia, and CKD who presents to [facility] as a direct admit from [facility].  He presented there earlier yesterday with concerns for 2 days worth of pain everywhere in his body. He states that he has been working hard at his job the last few months in order to try to make more money. 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 He denies any other symptoms such as true chest pain, nausea vomiting or diarrhea, or any fevers. He states simply just had malaise and myalgias. Upon arrival to [facility], he was noted to be in A. fib with RVR and have VINNY and anemia. He was started on Cardizem infusion, Protonix infusion, and IV fluids. He was transferred here for higher level care. Has had ongoing fevers and started on IV abx. Pancytopenia worsening. Pt feels worse - still in afib with RVR. Admit date: 6/28/2022  Days in hospital:  5  Status:  Inpatient       24 Hour Events: pt still worsening    Subjective:   Had few loose stools - watery as per nursing   No abd pain     ROS:   A comprehensive review of systems was negative except for: tired, short of breath, weak     Objective:    BP (!) 179/83   Pulse 70   Temp 97.7 °F (36.5 °C) (Oral)   Resp 16   Ht 6' 2\" (1.88 m)   Wt 171 lb 11.2 oz (77.9 kg)   SpO2 96%   BMI 22.04 kg/m²     Gen: ill appearing  HEENT: NC/AT, moist mucous membranes, no oropharyngeal erythema or exudate  Neck: supple, trachea midline, no anterior cervical or SC LAD  Heart:  Normal s1/s2, tachy, IRR, no murmurs, gallops, or rubs.  no leg edema  Lungs:  Very diminished bilaterally, no wheeze, no rales, some basilar rhonchi, no crackles, no use of accessory muscles  Abd: bowel sounds present, soft, nontender, nondistended, no masses  Extrem:  No clubbing, cyanosis,  no edema  Skin: no rashes or lesions  Psych: AXOX3  Neuro:  weak  Assessment:    Principal Problem:    Atrial fibrillation with RVR (HCC)  Active Problems:    VINNY (acute kidney injury) ([facility] [address]    Acute anemia    GI bleed    Thrombocytopenia (HCC)    Hypertension  Resolved Problems:    * No resolved hospital problems. *      Plan:  1.  afib with RVR - on toprol XL and diltiazem drip - still with rapid HR intermittently -  will give IV metoprolol as needed, PO lopressor dose is adjusted, d/w cardiology   2. Pancytopenia - worsening - viral?  Plan Bone marrow biopsy mon/tuesday - WBC worsening, hgb stable at 8.2, oncology following   3. Fevers - unclear of source - pt on IV abx- cefepime from 7/1, bl cx NGTD ,rpt cxr , UA 7/2 ,  poss viral infection given pancytopenia  , dc abx 7/3   4. Acute renal failure on CKDIII - stable  5. Sepsis based on fever, tachypnea, tachycardia, leukopenia, suspected pulm source - cultures negative so far  6. Diarrhea- get c diff 7/3     Prognosis:  Fair    Code status:  Full code    DVT prophylaxis: SCDs  GI prophylaxis: Proton Pump Inhibitor  Antibiotic prophylaxis indicated:   yes - lactobacillus  Diet:  ADULT DIET; Regular;  Low Sodium (2 gm)    Disposition:  Other unclear - likely SNF    Medications:  Scheduled Meds:   hydrALAZINE  100 mg Oral 3 times per day    isosorbide mononitrate  30 mg Oral Daily    metoprolol succinate  75 mg Oral BID    lactobacillus  1 capsule Oral BID WC    pantoprazole  40 mg IntraVENous Daily    vitamin C  1,000 mg Oral Daily    sertraline  50 mg Oral Nightly    sodium chloride flush  5-40 mL IntraVENous 2 times per day       PRN Meds:  metoprolol, HYDROcodone-acetaminophen, sodium chloride flush, sodium chloride, polyethylene glycol, acetaminophen **OR** acetaminophen, prochlorperazine    IV:   sodium chloride 25 mL/hr at 07/03/22 1012         Intake/Output Summary (Last 24 hours) at 7/3/2022 1619  Last data filed at 7/3/2022 1227  Gross per 24 hour   Intake 120 ml   Output 250 ml   Net -130 ml       Results:  CBC:   Recent Labs     07/01/22  1527 07/03/22  0858   WBC 2.3* 0.9*   HGB 8.2* 7.4*   HCT 23.6* 21.7*   MCV 99.4 98.7   PLT 62* 88*     BMP:   Recent Labs     07/01/22  0653 07/03/22  1244    142   K 4.2 3.9    106   CO2 24 24   BUN 35* 38*   CREATININE 1.4* 1.0     Mag: No results for input(s): MAG in the last 72 hours. Phos: No results found for: PHOS  No results found for: GLU    LIVER PROFILE:   No results for input(s): AST, ALT, LIPASE, BILIDIR, BILITOT, ALKPHOS in the last 72 hours. Invalid input(s): AMYLASE,  ALB  PT/INR:   No results for input(s): PROTIME, INR in the last 72 hours. APTT: No results for input(s): APTT in the last 72 hours. UA:  No results for input(s): NITRITE, COLORU, PHUR, LABCAST, WBCUA, RBCUA, MUCUS, TRICHOMONAS, YEAST, BACTERIA, CLARITYU, SPECGRAV, LEUKOCYTESUR, UROBILINOGEN, BILIRUBINUR, BLOODU, GLUCOSEU, AMORPHOUS in the last 72 hours.     Invalid input(s): Marley Torres input(s): ABG  Lab Results   Component Value Date    CALCIUM 8.7 07/03/2022               Electronically signed by Brenda Valdes MD on 7/3/2022 at 4:19 PM

## 2022-07-03 NOTE — PROGRESS NOTES
Perfect served [facility] MD: \"Good morning. Patient had two critical values this morning: absolute neutrophil of 0.3 and wbc count 0.9. Please advise about isolation needs and next steps. Thanks! \"

## 2022-07-03 NOTE — PLAN OF CARE
Problem: Safety - Adult  Goal: Free from fall injury  Outcome: Progressing   Bed in lowest position with wheels locked, 2/4 bed rails up, call light within reach, bed alarm in place, avasys camera in place

## 2022-07-03 NOTE — PROGRESS NOTES
[facility], [place], operated by [facility]  Cardiology  Progress Note    Admission date:  2022    Reason for follow up visit: AF RVR    HPI/CC: Jaelyn Yates is a 79 y.o. male who presented to [facility] on 2022 with generalized body aches and weakness. He was transferred to [facility] for atrial arrhythmias and WCT. Creatinine was elevated. Troponin and BNP were elevated. Echo showed an EF of 45-50%. Laboratory data also revealed anemia and thrombocytopenia. Hemoccult was positive. On 2022, he spontaneously converted from AT to sinus. Cardiology asked to see for recurrent AF RVR. Subjective: Denies chest pain, shortness of breath, palpitations and dizziness. Vitals:  Blood pressure (!) 179/83, pulse 70, temperature 97.7 °F (36.5 °C), temperature source Oral, resp. rate 16, height 6' 2\" (1.88 m), weight 171 lb 11.2 oz (77.9 kg), SpO2 96 %.   Temp  Av.4 °F (36.9 °C)  Min: 97.7 °F (36.5 °C)  Max: 99.1 °F (37.3 °C)  Pulse  Av.8  Min: 70  Max: 81  BP  Min: 156/66  Max: 187/85  SpO2  Av %  Min: 93 %  Max: 98 %    24 hour I/O    Intake/Output Summary (Last 24 hours) at 7/3/2022 1259  Last data filed at 7/3/2022 1227  Gross per 24 hour   Intake 120 ml   Output 450 ml   Net -330 ml     Current Facility-Administered Medications   Medication Dose Route Frequency Provider Last Rate Last Admin    hydrALAZINE (APRESOLINE) tablet 100 mg  100 mg Oral 3 times per day Jagruti Khoury MD        isosorbide mononitrate (IMDUR) extended release tablet 30 mg  30 mg Oral Daily Jagruti Khoury MD        metoprolol succinate (TOPROL XL) extended release tablet 50 mg  50 mg Oral BID ALESSIA Holloway - CNP   50 mg at 22 1006    cefepime (MAXIPIME) 2000 mg IVPB minibag  2,000 mg IntraVENous Q12H Jose Escobar MD 12.5 mL/hr at 22 1013 2,000 mg at 22 1013    lactobacillus (CULTURELLE) capsule 1 capsule  1 capsule Oral BID  Nelia Flowers MD   1 capsule at 22 1005    metoprolol (LOPRESSOR) injection 5 mg  5 mg IntraVENous Q6H PRN Lazarus Sayers, MD   5 mg at 07/02/22 0200    pantoprazole (PROTONIX) injection 40 mg  40 mg IntraVENous Daily Marisela Rico, APRN - CNP   40 mg at 07/03/22 1005    ascorbic acid (VITAMIN C) tablet 1,000 mg  1,000 mg Oral Daily Freada Industry, APRN - CNP   1,000 mg at 07/03/22 1006    HYDROcodone-acetaminophen (NORCO) 5-325 MG per tablet 1 tablet  1 tablet Oral Q6H PRN Freada Industry, APRN - CNP   1 tablet at 06/29/22 2026    sertraline (ZOLOFT) tablet 50 mg  50 mg Oral Nightly Freada Industry, APRN - CNP   50 mg at 07/02/22 2029    dilTIAZem 125 mg in dextrose 5 % 125 mL infusion  2.5-15 mg/hr IntraVENous Continuous Freada Industry, APRN - CNP   Stopped at 06/29/22 0134    sodium chloride flush 0.9 % injection 5-40 mL  5-40 mL IntraVENous 2 times per day Freada Industry, APRN - CNP   10 mL at 07/03/22 1006    sodium chloride flush 0.9 % injection 5-40 mL  5-40 mL IntraVENous PRN Freada Industry, APRN - CNP        0.9 % sodium chloride infusion   IntraVENous PRN Freada Industry, APRN - CNP 25 mL/hr at 07/03/22 1012 New Bag at 07/03/22 1012    polyethylene glycol (GLYCOLAX) packet 17 g  17 g Oral Daily PRN Freada Industry, APRN - CNP        acetaminophen (TYLENOL) tablet 650 mg  650 mg Oral Q6H PRN Freada Industry, APRN - CNP   650 mg at 06/30/22 2333    Or    acetaminophen (TYLENOL) suppository 650 mg  650 mg Rectal Q6H PRN Freada Industry, APRN - CNP        prochlorperazine (COMPAZINE) injection 10 mg  10 mg IntraVENous Q6H PRN Freada Industry, APRN - CNP   10 mg at 06/30/22 0809     Review of Systems   Constitutional: Negative. Respiratory: Negative. Cardiovascular: Negative. Gastrointestinal: Negative. Neurological: Negative.       Objective:     Telemetry monitor: SR    Physical Exam:  Constitutional:  Comfortable and alert, NAD, appears stated age  Eyes: PERRL, sclera nonicteric  Neck:  Supple, no masses, no thyroidmegaly, no JVD  Skin:  Warm and dry; no rash or lesions  Heart: Regular, normal apex, S1 and S2 normal, no M/G/R  Lungs:  Normal respiratory effort; clear; no wheezing/rhonchi/rales  Abdomen: soft, non tender, + bowel sounds  Extremities:  No edema or cyanosis; no clubbing  Neuro: alert, some disorientation, moves legs and arms equally, normal mood and affect    Data Reviewed:    Echo 6/29/2022:   Summary   Normal left ventricular size with mild concentric left ventricular   hypertrophy.   The left ventricular systolic function is mildly reduced with an ejection   fraction of 45 - 50 %.   Basal to mid inferior, basal inferoseptal hypokinesis.   Septal bounce noted.   Grade I diastolic dysfunction with normal filling pressure.   Normal right ventricular size with normal function.   Mild mitral and tricuspid regurgitation.   Mild aortic stenosis.   Systolic pulmonic artery pressure (SPAP) is estimated at 48 mmHg consistent   with mild pulmonary hypertension (Right atrial pressure of 8 mmHg).   Compared with the previous study performed 5-, left ventricular   function decreased with new regional wall motion abnormalities noted.     Lab Reviewed:     Renal Profile:  Lab Results   Component Value Date/Time    CREATININE 1.4 07/01/2022 06:53 AM    BUN 35 07/01/2022 06:53 AM     07/01/2022 06:53 AM    K 4.2 07/01/2022 06:53 AM    K 3.9 06/28/2022 09:31 AM     07/01/2022 06:53 AM    CO2 24 07/01/2022 06:53 AM     CBC:    Lab Results   Component Value Date/Time    WBC 0.9 07/03/2022 08:58 AM    RBC 2.19 07/03/2022 08:58 AM    HGB 7.4 07/03/2022 08:58 AM    HCT 21.7 07/03/2022 08:58 AM    MCV 98.7 07/03/2022 08:58 AM    RDW 18.9 07/03/2022 08:58 AM    PLT 88 07/03/2022 08:58 AM     BNP:    Lab Results   Component Value Date/Time    PROBNP 10,156 06/29/2022 12:31 AM     Fasting Lipid Panel:    Lab Results   Component Value Date/Time    CHOL 199 09/17/2021 12:03 PM    HDL 15 06/29/2022 01:42 PM    TRIG 100 09/17/2021 12:03 PM     Cardiac Enzymes:  CK/MbTroponin  Lab Results   Component Value Date/Time    CKTOTAL 144 06/28/2022 09:31 AM    TROPONINI 0.10 06/29/2022 04:10 AM    TROPONINI 0.10 06/29/2022 04:10 AM     PT/ INR   Lab Results   Component Value Date/Time    INR 1.22 06/29/2022 04:10 AM    PROTIME 15.3 06/29/2022 04:10 AM     PTT No results found for: PTT   Lab Results   Component Value Date/Time    MG 2.00 07/01/2022 06:53 AM      Lab Results   Component Value Date/Time    TSH 1.65 06/29/2022 12:31 AM     All labs and imaging reviewed today    Assessment:  Elevated troponin: likely type II due to anemia, renal insufficiency  Paroxsymal atrial arrhythmias (AF/AFL/AT): RVR today              -[id number] score 3 (age, HTN)  Rate related LBBB  Presumed CAD  Cardiomyopathy, unclear etiology: new diagnosis, EF 45-50% on echo 6/2022  NSVT: now stable, noted 7/2/2022, appeared asymptomatic  HTN: stable  HLD  A/CKD  Depression   GIB: GI following    Normocytic anemia: HemOnc following   Neutropenia  Pancytopenia     Plan:   1. Increase toprol to 75 mg BID for intermittent PAF RVR and NSVT  2. Avoiding diltiazem due to cardiomyopathy but ok to use short term if needed for RVR  3. Not on anticoagulation due to anemia, R>B  4. No ischemic evaluation for cardiomyopathy at this time due to anemia and neutropenia  5. Monitor telemetry  6.  Bone marrow biopsy planned per hem onc    Milo Meeks, APRN-CNP  [address]  [phone]

## 2022-07-03 NOTE — PROGRESS NOTES
Department of Internal Medicine  Nephrology Progress Note        SUBJECTIVE:    We are following this patient for A/CKD. The patient was seen and examined; he was resting comfortably with no acute events noted overnight. ROS: No fever or chills. Social: No family at bedside. Physical Exam:    VITALS:  BP (!) 179/83   Pulse 70   Temp 97.7 °F (36.5 °C) (Oral)   Resp 16   Ht 6' 2\" (1.88 m)   Wt 171 lb 11.2 oz (77.9 kg)   SpO2 96%   BMI 22.04 kg/m²     General appearance: Seems comfortable, no acute distress. Neck: Trachea midline, thyroid normal.   Lungs:  Non labored breathing, CTA to anterior auscultation. Heart:  S1S2 normal, rub or gallop. No peripheral edema. Abdomen: Soft, non-tender, no organomegaly. Skin: No lesions or rashes, warm to touch. DATA:    CBC:   Lab Results   Component Value Date/Time    WBC 0.9 07/03/2022 08:58 AM    RBC 2.19 07/03/2022 08:58 AM    HGB 7.4 07/03/2022 08:58 AM    HCT 21.7 07/03/2022 08:58 AM    MCV 98.7 07/03/2022 08:58 AM    MCH 33.5 07/03/2022 08:58 AM    MCHC 34.0 07/03/2022 08:58 AM    RDW 18.9 07/03/2022 08:58 AM    PLT 88 07/03/2022 08:58 AM    MPV 10.5 07/03/2022 08:58 AM     BMP:    Lab Results   Component Value Date/Time     07/03/2022 12:44 PM    K 3.9 07/03/2022 12:44 PM     07/03/2022 12:44 PM    CO2 24 07/03/2022 12:44 PM    BUN 38 07/03/2022 12:44 PM    LABALBU 2.7 06/30/2022 09:14 AM    CREATININE 1.0 07/03/2022 12:44 PM    CALCIUM 8.7 07/03/2022 12:44 PM    GFRAA >60 07/03/2022 12:44 PM    LABGLOM >60 07/03/2022 12:44 PM    LABGLOM 53 05/03/2016 02:55 PM    GLUCOSE 148 07/03/2022 12:44 PM    GLUCOSE 78 01/12/2012 02:47 PM       IMPRESSION/RECOMMENDATIONS:      1- A/CKD: The patient has chronic kidney disease with a baseline creatinine of 1.2 to 1.4. His VINNY is likely secondary to a pre-renal component, along with renal hypoperfusion in the setting of hemodynamic instability.  His urinary output is well maintained and his serum creatinine is below baseline range, monitor. 2- No significant electrolytes disorders noted. 3- HTN: Blood pressure within acceptable range. 4- Atrial fibrillation: Management per cardiology.

## 2022-07-04 LAB
ALBUMIN SERPL-MCNC: 3.1 G/DL (ref 3.4–5)
ANION GAP SERPL CALCULATED.3IONS-SCNC: 13 MMOL/L (ref 3–16)
BASOPHILS ABSOLUTE: 0 K/UL (ref 0–0.2)
BASOPHILS RELATIVE PERCENT: 0 %
BLASTS RELATIVE PERCENT: 3 %
BUN BLDV-MCNC: 36 MG/DL (ref 7–20)
C DIFF TOXIN/ANTIGEN: NORMAL
CALCIUM SERPL-MCNC: 8.7 MG/DL (ref 8.3–10.6)
CHLORIDE BLD-SCNC: 106 MMOL/L (ref 99–110)
CO2: 23 MMOL/L (ref 21–32)
CREAT SERPL-MCNC: 0.9 MG/DL (ref 0.8–1.3)
EOSINOPHILS ABSOLUTE: 0 K/UL (ref 0–0.6)
EOSINOPHILS RELATIVE PERCENT: 1 %
GFR AFRICAN AMERICAN: >60
GFR NON-AFRICAN AMERICAN: >60
GLUCOSE BLD-MCNC: 122 MG/DL (ref 70–99)
HCT VFR BLD CALC: 21.1 % (ref 40.5–52.5)
HEMATOLOGY PATH CONSULT: NO
HEMOGLOBIN: 7.3 G/DL (ref 13.5–17.5)
LYMPHOCYTES ABSOLUTE: 0.5 K/UL (ref 1–5.1)
LYMPHOCYTES RELATIVE PERCENT: 53 %
MCH RBC QN AUTO: 34.1 PG (ref 26–34)
MCHC RBC AUTO-ENTMCNC: 34.5 G/DL (ref 31–36)
MCV RBC AUTO: 98.8 FL (ref 80–100)
MONOCYTES ABSOLUTE: 0.3 K/UL (ref 0–1.3)
MONOCYTES RELATIVE PERCENT: 32 %
NEUTROPHILS ABSOLUTE: 0.1 K/UL (ref 1.7–7.7)
NEUTROPHILS RELATIVE PERCENT: 11 %
PDW BLD-RTO: 18.6 % (ref 12.4–15.4)
PHOSPHORUS: 2 MG/DL (ref 2.5–4.9)
PLATELET # BLD: 91 K/UL (ref 135–450)
PLATELET SLIDE REVIEW: ABNORMAL
PMV BLD AUTO: 10.1 FL (ref 5–10.5)
POTASSIUM SERPL-SCNC: 3.7 MMOL/L (ref 3.5–5.1)
RBC # BLD: 2.14 M/UL (ref 4.2–5.9)
SLIDE REVIEW: ABNORMAL
SODIUM BLD-SCNC: 142 MMOL/L (ref 136–145)
WBC # BLD: 1 K/UL (ref 4–11)

## 2022-07-04 PROCEDURE — 1200000000 HC SEMI PRIVATE

## 2022-07-04 PROCEDURE — 6360000002 HC RX W HCPCS: Performed by: NURSE PRACTITIONER

## 2022-07-04 PROCEDURE — 6370000000 HC RX 637 (ALT 250 FOR IP): Performed by: INTERNAL MEDICINE

## 2022-07-04 PROCEDURE — C9113 INJ PANTOPRAZOLE SODIUM, VIA: HCPCS | Performed by: NURSE PRACTITIONER

## 2022-07-04 PROCEDURE — 6370000000 HC RX 637 (ALT 250 FOR IP): Performed by: NURSE PRACTITIONER

## 2022-07-04 PROCEDURE — 36415 COLL VENOUS BLD VENIPUNCTURE: CPT

## 2022-07-04 PROCEDURE — 85025 COMPLETE CBC W/AUTO DIFF WBC: CPT

## 2022-07-04 PROCEDURE — 99232 SBSQ HOSP IP/OBS MODERATE 35: CPT | Performed by: INTERNAL MEDICINE

## 2022-07-04 PROCEDURE — 2580000003 HC RX 258: Performed by: NURSE PRACTITIONER

## 2022-07-04 PROCEDURE — 80069 RENAL FUNCTION PANEL: CPT

## 2022-07-04 RX ORDER — CARVEDILOL 6.25 MG/1
12.5 TABLET ORAL 2 TIMES DAILY
Status: DISCONTINUED | OUTPATIENT
Start: 2022-07-04 | End: 2022-07-07

## 2022-07-04 RX ORDER — ISOSORBIDE MONONITRATE 30 MG/1
30 TABLET, EXTENDED RELEASE ORAL ONCE
Status: COMPLETED | OUTPATIENT
Start: 2022-07-04 | End: 2022-07-04

## 2022-07-04 RX ORDER — ISOSORBIDE MONONITRATE 60 MG/1
60 TABLET, EXTENDED RELEASE ORAL DAILY
Status: DISCONTINUED | OUTPATIENT
Start: 2022-07-05 | End: 2022-07-08 | Stop reason: HOSPADM

## 2022-07-04 RX ADMIN — ISOSORBIDE MONONITRATE 30 MG: 30 TABLET, EXTENDED RELEASE ORAL at 10:36

## 2022-07-04 RX ADMIN — HYDRALAZINE HYDROCHLORIDE 100 MG: 50 TABLET, FILM COATED ORAL at 21:03

## 2022-07-04 RX ADMIN — OXYCODONE HYDROCHLORIDE AND ACETAMINOPHEN 1000 MG: 500 TABLET ORAL at 10:36

## 2022-07-04 RX ADMIN — PANTOPRAZOLE SODIUM 40 MG: 40 INJECTION, POWDER, FOR SOLUTION INTRAVENOUS at 10:36

## 2022-07-04 RX ADMIN — HYDRALAZINE HYDROCHLORIDE 100 MG: 50 TABLET, FILM COATED ORAL at 05:16

## 2022-07-04 RX ADMIN — SODIUM CHLORIDE, PRESERVATIVE FREE 10 ML: 5 INJECTION INTRAVENOUS at 21:04

## 2022-07-04 RX ADMIN — Medication 1 CAPSULE: at 10:36

## 2022-07-04 RX ADMIN — SERTRALINE 50 MG: 50 TABLET, FILM COATED ORAL at 21:04

## 2022-07-04 RX ADMIN — CARVEDILOL 12.5 MG: 6.25 TABLET, FILM COATED ORAL at 21:03

## 2022-07-04 ASSESSMENT — PAIN SCALES - GENERAL: PAINLEVEL_OUTOF10: 0

## 2022-07-04 NOTE — PROGRESS NOTES
Hospitalist Progress Note      PCP: Javier Rao MD    Date of Admission: 6/28/2022    Chief Complaint: Afib RVR    Hospital Course: 26-year-old male with significant past medical history of hypertension, hyperlipidemia, and CKD who presents to [address] [place] [address] as a direct admit from [address] [place] [address] presented there earlier yesterday with concerns for 2 days worth of pain everywhere in his body. 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 Laya Scott states simply just had malaise and myalgias.  Upon arrival to [facility], he was noted to be in A. fib with RVR and have VINNY and anemia.  He was started on Cardizem infusion, Protonix infusion, and IV fluids.  He was transferred here for higher level care. Has had ongoing fevers and started on IV abx. Pancytopenia worsening. Pt feels worse - still in afib with RVR.       Subjective: NAD, occ diarrhea. Updated patient on plan of care.        Medications:  Reviewed    Infusion Medications    sodium chloride 25 mL/hr at 07/03/22 1012     Scheduled Medications    carvedilol  12.5 mg Oral BID    [START ON 7/5/2022] isosorbide mononitrate  60 mg Oral Daily    hydrALAZINE  100 mg Oral 3 times per day    lactobacillus  1 capsule Oral BID WC    pantoprazole  40 mg IntraVENous Daily    vitamin C  1,000 mg Oral Daily    sertraline  50 mg Oral Nightly    sodium chloride flush  5-40 mL IntraVENous 2 times per day     PRN Meds: metoprolol, HYDROcodone-acetaminophen, sodium chloride flush, sodium chloride, polyethylene glycol, acetaminophen **OR** acetaminophen, prochlorperazine      Intake/Output Summary (Last 24 hours) at 7/4/2022 1109  Last data filed at 7/3/2022 1852  Gross per 24 hour   Intake 120 ml   Output 150 ml   Net -30 ml Physical Exam Performed:    BP (!) 155/61   Pulse 66   Temp 98.8 °F (37.1 °C) (Oral)   Resp 18   Ht 6' 2\" (1.88 m)   Wt 170 lb 12.8 oz (77.5 kg)   SpO2 100%   BMI 21.93 kg/m²     General appearance: No apparent distress, appears stated age and cooperative. HEENT: Pupils equal, round, and reactive to light. Conjunctivae/corneas clear. Neck: Supple, with full range of motion. No jugular venous distention. Trachea midline. Respiratory:  Normal respiratory effort. Clear to auscultation, bilaterally without Rales/Wheezes/Rhonchi. Cardiovascular: Regular rate and rhythm with normal S1/S2 without murmurs, rubs or gallops. Abdomen: Soft, non-tender, non-distended with normal bowel sounds. Musculoskeletal: No clubbing, cyanosis or edema bilaterally. Full range of motion without deformity. Skin: Skin color, texture, turgor normal.  No rashes or lesions. Neurologic:  Neurovascularly intact without any focal sensory/motor deficits. Cranial nerves: II-XII intact, grossly non-focal.  Psychiatric: Alert and oriented, thought content appropriate, normal insight  Capillary Refill: Brisk,3 seconds, normal   Peripheral Pulses: +2 palpable, equal bilaterally       Labs:   Recent Labs     07/01/22  1527 07/03/22  0858 07/04/22  0728   WBC 2.3* 0.9* 1.0*   HGB 8.2* 7.4* 7.3*   HCT 23.6* 21.7* 21.1*   PLT 62* 88* 91*     Recent Labs     07/03/22  1244 07/04/22  0728    142   K 3.9 3.7    106   CO2 24 23   BUN 38* 36*   CREATININE 1.0 0.9   CALCIUM 8.7 8.7   PHOS  --  2.0*     No results for input(s): AST, ALT, BILIDIR, BILITOT, ALKPHOS in the last 72 hours. No results for input(s): INR in the last 72 hours. No results for input(s): Caron Dross in the last 72 hours.     Urinalysis:      Lab Results   Component Value Date/Time    NITRU Negative 06/28/2022 02:15 PM    [address] 0-2 06/28/2022 02:15 PM    BACTERIA Rare 06/28/2022 02:15 PM    RBCUA 0-2 06/28/2022 02:15 PM    BLOODU TRACE-INTACT 06/28/2022 02:15 PM    SPECGRAV 1.020 06/28/2022 02:15 PM    GLUCOSEU Negative 06/28/2022 02:15 PM       Radiology:  XR CHEST PORTABLE   Final Result   Interstitial-alveolar opacities with progression of the perihilar alveolar   components, likely pulmonary edema. XR CHEST PORTABLE   Final Result   Interstitial prominence and patchy opacities, suggestive of edema. US ABDOMEN LIMITED Specify organ? LIVER, SPLEEN   Final Result   1. Cholelithiasis with equivocal cholecystitis. 2. Fatty infiltration of the liver. CT BIOPSY BONE MARROW    (Results Pending)           Assessment/Plan:    Active Hospital Problems    Diagnosis     VINNY (acute kidney injury) ([facility] [address] [N17.9]      Priority: Medium    Acute anemia [D64.9]      Priority: Medium    GI bleed [K92.2]      Priority: Medium    Thrombocytopenia ([facility] [address] [D69.6]      Priority: Medium    Atrial fibrillation with RVR ([facility] [address] [I48.91]      Priority: Medium    Hypertension [I10]      Plan:  1.  afib with RVR , CM on toprol XL and diltiazem drip - still with rapid HR intermittently -  will give IV metoprolol as needed, coreg, imdur, hydralazine. Echo 6/28: showed LVEF 45-50% w/ basal to mid inferior, basal inferoseptal hypokinesis.  Grade 1 DD. 2.  Pancytopenia - worsening - viral? Plan Bone marrow biopsy tuesday - WBC worsening, hgb 7.3, oncology following     3. Fevers - unclear of source - pt on IV abx- cefepime from 7/1, bl cx NGTD ,rpt cxr , UA 7/2 ,  poss viral infection given pancytopenia  , dc abx 7/3     4. Acute renal failure on CKDIII - stable    5. Sepsis based on fever, tachypnea, tachycardia, leukopenia, suspected pulm source - cultures negative so far    6. Diarrhea- c diff 7/3 pending    DVT Prophylaxis: scds  Diet: ADULT DIET; Regular;  Low Sodium (2 gm)  Code Status: Full Code    PT/OT Eval Status: consulted    Dispo - ~2-3 days    Rashmi East, APRN - CNP

## 2022-07-04 NOTE — PLAN OF CARE
Problem: Safety - Adult  Goal: Free from fall injury  Outcome: Progressing  Bed in lowest position with wheels locked, 2/4 bed rails up, bed alarm in place, avasys camera in place, call light within reach

## 2022-07-04 NOTE — PROGRESS NOTES
Assessment   Acute anemia, without iron deficiency. Concern is primarily for bone marrow considering pancytopenia. Appreciate hematology consult.  afib not on anticoag due to anemia.  Acute on chronic renal disease   cardiomyopathigy with LVEF 45-50%   Sirs with Fever of unknown origin, on cefepime. afeb now for since 6/30    Plan:   c diff pending   Semi elective EGD and colonoscopy; plt count has improved but still neutropenic. Will consider this as inpt after bone marrow biopsy or as outpt. Prefer to see improvement in neutropenia prior to endoscopy.  Complete abd US to assess spleen size. If enlarged, consider portal HTN and liver disease considering remote etoh abuse.  Dr Merle Kasper will be here tomorrow during the day if questions. Subjective: We are following for acute anemia without overt GI bleeding, in the context of new pancytopenia, new afib with RVR and VINNY. .      78 yo M w/ PMHx significant for HTN, HLD, CKD. Presented to hospital with generalized pain and fatigue. On admission found to be in AF with RVR and VINNY. Profound anemia thus reason for consult. Hgb 7.7 down from 9.2 on admission and baseline of 14 one year ago. FOBT Positive. Noted thrombocytopenia. Worsening CBC over the weekend. Now neutropenic. No overt GI bleeding. last colonoscopy was 2014. Over last 24 hrs  -new diarrhea, brown in color. No abd pain. Pt is tangential.    -Cardiology saw him and noted [id number] and recommended holding off anticoag due to anemia. Pt on metoprolol and dilt drip.   -Hematology has ordered bone marrow bx, which might happen tomorrow?    -hgb 7.3, unchanged from yesterday. Wbc 1, unchanged. Plt 91, up from 62 on 7/1    Objective:    Review of Systems:    Constitutional: Negative for fever, chills, and unexpected weight change. HENT: Negative for trouble swallowing. Respiratory: Negative for cough, chest tightness and shortness of breath.     Cardiovascular: Negative for chest pain  Gastrointestinal: see HPI  Musculoskeletal: Negative for unusual arthralgias. Skin: Negative for rash. Scheduled Meds:   carvedilol  12.5 mg Oral BID    [START ON 7/5/2022] isosorbide mononitrate  60 mg Oral Daily    hydrALAZINE  100 mg Oral 3 times per day    lactobacillus  1 capsule Oral BID WC    pantoprazole  40 mg IntraVENous Daily    vitamin C  1,000 mg Oral Daily    sertraline  50 mg Oral Nightly    sodium chloride flush  5-40 mL IntraVENous 2 times per day     Continuous Infusions:   sodium chloride 25 mL/hr at 07/03/22 1012       Vitals:  BP (!) 151/65   Pulse 62   Temp 98.2 °F (36.8 °C) (Oral)   Resp 16   Ht 6' 2\" (1.88 m)   Wt 170 lb 12.8 oz (77.5 kg)   SpO2 96%   BMI 21.93 kg/m²     Exam:  General:  comfortable  Heent: There is no scleral icterus. Cardiovascular: The heart is regular rate and rhythm. Respiratory:  The patient's breathing is non-labored with normal chest wall excursion and normal muscle movement. Abdomen: The abdomen is nondistended, soft, and nontender. Rectal:  deferred   Neurological:  Gross memory appears intact. Patient is alert and oriented. Labs and Imaging:  I reviewed the labs and imaging results from last 24 hours.      Recent Labs     07/01/22  1527 07/03/22  0858 07/04/22  0728   HGB 8.2* 7.4* 7.3*   WBC 2.3* 0.9* 1.0*   LABALBU  --   --  3.1*        BO MAYEN MD  July 4, 2022

## 2022-07-04 NOTE — PROGRESS NOTES
maintained and his serum creatinine is below baseline range, monitor. 2- No significant electrolytes disorders noted. 3- HTN: Blood pressure within acceptable range. 4- Atrial fibrillation: Management per cardiology. We will sign off the case for now, please call us back with any questions  .

## 2022-07-04 NOTE — ONCOLOGY
ONCOLOGY HEMATOLOGY CARE PROGRESS NOTE      SUBJECTIVE:     The patient has no new complaints. ROS:   The remaining 10 point review of symptoms is unremarkable. OBJECTIVE        Physical    VITALS:  BP (!) 175/75   Pulse 61   Temp 98 °F (36.7 °C) (Oral)   Resp 14   Ht 6' 2\" (1.88 m)   Wt 170 lb 12.8 oz (77.5 kg)   SpO2 94%   BMI 21.93 kg/m²   TEMPERATURE:  Current - Temp: 98 °F (36.7 °C); Max - Temp  Av.8 °F (36.6 °C)  Min: 97.7 °F (36.5 °C)  Max: 98 °F (36.7 °C)  PULSE OXIMETRY RANGE: SpO2  Av.5 %  Min: 94 %  Max: 96 %  24HR INTAKE/OUTPUT:      Intake/Output Summary (Last 24 hours) at 2022 1034  Last data filed at 7/3/2022 5741  Gross per 24 hour   Intake 120 ml   Output 150 ml   Net -30 ml       CONSTITUTIONAL:  awake, alert, cooperative, no apparent distress, HEENT oral pharynx , no scleral icterus  HEMATOLOGIC/LYMPHATICS:  no cervical lymphadenopathy, no supraclavicular lymphadenopathy, no axillary lymphadenopathy and no inguinal lymphadenopathy  LUNGS:  No increased work of breathing, good air exchange, clear to auscultation bilaterally, no crackles or wheezing  CARDIOVASCULAR:  , regular rate and rhythm, normal S1 and S2, no S3 or S4, and no murmur noted  ABDOMEN:  No scars, normal bowel sounds, soft, non-distended, non-tender, no masses palpated, no hepatosplenomegally  MUSCULOSKELETAL:  There is no redness, warmth, or swelling of the joints. EXTREMETIES: No clubbing cynosis or edema  NEUROLOGIC:  Awake, alert, oriented to name, place and time. Cranial nerves II-XII are grossly intact. Motor is 5 out of 5 bilaterally.    SKIN:  no bruising or bleeding      Data      Recent Labs     22  1527 22  0858 22  0728   WBC 2.3* 0.9* 1.0*   HGB 8.2* 7.4* 7.3*   HCT 23.6* 21.7* 21.1*   PLT 62* 88* 91*   MCV 99.4 98.7 98.8        Recent Labs     22  1244 22  0728    142   K 3.9 3.7    106   CO2 24 23   PHOS  --  2.0* BUN 38* 36*   CREATININE 1.0 0.9     No results for input(s): AST, ALT, ALB, BILIDIR, BILITOT, ALKPHOS in the last 72 hours. Magnesium:    Lab Results   Component Value Date/Time    MG 2.00 07/01/2022 06:53 AM    MG 2.20 06/30/2022 05:32 AM    MG 2.40 06/29/2022 04:10 AM         Problem List  Patient Active Problem List   Diagnosis    Hypertension    Rosacea    Mixed hyperlipidemia    Enlarged prostate with urinary obstruction    Taste perversion    Anosmia    Back pain    Nonrheumatic aortic valve insufficiency    Bilateral carotid bruits    Current moderate episode of major depressive disorder without prior episode (HCC)    Atrial fibrillation with RVR (HCC)    VINNY (acute kidney injury) (HCC)    Acute anemia    GI bleed    Thrombocytopenia (HCC)       ASSESSMENT AND PLAN    1.) Abnormal CBC  - Admit CBC on 6/28/2022 showed: WBC 5.4 K/mcL, HGB 9.2 g/dL, HCT 25.8%, PLT 41 K/mcL, ANC 3.7 K/mcL. - Has not been transfused and CBC has been stable. - Micronutrients: Ferritin 1296 ng/mL, iron sat 39%, B12 >2000 pg/mL, folate 12 ng/mL. No deficiencies. - Hemolysis/blood loss eval: Abs retic 26 K/mcL,  U/L, hapto 246 mg/dL, FOBT pos. Seems most consistent with an underproduction scenario, though is FOBT pos.  - Ultrasound of the abdomen showed a fatty liver. No comment on the spleen despite the request to do so. - Hep C neg. -SPEP and kappa lambda light chains are unremarkable. - Bone marrow biopsy has been ordered. I discussed this with the patient today.  -His white blood count is stabilized  -Platelets have gone up significantly  -He has a monocytosis which indicates a regenerating bone marrow. This could be from an infection or some other insult.  -I discussed this with the patient today     2.) Atrial fib  - EP is managing rate control.  - Would hold off on any anticoag, as I am not clear that the PLTs can be improved.   -Possibly some contribution from fatty infiltration of the liver, but as these are trending down ordered I doubt that is the entire cause.     3.) CM  - Echocardiogram, 6/28/2022, showed LVEF 45-50% w/ basal to mid inferior, basal inferoseptal hypokinesis. Grade 1 DD.  - Abnormal CBC is limiting the use of antiplatelet and LHC.     4.) CKD  - IVF per nephrology.   - Avoid nephrotoxins.  - Creat 1.8 --> 1.6 --> 1.2 -->     5.) Fever  - Blood culture x 2 collected on 6/30 - PENDING  -COVID 6/28/2022 is negative  - CXR, 6/30/2022, showed interstitial prominence and patchy opacities, suggestive of edema.  -He is on cefepime    ONCOLOGIC DISPOSITION:    -Once the cause of his pancytopenia has been determined    Tanner Tran MD  May be reached through [facility]

## 2022-07-04 NOTE — PROGRESS NOTES
Electrophysiology Progress Note     Admit Date: 2022     Reason for follow up: Atrial fibrillation and heart failure. Interval History:   - Patient seen and examined. - Clinical notes reviewed. - Telemetry reviewed. Sinus rhythm with PACs. - No new complaints today. - No major events overnight. Physical Examination:  Vitals:    22 0346   BP: (!) 175/75   Pulse: 61   Resp: 14   Temp: 98 °F (36.7 °C)   SpO2: 94%        Intake/Output Summary (Last 24 hours) at 2022 0955  Last data filed at 7/3/2022 1852  Gross per 24 hour   Intake 120 ml   Output 150 ml   Net -30 ml     In: 120 [P.O.:120]  Out: 150    Wt Readings from Last 3 Encounters:   22 170 lb 12.8 oz (77.5 kg)   22 185 lb (83.9 kg)   22 183 lb (83 kg)     Temp  Av.8 °F (36.6 °C)  Min: 97.7 °F (36.5 °C)  Max: 98 °F (36.7 °C)  Pulse  Av.5  Min: 61  Max: 70  BP  Min: 157/69  Max: 179/83  SpO2  Av.5 %  Min: 94 %  Max: 96 %    · Telemetry: Normal sinus rhythm with PACs. · Constitutional: Alert. Oriented to person, place, and time. No distress. · Head: Normocephalic and atraumatic. · Mouth/Throat: Lips appear moist. Oropharynx is clear and moist.  · Eyes: Conjunctivae normal. EOM are normal.   · Neck: Neck supple. No lymphadenopathy. No rigidity. No JVD present. · Cardiovascular: Normal rate, regular rhythm. Normal S1&S2. Carotid pulse 2+ bilaterally. · Pulmonary/Chest: Bilateral respiratory sounds present. No respiratory accessory muscle use. · Abdominal: Soft. Normal bowel sounds present. No distension, No tenderness. No splenomegaly. No hernia. · Musculoskeletal: No tenderness. No edema    · Neurological: Alert and oriented. Cranial nerve II-XII grossly intact. · Skin: Skin is warm and dry. No rash, lesions, ulcerations noted. · Psychiatric: Flat affect. Labs, diagnostic and imaging results reviewed. Reviewed.    Recent Labs     22  1244 22  0769    142   K 3.9 3.7  106   CO2 24 23   PHOS  --  2.0*   BUN 38* 36*   CREATININE 1.0 0.9     Recent Labs     07/01/22  1527 07/03/22  0858 07/04/22  0728   WBC 2.3* 0.9* 1.0*   HGB 8.2* 7.4* 7.3*   HCT 23.6* 21.7* 21.1*   MCV 99.4 98.7 98.8   PLT 62* 88* 91*     Lab Results   Component Value Date/Time    CKTOTAL 144 06/28/2022 09:31 AM    TROPONINI 0.10 06/29/2022 04:10 AM    TROPONINI 0.10 06/29/2022 04:10 AM     Estimated Creatinine Clearance: 68 mL/min (based on SCr of 0.9 mg/dL).    No results found for: BNP  Lab Results   Component Value Date/Time    PROTIME 15.3 06/29/2022 04:10 AM    INR 1.22 06/29/2022 04:10 AM     Lab Results   Component Value Date/Time    CHOL 199 09/17/2021 12:03 PM    HDL 15 06/29/2022 01:42 PM    TRIG 100 09/17/2021 12:03 PM       Scheduled Meds:   hydrALAZINE  100 mg Oral 3 times per day    isosorbide mononitrate  30 mg Oral Daily    metoprolol succinate  75 mg Oral BID    lactobacillus  1 capsule Oral BID WC    pantoprazole  40 mg IntraVENous Daily    vitamin C  1,000 mg Oral Daily    sertraline  50 mg Oral Nightly    sodium chloride flush  5-40 mL IntraVENous 2 times per day     Continuous Infusions:   sodium chloride 25 mL/hr at 07/03/22 1012     PRN Meds:metoprolol, HYDROcodone-acetaminophen, sodium chloride flush, sodium chloride, polyethylene glycol, acetaminophen **OR** acetaminophen, prochlorperazine     Patient Active Problem List    Diagnosis Date Noted    VINNY (acute kidney injury) ([facility] 06/29/2022    Acute anemia 06/29/2022    GI bleed 06/29/2022    Thrombocytopenia ([facility] 06/29/2022    Atrial fibrillation with RVR ([facility] 06/28/2022    Current moderate episode of major depressive disorder without prior episode ([facility] 01/23/2019    Nonrheumatic aortic valve insufficiency 09/06/2016    Bilateral carotid bruits 09/06/2016    Back pain 01/12/2012    Taste perversion 08/19/2011    Anosmia 08/19/2011    Hypertension     Rosacea     Mixed hyperlipidemia     Enlarged prostate with urinary obstruction       Active Hospital Problems    Diagnosis Date Noted    [facility] ([facility] [N17.9] 06/29/2022     Priority: Medium    Acute anemia [D64.9] 06/29/2022     Priority: Medium    GI bleed [K92.2] 06/29/2022     Priority: Medium    Thrombocytopenia ([facility] [D69.6] 06/29/2022     Priority: Medium    Atrial fibrillation with RVR ([facility] [I48.91] 06/28/2022     Priority: Medium    Hypertension [I10]      Mr. Salina Bermudez is a pleasant 79year old male, without any family support, with a medical history significant for hypertension, hyperlipidemia, chronic renal insufficiency, and depression who presented to [address] [place] [address] with generalized body aches.     Problem List:  1. Atrial fibrillation (course)/tachycardia/flutter. 2. New onset cardiomyopathy. 3. Normocytic anemia/thrombocytopenia. 4. Acute on chronic renal failure.     Assessment and Plan:  1. Atrial fibrillation (course)/tachycardia/flutter. 06/29/2022  Patient is a pleasant 79year old male with a medical history significant for hypertension, hyperlipidemia, and chronic renal insufficiency who presents from home with generalized and was found to have thrombocytopenia, anemia, new onset atrial arrhythmia, and new onset cardiomyopathy. Patient's EKG and telemetry shows salvos of atrial tachycardia, fibrillation and flutter with rate related bundle branch blockage.     Afib risk factors including age, HTN, obesity, inactivity and ARIA were discussed with patient. Risk factor modification recommended.       Patient's [id number] score is 4 with a stroke risk of 4.8%. We discussed oral anticoagulation to decrease the risk of thromboembolic events including stroke. Benefits and alternatives were discussed with patient. Risk of bleeding was discussed. Patient verbalized understanding.  Different forms of anticoagulants including warfarin (Coumadin), Dabigatran (Pradaxa), Rivaroxaban (Xarelto), and Apixaban (Eliquis) were discussed. We will hold off on [address] [place] [address] given anemia and possible GI bleeding.       Rate control strategy options including cardioversion, atrial fibrillation ablation, pacemaker with AVN ablation, anti-arrhythmic strategy, and rate control strategy were discussed with patient. Risks, benefits and alternative of each treatment options were explained. All questions were answered. Patient has opted for rate control. - I will order thyroid function testing.  - I will order hemoglobin A1c.  - Start metoprolol succinate given heart failure and wean off diltiazem. - Hold off on [address] [place], [place] [address]iff [facility]. - We will continue to follow along with you. 07/04/2022  Patient back in NSR. Unclear if he noted any clinical change. Not a candidate for [facility] at this point given likely malignancy. - Given blood pressure transition to coreg 12.5 mg BID with plans to increase to 25 mg BID if heart rate and blood pressure tolerate change. - Continue hydralazine.  - Increase isosorbide to 60 mg daily.  - Hold off on [address] [place] [address]. - Two week monitor at time of discharge. - Electrophysiology will sign off case however remain available to assist in any way as we began to transition to home and will help address [address] [place] [address] with heme/onc.     2. New onset cardiomyopathy. 06/29/2022  Stable with stable troponin enzymes with elevated BNP and creatinine. Clinically doesn't appear to be volume overloaded and chest radiograph reassuring along with echocardiogram (from filling pressure standpoint). - Per cardiology. - Will need vasodilator but will hold off on lisinopril for now given renal failure but hopeful to add as renal function not so bad that we will need to avoid. 07/04/2022  Stable. Renal function improved with nephrology's assistance. Ischemic work up timing to be discussed after diagnosis per heme/onc. - Ischemic work up pending diagnosis. - Cardiology will continue to follow along with you.     3. Normocytic anemia/thrombocytopenia.   - Per primary team and heme/onc.     4. Acute on chronic renal failure. - Per primary team and nephrology. Thank you for allowing me to participate in the care of this patient. If you have any questions, please do not hesitate to contact me.     Moni Munoz MD  Cardiac Electrophysiology  [address] [facility]  [phone] [facility]

## 2022-07-05 ENCOUNTER — APPOINTMENT (OUTPATIENT)
Dept: CT IMAGING | Age: 82
DRG: 280 | End: 2022-07-05
Attending: INTERNAL MEDICINE
Payer: COMMERCIAL

## 2022-07-05 LAB
A/G RATIO: 1.4 (ref 1.1–2.2)
ABO/RH: NORMAL
ALBUMIN SERPL-MCNC: 3 G/DL (ref 3.4–5)
ALP BLD-CCNC: 60 U/L (ref 40–129)
ALT SERPL-CCNC: 11 U/L (ref 10–40)
ANION GAP SERPL CALCULATED.3IONS-SCNC: 11 MMOL/L (ref 3–16)
ANTIBODY SCREEN: NORMAL
AST SERPL-CCNC: 20 U/L (ref 15–37)
BANDED NEUTROPHILS RELATIVE PERCENT: 1 % (ref 0–7)
BASOPHILS ABSOLUTE: 0 K/UL (ref 0–0.2)
BASOPHILS RELATIVE PERCENT: 0 %
BILIRUB SERPL-MCNC: 1.1 MG/DL (ref 0–1)
BLASTS RELATIVE PERCENT: 1 %
BLOOD BANK DISPENSE STATUS: NORMAL
BLOOD BANK PRODUCT CODE: NORMAL
BLOOD CULTURE, ROUTINE: NORMAL
BPU ID: NORMAL
BUN BLDV-MCNC: 35 MG/DL (ref 7–20)
CALCIUM SERPL-MCNC: 8.9 MG/DL (ref 8.3–10.6)
CHLORIDE BLD-SCNC: 106 MMOL/L (ref 99–110)
CO2: 24 MMOL/L (ref 21–32)
CREAT SERPL-MCNC: 1 MG/DL (ref 0.8–1.3)
CULTURE, BLOOD 2: NORMAL
DESCRIPTION BLOOD BANK: NORMAL
EOSINOPHILS ABSOLUTE: 0 K/UL (ref 0–0.6)
EOSINOPHILS RELATIVE PERCENT: 0 %
GFR AFRICAN AMERICAN: >60
GFR NON-AFRICAN AMERICAN: >60
GLUCOSE BLD-MCNC: 117 MG/DL (ref 70–99)
HCT VFR BLD CALC: 19 % (ref 40.5–52.5)
HCT VFR BLD CALC: 22.1 % (ref 40.5–52.5)
HEMATOLOGY PATH CONSULT: NO
HEMOGLOBIN: 6.6 G/DL (ref 13.5–17.5)
HEMOGLOBIN: 7.5 G/DL (ref 13.5–17.5)
LYMPHOCYTES ABSOLUTE: 0.6 K/UL (ref 1–5.1)
LYMPHOCYTES RELATIVE PERCENT: 58 %
MCH RBC QN AUTO: 34 PG (ref 26–34)
MCHC RBC AUTO-ENTMCNC: 34.6 G/DL (ref 31–36)
MCV RBC AUTO: 98.3 FL (ref 80–100)
MONOCYTES ABSOLUTE: 0.2 K/UL (ref 0–1.3)
MONOCYTES RELATIVE PERCENT: 23 %
NEUTROPHILS ABSOLUTE: 0.2 K/UL (ref 1.7–7.7)
NEUTROPHILS RELATIVE PERCENT: 17 %
PDW BLD-RTO: 18.2 % (ref 12.4–15.4)
PLATELET # BLD: 79 K/UL (ref 135–450)
PLATELET SLIDE REVIEW: ABNORMAL
PMV BLD AUTO: 9.1 FL (ref 5–10.5)
POTASSIUM REFLEX MAGNESIUM: 3.6 MMOL/L (ref 3.5–5.1)
RBC # BLD: 1.93 M/UL (ref 4.2–5.9)
SLIDE REVIEW: ABNORMAL
SODIUM BLD-SCNC: 141 MMOL/L (ref 136–145)
TOTAL PROTEIN: 5.1 G/DL (ref 6.4–8.2)
WBC # BLD: 1 K/UL (ref 4–11)

## 2022-07-05 PROCEDURE — P9016 RBC LEUKOCYTES REDUCED: HCPCS

## 2022-07-05 PROCEDURE — 88311 DECALCIFY TISSUE: CPT

## 2022-07-05 PROCEDURE — 88341 IMHCHEM/IMCYTCHM EA ADD ANTB: CPT

## 2022-07-05 PROCEDURE — 86901 BLOOD TYPING SEROLOGIC RH(D): CPT

## 2022-07-05 PROCEDURE — 85018 HEMOGLOBIN: CPT

## 2022-07-05 PROCEDURE — 85014 HEMATOCRIT: CPT

## 2022-07-05 PROCEDURE — 6370000000 HC RX 637 (ALT 250 FOR IP): Performed by: INTERNAL MEDICINE

## 2022-07-05 PROCEDURE — 1200000000 HC SEMI PRIVATE

## 2022-07-05 PROCEDURE — 6370000000 HC RX 637 (ALT 250 FOR IP): Performed by: NURSE PRACTITIONER

## 2022-07-05 PROCEDURE — 88313 SPECIAL STAINS GROUP 2: CPT

## 2022-07-05 PROCEDURE — 6360000002 HC RX W HCPCS: Performed by: RADIOLOGY

## 2022-07-05 PROCEDURE — 80053 COMPREHEN METABOLIC PANEL: CPT

## 2022-07-05 PROCEDURE — 86923 COMPATIBILITY TEST ELECTRIC: CPT

## 2022-07-05 PROCEDURE — 2709999900 CT BIOPSY BONE MARROW

## 2022-07-05 PROCEDURE — 88342 IMHCHEM/IMCYTCHM 1ST ANTB: CPT

## 2022-07-05 PROCEDURE — 07DR3ZX EXTRACTION OF ILIAC BONE MARROW, PERCUTANEOUS APPROACH, DIAGNOSTIC: ICD-10-PCS | Performed by: INTERNAL MEDICINE

## 2022-07-05 PROCEDURE — 88185 FLOWCYTOMETRY/TC ADD-ON: CPT

## 2022-07-05 PROCEDURE — C9113 INJ PANTOPRAZOLE SODIUM, VIA: HCPCS | Performed by: NURSE PRACTITIONER

## 2022-07-05 PROCEDURE — 88305 TISSUE EXAM BY PATHOLOGIST: CPT

## 2022-07-05 PROCEDURE — 079T3ZX DRAINAGE OF BONE MARROW, PERCUTANEOUS APPROACH, DIAGNOSTIC: ICD-10-PCS | Performed by: INTERNAL MEDICINE

## 2022-07-05 PROCEDURE — 6360000002 HC RX W HCPCS: Performed by: NURSE PRACTITIONER

## 2022-07-05 PROCEDURE — 77012 CT SCAN FOR NEEDLE BIOPSY: CPT

## 2022-07-05 PROCEDURE — 86850 RBC ANTIBODY SCREEN: CPT

## 2022-07-05 PROCEDURE — 2580000003 HC RX 258: Performed by: NURSE PRACTITIONER

## 2022-07-05 PROCEDURE — 85025 COMPLETE CBC W/AUTO DIFF WBC: CPT

## 2022-07-05 PROCEDURE — 99233 SBSQ HOSP IP/OBS HIGH 50: CPT | Performed by: NURSE PRACTITIONER

## 2022-07-05 PROCEDURE — 36415 COLL VENOUS BLD VENIPUNCTURE: CPT

## 2022-07-05 PROCEDURE — 88184 FLOWCYTOMETRY/ TC 1 MARKER: CPT

## 2022-07-05 PROCEDURE — 86900 BLOOD TYPING SEROLOGIC ABO: CPT

## 2022-07-05 PROCEDURE — 36430 TRANSFUSION BLD/BLD COMPNT: CPT

## 2022-07-05 RX ORDER — MIDAZOLAM HYDROCHLORIDE 1 MG/ML
INJECTION INTRAMUSCULAR; INTRAVENOUS
Status: COMPLETED | OUTPATIENT
Start: 2022-07-05 | End: 2022-07-05

## 2022-07-05 RX ORDER — FENTANYL CITRATE 50 UG/ML
INJECTION, SOLUTION INTRAMUSCULAR; INTRAVENOUS
Status: COMPLETED | OUTPATIENT
Start: 2022-07-05 | End: 2022-07-05

## 2022-07-05 RX ADMIN — ISOSORBIDE MONONITRATE 60 MG: 60 TABLET, EXTENDED RELEASE ORAL at 09:30

## 2022-07-05 RX ADMIN — PANTOPRAZOLE SODIUM 40 MG: 40 INJECTION, POWDER, FOR SOLUTION INTRAVENOUS at 09:30

## 2022-07-05 RX ADMIN — HYDRALAZINE HYDROCHLORIDE 100 MG: 50 TABLET, FILM COATED ORAL at 21:32

## 2022-07-05 RX ADMIN — MIDAZOLAM 0.5 MG: 1 INJECTION INTRAMUSCULAR; INTRAVENOUS at 11:48

## 2022-07-05 RX ADMIN — SODIUM CHLORIDE, PRESERVATIVE FREE 10 ML: 5 INJECTION INTRAVENOUS at 20:19

## 2022-07-05 RX ADMIN — HYDRALAZINE HYDROCHLORIDE 100 MG: 50 TABLET, FILM COATED ORAL at 05:36

## 2022-07-05 RX ADMIN — SERTRALINE 50 MG: 50 TABLET, FILM COATED ORAL at 20:19

## 2022-07-05 RX ADMIN — OXYCODONE HYDROCHLORIDE AND ACETAMINOPHEN 1000 MG: 500 TABLET ORAL at 09:30

## 2022-07-05 RX ADMIN — MIDAZOLAM 1 MG: 1 INJECTION INTRAMUSCULAR; INTRAVENOUS at 11:43

## 2022-07-05 RX ADMIN — FENTANYL CITRATE 50 MCG: 50 INJECTION INTRAMUSCULAR; INTRAVENOUS at 11:43

## 2022-07-05 RX ADMIN — SODIUM CHLORIDE, PRESERVATIVE FREE 10 ML: 5 INJECTION INTRAVENOUS at 09:27

## 2022-07-05 RX ADMIN — FENTANYL CITRATE 25 MCG: 50 INJECTION INTRAMUSCULAR; INTRAVENOUS at 11:48

## 2022-07-05 RX ADMIN — CARVEDILOL 12.5 MG: 6.25 TABLET, FILM COATED ORAL at 09:30

## 2022-07-05 RX ADMIN — CARVEDILOL 12.5 MG: 6.25 TABLET, FILM COATED ORAL at 20:19

## 2022-07-05 ASSESSMENT — PAIN SCALES - GENERAL
PAINLEVEL_OUTOF10: 0

## 2022-07-05 NOTE — ONCOLOGY
ONCOLOGY HEMATOLOGY CARE PROGRESS NOTE      SUBJECTIVE:     Afebrile the last 96 hrs. Room air. Sleeping. Awakened to voice. Wants to know what time the bone marrow biopsy will be. ROS:   The remaining 10 point review of symptoms is unremarkable. OBJECTIVE        Physical    VITALS:  /64   Pulse 61   Temp 98.4 °F (36.9 °C) (Oral)   Resp 18   Ht 6' 2\" (1.88 m)   Wt 170 lb 6.4 oz (77.3 kg)   SpO2 96%   BMI 21.88 kg/m²   TEMPERATURE:  Current - Temp: 98.4 °F (36.9 °C); Max - Temp  Av.5 °F (36.9 °C)  Min: 98.2 °F (36.8 °C)  Max: 98.8 °F (37.1 °C)  PULSE OXIMETRY RANGE: SpO2  Av.3 %  Min: 94 %  Max: 100 %  24HR INTAKE/OUTPUT:      Intake/Output Summary (Last 24 hours) at 2022 8494  Last data filed at 2022 2103  Gross per 24 hour   Intake 245 ml   Output 0 ml   Net 245 ml       CONSTITUTIONAL:  awake, alert, cooperative, no apparent distress, HEENT oral pharynx , no scleral icterus  HEMATOLOGIC/LYMPHATICS:  no cervical lymphadenopathy, no supraclavicular lymphadenopathy, no axillary lymphadenopathy and no inguinal lymphadenopathy  LUNGS:  No increased work of breathing, good air exchange, clear to auscultation bilaterally, no crackles or wheezing  CARDIOVASCULAR:  , regular rate and rhythm, normal S1 and S2, no S3 or S4, and no murmur noted  ABDOMEN:  No scars, normal bowel sounds, soft, non-distended, non-tender, no masses palpated, no hepatosplenomegally  MUSCULOSKELETAL:  There is no redness, warmth, or swelling of the joints. EXTREMETIES: No clubbing cynosis or edema  NEUROLOGIC:  Awake, alert, oriented to name, place and time. Cranial nerves II-XII are grossly intact. Motor is 5 out of 5 bilaterally.    SKIN:  no bruising or bleeding      Data      Recent Labs     22  0858 22  0728   WBC 0.9* 1.0*   HGB 7.4* 7.3*   HCT 21.7* 21.1*   PLT 88* 91*   MCV 98.7 98.8        Recent Labs     22  1244 22  0728    142   K fatty infiltration of the liver, but as these are trending down ordered I doubt that is the entire cause.     3.) CM  - Echocardiogram, 6/28/2022, showed LVEF 45-50% w/ basal to mid inferior, basal inferoseptal hypokinesis. Grade 1 DD.  - Abnormal CBC is limiting the use of antiplatelet and LHC.     4.) CKD  - IVF per nephrology.   - Avoid nephrotoxins.  - Creat 1.8 --> 1.6 --> 1.2 --> 0.9    5.) Fever  - Blood culture x 2 collected on 6/30 - PENDING  -COVID 6/28/2022 is negative  - CXR, 6/30/2022, showed interstitial prominence and patchy opacities, suggestive of edema.  -He is on cefepime    ONCOLOGIC DISPOSITION:    -Once the cause of his pancytopenia has been determined    Hao Villarreal MD  May be reached through [address] [place] [address]

## 2022-07-05 NOTE — PROGRESS NOTES
[facility]   Daily Progress Note    Admit Date:  6/28/2022  HPI:   Becky Ocasio presented with malaise and myalgia's. Transferred from [facility] to [facility] for Afib with RVR, VINNY, anemia with + FOBT and elevated troponin. Subjective:  Mr. Alex Parrishcks flat in bed, just returned from bone marrow biopsy. Denies any chest pain or pain elsewhere. Objective:   Patient Vitals for the past 24 hrs:   BP Temp Temp src Pulse Resp SpO2 Weight   07/05/22 1153 127/61 -- -- 60 14 94 % --   07/05/22 1151 119/67 -- -- 60 16 97 % --   07/05/22 1148 (!) 141/69 -- -- 57 15 100 % --   07/05/22 1145 (!) 147/67 -- -- 57 20 100 % --   07/05/22 1135 (!) 156/66 -- -- 63 22 100 % --   07/05/22 0941 (!) 135/58 98.1 °F (36.7 °C) Oral 65 16 97 % --   07/05/22 0926 (!) 137/56 98.8 °F (37.1 °C) Oral 58 16 97 % --   07/05/22 0923 (!) 140/59 98.6 °F (37 °C) Oral 62 16 96 % --   07/05/22 0827 (!) 138/57 98.4 °F (36.9 °C) Oral 66 18 97 % --   07/05/22 0536 137/64 98.4 °F (36.9 °C) Oral 61 18 96 % --   07/05/22 0246 -- -- -- -- -- -- 170 lb 6.4 oz (77.3 kg)   07/05/22 0000 (!) 118/48 98.8 °F (37.1 °C) Oral 63 18 96 % --   07/04/22 2103 (!) 154/67 98.4 °F (36.9 °C) Oral 65 18 94 % --   07/04/22 1845 (!) 151/73 -- -- 62 16 96 % --   07/04/22 1218 (!) 151/65 98.2 °F (36.8 °C) Oral 62 16 96 % --       Intake/Output Summary (Last 24 hours) at 7/5/2022 1210  Last data filed at 7/4/2022 2103  Gross per 24 hour   Intake 245 ml   Output 0 ml   Net 245 ml     Wt Readings from Last 3 Encounters:   07/05/22 170 lb 6.4 oz (77.3 kg)   06/28/22 185 lb (83.9 kg)   01/05/22 183 lb (83 kg)         ASSESSMENT:   1. Elevated troponin: peak 0.10, + NSTEMI, considering type II MI with severe anemia/ pancytopenia and VINNY  2. Paroxysmal atrial arrhythmias: None for past 24 hours, not a candidate for anticoagulation given anemia and thrombocytopenia  3. Presumed CAD  4. Cardiomyopathy, unknown etiology: EF 45-50%  5. NSVT: None in past 24 hours  6.  VINNY: Improved  7. Abnormal CBC: Pancytopenia, per heme-onc, s/p bone marrow biopsy today  8. Anemia: S/p PRBC x1 for hemoglobin 6.6  9. FOBT+: GI following  10. HTN: Stable  11. HLD: LDL 54      PLAN:  1. No antiplatelets or anticoagulation given pancytopenia  2. Further work-up of pancytopenia/anemia per heme-onc and GI  3. Continue Coreg 12.5 mg twice daily (tolerating well) as well as hydralazine and Imdur  4. No ACEi/ARB/ARNI given renal insufficiency  5. Daily labs, daily weights    Merced Murillo, APRN - CNP, 7/5/2022, 12:10 PM  [address]   [phone]       Telemetry: SR 50-60  NYHA: III    Physical Exam:  General:  Awake, alert, NAD  Skin:  Warm and dry  Neck:  JVP 8 cm flat  Chest: Clear to auscultation anteriorly  Cardiovascular:  RRR, normal S1S2, + murmur, no GR  Abdomen:  Soft, nontender, +bowel sounds  Extremities: No BLE edema      Medications:    carvedilol  12.5 mg Oral BID    isosorbide mononitrate  60 mg Oral Daily    hydrALAZINE  100 mg Oral 3 times per day    lactobacillus  1 capsule Oral BID WC    pantoprazole  40 mg IntraVENous Daily    vitamin C  1,000 mg Oral Daily    sertraline  50 mg Oral Nightly    sodium chloride flush  5-40 mL IntraVENous 2 times per day      sodium chloride 25 mL/hr at 07/03/22 1012       Lab Data: Lab results independently reviewed and analyzed by myself 7/5/2022    CBC:   Recent Labs     07/03/22  0858 07/04/22  0728 07/05/22  0640   WBC 0.9* 1.0* 1.0*   HGB 7.4* 7.3* 6.6*   PLT 88* 91* 79*     BMP:    Recent Labs     07/03/22  1244 07/04/22  0728 07/05/22  0640    142 141   K 3.9 3.7 3.6   CO2 24 23 24   BUN 38* 36* 35*   CREATININE 1.0 0.9 1.0     INR:  No results for input(s): INR in the last 72 hours. BNP:  No results for input(s): PROBNP in the last 72 hours. Cardiac Enzymes: No results for input(s): TROPONINI in the last 72 hours.   Lipids:   Lab Results   Component Value Date/Time    TRIG 100 09/17/2021 12:03 PM    TRIG 108 09/25/2020 02:39 PM    HDL 15 06/29/2022 01:42 PM    HDL 39 09/17/2021 12:03 PM    LDLCALC 54 06/29/2022 01:42 PM    LDLCALC 140 09/17/2021 12:03 PM       Cardiac Imaging:   Echo 6/29/2022:    Summary   Normal left ventricular size with mild concentric left ventricular hypertrophy. The left ventricular systolic function is mildly reduced with an ejection fraction of 45 - 50 %. Basal to mid inferior, basal inferoseptal hypokinesis. Septal bounce noted. Grade I diastolic dysfunction with normal filling pressure. Normal right ventricular size with normal function. Mild mitral and tricuspid regurgitation. Mild aortic stenosis. Systolic pulmonic artery pressure (SPAP) is estimated at 48 mmHg consistent with mild pulmonary hypertension (Right atrial pressure of 8 mmHg). Compared with the previous study performed 5-, left ventricular function decreased with new regional wall motion abnormalities noted.

## 2022-07-05 NOTE — PLAN OF CARE
Problem: Pain  Goal: Verbalizes/displays adequate comfort level or baseline comfort level  Outcome: Progressing   Pt will be satisfied with pain control. Pt uses numeric pain rating scale with reassessments after pain med administration. Will continue to monitor progression throughout shift. Problem: Risk for Falls  Goal: Fall prevention  Description: Patient  will remain free from falls as evidenced by no witnessed or reported falls each shift during the inpatient hospice stay. Patient  and or family/caregiver will receive education on fall prevention as evidenced by verbalizing recall of using the call lights system, fall prevention devices, and asking for help during the admission process and ongoing as needed during the inpatient hospice stay. Outcome: Progressing   Pt will remain free from falls throughout hospital stay. Fall precautions in place, bed alarm on, bed in lowest position with wheels locked and side rails 2/4 up. Room door open and hourly rounding completed. Will continue to monitor throughout shift.

## 2022-07-05 NOTE — OR NURSING
Image guided bone marrow biopsy w/aspiration biopsy completed by Dr. Laure Morillo. Pt tolerated procedure without any signs or symptoms of distress. Vital signs stable. Report given  to  RN. Pt transported back to [facility] in stable condition via stretcher. Received: Versed: 1.5 mg       Fentanyl: 75 mcg  Bandage to left back that is clean dry and intact. Patient to lay on back for 1 hour.      Vital Signs  Vitals:    07/05/22 1153   BP: 127/61   Pulse: 60   Resp: 14   Temp:    SpO2: 94%    (vital signs in table format)    Post Fariba  2 - Able to move 4 extremities voluntarily on command  2 - BP+/- 20mmHg of normal  2 - Fully awake  2 - Able to maintain oxygen saturation >92% on room air  2 - Able to breathe deeply and cough freely

## 2022-07-05 NOTE — PROGRESS NOTES
Progress Note    Patient Lor Brower  MRN: [id number]  YOB: 1940 Age: 79 y.o. Sex: male  Room: [address] [place] [address]       Admitting Physician: Annette Barnes MD   Date of Admission: 6/28/2022 11:01 PM   Primary Care Physician: Waylon Lawrence MD     Subjective:  Lor Brower was seen and examined. We are following for anemia. -- moving bowels. No overt bleeding  Some confusion    ROS:  Constitutional: Denies fever, no change in appetite  Respiratory: Denies cough or shortness of breath  Cardiovascular: Denies chest pain or edema    Objective:  Vital Signs:   Vitals:    07/05/22 1604   BP: (!) 144/62   Pulse: 58   Resp: 16   Temp: 98.2 °F (36.8 °C)   SpO2: 96%         Physical Exam:  Constitutional: Alert and oriented x 4. No acute distress. Respiratory: Respirations nonlabored, no crepitus  GI: Abdomen nondistended, soft, and nontender. Neurological: No focal deficits noted. No asterixis.     Intake/Output:    Intake/Output Summary (Last 24 hours) at 7/5/2022 2007  Last data filed at 7/5/2022 1919  Gross per 24 hour   Intake 1039.58 ml   Output 0 ml   Net 1039.58 ml        Current Medications:  Current Facility-Administered Medications   Medication Dose Route Frequency Provider Last Rate Last Admin    carvedilol (COREG) tablet 12.5 mg  12.5 mg Oral BID VINITA Jeffers MD   12.5 mg at 07/05/22 0930    isosorbide mononitrate (IMDUR) extended release tablet 60 mg  60 mg Oral Daily VINITA Jeffers MD   60 mg at 07/05/22 0930    hydrALAZINE (APRESOLINE) tablet 100 mg  100 mg Oral 3 times per day Analisa Duenas MD   100 mg at 07/05/22 0536    lactobacillus (CULTURELLE) capsule 1 capsule  1 capsule Oral BID  Gianfranco Sherman MD   1 capsule at 07/04/22 1036    metoprolol (LOPRESSOR) injection 5 mg  5 mg IntraVENous Q6H PRN Gianfranco Sherman MD   5 mg at 07/02/22 0200    pantoprazole (PROTONIX) injection 40 mg  40 mg IntraVENous Daily ALESSIA Torres - CNP   40 mg at present. [facility], [address] [place].  Also available via [employer]

## 2022-07-05 NOTE — PROGRESS NOTES
Comprehensive Nutrition Assessment    Type and Reason for Visit:  Initial    Nutrition Recommendations/Plan:   1. No added salt diet when appropriate  2. Add Ensure ONS  3. Encourage po intakes  4. Monitor po intakes, nutrition adequacy, weights, pertinent labs, BMs     Malnutrition Assessment:  Malnutrition Status: At risk for malnutrition (Comment) (07/05/22 4211)      Nutrition Assessment:    LOS assessment. Pt admitted in A. fib with RVR and noted to have VINNY and anemia. Pt unavailable today. Pt currently NPO for bone marrow biopsy. Previously on a low sodium diet with po intakes 0-25% per EMR. Noted weight loss this admission. Difficult to assess weight changes PTA. Will add ONS when diet is advanced. Will monitor. Nutrition Related Findings:    +1 pitting BLE edema. BM x2 on 7/4. Wound Type: None       Current Nutrition Intake & Therapies:    Average Meal Intake: 0%,1-25% (NPO currently)  Average Supplements Intake: None Ordered  Diet NPO    Anthropometric Measures:  Height: 6' 2\" (188 cm)  Ideal Body Weight (IBW): 190 lbs (86 kg)       Current Body Weight: 170 lb 6.4 oz (77.3 kg),   IBW.  Weight Source: Standing Scale  Current BMI (kg/m2): 21.9                          BMI Categories: Underweight (BMI less than 22) age over 72    Estimated Daily Nutrient Needs:  Energy Requirements Based On: Kcal/kg  Weight Used for Energy Requirements: Current  Energy (kcal/day): 2399-7400  Weight Used for Protein Requirements: Current  Protein (g/day): 77-92  Method Used for Fluid Requirements: 1 ml/kcal  Fluid (ml/day): 8578-5469    Nutrition Diagnosis:   · Inadequate oral intake related to inadequate protein-energy intake as evidenced by intake 0-25%      Nutrition Interventions:   Food and/or Nutrient Delivery: Start Oral Diet,Start Oral Nutrition Supplement  Nutrition Education/Counseling: No recommendation at this time  Coordination of Nutrition Care: Continue to monitor while inpatient       Goals:     Goals: PO intake 50% or greater,prior to discharge       Nutrition Monitoring and Evaluation:   Behavioral-Environmental Outcomes: None Identified  Food/Nutrient Intake Outcomes: Food and Nutrient Intake,Supplement Intake  Physical Signs/Symptoms Outcomes: Weight    Discharge Planning:     Too soon to determine      JIMMY Rosenbaum, LD  Contact: [phone]

## 2022-07-05 NOTE — PROGRESS NOTES
Hospitalist Progress Note      PCP: Prerna Caban MD    Date of Admission: 6/28/2022    Chief Complaint: Afib RVR    Hospital Course: 79-year-old male with significant past medical history of hypertension, hyperlipidemia, and CKD who presents to [address] [place] [address] as a direct admit from [address] [place] [address] presented there earlier yesterday with concerns for 2 days worth of pain everywhere in his body. 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 Samantha Guzmán states simply just had malaise and myalgias.  Upon arrival to [facility], he was noted to be in A. fib with RVR and have VINNY and anemia.  He was started on Cardizem infusion, Protonix infusion, and IV fluids.  He was transferred here for higher level care. Has had ongoing fevers and started on IV abx. Pancytopenia worsening. Pt feels worse - still in afib with RVR.       Subjective: NAD, no diarrhea today, Bone marrow biopsy today. Updated patient on plan of care. 1unit prbc's for hgb 6.6.       Medications:  Reviewed    Infusion Medications    sodium chloride 25 mL/hr at 07/03/22 1012     Scheduled Medications    carvedilol  12.5 mg Oral BID    isosorbide mononitrate  60 mg Oral Daily    hydrALAZINE  100 mg Oral 3 times per day    lactobacillus  1 capsule Oral BID WC    pantoprazole  40 mg IntraVENous Daily    vitamin C  1,000 mg Oral Daily    sertraline  50 mg Oral Nightly    sodium chloride flush  5-40 mL IntraVENous 2 times per day     PRN Meds: metoprolol, HYDROcodone-acetaminophen, sodium chloride flush, sodium chloride, polyethylene glycol, acetaminophen **OR** acetaminophen, prochlorperazine      Intake/Output Summary (Last 24 hours) at 7/5/2022 1219  Last data filed at 7/4/2022 2103  Gross per 24 hour   Intake 245 ml Output 0 ml   Net 245 ml       Physical Exam Performed:    BP (!) 123/58   Pulse 53   Temp 98.1 °F (36.7 °C) (Oral)   Resp 16   Ht 6' 2\" (1.88 m)   Wt 170 lb 6.4 oz (77.3 kg)   SpO2 100%   BMI 21.88 kg/m²     General appearance: Gen Weak. No apparent distress, appears stated age and cooperative. HEENT: Pupils equal, round, and reactive to light. Conjunctivae/corneas clear. Neck: Supple, with full range of motion. No jugular venous distention. Trachea midline. Respiratory:  Normal respiratory effort. Clear to auscultation, bilaterally without Rales/Wheezes/Rhonchi. Cardiovascular: Regular rate and rhythm with normal S1/S2 without murmurs, rubs or gallops. Abdomen: Soft, non-tender, non-distended with normal bowel sounds. Musculoskeletal: No clubbing, cyanosis or edema bilaterally. Full range of motion without deformity. Skin: Skin color, texture, turgor normal.  No rashes or lesions. Neurologic:  Neurovascularly intact without any focal sensory/motor deficits. Cranial nerves: II-XII intact, grossly non-focal.  Psychiatric: Alert and oriented, thought content appropriate, normal insight  Capillary Refill: Brisk,3 seconds, normal   Peripheral Pulses: +2 palpable, equal bilaterally       Labs:   Recent Labs     07/03/22  0858 07/04/22  0728 07/05/22  0640   WBC 0.9* 1.0* 1.0*   HGB 7.4* 7.3* 6.6*   HCT 21.7* 21.1* 19.0*   PLT 88* 91* 79*     Recent Labs     07/03/22  1244 07/04/22  0728 07/05/22  0640    142 141   K 3.9 3.7 3.6    106 106   CO2 24 23 24   BUN 38* 36* 35*   CREATININE 1.0 0.9 1.0   CALCIUM 8.7 8.7 8.9   PHOS  --  2.0*  --      Recent Labs     07/05/22  0640   AST 20   ALT 11   BILITOT 1.1*   ALKPHOS 60     No results for input(s): INR in the last 72 hours. No results for input(s): Nara Darnell in the last 72 hours.     Urinalysis:      Lab Results   Component Value Date/Time    NITRU Negative 06/28/2022 02:15 PM    WBCUA 0-2 06/28/2022 02:15 PM    BACTERIA Rare 06/28/2022 02:15 PM    RBCUA 0-2 06/28/2022 02:15 PM    BLOODU TRACE-INTACT 06/28/2022 02:15 PM    SPECGRAV 1.020 06/28/2022 02:15 PM    GLUCOSEU Negative 06/28/2022 02:15 PM       Radiology:  XR CHEST PORTABLE   Final Result   Interstitial-alveolar opacities with progression of the perihilar alveolar   components, likely pulmonary edema. XR CHEST PORTABLE   Final Result   Interstitial prominence and patchy opacities, suggestive of edema. US ABDOMEN LIMITED Specify organ? LIVER, SPLEEN   Final Result   1. Cholelithiasis with equivocal cholecystitis. 2. Fatty infiltration of the liver. CT BIOPSY BONE MARROW    (Results Pending)   CT BIOPSY BONE MARROW    (Results Pending)   CT GUIDED NEEDLE PLACEMENT    (Results Pending)           Assessment/Plan:    Active Hospital Problems    Diagnosis     VINNY (acute kidney injury) ([facility] [address] [N17.9]      Priority: Medium    Acute anemia [D64.9]      Priority: Medium    GI bleed [K92.2]      Priority: Medium    Thrombocytopenia ([facility] [address] [D69.6]      Priority: Medium    Atrial fibrillation with RVR ([facility] [address] [I48.91]      Priority: Medium    Hypertension [I10]      Plan:  1.  afib with RVR , CM on coreg / diltiazem drip d/c'd - cont imdur, hydralazine. Echo 6/28: showed LVEF 45-50% w/ basal to mid inferior, basal inferoseptal hypokinesis.  Grade 1 DD. 2.  Pancytopenia - worsening - viral? Plan Bone marrow biopsy completed - WBC 1.0, hgb 7.3->6.6, oncology following, tranfuse 1 unit prbc's    3. Fevers - unclear of source - pt on IV abx- cefepime from 7/1, bl cx NGTD ,rpt cxr , UA 7/2 ,  poss viral infection given pancytopenia  , dc abx 7/3     4. Acute renal failure on CKDIII - stable    5. Sepsis based on fever, tachypnea, tachycardia, leukopenia, suspected pulm source - cultures negative so far    6.  Diarrhea- c diff 7/3 negative    DVT Prophylaxis: scds  Diet: Diet NPO  Code Status: Full Code    PT/OT Eval Status: consulted    Dispo - ~2-3 days    Ronny Age

## 2022-07-05 NOTE — PROGRESS NOTES
RADIOLOGY:  Patient status post CT-guided bone marrow aspiration/biopsy via left iliac bone. Patient tolerated the procedure well. Full report to follow.

## 2022-07-06 LAB
ANION GAP SERPL CALCULATED.3IONS-SCNC: 11 MMOL/L (ref 3–16)
ANISOCYTOSIS: ABNORMAL
BANDED NEUTROPHILS RELATIVE PERCENT: 6 % (ref 0–7)
BASOPHILS ABSOLUTE: 0 K/UL (ref 0–0.2)
BASOPHILS RELATIVE PERCENT: 0 %
BUN BLDV-MCNC: 32 MG/DL (ref 7–20)
CALCIUM SERPL-MCNC: 8.2 MG/DL (ref 8.3–10.6)
CHLORIDE BLD-SCNC: 106 MMOL/L (ref 99–110)
CO2: 24 MMOL/L (ref 21–32)
CREAT SERPL-MCNC: 0.9 MG/DL (ref 0.8–1.3)
EOSINOPHILS ABSOLUTE: 0 K/UL (ref 0–0.6)
EOSINOPHILS RELATIVE PERCENT: 0 %
GFR AFRICAN AMERICAN: >60
GFR NON-AFRICAN AMERICAN: >60
GLUCOSE BLD-MCNC: 117 MG/DL (ref 70–99)
HCT VFR BLD CALC: 21.8 % (ref 40.5–52.5)
HEMATOLOGY PATH CONSULT: NO
HEMOGLOBIN: 7.4 G/DL (ref 13.5–17.5)
LYMPHOCYTES ABSOLUTE: 0.6 K/UL (ref 1–5.1)
LYMPHOCYTES RELATIVE PERCENT: 52 %
MCH RBC QN AUTO: 32.8 PG (ref 26–34)
MCHC RBC AUTO-ENTMCNC: 33.9 G/DL (ref 31–36)
MCV RBC AUTO: 96.6 FL (ref 80–100)
MONOCYTES ABSOLUTE: 0.3 K/UL (ref 0–1.3)
MONOCYTES RELATIVE PERCENT: 26 %
NEUTROPHILS ABSOLUTE: 0.3 K/UL (ref 1.7–7.7)
NEUTROPHILS RELATIVE PERCENT: 16 %
PDW BLD-RTO: 20.7 % (ref 12.4–15.4)
PLATELET # BLD: 81 K/UL (ref 135–450)
PLATELET SLIDE REVIEW: ABNORMAL
PMV BLD AUTO: 9.6 FL (ref 5–10.5)
POTASSIUM SERPL-SCNC: 3.5 MMOL/L (ref 3.5–5.1)
RBC # BLD: 2.26 M/UL (ref 4.2–5.9)
SLIDE REVIEW: ABNORMAL
SODIUM BLD-SCNC: 141 MMOL/L (ref 136–145)
WBC # BLD: 1.2 K/UL (ref 4–11)

## 2022-07-06 PROCEDURE — 6370000000 HC RX 637 (ALT 250 FOR IP): Performed by: INTERNAL MEDICINE

## 2022-07-06 PROCEDURE — 85025 COMPLETE CBC W/AUTO DIFF WBC: CPT

## 2022-07-06 PROCEDURE — 99232 SBSQ HOSP IP/OBS MODERATE 35: CPT | Performed by: NURSE PRACTITIONER

## 2022-07-06 PROCEDURE — 36415 COLL VENOUS BLD VENIPUNCTURE: CPT

## 2022-07-06 PROCEDURE — 6370000000 HC RX 637 (ALT 250 FOR IP): Performed by: NURSE PRACTITIONER

## 2022-07-06 PROCEDURE — 2580000003 HC RX 258: Performed by: NURSE PRACTITIONER

## 2022-07-06 PROCEDURE — C9113 INJ PANTOPRAZOLE SODIUM, VIA: HCPCS | Performed by: NURSE PRACTITIONER

## 2022-07-06 PROCEDURE — 80048 BASIC METABOLIC PNL TOTAL CA: CPT

## 2022-07-06 PROCEDURE — 6360000002 HC RX W HCPCS: Performed by: NURSE PRACTITIONER

## 2022-07-06 PROCEDURE — 1200000000 HC SEMI PRIVATE

## 2022-07-06 RX ADMIN — CARVEDILOL 12.5 MG: 6.25 TABLET, FILM COATED ORAL at 20:17

## 2022-07-06 RX ADMIN — POLYETHYLENE GLYCOL 3350 17 G: 17 POWDER, FOR SOLUTION ORAL at 10:22

## 2022-07-06 RX ADMIN — SODIUM CHLORIDE, PRESERVATIVE FREE 10 ML: 5 INJECTION INTRAVENOUS at 10:12

## 2022-07-06 RX ADMIN — SERTRALINE 50 MG: 50 TABLET, FILM COATED ORAL at 20:17

## 2022-07-06 RX ADMIN — ISOSORBIDE MONONITRATE 60 MG: 60 TABLET, EXTENDED RELEASE ORAL at 10:11

## 2022-07-06 RX ADMIN — SODIUM CHLORIDE, PRESERVATIVE FREE 10 ML: 5 INJECTION INTRAVENOUS at 20:17

## 2022-07-06 RX ADMIN — OXYCODONE HYDROCHLORIDE AND ACETAMINOPHEN 1000 MG: 500 TABLET ORAL at 10:10

## 2022-07-06 RX ADMIN — HYDRALAZINE HYDROCHLORIDE 100 MG: 50 TABLET, FILM COATED ORAL at 04:40

## 2022-07-06 RX ADMIN — HYDROCODONE BITARTRATE AND ACETAMINOPHEN 1 TABLET: 5; 325 TABLET ORAL at 10:10

## 2022-07-06 RX ADMIN — PANTOPRAZOLE SODIUM 40 MG: 40 INJECTION, POWDER, FOR SOLUTION INTRAVENOUS at 10:12

## 2022-07-06 RX ADMIN — Medication 1 CAPSULE: at 10:10

## 2022-07-06 RX ADMIN — HYDROCODONE BITARTRATE AND ACETAMINOPHEN 1 TABLET: 5; 325 TABLET ORAL at 16:17

## 2022-07-06 RX ADMIN — Medication 1 CAPSULE: at 16:17

## 2022-07-06 RX ADMIN — HYDRALAZINE HYDROCHLORIDE 100 MG: 50 TABLET, FILM COATED ORAL at 20:17

## 2022-07-06 RX ADMIN — CARVEDILOL 12.5 MG: 6.25 TABLET, FILM COATED ORAL at 10:10

## 2022-07-06 RX ADMIN — HYDRALAZINE HYDROCHLORIDE 100 MG: 50 TABLET, FILM COATED ORAL at 13:34

## 2022-07-06 ASSESSMENT — PAIN SCALES - GENERAL
PAINLEVEL_OUTOF10: 6
PAINLEVEL_OUTOF10: 7
PAINLEVEL_OUTOF10: 7
PAINLEVEL_OUTOF10: 0
PAINLEVEL_OUTOF10: 0

## 2022-07-06 ASSESSMENT — PAIN DESCRIPTION - LOCATION
LOCATION: BACK
LOCATION: BACK

## 2022-07-06 NOTE — PROGRESS NOTES
Hospitalist Progress Note      PCP: Mindy Glez MD    Date of Admission: 6/28/2022    Chief Complaint: Afib RVR    Hospital Course: 79-year-old male with significant past medical history of hypertension, hyperlipidemia, and CKD who presents to [facility] as a direct admit from [address] [place] [address] presented there earlier yesterday with concerns for 2 days worth of pain everywhere in his body. 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 Zadie Bernheim states simply just had malaise and myalgias.  Upon arrival to [facility], he was noted to be in A. fib with RVR and have VINNY and anemia.  He was started on Cardizem infusion, Protonix infusion, and IV fluids.  He was transferred here for higher level care. Has had ongoing fevers and started on IV abx. Pancytopenia worsening. Pt feels worse - still in afib with RVR.       Subjective: Gen weak, Bone marrow biopsy completed. Updated patient on plan of care.  1unit prbc's for hgb 6.6->7.4      Medications:  Reviewed    Infusion Medications    sodium chloride 25 mL/hr at 07/03/22 1012     Scheduled Medications    carvedilol  12.5 mg Oral BID    isosorbide mononitrate  60 mg Oral Daily    hydrALAZINE  100 mg Oral 3 times per day    lactobacillus  1 capsule Oral BID WC    pantoprazole  40 mg IntraVENous Daily    vitamin C  1,000 mg Oral Daily    sertraline  50 mg Oral Nightly    sodium chloride flush  5-40 mL IntraVENous 2 times per day     PRN Meds: metoprolol, HYDROcodone-acetaminophen, sodium chloride flush, sodium chloride, polyethylene glycol, acetaminophen **OR** acetaminophen, prochlorperazine      Intake/Output Summary (Last 24 hours) at 7/6/2022 0859  Last data filed at 7/6/2022 0430  Gross per 24 hour   Intake 1114.58 ml Output 0 ml   Net 1114.58 ml       Physical Exam Performed:    BP (!) 149/67   Pulse 65   Temp 98.6 °F (37 °C) (Oral)   Resp 18   Ht 6' 2\" (1.88 m)   Wt 170 lb 4.8 oz (77.2 kg)   SpO2 95%   BMI 21.87 kg/m²     General appearance: Gen Weak. No apparent distress, appears stated age and cooperative. HEENT: Pupils equal, round, and reactive to light. Conjunctivae/corneas clear. Neck: Supple, with full range of motion. No jugular venous distention. Trachea midline. Respiratory:  Normal respiratory effort. Clear to auscultation, bilaterally without Rales/Wheezes/Rhonchi. Cardiovascular: Regular rate and rhythm with normal S1/S2 without murmurs, rubs or gallops. Abdomen: Soft, non-tender, non-distended with normal bowel sounds. Musculoskeletal: No clubbing, cyanosis or edema bilaterally. Full range of motion without deformity. Skin: Skin color, texture, turgor normal.  No rashes or lesions. Neurologic:  Neurovascularly intact without any focal sensory/motor deficits. Cranial nerves: II-XII intact, grossly non-focal.  Psychiatric: Alert and oriented, thought content appropriate, normal insight  Capillary Refill: Brisk,3 seconds, normal   Peripheral Pulses: +2 palpable, equal bilaterally       Labs:   Recent Labs     07/04/22 0728 07/04/22 0728 07/05/22 0640 07/05/22  1802 07/06/22  0634   WBC 1.0*  --  1.0*  --  1.2*   HGB 7.3*   < > 6.6* 7.5* 7.4*   HCT 21.1*   < > 19.0* 22.1* 21.8*   PLT 91*  --  79*  --  81*    < > = values in this interval not displayed. Recent Labs     07/04/22  0728 07/05/22  0640 07/06/22  0634    141 141   K 3.7 3.6 3.5    106 106   CO2 23 24 24   BUN 36* 35* 32*   CREATININE 0.9 1.0 0.9   CALCIUM 8.7 8.9 8.2*   PHOS 2.0*  --   --      Recent Labs     07/05/22  0640   AST 20   ALT 11   BILITOT 1.1*   ALKPHOS 60     No results for input(s): INR in the last 72 hours. No results for input(s): Caron Dross in the last 72 hours.     Urinalysis:      Lab Results Component Value Date/Time    NITRU Negative 06/28/2022 02:15 PM    WBCUA 0-2 06/28/2022 02:15 PM    BACTERIA Rare 06/28/2022 02:15 PM    RBCUA 0-2 06/28/2022 02:15 PM    BLOODU TRACE-INTACT 06/28/2022 02:15 PM    SPECGRAV 1.020 06/28/2022 02:15 PM    GLUCOSEU Negative 06/28/2022 02:15 PM       Radiology:  CT BIOPSY BONE MARROW   Preliminary Result   1. Status post successful CT guided bone marrow aspiration and core biopsy of   the left iliac bone. 2. Visualization of focal lytic/lucent lesions about the lumbosacral spine. Metastatic disease and multiple myeloma are in the differential diagnosis. CT GUIDED NEEDLE PLACEMENT   Preliminary Result   1. Status post successful CT guided bone marrow aspiration and core biopsy of   the left iliac bone. 2. Visualization of focal lytic/lucent lesions about the lumbosacral spine. Metastatic disease and multiple myeloma are in the differential diagnosis. XR CHEST PORTABLE   Final Result   Interstitial-alveolar opacities with progression of the perihilar alveolar   components, likely pulmonary edema. XR CHEST PORTABLE   Final Result   Interstitial prominence and patchy opacities, suggestive of edema. US ABDOMEN LIMITED Specify organ? LIVER, SPLEEN   Final Result   1. Cholelithiasis with equivocal cholecystitis. 2. Fatty infiltration of the liver.          CT BIOPSY BONE MARROW    (Results Pending)           Assessment/Plan:    Active Hospital Problems    Diagnosis     Arrhythmia, atrial [I49.8]      Priority: Medium    Elevated troponin [R77.8]      Priority: Medium    Cardiomyopathy ([facility] [I42.9]      Priority: Medium    NSVT (nonsustained ventricular tachycardia) (HCC) [I47.2]      Priority: Medium    VINNY (acute kidney injury) ([facility] [N17.9]      Priority: Medium    Acute anemia [D64.9]      Priority: Medium    GI bleed [K92.2]      Priority: Medium    Thrombocytopenia ([address] [D69.6]      Priority: Medium    Atrial fibrillation with RVR ([facility] [I48.91]      Priority: Medium    Hypertension [I10]      Plan:  1.  afib with RVR , CM on coreg/diltiazem drip d/c'd - cont imdur, hydralazine. Echo 6/28: showed LVEF 45-50% w/ basal to mid inferior, basal inferoseptal hypokinesis.  Grade 1 DD. 2.  Pancytopenia - worsening - viral? Plan Bone marrow biopsy completed-smear pending- WBC 1.0->1.2, hgb 7.3->6.6->7.4, oncology following, tranfused 1 unit prbc's    3. Fevers - unclear of source - pt on IV abx- cefepime from 7/1 d/c'd, bl cx NGTD ,rpt cxr , UA 7/2 ,  poss viral infection given pancytopenia, dc abx 7/3     4. Acute renal failure on CKDIII - stable    5. Sepsis based on fever, tachypnea, tachycardia, leukopenia, suspected pulm source - cultures negative so far    6. Diarrhea- c diff 7/3 negative    DVT Prophylaxis: scds  Diet: ADULT DIET;  Regular; Low Microbial  Code Status: Full Code    PT/OT Eval Status: consulted    Dispo - ~2-3 days    Estela Medeiros, APRN - CNP

## 2022-07-06 NOTE — ONCOLOGY
ONCOLOGY HEMATOLOGY CARE PROGRESS NOTE      SUBJECTIVE:     The patient offers no new complaints. ROS:   The remaining 10 point review of symptoms is unremarkable. OBJECTIVE        Physical    VITALS:  BP (!) 175/64   Pulse 62   Temp 98.6 °F (37 °C) (Oral)   Resp 18   Ht 6' 2\" (1.88 m)   Wt 170 lb 4.8 oz (77.2 kg)   SpO2 97%   BMI 21.87 kg/m²   TEMPERATURE:  Current - Temp: 98.6 °F (37 °C); Max - Temp  Av.3 °F (36.8 °C)  Min: 97.5 °F (36.4 °C)  Max: 98.8 °F (37.1 °C)  PULSE OXIMETRY RANGE: SpO2  Av.5 %  Min: 94 %  Max: 100 %  24HR INTAKE/OUTPUT:      Intake/Output Summary (Last 24 hours) at 2022 1147  Last data filed at 2022 1047  Gross per 24 hour   Intake 1204.58 ml   Output 0 ml   Net 1204.58 ml       CONSTITUTIONAL:  awake, alert, cooperative, no apparent distress, HEENT oral pharynx , no scleral icterus  HEMATOLOGIC/LYMPHATICS:  no cervical lymphadenopathy, no supraclavicular lymphadenopathy, no axillary lymphadenopathy and no inguinal lymphadenopathy  LUNGS:  No increased work of breathing, good air exchange, clear to auscultation bilaterally, no crackles or wheezing  CARDIOVASCULAR:  , regular rate and rhythm, normal S1 and S2, no S3 or S4, and no murmur noted  ABDOMEN:  No scars, normal bowel sounds, soft, non-distended, non-tender, no masses palpated, no hepatosplenomegally  MUSCULOSKELETAL:  There is no redness, warmth, or swelling of the joints. EXTREMETIES: No clubbing cynosis or edema  NEUROLOGIC:  Awake, alert, oriented to name, place and time. Cranial nerves II-XII are grossly intact. Motor is 5 out of 5 bilaterally.    SKIN:  no bruising or bleeding      Data      Recent Labs     22  0728 22  0728 22  0640 22  1802 22  0634   WBC 1.0*  --  1.0*  --  1.2*   HGB 7.3*   < > 6.6* 7.5* 7.4*   HCT 21.1*   < > 19.0* 22.1* 21.8*   PLT 91*  --  79*  --  81*   MCV 98.8  --  98.3  --  96.6    < > = values in this interval not displayed. Recent Labs     07/04/22  0728 07/05/22  0640 07/06/22  0634    141 141   K 3.7 3.6 3.5    106 106   CO2 23 24 24   PHOS 2.0*  --   --    BUN 36* 35* 32*   CREATININE 0.9 1.0 0.9     Recent Labs     07/05/22  0640   AST 20   ALT 11   BILITOT 1.1*   ALKPHOS 60       Magnesium:    Lab Results   Component Value Date/Time    MG 2.00 07/01/2022 06:53 AM    MG 2.20 06/30/2022 05:32 AM    MG 2.40 06/29/2022 04:10 AM         Problem List  Patient Active Problem List   Diagnosis    Hypertension    Rosacea    Mixed hyperlipidemia    Enlarged prostate with urinary obstruction    Taste perversion    Anosmia    Back pain    Nonrheumatic aortic valve insufficiency    Bilateral carotid bruits    Current moderate episode of major depressive disorder without prior episode (HCC)    Atrial fibrillation with RVR (HCC)    VINNY [facility]acute [facility]) ([facility]    Acute anemia    GI bleed    Thrombocytopenia (HCC)    Arrhythmia, atrial    Elevated troponin    Cardiomyopathy ([facility])    NSVT (nonsustained ventricular tachycardia) ([facility])       ASSESSMENT AND PLAN    1.) Abnormal CBC  - Admit CBC on 6/28/2022 showed: WBC 5.4 K/mcL, HGB 9.2 g/dL, HCT 25.8%, PLT 41 K/mcL, ANC 3.7 K/mcL. - Has not been transfused and CBC has been stable. - Micronutrients: Ferritin 1296 ng/mL, iron sat 39%, B12 >2000 pg/mL, folate 12 ng/mL. No deficiencies. - Hemolysis/blood loss eval: Abs retic 26 K/mcL,  U/L, hapto 246 mg/dL, FOBT pos. Seems most consistent with an underproduction scenario, though is FOBT pos.  - Ultrasound of the abdomen showed a fatty liver. No comment on the spleen despite the request to do so. - Hep C neg. -SPEP and kappa lambda light chains on 6/30/2020 were unremarkable. -Bone marrow biopsy is still pending  -Flow cytometry is also pending  -CBC is relatively stable, but I suspect he will need blood tomorrow  -He is neutropenic with a significant monocytosis.   This

## 2022-07-06 NOTE — PROGRESS NOTES
[address]   Daily Progress Note    Admit Date:  6/28/2022  HPI:   Abby Tellez presented with malaise and myalgia's. Transferred from [facility] to [facility] for Afib with RVR, VINNY, anemia with + FOBT and elevated troponin. Subjective:  Mr. Juarez Livers up in room/ to bathroom. Family at bedside. He denies any chest pain or shortness of breath. No palpitations or lightheadedness. Objective:   Patient Vitals for the past 24 hrs:   BP Temp Temp src Pulse Resp SpO2 Weight   07/06/22 1154 (!) 131/58 98.6 °F (37 °C) -- 54 16 95 % --   07/06/22 1000 (!) 175/64 98.6 °F (37 °C) Oral 62 18 97 % --   07/06/22 0430 (!) 149/67 98.6 °F (37 °C) Oral 65 18 95 % --   07/06/22 0100 -- -- -- -- -- -- 170 lb 4.8 oz (77.2 kg)   07/05/22 2331 (!) 133/59 98.5 °F (36.9 °C) Oral 61 22 95 % --   07/05/22 2132 134/64 -- -- -- -- -- --   07/05/22 2018 (!) 122/55 98.8 °F (37.1 °C) Oral 65 18 96 % --   07/05/22 1604 (!) 144/62 98.2 °F (36.8 °C) Oral 58 16 96 % --   07/05/22 1315 (!) 141/57 98.2 °F (36.8 °C) Oral 54 16 97 % --   07/05/22 1246 (!) 127/53 97.5 °F (36.4 °C) Oral 55 16 95 % --       Intake/Output Summary (Last 24 hours) at 7/6/2022 1209  Last data filed at 7/6/2022 1047  Gross per 24 hour   Intake 1204.58 ml   Output 0 ml   Net 1204.58 ml     Wt Readings from Last 3 Encounters:   07/06/22 170 lb 4.8 oz (77.2 kg)   06/28/22 185 lb (83.9 kg)   01/05/22 183 lb (83 kg)         ASSESSMENT:   1. Elevated troponin: peak 0.10, + NSTEMI, considering type II MI with severe anemia/ pancytopenia and VINNY  2. Paroxysmal atrial arrhythmias: None for> 24 hours, not a candidate for anticoagulation given anemia and thrombocytopenia  3. Presumed CAD: on beta blocker, no antiplatelets given thrombocytopenia and anemia, statin stopped per hem/onc  4. Cardiomyopathy, unknown etiology: EF 45-50%  5. NSVT: None in past 24 hours  6. VINNY: Improved  7. Abnormal CBC: Pancytopenia, per heme-onc, s/p bone marrow biopsy today  8.  Anemia: S/p PRBC x1 for hemoglobin 6.6  9. FOBT+: GI following  10. HTN: Stable  11. HLD: LDL 54      PLAN:  1. No antiplatelets or anticoagulation given pancytopenia  2. Further work-up of pancytopenia/anemia per heme-onc and GI  3. Continue Coreg 12.5 mg twice daily (tolerating well) as well as hydralazine and Imdur  4. Statin stopped by hem/onc  5. No ACEi/ARB/ARNI given renal insufficiency  6. Daily labs, daily weights  7. Will follow peripherally as cardiac work up on hold until pancytopenia explained and patient able to take antiplatelets/ anticoagulation. He is asymptomatic at this time. Call if needed/ questions. ALESSIA Curiel - CNP, 7/6/2022, 12:09 PM  [employer]   [phone]       Telemetry: SR 50-60  NYHA: III    Physical Exam:  General:  Awake, alert, NAD  Skin:  Warm and dry  Neck:  JVP <8 cm  Chest: Clear to auscultation anteriorly  Cardiovascular:  RRR, normal S1S2, + murmur, no GR  Abdomen:  Soft, nontender, +bowel sounds  Extremities: No BLE edema      Medications:    carvedilol  12.5 mg Oral BID    isosorbide mononitrate  60 mg Oral Daily    hydrALAZINE  100 mg Oral 3 times per day    lactobacillus  1 capsule Oral BID WC    pantoprazole  40 mg IntraVENous Daily    vitamin C  1,000 mg Oral Daily    sertraline  50 mg Oral Nightly    sodium chloride flush  5-40 mL IntraVENous 2 times per day      sodium chloride 25 mL/hr at 07/03/22 1012       Lab Data: Lab results independently reviewed and analyzed by myself 7/6/2022    CBC:   Recent Labs     07/04/22 0728 07/04/22 0728 07/05/22  0640 07/05/22  1802 07/06/22  0634   WBC 1.0*  --  1.0*  --  1.2*   HGB 7.3*   < > 6.6* 7.5* 7.4*   PLT 91*  --  79*  --  81*    < > = values in this interval not displayed. BMP:    Recent Labs     07/04/22 0728 07/05/22  0640 07/06/22  0634    141 141   K 3.7 3.6 3.5   CO2 23 24 24   BUN 36* 35* 32*   CREATININE 0.9 1.0 0.9     INR:  No results for input(s): INR in the last 72 hours.   BNP:  No results for input(s): PROBNP in the last 72 hours. Cardiac Enzymes: No results for input(s): TROPONINI in the last 72 hours. Lipids:   Lab Results   Component Value Date/Time    TRIG 100 09/17/2021 12:03 PM    TRIG 108 09/25/2020 02:39 PM    HDL 15 06/29/2022 01:42 PM    HDL 39 09/17/2021 12:03 PM    LDLCALC 54 06/29/2022 01:42 PM    LDLCALC 140 09/17/2021 12:03 PM       Cardiac Imaging:   Echo 6/29/2022:    Summary   Normal left ventricular size with mild concentric left ventricular hypertrophy. The left ventricular systolic function is mildly reduced with an ejection fraction of 45 - 50 %. Basal to mid inferior, basal inferoseptal hypokinesis. Septal bounce noted. Grade I diastolic dysfunction with normal filling pressure. Normal right ventricular size with normal function. Mild mitral and tricuspid regurgitation. Mild aortic stenosis. Systolic pulmonic artery pressure (SPAP) is estimated at 48 mmHg consistent with mild pulmonary hypertension (Right atrial pressure of 8 mmHg). Compared with the previous study performed 5-, left ventricular function decreased with new regional wall motion abnormalities noted.

## 2022-07-07 LAB
ANION GAP SERPL CALCULATED.3IONS-SCNC: 11 MMOL/L (ref 3–16)
ANISOCYTOSIS: ABNORMAL
BASOPHILS ABSOLUTE: 0 K/UL (ref 0–0.2)
BASOPHILS RELATIVE PERCENT: 0 %
BUN BLDV-MCNC: 28 MG/DL (ref 7–20)
CALCIUM SERPL-MCNC: 8.2 MG/DL (ref 8.3–10.6)
CHLORIDE BLD-SCNC: 104 MMOL/L (ref 99–110)
CO2: 24 MMOL/L (ref 21–32)
CREAT SERPL-MCNC: 0.9 MG/DL (ref 0.8–1.3)
EOSINOPHILS ABSOLUTE: 0 K/UL (ref 0–0.6)
EOSINOPHILS RELATIVE PERCENT: 0 %
GFR AFRICAN AMERICAN: >60
GFR NON-AFRICAN AMERICAN: >60
GLUCOSE BLD-MCNC: 135 MG/DL (ref 70–99)
HCT VFR BLD CALC: 22.4 % (ref 40.5–52.5)
HEMATOLOGY PATH CONSULT: NO
HEMOGLOBIN: 7.6 G/DL (ref 13.5–17.5)
LYMPHOCYTES ABSOLUTE: 0.5 K/UL (ref 1–5.1)
LYMPHOCYTES RELATIVE PERCENT: 41 %
MCH RBC QN AUTO: 32.9 PG (ref 26–34)
MCHC RBC AUTO-ENTMCNC: 34.1 G/DL (ref 31–36)
MCV RBC AUTO: 96.6 FL (ref 80–100)
MONOCYTES ABSOLUTE: 0.5 K/UL (ref 0–1.3)
MONOCYTES RELATIVE PERCENT: 38 %
NEUTROPHILS ABSOLUTE: 0.3 K/UL (ref 1.7–7.7)
NEUTROPHILS RELATIVE PERCENT: 21 %
PDW BLD-RTO: 20.4 % (ref 12.4–15.4)
PLATELET # BLD: 72 K/UL (ref 135–450)
PLATELET SLIDE REVIEW: ABNORMAL
PMV BLD AUTO: 9.5 FL (ref 5–10.5)
POTASSIUM SERPL-SCNC: 3.5 MMOL/L (ref 3.5–5.1)
RBC # BLD: 2.31 M/UL (ref 4.2–5.9)
SLIDE REVIEW: ABNORMAL
SODIUM BLD-SCNC: 139 MMOL/L (ref 136–145)
WBC # BLD: 1.2 K/UL (ref 4–11)

## 2022-07-07 PROCEDURE — 80048 BASIC METABOLIC PNL TOTAL CA: CPT

## 2022-07-07 PROCEDURE — 6370000000 HC RX 637 (ALT 250 FOR IP): Performed by: NURSE PRACTITIONER

## 2022-07-07 PROCEDURE — 6360000002 HC RX W HCPCS: Performed by: NURSE PRACTITIONER

## 2022-07-07 PROCEDURE — C9113 INJ PANTOPRAZOLE SODIUM, VIA: HCPCS | Performed by: NURSE PRACTITIONER

## 2022-07-07 PROCEDURE — 1200000000 HC SEMI PRIVATE

## 2022-07-07 PROCEDURE — 6370000000 HC RX 637 (ALT 250 FOR IP): Performed by: INTERNAL MEDICINE

## 2022-07-07 PROCEDURE — 36415 COLL VENOUS BLD VENIPUNCTURE: CPT

## 2022-07-07 PROCEDURE — 99232 SBSQ HOSP IP/OBS MODERATE 35: CPT | Performed by: NURSE PRACTITIONER

## 2022-07-07 PROCEDURE — 85025 COMPLETE CBC W/AUTO DIFF WBC: CPT

## 2022-07-07 PROCEDURE — 2580000003 HC RX 258: Performed by: NURSE PRACTITIONER

## 2022-07-07 RX ORDER — CARVEDILOL 6.25 MG/1
18.75 TABLET ORAL 2 TIMES DAILY
Status: DISCONTINUED | OUTPATIENT
Start: 2022-07-07 | End: 2022-07-08 | Stop reason: HOSPADM

## 2022-07-07 RX ORDER — POTASSIUM CHLORIDE 20 MEQ/1
20 TABLET, EXTENDED RELEASE ORAL 2 TIMES DAILY
Status: DISCONTINUED | OUTPATIENT
Start: 2022-07-07 | End: 2022-07-08

## 2022-07-07 RX ADMIN — CARVEDILOL 18.75 MG: 6.25 TABLET, FILM COATED ORAL at 20:59

## 2022-07-07 RX ADMIN — HYDRALAZINE HYDROCHLORIDE 100 MG: 50 TABLET, FILM COATED ORAL at 05:40

## 2022-07-07 RX ADMIN — OXYCODONE HYDROCHLORIDE AND ACETAMINOPHEN 1000 MG: 500 TABLET ORAL at 10:05

## 2022-07-07 RX ADMIN — SODIUM CHLORIDE, PRESERVATIVE FREE 10 ML: 5 INJECTION INTRAVENOUS at 21:01

## 2022-07-07 RX ADMIN — CARVEDILOL 12.5 MG: 6.25 TABLET, FILM COATED ORAL at 10:05

## 2022-07-07 RX ADMIN — SODIUM CHLORIDE, PRESERVATIVE FREE 10 ML: 5 INJECTION INTRAVENOUS at 10:08

## 2022-07-07 RX ADMIN — POTASSIUM CHLORIDE 20 MEQ: 20 TABLET, EXTENDED RELEASE ORAL at 15:00

## 2022-07-07 RX ADMIN — PANTOPRAZOLE SODIUM 40 MG: 40 INJECTION, POWDER, FOR SOLUTION INTRAVENOUS at 10:05

## 2022-07-07 RX ADMIN — Medication 1 CAPSULE: at 16:51

## 2022-07-07 RX ADMIN — POTASSIUM CHLORIDE 20 MEQ: 20 TABLET, EXTENDED RELEASE ORAL at 20:59

## 2022-07-07 RX ADMIN — HYDRALAZINE HYDROCHLORIDE 100 MG: 50 TABLET, FILM COATED ORAL at 15:00

## 2022-07-07 RX ADMIN — Medication 1 CAPSULE: at 10:05

## 2022-07-07 RX ADMIN — SERTRALINE 50 MG: 50 TABLET, FILM COATED ORAL at 20:59

## 2022-07-07 RX ADMIN — ISOSORBIDE MONONITRATE 60 MG: 60 TABLET, EXTENDED RELEASE ORAL at 10:05

## 2022-07-07 ASSESSMENT — PAIN SCALES - GENERAL: PAINLEVEL_OUTOF10: 0

## 2022-07-07 NOTE — PLAN OF CARE
Problem: Pain  Goal: Verbalizes/displays adequate comfort level or baseline comfort level  7/7/2022 1358 by Yuki Bolivar RN  Outcome: Progressing  Note: Pt will be satisfied with pain control. Pt uses numeric pain rating scale with reassessments after pain med administration. Will continue to monitor progression throughout shift. Problem: Risk for Falls  Goal: Fall prevention  Description: Patient  will remain free from falls as evidenced by no witnessed or reported falls each shift during the inpatient hospice stay. Patient  and or family/caregiver will receive education on fall prevention as evidenced by verbalizing recall of using the call lights system, fall prevention devices, and asking for help during the admission process and ongoing as needed during the inpatient hospice stay. 7/7/2022 1358 by Yuki Bolivar RN  Outcome: Progressing  Note: Pt high fall risk. Instructed to use call light before getting out of bed. Call light within reach. Bed in low position. Bed alarm on. Will continue to monitor.

## 2022-07-07 NOTE — PROGRESS NOTES
Comprehensive Nutrition Assessment    Type and Reason for Visit:  Reassess    Nutrition Recommendations/Plan:   1. Continue regular, low microbial diet  2. Ensure with meals  3. Encourage po intakes  4. Monitor po intakes, nutrition adequacy, weights, pertinent labs, BMs     Malnutrition Assessment:  Malnutrition Status: At risk for malnutrition (Comment) (07/05/22 8790)      Nutrition Assessment:    Follow up: Pt nutritionally compromised d/t report of poor appetite. Pt currently on a regular, low microbial diet with variable po intakes, 0-100%, per EMR. Encouraged po intakes. Pt reports that he has no appetite. Willing to drink ONS, will order. Will monitor. Nutrition Related Findings:    Trace BLE edema. BM x2 on 7/4 Wound Type: None       Current Nutrition Intake & Therapies:    Average Meal Intake: 0%,1-25%,26-50%,51-75%,%  Average Supplements Intake: Unable to assess  ADULT DIET; Regular; Low Microbial  ADULT ORAL NUTRITION SUPPLEMENT; Breakfast, Dinner; Standard High Calorie/High Protein Oral Supplement    Anthropometric Measures:  Height: 6' 2\" (188 cm)  Ideal Body Weight (IBW): 190 lbs (86 kg)       Current Body Weight: 171 lb 14.4 oz (78 kg),   IBW.  Weight Source: Standing Scale  Current BMI (kg/m2): 22.1                          BMI Categories: Normal Weight (BMI 22.0 to 24.9) age over 72    Estimated Daily Nutrient Needs:  Energy Requirements Based On: Kcal/kg  Weight Used for Energy Requirements: Current  Energy (kcal/day): 6463-9069  Weight Used for Protein Requirements: Current  Protein (g/day): 77-92  Method Used for Fluid Requirements: 1 ml/kcal  Fluid (ml/day): 3798-2667    Nutrition Diagnosis:   · Inadequate oral intake related to inadequate protein-energy intake as evidenced by intake 0-25%      Nutrition Interventions:   Food and/or Nutrient Delivery: Continue Current Diet  Nutrition Education/Counseling: No recommendation at this time  Coordination of Nutrition Care: Continue to monitor while inpatient  Plan of Care discussed with: Pt    Goals:  Previous Goal Met: Progressing toward Goal(s)  Goals: PO intake 50% or greater,prior to discharge       Nutrition Monitoring and Evaluation:   Behavioral-Environmental Outcomes: None Identified  Food/Nutrient Intake Outcomes: Food and Nutrient Intake,Supplement Intake  Physical Signs/Symptoms Outcomes: Weight    Discharge Planning:    Continue current diet,Continue Oral Nutrition Supplement     Matthew Dunham RD, LD  Contact: [phone]

## 2022-07-07 NOTE — PROGRESS NOTES
[address]   Daily Progress Note    Admit Date:  6/28/2022  HPI:   Kathlene Epley presented with malaise and myalgia's. Transferred from [facility] to [facility] for Afib with RVR, VINNY, anemia with + FOBT and elevated troponin. Subjective:  Mr. Ivania Rice sitting up in bed. Still with PVC's and NSVT. Patient feels these palpitations but denies associated shortness of breath, chest pain or lightheadedness. Objective:   Patient Vitals for the past 24 hrs:   BP Temp Temp src Pulse Resp SpO2 Weight   07/07/22 1150 (!) 122/49 99 °F (37.2 °C) -- 58 22 96 % --   07/07/22 1045 135/61 -- -- -- -- -- --   07/07/22 0845 -- -- -- 66 -- -- --   07/07/22 0830 (!) 151/65 98.6 °F (37 °C) Oral 68 16 98 % --   07/07/22 0541 -- -- -- -- -- -- 171 lb 14.4 oz (78 kg)   07/07/22 0539 (!) 149/94 99 °F (37.2 °C) Oral 65 18 97 % --   07/07/22 0029 133/60 98.1 °F (36.7 °C) Oral 58 18 97 % --   07/06/22 2016 (!) 140/63 98.6 °F (37 °C) Oral 58 18 97 % --   07/06/22 1617 135/65 97.5 °F (36.4 °C) Oral 59 18 96 % --       Intake/Output Summary (Last 24 hours) at 7/7/2022 1237  Last data filed at 7/7/2022 0830  Gross per 24 hour   Intake 505 ml   Output 0 ml   Net 505 ml     Wt Readings from Last 3 Encounters:   07/07/22 171 lb 14.4 oz (78 kg)   06/28/22 185 lb (83.9 kg)   01/05/22 183 lb (83 kg)         ASSESSMENT:   1. Elevated troponin: peak 0.10, + NSTEMI, considering type II MI with severe anemia/ pancytopenia and VINNY  2. Paroxysmal atrial arrhythmias: None for> 24 hours, not a candidate for anticoagulation given anemia and thrombocytopenia  3. Presumed CAD: on beta blocker, no antiplatelets given thrombocytopenia and anemia, statin stopped per hem/onc  4. Cardiomyopathy, unknown etiology: EF 45-50%  5. NSVT: 4-6 beats, symptomatic, ongoing for years  6. VINNY: Improved/ stable  7. Abnormal CBC: Pancytopenia, per heme-onc, s/p bone marrow biopsy 7/5/22  8. Anemia: S/p PRBC x1 for hemoglobin 6.6  9. FOBT+: GI following  10.  HTN: Sub-optimal  11. HLD: LDL 54      PLAN:  1. No antiplatelets or anticoagulation given pancytopenia  2. Further work-up of pancytopenia/anemia per heme-onc and GI  3. Increase beta blocker - Coreg to 18.75 mg bid  4. Replete K  5. Continue hydralazine and Imdur  6. Statin stopped by hem/onc  7. No ACEi/ARB/ARNI given renal insufficiency  8. Daily labs, daily weights    Krystle Lozoya ALESSIA - CNP, 7/7/2022, 12:37 PM  [facility]   [phone]       Telemetry: SR, LBBB 50-60, NSVT 4-6 beats  NYHA: III    Physical Exam:  General:  Awake, alert, NAD  Skin:  Warm and dry  Neck:  JVP <8 cm  Chest: Clear to auscultation posteriorly  Cardiovascular:  RRR, normal S1S2, + murmur, no GR  Abdomen:  Soft, nontender, +bowel sounds  Extremities: No BLE edema      Medications:    carvedilol  12.5 mg Oral BID    isosorbide mononitrate  60 mg Oral Daily    hydrALAZINE  100 mg Oral 3 times per day    lactobacillus  1 capsule Oral BID WC    pantoprazole  40 mg IntraVENous Daily    vitamin C  1,000 mg Oral Daily    sertraline  50 mg Oral Nightly    sodium chloride flush  5-40 mL IntraVENous 2 times per day      sodium chloride 25 mL/hr at 07/03/22 1012       Lab Data: Lab results independently reviewed and analyzed by myself 7/7/2022    CBC:   Recent Labs     07/05/22  0640 07/05/22  0640 07/05/22  1802 07/06/22  0634 07/07/22  0734   WBC 1.0*  --   --  1.2* 1.2*   HGB 6.6*   < > 7.5* 7.4* 7.6*   PLT 79*  --   --  81* 72*    < > = values in this interval not displayed. BMP:    Recent Labs     07/05/22  0640 07/06/22  0634 07/07/22  0734    141 139   K 3.6 3.5 3.5   CO2 24 24 24   BUN 35* 32* 28*   CREATININE 1.0 0.9 0.9     INR:  No results for input(s): INR in the last 72 hours. BNP:  No results for input(s): PROBNP in the last 72 hours. Cardiac Enzymes: No results for input(s): TROPONINI in the last 72 hours.   Lipids:   Lab Results   Component Value Date/Time    TRIG 100 09/17/2021 12:03 PM    TRIG 108 09/25/2020 02:39 PM    HDL 15 06/29/2022 01:42 PM    HDL 39 09/17/2021 12:03 PM    LDLCALC 54 06/29/2022 01:42 PM    LDLCALC 140 09/17/2021 12:03 PM       Cardiac Imaging:   Echo 6/29/2022:    Summary   Normal left ventricular size with mild concentric left ventricular hypertrophy. The left ventricular systolic function is mildly reduced with an ejection fraction of 45 - 50 %. Basal to mid inferior, basal inferoseptal hypokinesis. Septal bounce noted. Grade I diastolic dysfunction with normal filling pressure. Normal right ventricular size with normal function. Mild mitral and tricuspid regurgitation. Mild aortic stenosis. Systolic pulmonic artery pressure (SPAP) is estimated at 48 mmHg consistent with mild pulmonary hypertension (Right atrial pressure of 8 mmHg). Compared with the previous study performed 5-, left ventricular function decreased with new regional wall motion abnormalities noted.

## 2022-07-07 NOTE — ONCOLOGY
ONCOLOGY HEMATOLOGY CARE PROGRESS NOTE      SUBJECTIVE:     Afebrile and on room air. The patient offers no new complaints. ROS:   The remaining 10 point review of symptoms is unremarkable. OBJECTIVE        Physical    VITALS:  BP (!) 149/94   Pulse 65   Temp 99 °F (37.2 °C) (Oral)   Resp 18   Ht 6' 2\" (1.88 m)   Wt 171 lb 14.4 oz (78 kg)   SpO2 97%   BMI 22.07 kg/m²   TEMPERATURE:  Current - Temp: 99 °F (37.2 °C); Max - Temp  Av.4 °F (36.9 °C)  Min: 97.5 °F (36.4 °C)  Max: 99 °F (37.2 °C)  PULSE OXIMETRY RANGE: SpO2  Av.5 %  Min: 95 %  Max: 97 %  24HR INTAKE/OUTPUT:      Intake/Output Summary (Last 24 hours) at 2022  Last data filed at 2022  Gross per 24 hour   Intake 475 ml   Output 0 ml   Net 475 ml       CONSTITUTIONAL:  awake, alert, cooperative, no apparent distress, HEENT oral pharynx , no scleral icterus  HEMATOLOGIC/LYMPHATICS:  no cervical lymphadenopathy, no supraclavicular lymphadenopathy, no axillary lymphadenopathy and no inguinal lymphadenopathy  LUNGS:  No increased work of breathing, good air exchange, clear to auscultation bilaterally, no crackles or wheezing  CARDIOVASCULAR:  , regular rate and rhythm, normal S1 and S2, no S3 or S4, and no murmur noted  ABDOMEN:  No scars, normal bowel sounds, soft, non-distended, non-tender, no masses palpated, no hepatosplenomegally  MUSCULOSKELETAL:  There is no redness, warmth, or swelling of the joints. EXTREMETIES: No clubbing cynosis or edema  NEUROLOGIC:  Awake, alert, oriented to name, place and time. Cranial nerves II-XII are grossly intact. Motor is 5 out of 5 bilaterally.    SKIN:  no bruising or bleeding      Data      Recent Labs     22  0640 22  1802 22  0634   WBC 1.0*  --  1.2*   HGB 6.6* 7.5* 7.4*   HCT 19.0* 22.1* 21.8*   PLT 79*  --  81*   MCV 98.3  --  96.6        Recent Labs     22  0640 22  0634 22  0734    141 139   K 3.6 managing rate control.  - Would hold off on any anticoag, as I am not clear that the PLTs can be improved. -Possibly some contribution from fatty infiltration of the liver, but as these are trending down ordered I doubt that is the entire cause.     3.) CM  - Echocardiogram, 6/28/2022, showed LVEF 45-50% w/ basal to mid inferior, basal inferoseptal hypokinesis. Grade 1 DD.  - Abnormal CBC is limiting the use of antiplatelet and LHC.     4.) CKD  - IVF per nephrology. - Avoid nephrotoxins.  - Creat 1.8 --> 1.6 --> 1.2 --> 0.9    5.) Fever  - Blood culture x 2 collected on 6/30 - Negative  -COVID 6/28/2022 is negative  - CXR, 6/30/2022, showed interstitial prominence and patchy opacities, suggestive of edema.  -He is no longer on cefepime.   Blood cultures, C. difficile, and COVID x2 are negative    ONCOLOGIC DISPOSITION:    -Once the cause of his pancytopenia has been determined    Anita Rodriguez MD  May be reached through [address] [place] [address]

## 2022-07-07 NOTE — PROGRESS NOTES
Hospitalist Progress Note      PCP: Briana Grant MD    Date of Admission: 6/28/2022    Chief Complaint: Afib RVR    Hospital Course: 79-year-old male with significant past medical history of hypertension, hyperlipidemia, and CKD who presents to [facility] as a direct admit from [address] [place] [address] presented there earlier yesterday with concerns for 2 days worth of pain everywhere in his body. 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 Leo Victor states simply just had malaise and myalgias.  Upon arrival to [facility], he was noted to be in A. fib with RVR and have VINNY and anemia.  He was started on Cardizem infusion, Protonix infusion, and IV fluids.  He was transferred here for higher level care. Has had ongoing fevers and started on IV abx. Pancytopenia worsening. Pt feels worse - still in afib with RVR.       Subjective: Gen weak, Bone marrow biopsy completed-results pending. Updated patient on plan of care.        Medications:  Reviewed    Infusion Medications    sodium chloride 25 mL/hr at 07/03/22 1012     Scheduled Medications    carvedilol  12.5 mg Oral BID    isosorbide mononitrate  60 mg Oral Daily    hydrALAZINE  100 mg Oral 3 times per day    lactobacillus  1 capsule Oral BID WC    pantoprazole  40 mg IntraVENous Daily    vitamin C  1,000 mg Oral Daily    sertraline  50 mg Oral Nightly    sodium chloride flush  5-40 mL IntraVENous 2 times per day     PRN Meds: metoprolol, HYDROcodone-acetaminophen, sodium chloride flush, sodium chloride, polyethylene glycol, acetaminophen **OR** acetaminophen, prochlorperazine      Intake/Output Summary (Last 24 hours) at 7/7/2022 0748  Last data filed at 7/6/2022 2017  Gross per 24 hour   Intake 475 ml   Output 0 ml   Net 475 ml       Physical Exam Performed:    BP (!) 149/94   Pulse 65   Temp 99 °F (37.2 °C) (Oral)   Resp 18   Ht 6' 2\" (1.88 m)   Wt 171 lb 14.4 oz (78 kg)   SpO2 97%   BMI 22.07 kg/m²     General appearance: Gen Weak. No apparent distress, appears stated age and cooperative. HEENT: Pupils equal, round, and reactive to light. Conjunctivae/corneas clear. Neck: Supple, with full range of motion. No jugular venous distention. Trachea midline. Respiratory:  Normal respiratory effort. Clear to auscultation, bilaterally without Rales/Wheezes/Rhonchi. Cardiovascular: Regular rate and rhythm with normal S1/S2 without murmurs, rubs or gallops. Abdomen: Soft, non-tender, non-distended with normal bowel sounds. Musculoskeletal: No clubbing, cyanosis or edema bilaterally. Full range of motion without deformity. Skin: Skin color, texture, turgor normal.  No rashes or lesions. Neurologic:  Neurovascularly intact without any focal sensory/motor deficits. Cranial nerves: II-XII intact, grossly non-focal.  Psychiatric: Alert and oriented, thought content appropriate, normal insight  Capillary Refill: Brisk,3 seconds, normal   Peripheral Pulses: +2 palpable, equal bilaterally       Labs:   Recent Labs     07/05/22  0640 07/05/22  1802 07/06/22  0634   WBC 1.0*  --  1.2*   HGB 6.6* 7.5* 7.4*   HCT 19.0* 22.1* 21.8*   PLT 79*  --  81*     Recent Labs     07/05/22  0640 07/06/22  0634    141   K 3.6 3.5    106   CO2 24 24   BUN 35* 32*   CREATININE 1.0 0.9   CALCIUM 8.9 8.2*     Recent Labs     07/05/22  0640   AST 20   ALT 11   BILITOT 1.1*   ALKPHOS 60     No results for input(s): INR in the last 72 hours. No results for input(s): Orin Belkis in the last 72 hours.     Urinalysis:      Lab Results   Component Value Date/Time    NITRU Negative 06/28/2022 02:15 PM    [address] 0-2 06/28/2022 02:15 PM    BACTERIA Rare 06/28/2022 02:15 PM    RBCUA 0-2 06/28/2022 02:15 PM    BLOODU TRACE-INTACT 06/28/2022 02:15 PM    SPECGRAV 1.020 06/28/2022 02:15 PM    GLUCOSEU Negative 06/28/2022 02:15 PM       Radiology:  CT BIOPSY BONE MARROW   Preliminary Result   1. Status post successful CT guided bone marrow aspiration and core biopsy of   the left iliac bone. 2. Visualization of focal lytic/lucent lesions about the lumbosacral spine. Metastatic disease and multiple myeloma are in the differential diagnosis. CT GUIDED NEEDLE PLACEMENT   Preliminary Result   1. Status post successful CT guided bone marrow aspiration and core biopsy of   the left iliac bone. 2. Visualization of focal lytic/lucent lesions about the lumbosacral spine. Metastatic disease and multiple myeloma are in the differential diagnosis. XR CHEST PORTABLE   Final Result   Interstitial-alveolar opacities with progression of the perihilar alveolar   components, likely pulmonary edema. XR CHEST PORTABLE   Final Result   Interstitial prominence and patchy opacities, suggestive of edema. US ABDOMEN LIMITED Specify organ? LIVER, SPLEEN   Final Result   1. Cholelithiasis with equivocal cholecystitis. 2. Fatty infiltration of the liver. CT BIOPSY BONE MARROW    (Results Pending)           Assessment/Plan:    Active Hospital Problems    Diagnosis     Arrhythmia, atrial [I49.8]      Priority: Medium    Elevated troponin [R77.8]      Priority: Medium    Cardiomyopathy ([facility] [I42.9]      Priority: Medium    NSVT (nonsustained ventricular tachycardia) (HCC) [I47.2]      Priority: Medium    VINNY (acute kidney injury) ([facility] [N17.9]      Priority: Medium    Acute anemia [D64.9]      Priority: Medium    GI bleed [K92.2]      Priority: Medium    Thrombocytopenia ([facility] [D69.6]      Priority: Medium    Atrial fibrillation with RVR ([facility] [I48.91]      Priority: Medium    Hypertension [I10]      Plan:  1.  afib with RVR , CM on coreg/diltiazem drip d/c'd - cont imdur, hydralazine.  Echo 6/28: showed LVEF 45-50% w/ basal to mid inferior, basal inferoseptal hypokinesis.  Grade 1 DD. 2.  Pancytopenia - viral? Bone marrow biopsy completed-smear pending- WBC 1.0->1.2, hgb 7.3->6.6->7.4->7.6, oncology following, tranfused 1 unit prbc's    3. Fevers - unclear of source - pt on IV abx- cefepime from 7/1 d/c'd, bl cx NGTD ,rpt cxr , UA 7/2 ,  poss viral infection given pancytopenia, dc abx 7/3     4. Acute renal failure on CKDIII - stable    5. Sepsis based on fever, tachypnea, tachycardia, leukopenia, suspected pulm source - cultures negative so far    6. Diarrhea- c diff 7/3 negative    DVT Prophylaxis: scds  Diet: ADULT DIET;  Regular; Low Microbial  Code Status: Full Code    PT/OT Eval Status: consulted    Dispo - ~1-2 days pending bone marrow biopsy    Dannielle Henning, APRN - CNP

## 2022-07-08 VITALS
DIASTOLIC BLOOD PRESSURE: 52 MMHG | TEMPERATURE: 98.2 F | HEIGHT: 74 IN | SYSTOLIC BLOOD PRESSURE: 123 MMHG | BODY MASS INDEX: 22.1 KG/M2 | OXYGEN SATURATION: 99 % | RESPIRATION RATE: 16 BRPM | HEART RATE: 52 BPM | WEIGHT: 172.2 LBS

## 2022-07-08 LAB
ANION GAP SERPL CALCULATED.3IONS-SCNC: 10 MMOL/L (ref 3–16)
ANISOCYTOSIS: ABNORMAL
ATYPICAL LYMPHOCYTE RELATIVE PERCENT: 10 % (ref 0–6)
BANDED NEUTROPHILS RELATIVE PERCENT: 3 % (ref 0–7)
BASOPHILS ABSOLUTE: 0 K/UL (ref 0–0.2)
BASOPHILS RELATIVE PERCENT: 0 %
BUN BLDV-MCNC: 30 MG/DL (ref 7–20)
CALCIUM SERPL-MCNC: 8.5 MG/DL (ref 8.3–10.6)
CHLORIDE BLD-SCNC: 105 MMOL/L (ref 99–110)
CO2: 24 MMOL/L (ref 21–32)
CREAT SERPL-MCNC: 1.1 MG/DL (ref 0.8–1.3)
EOSINOPHILS ABSOLUTE: 0 K/UL (ref 0–0.6)
EOSINOPHILS RELATIVE PERCENT: 0 %
GFR AFRICAN AMERICAN: >60
GFR NON-AFRICAN AMERICAN: >60
GLUCOSE BLD-MCNC: 187 MG/DL (ref 70–99)
HCT VFR BLD CALC: 21.4 % (ref 40.5–52.5)
HEMATOLOGY PATH CONSULT: NO
HEMOGLOBIN: 7.3 G/DL (ref 13.5–17.5)
LYMPHOCYTES ABSOLUTE: 0.8 K/UL (ref 1–5.1)
LYMPHOCYTES RELATIVE PERCENT: 53 %
MACROCYTES: ABNORMAL
MCH RBC QN AUTO: 33 PG (ref 26–34)
MCHC RBC AUTO-ENTMCNC: 34.1 G/DL (ref 31–36)
MCV RBC AUTO: 97 FL (ref 80–100)
MONOCYTES ABSOLUTE: 0.1 K/UL (ref 0–1.3)
MONOCYTES RELATIVE PERCENT: 12 %
NEUTROPHILS ABSOLUTE: 0.3 K/UL (ref 1.7–7.7)
NEUTROPHILS RELATIVE PERCENT: 22 %
PDW BLD-RTO: 20.5 % (ref 12.4–15.4)
PLATELET # BLD: 64 K/UL (ref 135–450)
PMV BLD AUTO: 9.4 FL (ref 5–10.5)
POIKILOCYTES: ABNORMAL
POTASSIUM SERPL-SCNC: 4.2 MMOL/L (ref 3.5–5.1)
RBC # BLD: 2.21 M/UL (ref 4.2–5.9)
SCHISTOCYTES: ABNORMAL
SODIUM BLD-SCNC: 139 MMOL/L (ref 136–145)
WBC # BLD: 1.2 K/UL (ref 4–11)

## 2022-07-08 PROCEDURE — 36415 COLL VENOUS BLD VENIPUNCTURE: CPT

## 2022-07-08 PROCEDURE — 6370000000 HC RX 637 (ALT 250 FOR IP): Performed by: INTERNAL MEDICINE

## 2022-07-08 PROCEDURE — 6360000002 HC RX W HCPCS: Performed by: NURSE PRACTITIONER

## 2022-07-08 PROCEDURE — 97162 PT EVAL MOD COMPLEX 30 MIN: CPT

## 2022-07-08 PROCEDURE — 6370000000 HC RX 637 (ALT 250 FOR IP): Performed by: NURSE PRACTITIONER

## 2022-07-08 PROCEDURE — 97535 SELF CARE MNGMENT TRAINING: CPT

## 2022-07-08 PROCEDURE — 85025 COMPLETE CBC W/AUTO DIFF WBC: CPT

## 2022-07-08 PROCEDURE — 97166 OT EVAL MOD COMPLEX 45 MIN: CPT

## 2022-07-08 PROCEDURE — 80048 BASIC METABOLIC PNL TOTAL CA: CPT

## 2022-07-08 PROCEDURE — 99232 SBSQ HOSP IP/OBS MODERATE 35: CPT | Performed by: NURSE PRACTITIONER

## 2022-07-08 PROCEDURE — C9113 INJ PANTOPRAZOLE SODIUM, VIA: HCPCS | Performed by: NURSE PRACTITIONER

## 2022-07-08 PROCEDURE — 2580000003 HC RX 258: Performed by: NURSE PRACTITIONER

## 2022-07-08 PROCEDURE — 97116 GAIT TRAINING THERAPY: CPT

## 2022-07-08 RX ORDER — ISOSORBIDE MONONITRATE 60 MG/1
60 TABLET, EXTENDED RELEASE ORAL DAILY
Qty: 30 TABLET | Refills: 3 | Status: SHIPPED | OUTPATIENT
Start: 2022-07-09 | End: 2022-08-04 | Stop reason: SDUPTHER

## 2022-07-08 RX ORDER — POTASSIUM CHLORIDE 20 MEQ/1
20 TABLET, EXTENDED RELEASE ORAL DAILY
Status: DISCONTINUED | OUTPATIENT
Start: 2022-07-09 | End: 2022-07-08 | Stop reason: HOSPADM

## 2022-07-08 RX ORDER — HYDRALAZINE HYDROCHLORIDE 100 MG/1
100 TABLET, FILM COATED ORAL EVERY 8 HOURS SCHEDULED
Qty: 90 TABLET | Refills: 3 | Status: SHIPPED | OUTPATIENT
Start: 2022-07-08 | End: 2022-08-04

## 2022-07-08 RX ORDER — CARVEDILOL 6.25 MG/1
18.75 TABLET ORAL 2 TIMES DAILY
Qty: 60 TABLET | Refills: 3 | Status: SHIPPED | OUTPATIENT
Start: 2022-07-08 | End: 2022-08-04 | Stop reason: SDUPTHER

## 2022-07-08 RX ADMIN — Medication 1 CAPSULE: at 10:11

## 2022-07-08 RX ADMIN — SODIUM CHLORIDE, PRESERVATIVE FREE 10 ML: 5 INJECTION INTRAVENOUS at 10:13

## 2022-07-08 RX ADMIN — ISOSORBIDE MONONITRATE 60 MG: 60 TABLET, EXTENDED RELEASE ORAL at 10:11

## 2022-07-08 RX ADMIN — PANTOPRAZOLE SODIUM 40 MG: 40 INJECTION, POWDER, FOR SOLUTION INTRAVENOUS at 10:12

## 2022-07-08 RX ADMIN — HYDRALAZINE HYDROCHLORIDE 100 MG: 50 TABLET, FILM COATED ORAL at 05:41

## 2022-07-08 RX ADMIN — HYDRALAZINE HYDROCHLORIDE 100 MG: 50 TABLET, FILM COATED ORAL at 16:09

## 2022-07-08 RX ADMIN — CARVEDILOL 18.75 MG: 6.25 TABLET, FILM COATED ORAL at 10:11

## 2022-07-08 RX ADMIN — OXYCODONE HYDROCHLORIDE AND ACETAMINOPHEN 1000 MG: 500 TABLET ORAL at 10:11

## 2022-07-08 RX ADMIN — POTASSIUM CHLORIDE 20 MEQ: 20 TABLET, EXTENDED RELEASE ORAL at 10:11

## 2022-07-08 NOTE — PROGRESS NOTES
Pt d/c'd home. Removed peripheral IV and stopped bleeding. Catheter intact. Pt tolerated well. No redness noted at site. Notified [facility] and removed tele box. Reviewed d/c instructions, home meds, and  f/u information utilizing teach-back method. Scripts picked up from pharmacy by family member. Patient verbalized understanding.

## 2022-07-08 NOTE — PROGRESS NOTES
Physical Therapy  Facility/Department: [facility] A2 CARD TELEMETRY  Physical Therapy Initial Assessment/Treatment    Name: Libby Villatoro  : 1940  MRN: [id number]  Date of Service: 2022    Discharge Recommendations:  24 hour supervision or assist,Home with Home health PT   PT Equipment Recommendations  Equipment Needed: No      Patient Diagnosis(es): There were no encounter diagnoses. Past Medical History:  has a past medical history of Anosmia, Backpain, Bilateral carotid bruits, Chronic kidney disease, Current moderate episode of major depressive disorder without prior episode ([facility] [address] Hyperlipidemia, Hypertension, Hypertrophy of prostate without urinary obstruction and other lower urinary tract symptoms (LUTS), Nonrheumatic aortic valve insufficiency, Rosacea, and Taste perversion. Past Surgical History:  has a past surgical history that includes hernia repair; Colonoscopy (2008); and Colonoscopy (2014). Assessment   Body Structures, Functions, Activity Limitations Requiring Skilled Therapeutic Intervention: Decreased functional mobility ; Decreased strength;Decreased safe awareness;Decreased balance;Decreased endurance; Increased pain  Assessment: Pt presents to [facility] with A Fib with RVR. At baseline, pt lives alone with 4 steps to enter and performs functional mobility independently with no AD. Currently, pt performs functional mobility slightly below baseline, requiring grossly CGA for functional mobility with no AD. Pt would benefit from continued skilled PT to address current deficits.  Recommend home with 24hr supervision and HHPT upon d/c  Treatment Diagnosis: impaired functional mobility  Therapy Prognosis: Good  Decision Making: Medium Complexity  Activity Tolerance  Activity Tolerance: Patient tolerated evaluation without incident     Plan   Plan  Plan: 3-5 times per week  Current Treatment Recommendations: Strengthening,Balance training,Functional mobility training,Transfer training,Endurance training,Neuromuscular re-education,Stair training,Gait training,Pain management,Home exercise program,Safety education & training,Patient/Caregiver education & training,Therapeutic activities  Safety Devices  Type of Devices: Call light within reach,Chair alarm in place,Patient at risk for falls,Gait belt,Left in chair,Nurse notified,Telesitter in use     Restrictions  Restrictions/Precautions  Restrictions/Precautions: Fall Risk  Position Activity Restriction  Other position/activity restrictions: Neuropenic precautions     Subjective   Pain: 3/10 back ache  General  Chart Reviewed: Yes  Patient assessed for rehabilitation services?: Yes  Response To Previous Treatment: Not applicable  Family / Caregiver Present: Yes (friends)  Referring Practitioner: ALESSIA Cortez CNP  Referral Date : 07/08/22  Diagnosis: A fib with RVR  Follows Commands: Within Functional Limits  General Comment  Comments: RN cleared pt for PT eval  Subjective  Subjective: Pt sitting up in chair upon arrival, agreeable to PT eval         Social/Functional History  Social/Functional History  Lives With: Alone  Type of Home: [address] [place] [address],[address]: One level,Laundry in basement  Home Access: Stairs to enter with rails  Entrance Stairs - Number of Steps: 4  Entrance Stairs - Rails: Both  Bathroom Shower/Tub: Tub/Shower unit  Bathroom Toilet: Standard  Bathroom Equipment: Shower chair,Grab bars in shower,Grab bars around toilet  Home Equipment: Crutches,Rollator,Wheelchair-manual,Grab bars  Has the patient had two or more falls in the past year or any fall with injury in the past year?: No  ADL Assistance: [address] [place] [address] Avenue: Independent  Homemaking Responsibilities: Yes  Ambulation Assistance: Independent  Transfer Assistance: Independent  Active : Yes  Occupation: Full time employment  Type of Occupation: [employer] business     Vision/Hearing  Vision  Vision: Impaired  Vision Exceptions: Wears glasses for distance  Hearing  Hearing: Within functional limits      Cognition   Orientation  Overall Orientation Status: Within Functional Limits  Orientation Level: Oriented X4  Cognition  Overall Cognitive Status: Exceptions  Arousal/Alertness: Appropriate responses to stimuli  Following Commands: Follows one step commands with increased time; Follows one step commands with repetition  Attention Span: Appears intact  Memory: Appears intact  Safety Judgement: Decreased awareness of need for safety  Problem Solving: Able to problem solve independently  Insights: Decreased awareness of deficits  Initiation: Does not require cues  Sequencing: Does not require cues     Objective   Heart Rate: 63  Heart Rate Source: Monitor  BP: (!) 114/58  BP Location: Right upper arm  BP Method: Automatic  Patient Position: Up in chair  MAP (Calculated): 76.67  Resp: 16  SpO2: 98 %  O2 Device: None (Room air)        Gross Assessment  AROM: Generally decreased, functional  PROM: Generally decreased, functional  Strength: Generally decreased, functional  Coordination: Generally decreased, functional  Tone: Normal  Sensation: Intact     Bed Mobility Training  Bed Mobility Training: No (Pt in chair at begining and end of session)  Balance  Sitting: Intact  Standing: Impaired (Grossly CGA)  Standing - Static: Good  Standing - Dynamic: Good  Transfer Training  Transfer Training: Yes  Overall Level of Assistance: Contact-guard assistance  Interventions: Verbal cues; Safety awareness training  Sit to Stand: Contact-guard assistance  Stand to Sit: Contact-guard assistance  Gait Training  Gait Training: Yes  Right Side Weight Bearing: As tolerated  Left Side Weight Bearing: As tolerated  Gait  Overall Level of Assistance: Contact-guard assistance  Interventions: Verbal cues; Safety awareness training  Speed/Lainey: Slow;Shuffled  Step Length: Right shortened;Left shortened  Gait Abnormalities: Path deviations;Decreased step clearance  Distance (ft): 35 Feet  Assistive Device: Gait belt       AM-PAC Score  AM-PAC Inpatient Mobility Raw Score : 18 (07/08/22 1542)  AM-PAC Inpatient T-Scale Score : 43.63 (07/08/22 1542)  Mobility Inpatient CMS 0-100% Score: 46.58 (07/08/22 1542)  Mobility Inpatient CMS G-Code Modifier : CK (07/08/22 1542)          Goals  Short Term Goals  Time Frame for Short term goals: 7 days (7/15/22) unless otherwise noted  Short term goal 1: Pt will perform bed mobility with supervision  Short term goal 2: Pt will perform transfers with LRAD and supervision  Short term goal 3: Pt will ambulate 50 ft with LRAD and supervision  Short term goal 4: Pt will perform 4 steps with HR and supervision  Short term goal 5: Pt will perform 12-15 reps of BLE execises to improve strength and mobility by 7/11/22  Patient Goals   Patient goals : \"to go home\"       Education  Patient Education  Education Given To: Patient; Other (Comment)  Education Provided: Role of Therapy;Plan of Care;Transfer Training;Precautions  Education Method: Verbal  Barriers to Learning: None  Education Outcome: Verbalized understanding;Demonstrated understanding      Therapy Time   Individual Concurrent Group Co-treatment   Time In 1515         Time Out 1535         Minutes 20         Timed Code Treatment Minutes: 10 Minutes (10 min eval)     If pt is unable to be seen after this session, please let this note serve as discharge summary. Please see case management note for discharge disposition. Thank you.     Ale Naranjo, PT

## 2022-07-08 NOTE — PROGRESS NOTES
Occupational Therapy  Facility/Department: [facility] A2 CARD TELEMETRY  Occupational Therapy Initial Assessment and Treatment     Name: Triston Cruz  : 1940  MRN: [id number]  Date of Service: 2022    Discharge Recommendations:  Home with Home health OT,24 hour supervision or assist  OT Equipment Recommendations  Equipment Needed: No (pt has shower chair, anticipate no further needs)       Patient Diagnosis(es): There were no encounter diagnoses. Past Medical History:  has a past medical history of Anosmia, Backpain, Bilateral carotid bruits, Chronic kidney disease, Current moderate episode of major depressive disorder without prior episode ([facility] [address] Hyperlipidemia, Hypertension, Hypertrophy of prostate without urinary obstruction and other lower urinary tract symptoms (LUTS), Nonrheumatic aortic valve insufficiency, Rosacea, and Taste perversion. Past Surgical History:  has a past surgical history that includes hernia repair; Colonoscopy (2008); and Colonoscopy (2014). Assessment   Performance deficits / Impairments: Decreased functional mobility ; Decreased ADL status; Decreased safe awareness;Decreased balance;Decreased strength;Decreased endurance  Assessment: Mr. Allison Morrow Cloud is an 78 yo gentleman from home who presented to [facility]  with 3 days of malaise and all over muscle aches, transferred to [facility]. Dx of A Fib, VINNY, anemia; EF at 45-50%. PTA pt lives alone, runs a business, and completes all ADLs/IADLs with no AD. Currently pt endorses fatigue with OOB activity, CGA for sit <>stands and ambulation bathroom to chair without AD. CGA for LB dressing. Pt is currently functioning below baseline and would benefit from cont OT while in acute care. Rec  A at home with [address] [place] [address] upon d/c.   Prognosis: Good  Decision Making: Medium Complexity  REQUIRES OT FOLLOW-UP: Yes  Activity Tolerance  Activity Tolerance: Patient limited by fatigue        Plan   Plan  Times per Week: 3-5  Current Treatment Recommendations: Strengthening,Balance training,Functional mobility training,Endurance training,Safety education & training,Patient/Caregiver education & training,Equipment evaluation, education, & procurement,Self-Care / Shazia Coffman to work related activity,Home management training     Restrictions  Restrictions/Precautions  Restrictions/Precautions: General Precautions  Position Activity Restriction  Other position/activity restrictions: Neuropenic precautions    Subjective   General  Chart Reviewed: Yes  Referring Practitioner: ALESSIA Emanuel CNP  Diagnosis: A Fib  Subjective  Subjective: Pt in bed upon arrival, reporting chronic/mild back ache, RN approved therapy     Social/Functional History  Social/Functional History  Lives With: Alone  Type of Home: House  Home Layout: One level,Laundry in basement  Home Access: Stairs to enter with rails  Entrance Stairs - Rails: Both  Bathroom Shower/Tub: Tub/Shower unit  Bathroom Toilet: Standard  Bathroom Equipment: Shower chair  Home Equipment: Crutches,Rollator  Has the patient had two or more falls in the past year or any fall with injury in the past year?: No  ADL Assistance: [address] [place] [address] Avenue: Independent  Homemaking Responsibilities: Yes  Ambulation Assistance: Independent  Transfer Assistance: Independent  Active : Yes  Occupation: Full time employment  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       Objective   Heart Rate: 63  Heart Rate Source: Monitor  BP: (!) 114/58  BP Location: Right upper arm  BP Method: Automatic  Patient Position: Up in chair  MAP (Calculated): 76.67  Resp: 16  SpO2: 98 %  O2 Device: None (Room air)             Safety Devices  Type of Devices: Call light within reach; Chair alarm in place; Patient at risk for falls;Gait belt;Left in chair;Nurse notified;Telesitter in use     Toilet Transfers  Toilet - Technique: Ambulating  Equipment Used: Standard toilet (with grab bars)  Toilet Transfer: Contact guard assistance  Toilet Transfers Comments: one small LOB to R with pt self correcting  AROM: Generally decreased, functional  PROM: Generally decreased, functional  Strength: Generally decreased, functional  Coordination: Generally decreased, functional  Tone: Normal  Sensation:  (did not note numbess/tingling)  ADL  LE Dressing: Contact guard assistance  LE Dressing Skilled Clinical Factors: donning shorts and shoes  Toileting: Stand by assistance  Toileting Skilled Clinical Factors: urination seated on toilet, managed brief        Bed mobility  Supine to Sit: Supervision  Sit to Supine: Unable to assess  Scooting: Supervision  Bed Mobility Comments: to left, HOB elevated to 47*, pt up in chair at end of session  Transfers  Stand to sit: Contact guard assistance  Transfer Comments: no AD     Cognition  Overall Cognitive Status: Exceptions  Arousal/Alertness: Appropriate responses to stimuli  Following Commands: Follows one step commands with increased time; Follows one step commands with repetition ([place])  Attention Span: Appears intact  Memory: Appears intact  Safety Judgement: Decreased awareness of need for safety  Problem Solving: Able to problem solve independently  Insights: Decreased awareness of deficits  Initiation: Does not require cues  Sequencing: Does not require cues  Orientation  Overall Orientation Status: Within Functional Limits  Orientation Level: Oriented X4  Perception  Overall Perceptual Status: WFL               Education Given To: Patient  Education Provided: Role of Therapy; ADL Adaptive Strategies;Transfer Training;Plan of Care;Precautions  Education Provided Comments: disease specific: use of call light for RN, benefits of OOB mobility  Education Method: Verbal  Barriers to Learning: None  Education Outcome: Verbalized understanding;Continued education needed  LUE AROM (degrees)  LUE AROM : WFL  Left Hand AROM (degrees)  Left Hand AROM: WFL  RUE AROM (degrees)  RUE AROM : WFL  Right Hand AROM (degrees)  Right Hand AROM: WFL     L Fingers  Full Assessment: No  Left Finger Strength Comment: WFL  R Fingers  Full Assessment: No  Right Finger Strength Comment: WFL                AM-PAC Score        AM-PAC Inpatient Daily Activity Raw Score: 18 (07/08/22 1339)  AM-PAC Inpatient ADL T-Scale Score : 38.66 (07/08/22 1339)  ADL Inpatient CMS 0-100% Score: 46.65 (07/08/22 1339)  ADL Inpatient CMS G-Code Modifier : CK (07/08/22 1339)    Goals  Short Term Goals  Time Frame for Short term goals: within 7 days by 7/15/22 unless otherwise indicated  Short Term Goal 1: Pt will perform toilet transfer with supervision  Short Term Goal 2: Pt will perform grooming task in stance with SBA  Short Term Goal 3: Pt will perform LB dressing with supervision  Short Term Goal 4: Pt will perform BUE ther ex x15 with good activity tolerance by 7/12/22  Patient Goals   Patient goals : to go home       Therapy Time   Individual Concurrent Group Co-treatment   Time In 1304         Time Out 1326         Minutes 22         Timed Code Treatment Minutes: 12 Minutes (10 minute eval)     If pt is unable to be seen after this session, please let this note serve as discharge summary. Please see case management note for discharge disposition. Thank you.     Kimberly Trotter, OT

## 2022-07-08 NOTE — PLAN OF CARE
Problem: Pain  Goal: Verbalizes/displays adequate comfort level or baseline comfort level  Outcome: Progressing  Note: Pt will be satisfied with pain control. Pt uses numeric pain rating scale with reassessments after pain med administration. Will continue to monitor progression throughout shift. Problem: Risk for Falls  Goal: Fall prevention  Description: Patient  will remain free from falls as evidenced by no witnessed or reported falls each shift during the inpatient hospice stay. Patient  and or family/caregiver will receive education on fall prevention as evidenced by verbalizing recall of using the call lights system, fall prevention devices, and asking for help during the admission process and ongoing as needed during the inpatient hospice stay. 7/8/2022 1324 by Stacey Salgado RN  Outcome: Progressing  Note: Pt high fall risk. Instructed to use call light before getting out of bed. Call light within reach. Bed in low position. Bed alarm on. Will continue to monitor.

## 2022-07-08 NOTE — ONCOLOGY
ONCOLOGY HEMATOLOGY CARE PROGRESS NOTE      SUBJECTIVE:     Afebrile and on room air. He's not sure if he would do chemo or not for MDS. ROS:   The remaining 10 point review of symptoms is unremarkable. OBJECTIVE        Physical    VITALS:  BP (!) 148/64   Pulse 56   Temp 98.5 °F (36.9 °C) (Oral)   Resp 18   Ht 6' 2\" (1.88 m)   Wt 172 lb 3.2 oz (78.1 kg)   SpO2 97%   BMI 22.11 kg/m²   TEMPERATURE:  Current - Temp: 98.5 °F (36.9 °C); Max - Temp  Av.7 °F (37.1 °C)  Min: 98.4 °F (36.9 °C)  Max: 99.1 °F (37.3 °C)  PULSE OXIMETRY RANGE: SpO2  Av.7 %  Min: 95 %  Max: 98 %  24HR INTAKE/OUTPUT:      Intake/Output Summary (Last 24 hours) at 2022 4205  Last data filed at 2022 0306  Gross per 24 hour   Intake 360 ml   Output 0 ml   Net 360 ml       CONSTITUTIONAL:  awake, alert, cooperative, no apparent distress, HEENT oral pharynx , no scleral icterus  HEMATOLOGIC/LYMPHATICS:  no cervical lymphadenopathy, no supraclavicular lymphadenopathy, no axillary lymphadenopathy and no inguinal lymphadenopathy  LUNGS:  No increased work of breathing, good air exchange, clear to auscultation bilaterally, no crackles or wheezing  CARDIOVASCULAR:  , regular rate and rhythm, normal S1 and S2, no S3 or S4, and no murmur noted  ABDOMEN:  No scars, normal bowel sounds, soft, non-distended, non-tender, no masses palpated, no hepatosplenomegally  MUSCULOSKELETAL:  There is no redness, warmth, or swelling of the joints. EXTREMETIES: No clubbing cynosis or edema  NEUROLOGIC:  Awake, alert, oriented to name, place and time. Cranial nerves II-XII are grossly intact. Motor is 5 out of 5 bilaterally.    SKIN:  no bruising or bleeding      Data      Recent Labs     22  0634 22  0734 22  0620   WBC 1.2* 1.2* 1.2*   HGB 7.4* 7.6* 7.3*   HCT 21.8* 22.4* 21.4*   PLT 81* 72* 64*   MCV 96.6 96.6 97.0        Recent Labs     22  0634 22  0734 22  0620   NA 141 139 139   K 3.5 3.5 4.2    104 105   CO2 24 24 24   BUN 32* 28* 30*   CREATININE 0.9 0.9 1.1     No results for input(s): AST, ALT, ALB, BILIDIR, BILITOT, ALKPHOS in the last 72 hours. Magnesium:    Lab Results   Component Value Date/Time    MG 2.00 07/01/2022 06:53 AM    MG 2.20 06/30/2022 05:32 AM    MG 2.40 06/29/2022 04:10 AM         Problem List  Patient Active Problem List   Diagnosis    Hypertension    Rosacea    Mixed hyperlipidemia    Enlarged prostate with urinary obstruction    Taste perversion    Anosmia    Back pain    Nonrheumatic aortic valve insufficiency    Bilateral carotid bruits    Current moderate episode of major depressive disorder without prior episode (HCC)    Atrial fibrillation with RVR (HCC)    VINNY (acute kidney injury) (HCC)    Acute anemia    GI bleed    Thrombocytopenia (HCC)    Arrhythmia, atrial    Elevated troponin    Cardiomyopathy (HCC)    NSVT (nonsustained ventricular tachycardia) (HCC)       ASSESSMENT AND PLAN    1.) Abnormal CBC  - Admit CBC on 6/28/2022 showed: WBC 5.4 K/mcL, HGB 9.2 g/dL, HCT 25.8%, PLT 41 K/mcL, ANC 3.7 K/mcL. - Has not been transfused and CBC has been stable. - Micronutrients: Ferritin 1296 ng/mL, iron sat 39%, B12 >2000 pg/mL, folate 12 ng/mL. No deficiencies. - Hemolysis/blood loss eval: Abs retic 26 K/mcL,  U/L, hapto 246 mg/dL, FOBT pos. Seems most consistent with an underproduction scenario, though is FOBT pos.  - Ultrasound of the abdomen showed a fatty liver. No comment on the spleen despite the request to do so. - Hep C neg. -SPEP and kappa lambda light chains on 6/30/2020 were unremarkable. -Bone marrow biopsy showed dysplasia in once cell line and moderate reticulin fibrosis. 3-5% blasts. MDS-SLD is a possibility. JAK2, myeloid FISH panel, cytogenetics are pending.  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   Would not give anticoag or antiplatelets, as his counts are not likely to return to normal.     2.) Atrial fib  - EP is managing rate control.  - Would hold off on any anticoag, as I am not clear that the PLTs can be improved. -Possibly some contribution from fatty infiltration of the liver, but as these are trending down ordered I doubt that is the entire cause.     3.) CM  - Echocardiogram, 6/28/2022, showed LVEF 45-50% w/ basal to mid inferior, basal inferoseptal hypokinesis. Grade 1 DD.  - Abnormal CBC is limiting the use of antiplatelet and LHC.     4.) CKD  - IVF per nephrology. - Avoid nephrotoxins.  - Creat 1.8 --> 1.6 --> 1.2 --> 0.9 --> 1.1    5.) Fever  - Blood culture x 2 collected on 6/30 - Negative  -COVID 6/28/2022 is negative  - CXR, 6/30/2022, showed interstitial prominence and patchy opacities, suggestive of edema.  -He is no longer on cefepime. Blood cultures, C. difficile, and COVID x2 are negative. ONCOLOGIC DISPOSITION:  -No oncology barrier to discharge. Can follow up in the office. I have his number.     David Jernigan MD  May be reached through [address] [place] [address] Detail Level: Simple Additional Notes: Patient states she is experiencing no pain 0/10 Quality 131: Pain Assessment And Follow-Up: Pain assessment using a standardized tool is documented as negative, no follow-up plan required Quality 130: Documentation Of Current Medications In The Medical Record: Current Medications Documented

## 2022-07-08 NOTE — PLAN OF CARE
Problem: Risk for Falls  Goal: Fall prevention  Description: Patient  will remain free from falls as evidenced by no witnessed or reported falls each shift during the inpatient hospice stay. Patient  and or family/caregiver will receive education on fall prevention as evidenced by verbalizing recall of using the call lights system, fall prevention devices, and asking for help during the admission process and ongoing as needed during the inpatient hospice stay.     7/8/2022 0155 by Vineet Thompson RN  Outcome: Progressing

## 2022-07-08 NOTE — DISCHARGE INSTR - COC
Continuity of Care Form    Patient Name: Demarcus Hicks   :  1940  MRN:  [id number]    [facility] date:  2022  Discharge date:  22    Code Status Order: Full Code   Advance Directives:      Admitting Physician:  Chavez Mims MD  PCP: Niall Hughes MD    Discharging Nurse: Jennifer Arriaga 23 Unit/Room#: 0217/0217-01  Discharging Unit Phone Number: [phone]    Emergency Contact:   Extended Emergency Contact Information  Primary Emergency Contact: [address] Phone: [phone]  Mobile Phone: [phone]  Relation: Other  Secondary Emergency Contact: Katy Lindsey  Mobile Phone: [phone]  Relation: Child    Past Surgical History:  Past Surgical History:   Procedure Laterality Date    COLONOSCOPY  2008    COLONOSCOPY  2014    HERNIA REPAIR         Immunization History:   Immunization History   Administered Date(s) Administered    COVID-19, PFIZER PURPLE top, DILUTE for use, (age 15 y+), 30mcg/0.3mL 2021, 2021    Influenza 2012, 10/30/2013    Influenza Virus Vaccine 2014, 2015    Influenza, High Dose (Fluzone 65 yrs and older) 2016    Influenza, High-dose, Quadv, 65 yrs +, IM (Fluzone) 2021    Pneumococcal Conjugate 13-valent (Mgqpyok41) 2016    Pneumococcal Polysaccharide (Kpbiumrmi53) 2012       Active Problems:  Patient Active Problem List   Diagnosis Code    Hypertension I10    Rosacea L71.9    Mixed hyperlipidemia E78.2    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, N13.8    Taste perversion R43.2    Anosmia R43.0    Back pain M54.9    Nonrheumatic aortic valve insufficiency I35.1    Bilateral carotid bruits R09.89    Current moderate episode of major depressive disorder without prior episode ([facility]) F32.1    Atrial fibrillation with RVR ([facility]) I48.91    VINNY (acute kidney injury) ([facility] [address] N17.9    Acute anemia D64.9    GI bleed K92.2    Thrombocytopenia ([facility]) D69.6    Arrhythmia, atrial I49.8 Elevated troponin R77.8    Cardiomyopathy ([facility]) I42.9    NSVT (nonsustained ventricular tachycardia) ([facility]) I47.2       Isolation/Infection:   Isolation            Neutropenic          Patient Infection Status       Infection Onset Added Last Indicated Last Indicated By Review Planned Expiration Resolved Resolved By    None active    Resolved    C-diff Rule Out 07/03/22 07/03/22 07/03/22 Clostridium difficile toxin/antigen (Ordered)   07/04/22 Rule-Out Test Resulted    COVID-19 (Rule Out) 07/03/22 07/03/22 07/03/22 COVID-19, Rapid (Ordered)   07/03/22 Rule-Out Test Resulted    COVID-19 (Rule Out) 06/28/22 06/28/22 06/28/22 COVID-19 & Influenza Combo (Ordered)   06/28/22 Rule-Out Test Resulted            Nurse Assessment:  Last Vital Signs: BP (!) 114/58   Pulse 63   Temp 98.1 °F (36.7 °C)   Resp 16   Ht 6' 2\" (1.88 m)   Wt 172 lb 3.2 oz (78.1 kg)   SpO2 98%   BMI 22.11 kg/m²     Last documented pain score (0-10 scale): Pain Level: 0  Last Weight:   Wt Readings from Last 1 Encounters:   07/08/22 172 lb 3.2 oz (78.1 kg)     Mental Status:  oriented and alert    IV Access:  - None    Nursing Mobility/ADLs:  Walking   Independent  Transfer  Independent  Bathing  Independent  Dressing  Independent  Toileting  Independent  Feeding  Independent  Med Admin  Independent  Med Delivery   none    Wound Care Documentation and Therapy:        Elimination:  Continence: Bowel: No  Bladder: No  Urinary Catheter: None   Colostomy/Ileostomy/Ileal Conduit: No       Date of Last BM: 7/7/2022    Intake/Output Summary (Last 24 hours) at 7/8/2022 1446  Last data filed at 7/8/2022 0306  Gross per 24 hour   Intake 240 ml   Output --   Net 240 ml     I/O last 3 completed shifts: In: 605 [P. O.:600;  I.V.:5]  Out: 0     Safety Concerns:     None    Impairments/Disabilities:      None    Nutrition Therapy:  Current Nutrition Therapy:   - Oral Diet:  General    Routes of Feeding: Oral  Liquids: No Restrictions  Daily Fluid Restriction: no  Last Modified Barium Swallow with Video (Video Swallowing Test): not done    Treatments at the Time of Hospital Discharge:   Respiratory Treatments: None  Oxygen Therapy:  is not on home oxygen therapy.   Ventilator:    - No ventilator support    Rehab Therapies: Nurse, Physical Therapy and Occupational Therapy  Weight Bearing Status/Restrictions: No weight bearing restrictions  Other Medical Equipment (for information only, NOT a DME order):  None  Other Treatments: [facility]: LEVEL 3 [address] to establish plan of care for patient over 60 day period   Nursing  Initial home SN evaluation visit to occur within 24-48 hours for:  1)  medication management  2)  VS and clinical assessment  3)  S&S chronic disease exacerbation education + when to contact MD/NP  4)  care coordination  Medication Reconciliation during 1st SN visit  PT/OT/Speech   Evaluations in home within 24-48 hours of discharge to include DME and home safety   Frontload therapy 5 days, then 3x a week   OT to evaluate if patient has [address] needs for personal care    evaluation within 24-48 hours to evaluate resources & insurance for potential AL, [place], LTC, and Medicaid options   Palliative Care referral within 5 days of hospital discharge   PCP Visit scheduled within 3 - 7 days of hospital discharge    [place] care Vitals (If patient is agreeable and meets guidelines)      Patient's personal belongings (please select all that are sent with patient):  Dentures lower    RN SIGNATURE:  Electronically signed by Sanjiv Ballard RN on 7/8/22 at 4:43 PM EDT    CASE MANAGEMENT/SOCIAL WORK SECTION    Inpatient Status Date: 6/28/22    Readmission Risk Assessment Score:  Readmission Risk              Risk of Unplanned Readmission:  16           Discharging to  [address] [place] [address] [facility] [address]   [phone]          / signature: {Esignature:[id number]}    PHYSICIAN SECTION    Prognosis: Guarded    Condition at Discharge: Stable    Rehab Potential (if transferring to Rehab): Fair    Recommended Labs or Other Treatments After Discharge: Home care, Follow up with Hem/Onc, PCP and Cardiology. Physician Certification: I certify the above information and transfer of Erin Nava  is necessary for the continuing treatment of the diagnosis listed and that he requires [address] for less 30 days.      Update Admission H&P: No change in H&P    PHYSICIAN SIGNATURE:  Electronically signed by ALESSIA Douglas CNP on 7/8/22 at 2:47 PM EDT

## 2022-07-08 NOTE — CARE COORDINATION
[facility]    Referral received from  to follow for home care services. I will follow for needs, and speak with patient to verify demos. [facility]    DC order noted, all docs needed have been faxed to [facility] for home care services.     Home care to see patient within 24-48 hrs    Huong Grande RN, BSN CTN  [facility] [phone]

## 2022-07-08 NOTE — CARE COORDINATION
CASE MANAGEMENT DISCHARGE SUMMARY      Discharge to:  Home with [address] for PT/OT     IMM given: 7/8/22     Transportation:    Family/car:       Confirmed discharge plan with:     Patient: yes    Family:  Yes      Name: Cindy Nguyen (friend) here to transport       Facility/Agency, name:  [facility]  Pulled  from EMR  By Matthew Man [facility] liaison) Note: Discharging nurse to complete [facility], reconcile [facility], and place final copy with patient's discharge packet. RN to ensure that written prescriptions for  Level II medications are sent with patient to the facility as per protocol.       Jorgito Jenkins RN

## 2022-07-11 ENCOUNTER — CARE COORDINATION (OUTPATIENT)
Dept: CASE MANAGEMENT | Age: 82
End: 2022-07-11

## 2022-07-11 NOTE — CARE COORDINATION
[facility] Transitions Initial Follow Up Call    Call within 2 business days of discharge: Yes    Patient: Triston Cruz Patient : 1940   MRN: [id number]  Reason for Admission: atrial fib w/ RVR  Discharge Date: 22 RARS: Readmission Risk Score: 15.1 ( )      Last Discharge [facility]       Complaint Diagnosis Description Type Department Provider    22   Admission (Discharged) Td Freeman MD    22 Generalized Body Aches Atrial fibrillation and flutter ([address] . .. ED (TRANSFER) [facility] ED Elmira Shah DO           Spoke with: [address] [place] [address] Avenue: [facility]    Spoke to [facility] scheduled for 22  Attempted to reach patient via phone for initial post hospital transition call. VM left stating purpose of call along with my contact information requesting a return call.       Care Transitions 24 Hour Call    Care Transitions Interventions         Follow Up  Future Appointments   Date Time Provider [place]   2022  3:10 PM Jeanette Kasper MD [facility] None   2022  1:45 PM Siena Garrett MD [facility] ALFREDO Barrett, IDANIA

## 2022-07-12 ENCOUNTER — CARE COORDINATION (OUTPATIENT)
Dept: CASE MANAGEMENT | Age: 82
End: 2022-07-12

## 2022-07-12 NOTE — CARE COORDINATION
[facility] Transitions Follow Up Call    2022    Patient: Marsha Rizo  Patient : 1940   MRN: [id number]  Reason for Admission: afib w/ RVR  Discharge Date: 22 RARS: Readmission Risk Score: 15.1 ( )         Spoke with: leandra    Second and final attempt made to reach patient for initial post hospital transition call. VM left stating purpose of call along with my contact information requesting a return call. Care Transitions Subsequent and Final Call    Subsequent and Final Calls  Care Transitions Interventions  Other Interventions:            Follow Up  Future Appointments   Date Time Provider [place]   2022  3:10 PM Susan Pritchard MD [facility] None   2022  1:45 PM Vincenzo Pelaez MD [facility] ALFREDO Bentley RN

## 2022-07-12 NOTE — DISCHARGE SUMMARY
Hospital Medicine Discharge Summary    Patient ID: Maria R Villa      Patient's PCP: Kavita Mosher MD    Admit Date: 6/28/2022     Discharge Date: 7/8/2022      Admitting Provider: Kinjal Maldonado MD     Discharge Provider: ALESSIA Mathias CNP     Discharge Diagnoses: Active Hospital Problems    Diagnosis     Arrhythmia, atrial [I49.8]      Priority: Medium    Elevated troponin [R77.8]      Priority: Medium    Cardiomyopathy ([facility] [I42.9]      Priority: Medium    NSVT (nonsustained ventricular tachycardia) (HCC) [I47.2]      Priority: Medium    VINNY (acute kidney injury) ([facility] [N17.9]      Priority: Medium    Acute anemia [D64.9]      Priority: Medium    GI bleed [K92.2]      Priority: Medium    Thrombocytopenia ([facility] [D69.6]      Priority: Medium    Atrial fibrillation with RVR ([facility] [I48.91]      Priority: Medium    Hypertension [I10]        The patient was seen and examined on day of discharge and this discharge summary is in conjunction with any daily progress note from day of discharge.     Hospital Course:     48-year-old male with significant past medical history of hypertension, hyperlipidemia, and CKD who presents to [id number] [facility] as a direct admit from [facility] presented there earlier yesterday with concerns for 2 days worth of pain everywhere in his body. 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 Maria Del Carmen Moon states simply just had malaise and myalgias.  Upon arrival to [facility], he was noted to be in A. fib with RVR and have VINNY and anemia. Maria Del Carmen Moon was started on Cardizem infusion, Protonix infusion, and IV fluids.  He was transferred here for higher level care.  Has had ongoing fevers and started on IV abx.  Pancytopenia worsening.  Pt feels worse - still in afib with RVR. Plan:  1.  afib with RVR , CM on coreg/diltiazem drip d/c'd - cont imdur, hydralazine. Echo 6/28: showed LVEF 45-50% w/ basal to mid inferior, basal inferoseptal hypokinesis.  Grade 1 DD. Cardiology recommended Coreg BID/KCL, hydralazine and imdur at discharge. No AC d/t pancytopenia     2.  Pancytopenia - Poss MDS? Bone marrow biopsy completed-smear pending- WBC 1.0->1.2, FOBT +, GI consulted, hgb 7.3->6.6->7.4->7.6->7.3, oncology following, tranfused 1 unit prbc's     3.  Fevers - unclear of source - pt on IV abx- cefepime from 7/1 d/c'd, bl cx NGTD ,rpt cxr , UA 7/2 ,  poss viral infection given pancytopenia, dc abx 7/3      4.  Acute renal failure on CKDIII - stable     5.  Sepsis based on fever, tachypnea, tachycardia, leukopenia, suspected pulm source - cultures negative so far     6. Diarrhea- c diff 7/3 negative    Patient medically stable for discharge. He will follow up with PCP/Oncology. Home care arranged. Consider outpatient EGD/C-scope. Follow up with [facility]. Physical Exam Performed:     BP (!) 123/52   Pulse 52   Temp 98.2 °F (36.8 °C) (Oral)   Resp 16   Ht 6' 2\" (1.88 m)   Wt 172 lb 3.2 oz (78.1 kg)   SpO2 99%   BMI 22.11 kg/m²       General appearance:  No apparent distress, appears stated age and cooperative. HEENT:  Normal cephalic, atraumatic without obvious deformity. Pupils equal, round, and reactive to light. Extra ocular muscles intact. Conjunctivae/corneas clear. Neck: Supple, with full range of motion. No jugular venous distention. Trachea midline. Respiratory:  Normal respiratory effort. Clear to auscultation, bilaterally without Rales/Wheezes/Rhonchi. Cardiovascular:  Regular rate and rhythm with normal S1/S2 without murmurs, rubs or gallops. Abdomen: Soft, non-tender, non-distended with normal bowel sounds. Musculoskeletal:  No clubbing, cyanosis or edema bilaterally. Full range of motion without deformity.   Skin: Skin color, texture, turgor normal.  No rashes or lesions. Neurologic:  Neurovascularly intact without any focal sensory/motor deficits. Cranial nerves: II-XII intact, grossly non-focal.  Psychiatric:  Alert and oriented, thought content appropriate, normal insight  Capillary Refill: Brisk,< 3 seconds   Peripheral Pulses: +2 palpable, equal bilaterally       Labs: For convenience and continuity at follow-up the following most recent labs are provided:      CBC:    Lab Results   Component Value Date/Time    WBC 1.2 07/08/2022 06:20 AM    HGB 7.3 07/08/2022 06:20 AM    HCT 21.4 07/08/2022 06:20 AM    PLT 64 07/08/2022 06:20 AM       Renal:    Lab Results   Component Value Date/Time     07/08/2022 06:20 AM    K 4.2 07/08/2022 06:20 AM    K 3.6 07/05/2022 06:40 AM     07/08/2022 06:20 AM    CO2 24 07/08/2022 06:20 AM    BUN 30 07/08/2022 06:20 AM    CREATININE 1.1 07/08/2022 06:20 AM    CALCIUM 8.5 07/08/2022 06:20 AM    PHOS 2.0 07/04/2022 07:28 AM         Significant Diagnostic Studies    Radiology:   CT BIOPSY BONE MARROW   Final Result   1. Status post successful CT guided bone marrow aspiration and core biopsy of   the left iliac bone. 2. Visualization of focal lytic/lucent lesions about the lumbosacral spine. Metastatic disease and multiple myeloma are in the differential diagnosis. CT GUIDED NEEDLE PLACEMENT   Final Result   1. Status post successful CT guided bone marrow aspiration and core biopsy of   the left iliac bone. 2. Visualization of focal lytic/lucent lesions about the lumbosacral spine. Metastatic disease and multiple myeloma are in the differential diagnosis. XR CHEST PORTABLE   Final Result   Interstitial-alveolar opacities with progression of the perihilar alveolar   components, likely pulmonary edema. XR CHEST PORTABLE   Final Result   Interstitial prominence and patchy opacities, suggestive of edema. US ABDOMEN LIMITED Specify organ?  LIVER, SPLEEN   Final Result   1. Cholelithiasis with equivocal cholecystitis. 2. Fatty infiltration of the liver. CT BIOPSY BONE MARROW    (Results Pending)          Consults:     IP CONSULT TO CARDIOLOGY  IP CONSULT TO GI  IP CONSULT TO NEPHROLOGY  IP CONSULT TO ONCOLOGY  IP CONSULT TO HOME CARE NEEDS    Disposition:  Home w home care    Condition at Discharge: Stable    Discharge Instructions/Follow-up:  See AVS    Code Status:  Prior     Activity: activity as tolerated    Diet: regular diet      Discharge Medications:     Discharge Medication List as of 7/8/2022  4:48 PM           Details   hydrALAZINE (APRESOLINE) 100 MG tablet Take 1 tablet by mouth every 8 hours, Disp-90 tablet, R-3Normal      carvedilol (COREG) 6.25 MG tablet Take 3 tablets by mouth 2 times daily, Disp-60 tablet, R-3Normal      isosorbide mononitrate (IMDUR) 60 MG extended release tablet Take 1 tablet by mouth daily, Disp-30 tablet, R-3Normal              Details   sertraline (ZOLOFT) 50 MG tablet Take 1 tablet by mouth nightly, Disp-90 tablet, R-0Normal      fenofibrate (TRIGLIDE) 160 MG tablet TAKE ONE TABLET BY MOUTH DAILY, Disp-90 tablet, R-0Normal      Multiple Vitamins-Minerals (MULTIVITAMIN PO) Take 1 tablet by mouth daily. Omega-3 Fatty Acids (FISH OIL PO) Take 1 tablet by mouth daily. Ascorbic Acid (VITAMIN C) 500 MG tablet Take 1,000 mg by mouth daily. Time Spent on discharge is more than 30 minutes in the examination, evaluation, counseling and review of medications and discharge plan. Signed:    ALESSIA Jensen - CNP   7/12/2022      Thank you Javier Rao MD for the opportunity to be involved in this patient's care. If you have any questions or concerns, please feel free to contact me at [phone].

## 2022-07-13 ENCOUNTER — TELEPHONE (OUTPATIENT)
Dept: INTERNAL MEDICINE CLINIC | Age: 82
End: 2022-07-13

## 2022-07-13 RX ORDER — ESCITALOPRAM OXALATE 5 MG/1
5 TABLET ORAL DAILY
Qty: 30 TABLET | Refills: 0 | Status: SHIPPED
Start: 2022-07-13 | End: 2022-07-26 | Stop reason: ALTCHOICE

## 2022-07-13 NOTE — TELEPHONE ENCOUNTER
----- Message from Vida Begum sent at 7/13/2022  3:16 PM EDT -----  Contact: Estela Molina [phone]  Lexapro 5 mg per Dr Rj Gagnon  ----- Message -----  From: Lilly Jesi  Sent: 7/13/2022   1:11 PM EDT  To: Park Nguyen MD    Wanting to know if you will prescribe patient an SSRI. Patient DOES NOT want Zoloft. She thought a low dose of Lexapro? #Patient has history of GI bleed but also Afib. Nurse said no aspirin is included in his current med list. She is just checking if that is still correct.   ----- Message -----  From: Ayla Petersen: 7/13/2022  12:54 PM EDT  To: Pranav Moon for verbal orders and patient can continue norco per Dr Rj Gagnon  ----- Message -----  From: Vida Begum  Sent: 7/13/2022   8:41 AM EDT  To: Park Nguyen MD    [id number] [facility] [address] for verbal orders for skilled nursing, ot, pt, and speech? [address] [place] [address],[address] was stopped at dc but patient is still taking. Please advise.  ----- Message -----  From: Park Nguyen MD  Sent: 7/13/2022   8:38 AM EDT  To: Shanell Herrera    Lexapro 5 mg daily. We can decide on anti coagulation during office follow up  ----- Message -----  From: Matthieu Bowman  Sent: 7/12/2022   5:07 PM EDT  To: Park Nguyen MD    #Requesting verbal orders for Skilled Nursing, PT, OT, and Speech. Patient is having some issues with swallowing. #Norco was stopped at dc and patient has some at home he is still taking. Please advise. #Patient has history of GI bleed but also Afib. Nurse said no aspirin is included in his current med list. She is just checking if that is still correct. #Patient admits to depression and used to take 50 mg of Zoloft but stopped due it making him too tired and has not been taking it for quite some time. He was given the Zoloft while in the hospital for 3 days. Nurse is asking for a possible different medication that will not make him drowsy. Please advise.      Thank you

## 2022-07-15 ENCOUNTER — TELEPHONE (OUTPATIENT)
Dept: INTERNAL MEDICINE CLINIC | Age: 82
End: 2022-07-15

## 2022-07-15 NOTE — TELEPHONE ENCOUNTER
----- Message from Maria Fernanda Thomas MD sent at 7/15/2022  9:49 AM EDT -----  Contact: Penny Handing [phone]  ok  ----- Message -----  From: Mag Rain MA  Sent: 7/15/2022   9:18 AM EDT  To: Maria Fernanda Thomas MD Caro Handing from [facility] needs verbal orders for Senior service care so they can do home modifications and also needs verbal orders for OT 2 times a week for 2 weeks

## 2022-07-20 PROBLEM — D46.9 MYELODYSPLASTIC SYNDROME (HCC): Status: ACTIVE | Noted: 2022-07-20

## 2022-07-20 RX ORDER — ALBUTEROL SULFATE 90 UG/1
4 AEROSOL, METERED RESPIRATORY (INHALATION) PRN
Status: CANCELLED | OUTPATIENT
Start: 2022-07-20

## 2022-07-20 RX ORDER — ACETAMINOPHEN 325 MG/1
650 TABLET ORAL
Status: CANCELLED | OUTPATIENT
Start: 2022-07-20

## 2022-07-20 RX ORDER — ONDANSETRON 2 MG/ML
8 INJECTION INTRAMUSCULAR; INTRAVENOUS
Status: CANCELLED | OUTPATIENT
Start: 2022-07-20

## 2022-07-20 RX ORDER — SODIUM CHLORIDE 9 MG/ML
INJECTION, SOLUTION INTRAVENOUS CONTINUOUS
Status: CANCELLED | OUTPATIENT
Start: 2022-07-20

## 2022-07-20 RX ORDER — DIPHENHYDRAMINE HYDROCHLORIDE 50 MG/ML
50 INJECTION INTRAMUSCULAR; INTRAVENOUS
Status: CANCELLED | OUTPATIENT
Start: 2022-07-20

## 2022-07-21 ENCOUNTER — HOSPITAL ENCOUNTER (OUTPATIENT)
Dept: ONCOLOGY | Age: 82
Setting detail: INFUSION SERIES
Discharge: HOME | End: 2022-07-21
Payer: COMMERCIAL

## 2022-07-21 VITALS
DIASTOLIC BLOOD PRESSURE: 40 MMHG | RESPIRATION RATE: 18 BRPM | HEART RATE: 62 BPM | SYSTOLIC BLOOD PRESSURE: 119 MMHG | TEMPERATURE: 99.3 F | OXYGEN SATURATION: 97 %

## 2022-07-21 DIAGNOSIS — D46.9 MYELODYSPLASTIC SYNDROME (HCC): Primary | ICD-10-CM

## 2022-07-21 LAB
ABO/RH: NORMAL
ANTIBODY SCREEN: NORMAL
BLOOD BANK DISPENSE STATUS: NORMAL
BLOOD BANK DISPENSE STATUS: NORMAL
BLOOD BANK PRODUCT CODE: NORMAL
BLOOD BANK PRODUCT CODE: NORMAL
BPU ID: NORMAL
BPU ID: NORMAL
DESCRIPTION BLOOD BANK: NORMAL
DESCRIPTION BLOOD BANK: NORMAL

## 2022-07-21 PROCEDURE — P9040 RBC LEUKOREDUCED IRRADIATED: HCPCS

## 2022-07-21 PROCEDURE — 86923 COMPATIBILITY TEST ELECTRIC: CPT

## 2022-07-21 PROCEDURE — 36430 TRANSFUSION BLD/BLD COMPNT: CPT

## 2022-07-21 PROCEDURE — 86901 BLOOD TYPING SEROLOGIC RH(D): CPT

## 2022-07-21 PROCEDURE — 86850 RBC ANTIBODY SCREEN: CPT

## 2022-07-21 PROCEDURE — 6370000000 HC RX 637 (ALT 250 FOR IP): Performed by: INTERNAL MEDICINE

## 2022-07-21 PROCEDURE — 86900 BLOOD TYPING SEROLOGIC ABO: CPT

## 2022-07-21 RX ORDER — SODIUM CHLORIDE 9 MG/ML
20 INJECTION, SOLUTION INTRAVENOUS CONTINUOUS
Status: CANCELLED | OUTPATIENT
Start: 2022-07-21

## 2022-07-21 RX ORDER — ALBUTEROL SULFATE 90 UG/1
4 AEROSOL, METERED RESPIRATORY (INHALATION) PRN
Status: CANCELLED | OUTPATIENT
Start: 2022-07-21

## 2022-07-21 RX ORDER — SODIUM CHLORIDE 0.9 % (FLUSH) 0.9 %
5-40 SYRINGE (ML) INJECTION PRN
Status: CANCELLED | OUTPATIENT
Start: 2022-07-21

## 2022-07-21 RX ORDER — DIPHENHYDRAMINE HCL 25 MG
25 TABLET ORAL ONCE
Status: CANCELLED | OUTPATIENT
Start: 2022-07-21 | End: 2022-07-21

## 2022-07-21 RX ORDER — SODIUM CHLORIDE 9 MG/ML
25 INJECTION, SOLUTION INTRAVENOUS PRN
Status: DISCONTINUED | OUTPATIENT
Start: 2022-07-21 | End: 2022-07-22 | Stop reason: HOSPADM

## 2022-07-21 RX ORDER — ONDANSETRON 2 MG/ML
8 INJECTION INTRAMUSCULAR; INTRAVENOUS
Status: CANCELLED | OUTPATIENT
Start: 2022-07-21

## 2022-07-21 RX ORDER — SODIUM CHLORIDE 9 MG/ML
25 INJECTION, SOLUTION INTRAVENOUS PRN
Status: CANCELLED | OUTPATIENT
Start: 2022-07-21

## 2022-07-21 RX ORDER — DIPHENHYDRAMINE HCL 25 MG
25 TABLET ORAL ONCE
Status: COMPLETED | OUTPATIENT
Start: 2022-07-21 | End: 2022-07-21

## 2022-07-21 RX ORDER — DIPHENHYDRAMINE HYDROCHLORIDE 50 MG/ML
50 INJECTION INTRAMUSCULAR; INTRAVENOUS
Status: CANCELLED | OUTPATIENT
Start: 2022-07-21

## 2022-07-21 RX ORDER — SODIUM CHLORIDE 9 MG/ML
20 INJECTION, SOLUTION INTRAVENOUS CONTINUOUS
Status: DISCONTINUED | OUTPATIENT
Start: 2022-07-21 | End: 2022-07-22 | Stop reason: HOSPADM

## 2022-07-21 RX ORDER — SODIUM CHLORIDE 9 MG/ML
INJECTION, SOLUTION INTRAVENOUS CONTINUOUS
Status: CANCELLED | OUTPATIENT
Start: 2022-07-21

## 2022-07-21 RX ORDER — SODIUM CHLORIDE 0.9 % (FLUSH) 0.9 %
5-40 SYRINGE (ML) INJECTION PRN
Status: DISCONTINUED | OUTPATIENT
Start: 2022-07-21 | End: 2022-07-22 | Stop reason: HOSPADM

## 2022-07-21 RX ORDER — ACETAMINOPHEN 325 MG/1
650 TABLET ORAL
Status: CANCELLED | OUTPATIENT
Start: 2022-07-21

## 2022-07-21 RX ORDER — ACETAMINOPHEN 325 MG/1
650 TABLET ORAL ONCE
Status: CANCELLED | OUTPATIENT
Start: 2022-07-21 | End: 2022-07-21

## 2022-07-21 RX ORDER — ACETAMINOPHEN 325 MG/1
650 TABLET ORAL ONCE
Status: COMPLETED | OUTPATIENT
Start: 2022-07-21 | End: 2022-07-21

## 2022-07-21 RX ADMIN — ACETAMINOPHEN 650 MG: 325 TABLET ORAL at 10:24

## 2022-07-21 RX ADMIN — DIPHENHYDRAMINE HYDROCHLORIDE 25 MG: 25 TABLET, FILM COATED ORAL at 10:24

## 2022-07-21 NOTE — PROGRESS NOTES
Pt seen at [facility] for 2 units Packed red blood cell transfusion per orders from Dr. Soledad oLuis. Informed consent verified. Pre-medications administered as ordered. Transfused per policy. Pt tolerated transfusion well and without incident. Pt verbalizes understanding of discharge instructions. Discharged to home with self.      Melani Rios RN

## 2022-07-22 ENCOUNTER — TELEPHONE (OUTPATIENT)
Dept: INTERNAL MEDICINE CLINIC | Age: 82
End: 2022-07-22

## 2022-07-22 NOTE — TELEPHONE ENCOUNTER
----- Message from Luis Suarez MD sent at 7/22/2022  2:36 PM EDT -----  Contact: [address] [place] [address]   [phone]  Okshruthi  ----- Message -----  From: Jamescastro Dallas  Sent: 7/22/2022   1:46 PM EDT  To: Luis Suarez MD    [address] called. And stated she would like a extension for the social work on the patient.

## 2022-07-25 ENCOUNTER — TELEPHONE (OUTPATIENT)
Dept: INTERNAL MEDICINE CLINIC | Age: 82
End: 2022-07-25

## 2022-07-25 RX ORDER — FLUOXETINE 10 MG/1
CAPSULE ORAL
Qty: 30 CAPSULE | Refills: 0 | Status: SHIPPED | OUTPATIENT
Start: 2022-07-25 | End: 2022-07-28 | Stop reason: SDUPTHER

## 2022-07-26 ENCOUNTER — TELEPHONE (OUTPATIENT)
Dept: INTERNAL MEDICINE CLINIC | Age: 82
End: 2022-07-26

## 2022-07-26 RX ORDER — TRAZODONE HYDROCHLORIDE 50 MG/1
50 TABLET ORAL NIGHTLY
Qty: 30 TABLET | Refills: 0 | Status: SHIPPED | OUTPATIENT
Start: 2022-07-26 | End: 2022-07-28 | Stop reason: SDUPTHER

## 2022-07-26 NOTE — TELEPHONE ENCOUNTER
----- Message from Jerome Frank MD sent at 7/26/2022  1:38 PM EDT -----  Contact: [place] John Paul [phone]  Trazodone 50 mg po qhs #30  ----- Message -----  From: Buddy Benton MA  Sent: 7/26/2022  12:26 PM EDT  To: Jerome Frank MD    Gabriela from [facility] called said the pt is still having difficulty sleeping he is very restless they have tried Melatonin 5 mg and then 10 mg and not having any improvements in the past couple weeks please advise         [facility] [id number] [place], [place] - [address] [facility] [phone] - F [phone] (Ph: [phone])

## 2022-07-27 ENCOUNTER — HOSPITAL ENCOUNTER (OUTPATIENT)
Age: 82
Discharge: HOME | End: 2022-07-27
Payer: COMMERCIAL

## 2022-07-27 LAB
ABO/RH: NORMAL
ANTIBODY SCREEN: NORMAL

## 2022-07-27 PROCEDURE — 86900 BLOOD TYPING SEROLOGIC ABO: CPT

## 2022-07-27 PROCEDURE — 86850 RBC ANTIBODY SCREEN: CPT

## 2022-07-27 PROCEDURE — 86901 BLOOD TYPING SEROLOGIC RH(D): CPT

## 2022-07-27 PROCEDURE — 86923 COMPATIBILITY TEST ELECTRIC: CPT

## 2022-07-28 ENCOUNTER — CLINICAL DOCUMENTATION (OUTPATIENT)
Dept: SPIRITUAL SERVICES | Age: 82
End: 2022-07-28

## 2022-07-28 ENCOUNTER — OFFICE VISIT (OUTPATIENT)
Dept: INTERNAL MEDICINE CLINIC | Age: 82
End: 2022-07-28

## 2022-07-28 ENCOUNTER — HOSPITAL ENCOUNTER (OUTPATIENT)
Dept: NURSING | Age: 82
Setting detail: INFUSION SERIES
Discharge: HOME | End: 2022-07-28
Payer: COMMERCIAL

## 2022-07-28 VITALS
HEIGHT: 73 IN | BODY MASS INDEX: 22.8 KG/M2 | SYSTOLIC BLOOD PRESSURE: 110 MMHG | WEIGHT: 172 LBS | DIASTOLIC BLOOD PRESSURE: 80 MMHG | HEART RATE: 70 BPM | RESPIRATION RATE: 12 BRPM

## 2022-07-28 VITALS
BODY MASS INDEX: 22.8 KG/M2 | RESPIRATION RATE: 16 BRPM | TEMPERATURE: 98.3 F | SYSTOLIC BLOOD PRESSURE: 133 MMHG | DIASTOLIC BLOOD PRESSURE: 63 MMHG | HEIGHT: 73 IN | HEART RATE: 47 BPM | WEIGHT: 172 LBS | OXYGEN SATURATION: 96 %

## 2022-07-28 DIAGNOSIS — D69.6 THROMBOCYTOPENIA (HCC): ICD-10-CM

## 2022-07-28 DIAGNOSIS — Z71.89 COUNSELING FOR LIVING WILL: ICD-10-CM

## 2022-07-28 DIAGNOSIS — I42.9 CARDIOMYOPATHY, UNSPECIFIED TYPE (HCC): ICD-10-CM

## 2022-07-28 DIAGNOSIS — N40.1 ENLARGED PROSTATE WITH URINARY OBSTRUCTION: ICD-10-CM

## 2022-07-28 DIAGNOSIS — I50.22 CHRONIC SYSTOLIC (CONGESTIVE) HEART FAILURE (HCC): ICD-10-CM

## 2022-07-28 DIAGNOSIS — F32.1 CURRENT MODERATE EPISODE OF MAJOR DEPRESSIVE DISORDER WITHOUT PRIOR EPISODE (HCC): ICD-10-CM

## 2022-07-28 DIAGNOSIS — D46.9 MYELODYSPLASTIC SYNDROME (HCC): Primary | ICD-10-CM

## 2022-07-28 DIAGNOSIS — D46.9 MYELODYSPLASTIC SYNDROME (HCC): ICD-10-CM

## 2022-07-28 DIAGNOSIS — G89.29 CHRONIC BILATERAL LOW BACK PAIN WITHOUT SCIATICA: ICD-10-CM

## 2022-07-28 DIAGNOSIS — M54.50 CHRONIC BILATERAL LOW BACK PAIN WITHOUT SCIATICA: ICD-10-CM

## 2022-07-28 DIAGNOSIS — N13.8 ENLARGED PROSTATE WITH URINARY OBSTRUCTION: ICD-10-CM

## 2022-07-28 DIAGNOSIS — E78.2 MIXED HYPERLIPIDEMIA: ICD-10-CM

## 2022-07-28 DIAGNOSIS — I10 PRIMARY HYPERTENSION: ICD-10-CM

## 2022-07-28 DIAGNOSIS — I48.0 PAROXYSMAL ATRIAL FIBRILLATION (HCC): Primary | ICD-10-CM

## 2022-07-28 LAB
BLOOD BANK DISPENSE STATUS: NORMAL
BLOOD BANK DISPENSE STATUS: NORMAL
BLOOD BANK PRODUCT CODE: NORMAL
BLOOD BANK PRODUCT CODE: NORMAL
BPU ID: NORMAL
BPU ID: NORMAL
DESCRIPTION BLOOD BANK: NORMAL
DESCRIPTION BLOOD BANK: NORMAL

## 2022-07-28 PROCEDURE — 1123F ACP DISCUSS/DSCN MKR DOCD: CPT | Performed by: INTERNAL MEDICINE

## 2022-07-28 PROCEDURE — P9040 RBC LEUKOREDUCED IRRADIATED: HCPCS

## 2022-07-28 PROCEDURE — 99203 OFFICE O/P NEW LOW 30 MIN: CPT

## 2022-07-28 PROCEDURE — 36430 TRANSFUSION BLD/BLD COMPNT: CPT

## 2022-07-28 PROCEDURE — 99215 OFFICE O/P EST HI 40 MIN: CPT | Performed by: INTERNAL MEDICINE

## 2022-07-28 RX ORDER — HYDROCODONE BITARTRATE AND ACETAMINOPHEN 5; 325 MG/1; MG/1
1 TABLET ORAL 2 TIMES DAILY PRN
Qty: 60 TABLET | Refills: 0 | Status: ON HOLD
Start: 2022-07-28 | End: 2022-08-30 | Stop reason: HOSPADM

## 2022-07-28 RX ORDER — TRAZODONE HYDROCHLORIDE 50 MG/1
50 TABLET ORAL NIGHTLY
Qty: 30 TABLET | Refills: 0 | Status: SHIPPED | OUTPATIENT
Start: 2022-07-28 | End: 2022-09-23

## 2022-07-28 RX ORDER — TAMSULOSIN HYDROCHLORIDE 0.4 MG/1
0.4 CAPSULE ORAL DAILY
Qty: 90 CAPSULE | Refills: 1 | Status: SHIPPED | OUTPATIENT
Start: 2022-07-28

## 2022-07-28 RX ORDER — FUROSEMIDE 20 MG/1
20 TABLET ORAL DAILY
Qty: 30 TABLET | Refills: 1 | Status: SHIPPED | OUTPATIENT
Start: 2022-07-28 | End: 2022-09-22

## 2022-07-28 RX ORDER — DIPHENHYDRAMINE HCL 25 MG
25 TABLET ORAL ONCE
Status: DISCONTINUED | OUTPATIENT
Start: 2022-07-28 | End: 2022-07-29 | Stop reason: HOSPADM

## 2022-07-28 RX ORDER — ACETAMINOPHEN 325 MG/1
650 TABLET ORAL ONCE
Status: DISCONTINUED | OUTPATIENT
Start: 2022-07-28 | End: 2022-07-29 | Stop reason: HOSPADM

## 2022-07-28 RX ORDER — SENNA AND DOCUSATE SODIUM 50; 8.6 MG/1; MG/1
2 TABLET, FILM COATED ORAL 2 TIMES DAILY PRN
Qty: 120 TABLET | Refills: 2 | Status: SHIPPED | OUTPATIENT
Start: 2022-07-28 | End: 2022-08-25

## 2022-07-28 RX ORDER — FLUOXETINE 10 MG/1
CAPSULE ORAL
Qty: 90 CAPSULE | Refills: 0 | Status: SHIPPED | OUTPATIENT
Start: 2022-07-28

## 2022-07-28 RX ORDER — FENOFIBRATE 160 MG/1
TABLET ORAL
Qty: 90 TABLET | Refills: 0 | Status: SHIPPED | OUTPATIENT
Start: 2022-07-28

## 2022-07-28 ASSESSMENT — ENCOUNTER SYMPTOMS
ABDOMINAL PAIN: 0
SHORTNESS OF BREATH: 0
NAUSEA: 0
BACK PAIN: 0
WHEEZING: 0
VOMITING: 0
RHINORRHEA: 0

## 2022-07-28 NOTE — PROGRESS NOTES
2nd unit of PRBC's has infused without any signs of adverse reactions  IV site unremarkable  Pt without c/o's NS infusing to clear tubing   Will monitor

## 2022-07-28 NOTE — PROGRESS NOTES
Pt here via w/c with family for 2 units of PRBC's  Pt without c/o's today  Current H&H is 6.3 & 18.8 Informed consent has been received from Dr. Jhon Bethea and is in the chart n pt has had blood before and denies any questions or concerns at this time    Blood consent obtained  IV # 22 started in inner LFA on 1st attempt per myself   Pt  jessica well  pt refused pre meds of tylenol 650 mg and benadryl 25 mg  1st unit of PRBC's started at 60 cc hr  IV site unremarkable  Pt jessica well  Will stay with pt for 15 mins to monitor for signs of adverse reactions  Blankets have been applied for comfort

## 2022-07-28 NOTE — PROGRESS NOTES
[facility] CARDIOLOGY FOLLOW UP        Patient Name: Sandor Coffman  Primary Care physician: Jacquelin Cooks, MD    Reason for Referral/Chief Complaint: Sandor Coffman is a 79 y.o. patient who is referred to cardiology clinic today for hospital follow up . History of Present Illness:   Sandor Coffman 79 y.o. with a prior medical history notable for new onset atrial fibrillation, new onset cardiomyopathy, anemia, hypertension, hyperlipidemia, chronic kidney disease, carotid disease, and depressive disorder who presents today for post-hospitalization follow up. Pt was admitted  06/28/2022 -07/08/2022 was a transfer from [facility] for new onset atrial fibrillation with rapid ventricular rates, acute kidney injury and acute anemia with positive fecal occult. Troponin 0.04,0.06 and 0.10. Echo on 06/29/2022 EF 45-50%. Grade I diastolic dysfunction, mild mitral and tricuspid reguritation, mild aortic stenosis. He was also seen by oncology for abnormal cbc and pancytopenia. EP was asked to evaluate the patient and noted Paroxysmal atrial fibrillation, flutter, and paroxysms of atrial tachycardia with rate-dep LBBB. Course was complicated with acute kidney injury and anemia limiting us from proceeding with angiogram for myopathy. GDMT initiated. Today, presents in wheelchair with daughter and step daughter. Sees hematology, Dr. Lisandra Naidu and is being evaluated for his anemia. Noted lower BP today. Dizziness endorsed. Does have trouble at night catching his breath. Denies paroxysmal nocturnal dyspnea, increasing lower extremity edema or weight gain. Weight was 172 at discharge and is virtually unchanged. Started lasix 7/28 per PCP. The patient denies chest pain. Denies palpitations, dizziness, near-syncope or varun syncope.      Past Medical History:   has a past medical history of Anosmia, Backpain, Bilateral carotid bruits, Chronic kidney disease, Current moderate episode of major depressive disorder without prior episode ([facility] [address] Hyperlipidemia, Hypertension, Hypertrophy of prostate without urinary obstruction and other lower urinary tract symptoms (LUTS), Nonrheumatic aortic valve insufficiency, Rosacea, and Taste perversion. Surgical History:   has a past surgical history that includes hernia repair; Colonoscopy (2/11/2008); Colonoscopy (11/14/2014); and CT BIOPSY BONE MARROW (7/5/2022). Social History:   reports that he has never smoked. He has never used smokeless tobacco. He reports that he does not drink alcohol and does not use drugs. Family History:  family history includes COPD in his brother and sister. Home Medications:  Were reviewed and are listed in nursing record and/or below  Prior to Admission medications    Medication Sig Start Date End Date Taking? Authorizing Provider   traZODone (DESYREL) 50 MG tablet Take 1 tablet by mouth nightly 7/28/22  Yes Hipolito Begum MD   FLUoxetine (PROZAC) 10 MG capsule Take one capsule by mouth daily. 7/28/22  Yes Hipolito Begum MD   fenofibrate (TRIGLIDE) 160 MG tablet TAKE ONE TABLET BY MOUTH DAILY 7/28/22  Yes Hipolito Begum MD   sennosides-docusate sodium (SENOKOT-S) 8.6-50 MG tablet Take 2 tablets by mouth 2 times daily as needed for Constipation 7/28/22  Yes Hipolito Begum MD   HYDROcodone-acetaminophen (NORCO) 5-325 MG per tablet Take 1 tablet by mouth 2 times daily as needed for Pain for up to 30 days.  7/28/22 8/27/22 Yes Hipolito Begum MD   tamsulosin (FLOMAX) 0.4 MG capsule Take 1 capsule by mouth in the morning. 7/28/22  Yes Hipolito Begum MD   furosemide (LASIX) 20 MG tablet Take 1 tablet by mouth in the morning. 7/28/22  Yes Hipolito Begum MD   hydrALAZINE (APRESOLINE) 100 MG tablet Take 1 tablet by mouth every 8 hours 7/8/22  Yes ALESSIA oCley CNP   carvedilol (COREG) 6.25 MG tablet Take 3 tablets by mouth 2 times daily 7/8/22  Yes ALESSIA Coley CNP   isosorbide mononitrate (IMDUR) 60 MG extended release tablet Take 1 tablet by mouth daily 7/9/22  Yes Enrique Angie, APRN - CNP   Ascorbic Acid (VITAMIN C) 500 MG tablet Take 1,000 mg by mouth daily. Yes Historical Provider, MD   Multiple Vitamins-Minerals (MULTIVITAMIN PO) Take 1 tablet by mouth daily. Patient not taking: Reported on 8/2/2022    Historical Provider, MD   Omega-3 Fatty Acids (FISH OIL PO) Take 1 tablet by mouth daily. Patient not taking: No sig reported    Historical Provider, MD        CURRENT Medications:  No current facility-administered medications for this visit. Allergies:  Patient has no known allergies. Review of Systems:   A 14 point review of symptoms completed. Pertinent positives identified in the HPI, all other review of symptoms negative as below. Wt Readings from Last 3 Encounters:   08/02/22 171 lb (77.6 kg)   07/28/22 172 lb (78 kg)   07/28/22 172 lb (78 kg)         Objective:     Vitals:    08/02/22 1410   BP: (!) 96/38   Pulse: 55   SpO2: 98%    Weight: 171 lb (77.6 kg)       PHYSICAL EXAM:    General:  Alert, cooperative, no distress, appears stated age   Head:  Normocephalic, atraumatic   Eyes:  Conjunctiva/corneas clear, anicteric sclerae    Nose: Nares normal, no drainage or sinus tenderness   Throat: No abnormalities of the lips, oral mucosa or tongue. Neck: Trachea midline. Neck supple with no lymphadenopathy, thyroid not enlarged, symmetric, no tenderness/mass/nodules, flat neck vv    Lungs:   Clear to auscultation bilaterally, no wheezes, no rales, no respiratory distress   Chest Wall:  No deformity or tenderness to palpation   Heart:  Regular rate and rhythm, normal S1, normal S2, no murmur, no rub, no S3/S4, PMI non-displaced. Abdomen:   Soft, non-tender, with normoactive bowel sounds. No masses, no hepatosplenomegaly   Extremities: No cyanosis, clubbing or pitting edema. Vascular: 2+ radial, posterior tibial pulses bilaterally.  Brisk carotid upstrokes without carotid bruit. Skin: Skin color, texture, turgor are normal with no rashes or ulceration. Pysch: Euthymic mood, appropriate affect   Neurologic: Oriented to person, place and time. No slurred speech or facial asymmetry. No motor or sensory deficits on gross examination. Labs:   CBC:   Lab Results   Component Value Date/Time    WBC 1.2 2022 06:20 AM    RBC 2.21 2022 06:20 AM    HGB 7.3 2022 06:20 AM    HCT 21.4 2022 06:20 AM    MCV 97.0 2022 06:20 AM    RDW 20.5 2022 06:20 AM    PLT 64 2022 06:20 AM     CMP:  Lab Results   Component Value Date/Time     2022 06:20 AM    K 4.2 2022 06:20 AM    K 3.6 2022 06:40 AM     2022 06:20 AM    CO2 24 2022 06:20 AM    BUN 30 2022 06:20 AM    CREATININE 1.1 2022 06:20 AM    GFRAA >60 2022 06:20 AM    AGRATIO 1.4 2022 06:40 AM    LABGLOM >60 2022 06:20 AM    LABGLOM 53 2016 02:55 PM    GLUCOSE 187 2022 06:20 AM    GLUCOSE 78 2012 02:47 PM    PROT 5.1 2022 06:40 AM    PROT 7.3 2012 02:47 PM    CALCIUM 8.5 2022 06:20 AM    BILITOT 1.1 2022 06:40 AM    ALKPHOS 60 2022 06:40 AM    AST 20 2022 06:40 AM    ALT 11 2022 06:40 AM     PT/INR:  No results found for: PTINR  HgBA1c:  Lab Results   Component Value Date    LABA1C 5.8 2022     Lab Results   Component Value Date    CKTOTAL 144 2022    TROPONINI 0.10 (H) 2022    TROPONINI 0.10 (H) 2022         Cardiac Data:     EK2022   Poor data quality, interpretation may be adversely affected  Sinus rhythm with Fusion complexes  Left ventricular hypertrophy with QRS widening and repolarization abnormality  Abnormal ECG  When compared with ECG of 2022 12:40,  Sinus rhythm has replaced Atrial fibrillation  Vent.  rate has decreased BY 53 BPM      Echo: 2022   Summary   Normal left ventricular size with mild concentric left ventricular   hypertrophy. The left ventricular systolic function is mildly reduced with an ejection   fraction of 45 - 50 %. Basal to mid inferior, basal inferoseptal hypokinesis. Septal bounce noted. Grade I diastolic dysfunction with normal filling pressure. Normal right ventricular size with normal function. Mild mitral and tricuspid regurgitation. Mild aortic stenosis. Systolic pulmonic artery pressure (SPAP) is estimated at 48 mmHg consistent   with mild pulmonary hypertension (Right atrial pressure of 8 mmHg). Compared with the previous study performed 5-, left ventricular   function decreased with new regional wall motion abnormalities noted. Echo 5/18/2016   Conclusions   Summary   Normal left ventricle size, wall thickness and systolic function with an   estimated ejection fraction of 55%. No regional wall motion abnormalities are seen. Diastolic filling parameters suggests grade I diastolic dysfunction. Aortic valve leaflets appear mildly thickened. Mild aortic regurgitation. Carotid US  2016  Summary        1. There is minimal plaque seen in the right internal carotid artery with an    estimated diameter reduction of <50%. 2. There is minimal plaque seen in the left internal carotid artery with an    estimated diameter reduction of <50%. 3. The vertebral arteries are patent with antegrade flow bilaterally.               Impression and Plan:      Cardiomyopathy with mild LV dysfunction, suspected ischemic  Chronic heart failure with reduced EF, NYHA II  Suspected coronary artery disease without angina   Hypertension  Hyperlipidemia - low today  Atrial fibrillation  - maintaining sinus by exam  Atrial tachycardia   Rate-dependent LBBB  Mild AS  Mild PH  Chronic kidney disease  Pancytopenia - most recent hemoglobin 7.3, plt 64      Patient Active Problem List   Diagnosis    Hypertension    Rosacea    Mixed hyperlipidemia    Enlarged prostate with urinary obstruction    Taste perversion    Anosmia    Back pain    Nonrheumatic aortic valve insufficiency    Bilateral carotid bruits    Current moderate episode of major depressive disorder without prior episode (HCC)    Atrial fibrillation with RVR (HCC)    VINNY (acute kidney injury) (HCC)    Acute anemia    GI bleed    Thrombocytopenia (HCC)    Arrhythmia, atrial    Elevated troponin    Cardiomyopathy (HCC)    NSVT (nonsustained ventricular tachycardia) (HCC)    Myelodysplastic syndrome (HCC)    Chronic systolic (congestive) heart failure       PLAN:  Decrease hydralazine from 100 mg to 50 mg three times a day (may take 1/2 100mg tab three times daily until gone then start the new 50 mg tab three time per day thereafter)  Decrease Carvedilol (Coreg) to 2 tabs (12.5 mg) in the am and 2 tabs in the pm (we will write new script to take one 12.5 mg tab twice daily once these are gone). Follow up with EP for atrial arrhythmia management. He is NOT on anti-coagulation due to elevating bleeding risk/pancytopenia. He remains with low blood counts. He has been diagnosed with Myelodysplastic syndrome. He has opted for no chemotherapy. Following with [facility] with transfusions as needed. He is not a candidate for invasive angiography given this. Will hold off on any ischemic assessment and treat his CM medically. For any new onset angina, we will treat medically. Continues on hydralazine, imdur, coreg for GDMT with dose reductions as above. Will re-assess next visit, plan labs, adjust GDMT and assure BP improved. He was started on lasix per PCP - weight is stable. This is continued. for now. Follow up with Yesy Balderas in 6 weeks    This note is scribed in the presence of Mayco Mortensen by Jennifer Barksdale RN      The scribes documentation has been prepared under my direction and personally reviewed by me in its entirety.   I confirm that the note above accurately reflects all work, treatment, procedures, and medical decision making performed by me. Giovanni Paez MD, personally performed the services described in this documentation as scribed by Shama Silva RN in my presence, and it is both accurate and complete to the best of our ability. I will address the patient's cardiac risk factors and adjusted pharmacologic treatment as needed. In addition, I have reinforced the need for patient directed risk factor modification. All questions and concerns were addressed to the patient/family. Alternatives to my treatment were discussed. Thank you for allowing us to participate in the care of Gi Mcclendon. Please call me with any questions [phone].     Bishop Virk MD, [facility]  Cardiovascular Disease  [address]  [phone] [facility]  [phone] [address]  8/2/2022 2:19 PM

## 2022-07-28 NOTE — PROGRESS NOTES
Have been at pt's side for 15 mins  Blood infusing well without any signs of adverse reactions  IV site unremarkable   Will continue to monitor

## 2022-07-28 NOTE — PROGRESS NOTES
No changes noted  Pt has been resting quietly and dozing at times  No c/o's voiced   1st unit of PRBC's has infused without any signs of adverse reactions  2nd unit PRBC's started  IV site unremarkable  Pt jessica well  Will stay with pt for 15 mins to monitor for signs of adverse reactions

## 2022-07-28 NOTE — PROGRESS NOTES
Blood continues to infuse without any signs of adverse reactions  IV site unremarkable   Will continue to monitor  Pt dozing when not disturbed

## 2022-07-28 NOTE — PROGRESS NOTES
Subjective:      Patient ID: Ward Wilder is a 79 y.o. male. HPI    Patient is here for follow up on hypertension. Hyerplipidemia, CKD, rosacea and back pain. He was admitted to [facility]. He was diagnosed with rapid a fib. He was diagnosed with myelodysplastic syndrome. He has opted not to do chemotherapy. He will receive transfusion prn. He has seen Oncology. Patient's BP is controlled as he has not been taking his medication. No chest pain or dyspnea. No ER visits. His hyperlipidemia is usually well controlled but he has been without medication. His rosacea has been stable with metrogel. He uses it off and on. He has issues with back pain and takes vicodin prn. No constipation. Review of Systems   Constitutional:  Negative for activity change and appetite change. HENT:  Negative for postnasal drip and rhinorrhea. Respiratory:  Negative for shortness of breath and wheezing. Cardiovascular:  Negative for chest pain, palpitations and leg swelling. Gastrointestinal:  Negative for abdominal pain, nausea and vomiting. Genitourinary:  Negative for difficulty urinating and frequency. Musculoskeletal:  Negative for back pain and joint swelling. Skin:  Negative for rash. Neurological:  Negative for light-headedness. Psychiatric/Behavioral:  Negative for sleep disturbance.         Past Medical History:   Diagnosis Date    Anosmia 8/19/2011    Backpain     Bilateral carotid bruits 9/6/2016    Chronic kidney disease 9/6/2016    Current moderate episode of major depressive disorder without prior episode ([facility] [address] 1/23/2019    Hyperlipidemia     Hypertension     Hypertrophy of prostate without urinary obstruction and other lower urinary tract symptoms (LUTS)     Nonrheumatic aortic valve insufficiency 9/6/2016    Rosacea     Taste perversion 8/19/2011       Past Surgical History:   Procedure Laterality Date    COLONOSCOPY  2/11/2008    COLONOSCOPY  11/14/2014    CT BONE MARROW BIOPSY  7/5/2022    CT BONE MARROW BIOPSY 7/5/2022 Tete Alva MD [place] CT SCAN    HERNIA REPAIR         Family History   Problem Relation Age of Onset    COPD Sister     COPD Brother         Birth defect       Social History     Tobacco Use    Smoking status: Never    Smokeless tobacco: Never   Vaping Use    Vaping Use: Never used   Substance Use Topics    Alcohol use: No    Drug use: No           Current Outpatient Medications:     traZODone (DESYREL) 50 MG tablet, Take 1 tablet by mouth nightly, Disp: 30 tablet, Rfl: 0    FLUoxetine (PROZAC) 10 MG capsule, Take one capsule by mouth daily. , Disp: 30 capsule, Rfl: 0    hydrALAZINE (APRESOLINE) 100 MG tablet, Take 1 tablet by mouth every 8 hours, Disp: 90 tablet, Rfl: 3    carvedilol (COREG) 6.25 MG tablet, Take 3 tablets by mouth 2 times daily, Disp: 60 tablet, Rfl: 3    isosorbide mononitrate (IMDUR) 60 MG extended release tablet, Take 1 tablet by mouth daily, Disp: 30 tablet, Rfl: 3    fenofibrate (TRIGLIDE) 160 MG tablet, TAKE ONE TABLET BY MOUTH DAILY (Patient not taking: Reported on 7/21/2022), Disp: 90 tablet, Rfl: 0    Multiple Vitamins-Minerals (MULTIVITAMIN PO), Take 1 tablet by mouth daily. , Disp: , Rfl:     Omega-3 Fatty Acids (FISH OIL PO), Take 1 tablet by mouth daily. (Patient not taking: Reported on 7/21/2022), Disp: , Rfl:     Ascorbic Acid (VITAMIN C) 500 MG tablet, Take 1,000 mg by mouth daily. , Disp: , Rfl:     /80 (Site: Right Upper Arm, Position: Sitting, Cuff Size: Medium Adult)   Pulse 70   Resp 12   Ht 6' 1\" (1.854 m)   Wt 172 lb (78 kg)   BMI 22.69 kg/m²     Objective:   Physical Exam  HENT:      Head: Normocephalic and atraumatic. Eyes:      Pupils: Pupils are equal, round, and reactive to light. Neck:      Vascular: Carotid bruit present. Cardiovascular:      Rate and Rhythm: Normal rate and regular rhythm. Heart sounds: Murmur heard. No friction rub. No gallop.       Comments: No carotid bruit  Pulmonary:      Effort: Pulmonary effort is normal. No respiratory distress. Breath sounds: No wheezing or rales. Chest:      Chest wall: No tenderness. Abdominal:      General: Bowel sounds are normal. There is no distension. Palpations: Abdomen is soft. There is no mass. Tenderness: There is no abdominal tenderness. There is no guarding or rebound. Musculoskeletal:      Cervical back: Normal range of motion and neck supple. Neurological:      Mental Status: He is alert and oriented to person, place, and time. ECHO done on 5/18/16   Conclusions      Summary   Normal left ventricle size, wall thickness and systolic function with an   estimated ejection fraction of 55%. No regional wall motion abnormalities are seen. Diastolic filling parameters suggests grade I diastolic dysfunction. Aortic valve leaflets appear mildly thickened. Mild aortic regurgitation. Carotid US done 5/18/16      Summary        1. There is minimal plaque seen in the right internal carotid artery with an    estimated diameter reduction of <50%. 2. There is minimal plaque seen in the left internal carotid artery with an    estimated diameter reduction of <50%. 3. The vertebral arteries are patent with antegrade flow bilaterally. ECHO 6/29/22   Conclusions      Summary   Normal left ventricular size with mild concentric left ventricular   hypertrophy. The left ventricular systolic function is mildly reduced with an ejection   fraction of 45 - 50 %. Basal to mid inferior, basal inferoseptal hypokinesis. Septal bounce noted. Grade I diastolic dysfunction with normal filling pressure. Normal right ventricular size with normal function. Mild mitral and tricuspid regurgitation. Mild aortic stenosis. Systolic pulmonic artery pressure (SPAP) is estimated at 48 mmHg consistent   with mild pulmonary hypertension (Right atrial pressure of 8 mmHg).       Compared with the previous study performed 5-, left ventricular function decreased with new regional wall motion abnormalities noted. Signature      ------------------------------------------------------------------   Electronically signed by Daniel Delaney MD (Interpreting   physician) on 06/29/2022 at 11:15 AM    Assessment:       Diagnosis Orders   1. Paroxysmal atrial fibrillation (HCC)        2. Chronic bilateral low back pain without sciatica  HYDROcodone-acetaminophen (NORCO) 5-325 MG per tablet      3. Chronic systolic (congestive) heart failure        4. Myelodysplastic syndrome ([facility] [address]        5. Thrombocytopenia ([facility] [address]        6. Cardiomyopathy, unspecified type ([facility] [address]        7. Mixed hyperlipidemia        8. Primary hypertension        9. Enlarged prostate with urinary obstruction        10. Current moderate episode of major depressive disorder without prior episode [facility])                   Plan:          I reviewed his hospital visit. A fib resolved. Chronic systolic CHF stable. Start Lasix 20 mg po daily. MDS. Will not do chemotherapy. He is ok with blood transfusion. Bp is controlled. Continue current meds. He has mild CKD. Hyperlipidemia- at goal.   Rosacea- stable on metrogel. Back pain-on vicodin prn. Carotid bruit: US is negative. Mild aortic regurgitation/ASmonitor for now. Depression. Stable. BPH with LUTS. Start Flomax. Decrease calorie intake. Exercise,weight loss recommended. The current medical regimen is effective;  continue present plan and medications. See orders.

## 2022-07-28 NOTE — DISCHARGE INSTRUCTIONS
Blood Transfusion Information  WHAT IS A BLOOD TRANSFUSION? A transfusion is the replacement of blood or some of its parts. Blood is made up of multiple cells which provide different functions. Red blood cells carry oxygen and are used for blood loss replacement. White blood cells fight against infection. Platelets control bleeding. Plasma helps clot blood. Other blood products are available for specialized needs, such as hemophilia or other clotting disorders. BEFORE THE TRANSFUSION   Who gives blood for transfusions? You may be able to donate blood to be used at a later date on yourself (autologous donation). Relatives can be asked to donate blood. This is generally not any safer than if you have received blood from a stranger. The same precautions are taken to ensure safety when a relative's blood is donated. Healthy volunteers who are fully evaluated to make sure their blood is safe. This is blood bank blood. Transfusion therapy is the safest it has ever been in the practice of medicine. Before blood is taken from a donor, a complete history is taken to make sure that person has no history of diseases nor engages in risky social behavior (examples are intravenous drug use or sexual activity with multiple partners ) The donor's travel history is screened to minimize risk of transmitting infections, such as malaria. The donated blood is tested for signs of infectious diseases, such as HIV and hepatitis. The blood is then tested to be sure it is compatible with you in order to minimize the chance of a transfusion reaction. If you or a relative donates blood, this is often done in anticipation of surgery and is not appropriate for emergency situations. It takes many days to process the donated blood. RISKS AND COMPLICATIONS  Although transfusion therapy is very safe and saves many lives, the main dangers of transfusion include:   Getting an infectious disease. Developing a transfusion reaction. This is an allergic reaction to something in the blood you were given. Every precaution is taken to prevent this. The decision to have a blood transfusion has been considered carefully by your caregiver before blood is given. Blood is not given unless the benefits outweigh the risks. AFTER THE TRANSFUSION  Right after receiving a blood transfusion, you will usually feel much better and more energetic. This is especially true if your red blood cells have gotten low (anemic). The transfusion raises the level of the red blood cells which carry oxygen, and this usually causes an energy increase. The nurse administering the transfusion will monitor you carefully for complications. HOME CARE INSTRUCTIONS   No special instructions are needed after a transfusion. You may find your energy is better. Speak with your caregiver about any limitations on activity for underlying diseases you may have. SEEK MEDICAL CARE IF:   Your condition is not improving after your transfusion. You develop redness or irritation at the intravenous (IV) site. SEEK IMMEDIATE MEDICAL CARE IF:   Any of the following symptoms occur over the next 12 hours:  Shaking chills. You have a temperature by mouth above 102° F (38.9° C), not controlled by medicine. Chest, back, or muscle pain. People around you feel you are not acting correctly or are confused. Shortness of breath or difficulty breathing. Dizziness and fainting. You get a rash or develop hives. You have a decrease in urine output. Your urine turns a dark color or changes to pink, red, or brown. Any of the following symptoms occur over the next 10 days: You have a temperature by mouth above 102° F (38.9° C), not controlled by medicine. Shortness of breath. Weakness after normal activity. The white part of the eye turns yellow (jaundice). You have a decrease in the amount of urine or are urinating less often.   Your urine turns a dark color or changes to pink, red, or brown.        In case of emergency,  CALL 911  .      FOLLOW UP WITH DR. Gray Childress AS NEEDED OR SCHEDULED

## 2022-07-28 NOTE — ACP (ADVANCE CARE PLANNING)
Advance Care Planning   Ambulatory ACP Specialist Patient Outreach    Date:  7/28/2022  ACP Specialist:  Dina Fletcher    Outreach call to patient in follow-up to ACP Specialist referral from: Perlita Garcia MD    [x] PCP  [] Provider   [] Ambulatory Care Management [] Other for Reason:    [x] Advance Directive Assistance  [] Code Status Discussion  [] Complete Portable DNR Order  [] Discuss Goals of Care  [] Complete POST/MOST  [] Early ACP Decision-Making  [] Other    Date Referral Received:7/28/22    Today's Outreach:  [x] First   [] Second  [] Third                               First outreach made by [x]  phone  [] email []   [employer] by [place]     Intervention:  [] Spoke with Patient  [x] Left VM requesting return call      Outcome:   Left detailed VM for Patient to return call     Next Step:   [] ACP scheduled conversation  [x] Outreach again in two week               [] Email / Mail [address]  [] Email / Mail Advance Directive            [] Close Referral. Routing closure to referring provider/staff and to ACP Specialist . [] Closure Letter mailed to Patient with Invitation to Contact ACP Specialist if/when ready.     Thank you for this referral.

## 2022-07-28 NOTE — PROGRESS NOTES
Have been at pt's side for 15 mins plus Blood infusing well without any signs of adverse reactions  IV site unremarkable  IV rate increased to 125 cc hr   Will continue to monitor  pt refused offer of breakfast stating that he has already eaten

## 2022-07-28 NOTE — PROGRESS NOTES
Blood tubing clear  IV was removed  Cath tip intact  Pressure then pressure dressing applied and was secured with a coban dressing  IV site unremarkable  Pt jessica well  Discharge instructions were then reviewed with pt and copy was given  Understanding was verbalized  Pt was discharged via w/c with family in stable condition

## 2022-08-02 ENCOUNTER — OFFICE VISIT (OUTPATIENT)
Dept: CARDIOLOGY CLINIC | Age: 82
End: 2022-08-02
Payer: COMMERCIAL

## 2022-08-02 ENCOUNTER — TELEPHONE (OUTPATIENT)
Dept: CARDIOLOGY CLINIC | Age: 82
End: 2022-08-02

## 2022-08-02 VITALS
OXYGEN SATURATION: 98 % | HEART RATE: 55 BPM | WEIGHT: 171 LBS | HEIGHT: 73 IN | SYSTOLIC BLOOD PRESSURE: 106 MMHG | DIASTOLIC BLOOD PRESSURE: 44 MMHG | BODY MASS INDEX: 22.66 KG/M2

## 2022-08-02 DIAGNOSIS — I20.9 ANGINA PECTORIS, UNSPECIFIED (HCC): ICD-10-CM

## 2022-08-02 DIAGNOSIS — I48.91 ATRIAL FIBRILLATION WITH RVR (HCC): Primary | ICD-10-CM

## 2022-08-02 DIAGNOSIS — I25.119 ATHEROSCLEROSIS OF NATIVE CORONARY ARTERY OF NATIVE HEART WITH ANGINA PECTORIS (HCC): ICD-10-CM

## 2022-08-02 DIAGNOSIS — I42.9 CARDIOMYOPATHY, UNSPECIFIED TYPE (HCC): ICD-10-CM

## 2022-08-02 DIAGNOSIS — I49.8 ARRHYTHMIA, ATRIAL: ICD-10-CM

## 2022-08-02 DIAGNOSIS — I47.29 NSVT (NONSUSTAINED VENTRICULAR TACHYCARDIA): ICD-10-CM

## 2022-08-02 PROCEDURE — 1123F ACP DISCUSS/DSCN MKR DOCD: CPT | Performed by: INTERNAL MEDICINE

## 2022-08-02 PROCEDURE — 99214 OFFICE O/P EST MOD 30 MIN: CPT | Performed by: INTERNAL MEDICINE

## 2022-08-02 NOTE — LETTER
[address]  [address] [address]  Phone: [phone]  Fax: [phone]    Ellyn Moeller MD    August 16, 2022     Romana Vinson, [address] [place] [address]    Patient: Ila Pandey   MR Number: [id number]   YOB: 1940   Date of Visit: 8/2/2022       Dear Romana Vinson:    Thank you for referring Ila Pandey to me for evaluation/treatment. Below are the relevant portions of my assessment and plan of care. If you have questions, please do not hesitate to call me. I look forward to following Evonheidinile Daniel along with you.     Sincerely,      Ellyn Moeller MD

## 2022-08-02 NOTE — PATIENT INSTRUCTIONS
PLAN:  Recommend elevating legs when sitting. Decrease hydralazine from 100 mg to 50 mg three times a day (may take 1/2 100mg tab three times daily until gone then start the new 50 mg tab three time per day thereafter)  Decrease Carvedilol (Coreg) to 2 tabs in the am and 2 tabs in the pm (we will write new script to take one 12.5 mg tab twice daily once these are gone).    Follow up with EP doctor for Afib and Aflutter    Follow up with Milo Meeks in 6 weeks

## 2022-08-04 PROBLEM — I25.119 ATHEROSCLEROTIC HEART DISEASE OF NATIVE CORONARY ARTERY WITH UNSPECIFIED ANGINA PECTORIS (HCC): Status: ACTIVE | Noted: 2022-08-04

## 2022-08-04 PROBLEM — I20.9 ANGINA PECTORIS, UNSPECIFIED (HCC): Status: ACTIVE | Noted: 2022-08-04

## 2022-08-04 RX ORDER — HYDRALAZINE HYDROCHLORIDE 50 MG/1
50 TABLET, FILM COATED ORAL 3 TIMES DAILY
Qty: 90 TABLET | Refills: 0 | Status: ON HOLD
Start: 2022-08-04 | End: 2022-08-25 | Stop reason: CLARIF

## 2022-08-04 RX ORDER — ISOSORBIDE MONONITRATE 60 MG/1
60 TABLET, EXTENDED RELEASE ORAL DAILY
Qty: 90 TABLET | Refills: 3 | Status: SHIPPED | OUTPATIENT
Start: 2022-08-04

## 2022-08-04 RX ORDER — CARVEDILOL 12.5 MG/1
12.5 TABLET ORAL 2 TIMES DAILY
Qty: 180 TABLET | Refills: 3 | Status: SHIPPED | OUTPATIENT
Start: 2022-08-04 | End: 2022-08-15

## 2022-08-15 ENCOUNTER — CLINICAL DOCUMENTATION (OUTPATIENT)
Dept: SPIRITUAL SERVICES | Age: 82
End: 2022-08-15

## 2022-08-15 ENCOUNTER — OFFICE VISIT (OUTPATIENT)
Dept: CARDIOLOGY CLINIC | Age: 82
End: 2022-08-15
Payer: COMMERCIAL

## 2022-08-15 VITALS
BODY MASS INDEX: 23.06 KG/M2 | WEIGHT: 174 LBS | DIASTOLIC BLOOD PRESSURE: 40 MMHG | SYSTOLIC BLOOD PRESSURE: 106 MMHG | OXYGEN SATURATION: 96 % | HEART RATE: 47 BPM | HEIGHT: 73 IN

## 2022-08-15 DIAGNOSIS — I95.2 HYPOTENSION DUE TO DRUGS: ICD-10-CM

## 2022-08-15 DIAGNOSIS — I48.92 ATRIAL FLUTTER, UNSPECIFIED TYPE (HCC): ICD-10-CM

## 2022-08-15 DIAGNOSIS — I48.91 ATRIAL FIBRILLATION WITH RVR (HCC): Primary | ICD-10-CM

## 2022-08-15 DIAGNOSIS — I42.9 CARDIOMYOPATHY, UNSPECIFIED TYPE (HCC): ICD-10-CM

## 2022-08-15 PROCEDURE — 99204 OFFICE O/P NEW MOD 45 MIN: CPT | Performed by: INTERNAL MEDICINE

## 2022-08-15 PROCEDURE — 1123F ACP DISCUSS/DSCN MKR DOCD: CPT | Performed by: INTERNAL MEDICINE

## 2022-08-15 PROCEDURE — 93000 ELECTROCARDIOGRAM COMPLETE: CPT | Performed by: INTERNAL MEDICINE

## 2022-08-15 RX ORDER — CARVEDILOL 6.25 MG/1
6.25 TABLET ORAL 2 TIMES DAILY
Qty: 60 TABLET | Refills: 2 | Status: SHIPPED | OUTPATIENT
Start: 2022-08-15

## 2022-08-15 ASSESSMENT — ENCOUNTER SYMPTOMS
WHEEZING: 0
HEMATEMESIS: 0
HEMATOCHEZIA: 0
STRIDOR: 0
RIGHT EYE: 0
SHORTNESS OF BREATH: 0
LEFT EYE: 0

## 2022-08-15 NOTE — PATIENT INSTRUCTIONS
Plan:     Consider decreasing Imdur dosing. We will reach out to verify dose. Decrease carvedilol to 6.25 mg twice daily. Come to ER with any passing out spells. Come in for monitor placement next week. Follow up in 6 weeks.

## 2022-08-15 NOTE — ACP (ADVANCE CARE PLANNING)
Advance Care Planning   Ambulatory ACP Specialist Patient Outreach    Date:  8/15/2022  ACP Specialist:  Eamon Dixon    Outreach call to patient in follow-up to ACP Specialist referral from: Romana Vinson MD    [x] PCP  [] Provider   [] Ambulatory Care Management [] Other for Reason:    [x] Advance Directive Assistance  [] Code Status Discussion  [] Complete Portable DNR Order  [] Discuss Goals of Care  [] Complete POST/MOST  [] Early ACP Decision-Making  [] Other    Date Referral Received:7/28/22    Today's Outreach:  [] First   [x] Second  [] Third                               Second outreach made by [x]  phone  [] email []   [employer]     Intervention:  [] Spoke with Patient  [x] Left VM requesting return call      Outcome: Left detailed VM for Patient to return call. Next Step:   [] ACP scheduled conversation  [x] Outreach again in two week               [] Email / Mail ACP Info Sheets  [] Email / Mail Advance Directive            [] Close Referral. Routing closure to referring provider/staff and to ACP Specialist . [] Closure Letter mailed to Patient with Invitation to Contact ACP Specialist if/when ready.     Thank you for this referral.

## 2022-08-15 NOTE — PROGRESS NOTES
Assessment:     1. Atrial flutter/atrial arrhythmia: Patient was hospitalized in late June 2022 for issues related to generalized body aches. During that admission, he was found to have frequent episodes of atrial arrhythmia with rapid ventricular rates and left bundle branch block. The appearance of the ECGs is most suggestive of atrial flutter with variable conduction (mostly 2:1). He does have a hint of ventricular preexcitation on his resting sinus rhythm ECG. There were no episodes of pre-excitated of atrial fibrillation noted. Of note, he was also noted to have mild LV systolic dysfunction with some regional wall motion abnormalities. Due to the finding of pancytopenia, decision was made to avoid invasive procedures including coronary angiography. He was started on medical therapy. Due to issues related to hypotension, the doses of many of his medications have been reduced over the last few weeks. Patient denies any significant palpitations with only a sensation of an irregular heartbeat for a few seconds with no associated symptoms. Given his overall health condition, he is not a suitable candidate for invasive procedures at this time. Rate control with beta-blockers is reasonable as long as the blood pressure tolerates it. His ECG today shows marked sinus bradycardia with first-degree AV block, heart rate in the 40s. He does not have dizziness and has not had episodes of syncope. Would reduce carvedilol down further to 6.25 mg twice daily. Patient has an elevated [id number] score. However, given his recent bleeding issues and marked pancytopenia, he is not a suitable candidate for anticoagulation. The risks and benefits of anticoagulation in this case were discussed with the patient and his family; they are in agreement with above plan. 2.  Cardiomyopathy: Newly noted with LVEF of 45-50%. On medical therapy. Mild evidence of volume overload. Evaluation as above.   He has had a LV    3. Low blood pressure: As above (BP 86/40 upon arrival), we will go ahead and reduce carvedilol to 6.25 mg twice daily (also to address the issue of the bradycardia). Monitor closely. Consider reducing Imdur dose if blood pressure still low. Plan:     Consider decreasing Imdur dosing. We will reach out to verify dose. Decrease carvedilol to 6.25 mg twice daily. Come to ER with any passing out spells. Come in for monitor placement next week. Follow up in 6 weeks. Subjective:       Patient ID: Lor Brower is a 79 y.o. male. Chief Complaint:  Chief Complaint   Patient presents with    New Patient    Atrial Fibrillation       HPI    Patient is a pleasant 79 y.o. male who presents for evaluation of AF/AT/AFL. Patient presented to hospital on 6/28/2022 with general body aches. ECG showed atrial arrhythmias with RVR and wide complex. Labs showed thrombocytopenia and anemia. He was also in new onset cardiomyopathy. [address] was deferred at the time due to anemia. Office Visit ([address], 8/15/2022): Patient is here today for EP evaluation after his hospital discharge and cardiology clinic visit. He presents in a wheelchair. He reports occasional palpitations lasting few seconds with no associated symptoms. He is taking his medications as prescribed. Patient denies current edema, chest pain, shortness of breath, dizziness or syncope. Has not noted any new bleeding issues. Review of Systems  Review of Systems   Constitutional: Negative for malaise/fatigue, weight gain and weight loss. HENT:  Negative for nosebleeds and stridor. Eyes:  Negative for vision loss in left eye and vision loss in right eye. Cardiovascular:  Positive for palpitations. Negative for chest pain, dyspnea on exertion, leg swelling and syncope. Respiratory:  Negative for shortness of breath and wheezing. Hematologic/Lymphatic: Negative for bleeding problem. Does not bruise/bleed easily.    Skin:  Negative for Physical Exam  Constitutional:       Appearance: Normal appearance. HENT:      Head: Normocephalic and atraumatic. Nose: Nose normal. No rhinorrhea. Eyes:      General: No scleral icterus. Conjunctiva/sclera: Conjunctivae normal.   Cardiovascular:      Rate and Rhythm: Regular rhythm. Bradycardia present. Heart sounds: Murmur heard. Pulmonary:      Effort: Pulmonary effort is normal.      Breath sounds: Normal breath sounds. Abdominal:      General: There is no distension. Musculoskeletal:         General: Normal range of motion. Cervical back: Normal range of motion and neck supple. Skin:     General: Skin is warm and dry. Neurological:      General: No focal deficit present. Mental Status: He is alert and oriented to person, place, and time. Psychiatric:         Mood and Affect: Mood normal.         Behavior: Behavior normal.           ECG Interpretation:  (Date: 8/15/2022)  Rhythm: Sinus Bradycardia  Rate: 46 BPM  PAC's / PVC's present: No  Conduction abnormalities: None  Axis: normal      Echocardiogram  (Date: 6/29/2022)    Summary  Normal left ventricular size with mild concentric left ventricular  hypertrophy. The left ventricular systolic function is mildly reduced with an ejection  fraction of 45 - 50 %. Basal to mid inferior, basal inferoseptal hypokinesis. Septal bounce noted. Grade I diastolic dysfunction with normal filling pressure. Normal right ventricular size with normal function. Mild mitral and tricuspid regurgitation. Mild aortic stenosis. Systolic pulmonic artery pressure (SPAP) is estimated at 48 mmHg consistent  with mild pulmonary hypertension (Right atrial pressure of 8 mmHg). Compared with the previous study performed 5-, left ventricular  function decreased with new regional wall motion abnormalities noted. Findings    Left Ventricle  Normal left ventricular size with mild concentric left ventricular  hypertrophy.   The left ventricular systolic function is mildly reduced with an ejection  fraction of 45 - 50 %. Basal to mid inferior, basal inferoseptal hypokinesis. Septal bounce noted. Grade I diastolic dysfunction with normal filling pressure. Changes noted from previous echo on 5- . Mitral Valve  Mild thickening of leaflets of mitral valve. Mild mitral annular calcification. No mitral stenosis. Mild mitral regurgitation. Left Atrium  The left atrium is normal in size. Aortic Valve  The aortic valve is thickened/calcified with decreased leaflet mobility  consistent with aortic stenosis. The maximum velocity 2.5 m/s across the aortic valve is , with a maximum  pressure gradient of 26 mmHg and a mean pressure gradient of 13mmHg. Findings are consistent with mild aortic stenosis. No aortic regurgitation. Aorta  The aortic root is normal in size. Right Ventricle  Normal right ventricular size with normal function. TAPSE is measured at 12 mm. S' prime velocity is measured at 8 cm/s. Tricuspid Valve  Tricuspid valve is structurally normal.  Mild tricuspid regurgitation. Systolic pulmonic artery pressure (SPAP) is estimated at 48 mmHg consistent  with mild pulmonary hypertension (Right atrial pressure of 8 mmHg). Right Atrium  The right atrium is normal in size at 15 cm2. Pulmonic Valve  The pulmonic valve is not well visualized. No evidence of pulmonic valve regurgitation. Pericardial Effusion  No pericardial effusion noted. Pleural Effusion  No pleural effusion. Miscellaneous  IVC size is normal (<2.1 cm) but collapses < 50% with respiration consistent  with elevated RA pressure (8 mmHg).     M-Mode/2D Measurements (cm)    LV Diastolic Dimension: 6.72 cm LV Systolic Dimension: 3.5 cm  LV Septum Diastolic: 6.20 cm  LV PW Diastolic: 2.43 cm        AO Root Dimension: 3.6 cm  AV Cusp Separation: 1.3 cm  LA Dimension: 3.8 cm  LVOT: 2.3 cm                    LA Area: 21.8 cm2  LA volume/Index: 66.43 ml /32 ml/m2    Doppler Measurements    AV Peak Velocity: 252 cm/s     MV Peak E-Wave: 129 cm/s  AV Peak Gradient: 25.4 mmHg    MV Peak A-Wave: 66.8 cm/s  AV Mean Gradient: 13 mmHg      MV E/A Ratio: 1.93  LVOT Peak Velocity: 81.6 cm/s  AV Area (Continuity):1.33 cm2    TR Velocity:316 cm/s  TR Gradient:39.94 mmHg  Estimated RAP:8 mmHg  Estimated RVSP: 48 mmHg  E' Septal Velocity: 6.42 cm/s  E' Lateral Velocity: 9.36 cm/s  E/E' ratio: 17    Aortic Valve    Peak Velocity: 252 cm/s     Mean Velocity: 150 cm/s  Peak Gradient: 25.4 mmHg    Mean Gradient: 13 mmHg  Area (continuity): 1.33 cm2  AV VTI: 52 cm    Cusp Separation: 1.3 cm    Aorta    Aortic Root: 3.6 cm  LVOT Diameter: 2.3 cm         Stress Test (Date: N/A)        Current Medications     Current Outpatient Medications   Medication Sig Dispense Refill    carvedilol (COREG) 12.5 MG tablet Take 1 tablet by mouth in the morning and 1 tablet before bedtime. 180 tablet 3    isosorbide mononitrate (IMDUR) 60 MG extended release tablet Take 1 tablet by mouth in the morning. 90 tablet 3    hydrALAZINE (APRESOLINE) 50 MG tablet Take 1 tablet by mouth in the morning and 1 tablet at noon and 1 tablet before bedtime. 90 tablet 0    traZODone (DESYREL) 50 MG tablet Take 1 tablet by mouth nightly 30 tablet 0    FLUoxetine (PROZAC) 10 MG capsule Take one capsule by mouth daily. 90 capsule 0    fenofibrate (TRIGLIDE) 160 MG tablet TAKE ONE TABLET BY MOUTH DAILY 90 tablet 0    sennosides-docusate sodium (SENOKOT-S) 8.6-50 MG tablet Take 2 tablets by mouth 2 times daily as needed for Constipation 120 tablet 2    HYDROcodone-acetaminophen (NORCO) 5-325 MG per tablet Take 1 tablet by mouth 2 times daily as needed for Pain for up to 30 days. 60 tablet 0    tamsulosin (FLOMAX) 0.4 MG capsule Take 1 capsule by mouth in the morning. 90 capsule 1    furosemide (LASIX) 20 MG tablet Take 1 tablet by mouth in the morning.  30 tablet 1    Ascorbic Acid (VITAMIN C) 500 MG tablet Take 1,000 mg by mouth daily. Multiple Vitamins-Minerals (MULTIVITAMIN PO) Take 1 tablet by mouth daily. (Patient not taking: No sig reported)      Omega-3 Fatty Acids (FISH OIL PO) Take 1 tablet by mouth daily. (Patient not taking: No sig reported)       No current facility-administered medications for this visit. Lab Review     Lab Results   Component Value Date/Time     07/08/2022 06:20 AM    K 4.2 07/08/2022 06:20 AM    K 3.6 07/05/2022 06:40 AM     07/08/2022 06:20 AM    CO2 24 07/08/2022 06:20 AM    BUN 30 07/08/2022 06:20 AM    CREATININE 1.1 07/08/2022 06:20 AM    GLUCOSE 187 07/08/2022 06:20 AM    GLUCOSE 78 01/12/2012 02:47 PM    CALCIUM 8.5 07/08/2022 06:20 AM        Lab Results   Component Value Date    WBC 1.2 (LL) 07/08/2022    HGB 7.3 (L) 07/08/2022    HCT 21.4 (L) 07/08/2022    MCV 97.0 07/08/2022    PLT 64 (L) 07/08/2022       Lab Results   Component Value Date    TSHFT4 0.88 06/30/2022    TSH 1.65 06/29/2022       No results found for: BNP    I, Ernie Lennon RN, am scribing for and in the presence of Dr. Mohan Alarcon.  08/15/22 10:54 AM   Ernie Lennon RN

## 2022-08-23 ENCOUNTER — HOSPITAL ENCOUNTER (OUTPATIENT)
Age: 82
Discharge: HOME | End: 2022-08-23
Payer: COMMERCIAL

## 2022-08-23 ENCOUNTER — TELEPHONE (OUTPATIENT)
Dept: CARDIOLOGY CLINIC | Age: 82
End: 2022-08-23

## 2022-08-23 LAB
ABO/RH: NORMAL
ANTIBODY SCREEN: NORMAL

## 2022-08-23 PROCEDURE — 93242 EXT ECG>48HR<7D RECORDING: CPT | Performed by: INTERNAL MEDICINE

## 2022-08-23 PROCEDURE — 86900 BLOOD TYPING SEROLOGIC ABO: CPT

## 2022-08-23 PROCEDURE — 86923 COMPATIBILITY TEST ELECTRIC: CPT

## 2022-08-23 PROCEDURE — 86901 BLOOD TYPING SEROLOGIC RH(D): CPT

## 2022-08-23 PROCEDURE — P9040 RBC LEUKOREDUCED IRRADIATED: HCPCS

## 2022-08-23 PROCEDURE — 86850 RBC ANTIBODY SCREEN: CPT

## 2022-08-23 NOTE — TELEPHONE ENCOUNTER
Monitor placed by [address] [place] [address]  Length of monitor 7 days  Monitor ordered by [facility]  Serial number [id number]  Activation successful prior to pt leaving office?  Yes

## 2022-08-24 ENCOUNTER — HOSPITAL ENCOUNTER (OUTPATIENT)
Dept: NURSING | Age: 82
Setting detail: INFUSION SERIES
Discharge: HOME | End: 2022-08-24
Payer: COMMERCIAL

## 2022-08-24 VITALS
SYSTOLIC BLOOD PRESSURE: 112 MMHG | OXYGEN SATURATION: 97 % | RESPIRATION RATE: 16 BRPM | WEIGHT: 174 LBS | BODY MASS INDEX: 23.06 KG/M2 | HEIGHT: 73 IN | HEART RATE: 62 BPM | DIASTOLIC BLOOD PRESSURE: 55 MMHG | TEMPERATURE: 99.2 F

## 2022-08-24 DIAGNOSIS — D46.9 MYELODYSPLASTIC SYNDROME (HCC): Primary | ICD-10-CM

## 2022-08-24 LAB
BLOOD BANK DISPENSE STATUS: NORMAL
BLOOD BANK PRODUCT CODE: NORMAL
BPU ID: NORMAL
DESCRIPTION BLOOD BANK: NORMAL

## 2022-08-24 PROCEDURE — P9040 RBC LEUKOREDUCED IRRADIATED: HCPCS

## 2022-08-24 PROCEDURE — 99211 OFF/OP EST MAY X REQ PHY/QHP: CPT

## 2022-08-24 PROCEDURE — 36430 TRANSFUSION BLD/BLD COMPNT: CPT

## 2022-08-24 RX ORDER — DIPHENHYDRAMINE HCL 25 MG
25 TABLET ORAL ONCE
Status: DISCONTINUED | OUTPATIENT
Start: 2022-08-24 | End: 2022-08-25 | Stop reason: HOSPADM

## 2022-08-24 RX ORDER — ACETAMINOPHEN 325 MG/1
650 TABLET ORAL ONCE
Status: DISCONTINUED | OUTPATIENT
Start: 2022-08-24 | End: 2022-08-25 | Stop reason: HOSPADM

## 2022-08-24 ASSESSMENT — PAIN SCALES - GENERAL: PAINLEVEL_OUTOF10: 0

## 2022-08-24 NOTE — DISCHARGE INSTRUCTIONS
This is an allergic reaction to something in the blood you were given. Every precaution is taken to prevent this. The decision to have a blood transfusion has been considered carefully by your caregiver before blood is given. Blood is not given unless the benefits outweigh the risks. AFTER THE TRANSFUSION  Right after receiving a blood transfusion, you will usually feel much better and more energetic. This is especially true if your red blood cells have gotten low (anemic). The transfusion raises the level of the red blood cells which carry oxygen, and this usually causes an energy increase. The nurse administering the transfusion will monitor you carefully for complications. HOME CARE INSTRUCTIONS   No special instructions are needed after a transfusion. You may find your energy is better. Speak with your caregiver about any limitations on activity for underlying diseases you may have. SEEK MEDICAL CARE IF:   Your condition is not improving after your transfusion. You develop redness or irritation at the intravenous (IV) site. SEEK IMMEDIATE MEDICAL CARE IF:   Any of the following symptoms occur over the next 12 hours:  Shaking chills. You have a temperature by mouth above 102° F (38.9° C), not controlled by medicine. Chest, back, or muscle pain. People around you feel you are not acting correctly or are confused. Shortness of breath or difficulty breathing. Dizziness and fainting. You get a rash or develop hives. You have a decrease in urine output. Your urine turns a dark color or changes to pink, red, or brown. Any of the following symptoms occur over the next 10 days: You have a temperature by mouth above 102° F (38.9° C), not controlled by medicine. Shortness of breath. Weakness after normal activity. The white part of the eye turns yellow (jaundice). You have a decrease in the amount of urine or are urinating less often.   Your urine turns a dark color or changes to pink, red, or brown.        In case of emergency,  CALL 911  .      FOLLOW UP WITH DR. Fernando Paiz AS NEEDED OR SCHEDULED

## 2022-08-24 NOTE — PROGRESS NOTES
unit of PRBC's has infused without any signs of adverse reactions  IV site unremarkable  Pt without c/o's NS infusing to clear tubing   Will monitor

## 2022-08-25 ENCOUNTER — APPOINTMENT (OUTPATIENT)
Dept: GENERAL RADIOLOGY | Age: 82
DRG: 808 | End: 2022-08-25
Payer: COMMERCIAL

## 2022-08-25 ENCOUNTER — TELEPHONE (OUTPATIENT)
Dept: CARDIOLOGY CLINIC | Age: 82
End: 2022-08-25

## 2022-08-25 ENCOUNTER — HOSPITAL ENCOUNTER (INPATIENT)
Age: 82
LOS: 2 days | Discharge: HOME | DRG: 808 | End: 2022-08-30
Attending: EMERGENCY MEDICINE | Admitting: INTERNAL MEDICINE
Payer: COMMERCIAL

## 2022-08-25 DIAGNOSIS — R65.20 SEVERE SEPSIS (HCC): ICD-10-CM

## 2022-08-25 DIAGNOSIS — J96.01 ACUTE RESPIRATORY FAILURE WITH HYPOXIA (HCC): ICD-10-CM

## 2022-08-25 DIAGNOSIS — A41.9 SEVERE SEPSIS (HCC): ICD-10-CM

## 2022-08-25 DIAGNOSIS — D70.9 NEUTROPENIC FEVER (HCC): Primary | ICD-10-CM

## 2022-08-25 DIAGNOSIS — R50.81 NEUTROPENIC FEVER (HCC): Primary | ICD-10-CM

## 2022-08-25 PROBLEM — D64.9 ANEMIA: Status: ACTIVE | Noted: 2022-08-25

## 2022-08-25 PROBLEM — I50.43 ACUTE ON CHRONIC COMBINED SYSTOLIC AND DIASTOLIC CHF (CONGESTIVE HEART FAILURE) (HCC): Status: ACTIVE | Noted: 2022-08-25

## 2022-08-25 PROBLEM — D61.818 PANCYTOPENIA (HCC): Status: ACTIVE | Noted: 2022-08-25

## 2022-08-25 PROBLEM — I27.81 COR PULMONALE (HCC): Status: ACTIVE | Noted: 2022-08-25

## 2022-08-25 PROBLEM — R09.89 PULMONARY CONGESTION: Status: ACTIVE | Noted: 2022-08-25

## 2022-08-25 PROBLEM — J18.9 PNEUMONIA DUE TO INFECTIOUS ORGANISM: Status: ACTIVE | Noted: 2022-08-25

## 2022-08-25 LAB
A/G RATIO: 1.4 (ref 1.1–2.2)
ABO/RH: NORMAL
ALBUMIN SERPL-MCNC: 3.9 G/DL (ref 3.4–5)
ALP BLD-CCNC: 46 U/L (ref 40–129)
ALT SERPL-CCNC: 6 U/L (ref 10–40)
ANION GAP SERPL CALCULATED.3IONS-SCNC: 11 MMOL/L (ref 3–16)
ANISOCYTOSIS: ABNORMAL
ANTIBODY SCREEN: NORMAL
AST SERPL-CCNC: 18 U/L (ref 15–37)
ATYPICAL LYMPHOCYTE RELATIVE PERCENT: 5 % (ref 0–6)
BACTERIA: ABNORMAL /HPF
BANDED NEUTROPHILS RELATIVE PERCENT: 1 % (ref 0–7)
BASE EXCESS VENOUS: 2.9 MMOL/L (ref -3–3)
BASOPHILS ABSOLUTE: 0 K/UL (ref 0–0.2)
BASOPHILS RELATIVE PERCENT: 0 %
BILIRUB SERPL-MCNC: 1.9 MG/DL (ref 0–1)
BILIRUBIN URINE: NEGATIVE
BLASTS RELATIVE PERCENT: 6 %
BLOOD, URINE: NEGATIVE
BUN BLDV-MCNC: 19 MG/DL (ref 7–20)
CALCIUM SERPL-MCNC: 9.7 MG/DL (ref 8.3–10.6)
CARBOXYHEMOGLOBIN: 3.1 % (ref 0–1.5)
CHLORIDE BLD-SCNC: 99 MMOL/L (ref 99–110)
CLARITY: CLEAR
CO2: 24 MMOL/L (ref 21–32)
COLOR: YELLOW
CREAT SERPL-MCNC: 1.3 MG/DL (ref 0.8–1.3)
EKG ATRIAL RATE: 99 BPM
EKG DIAGNOSIS: NORMAL
EKG P AXIS: 27 DEGREES
EKG P-R INTERVAL: 154 MS
EKG Q-T INTERVAL: 418 MS
EKG QRS DURATION: 164 MS
EKG QTC CALCULATION (BAZETT): 536 MS
EKG R AXIS: 45 DEGREES
EKG T AXIS: 139 DEGREES
EKG VENTRICULAR RATE: 99 BPM
EOSINOPHILS ABSOLUTE: 0 K/UL (ref 0–0.6)
EOSINOPHILS RELATIVE PERCENT: 0 %
GFR AFRICAN AMERICAN: >60
GFR NON-AFRICAN AMERICAN: 53
GLUCOSE BLD-MCNC: 165 MG/DL (ref 70–99)
GLUCOSE URINE: NEGATIVE MG/DL
HCO3 VENOUS: 26.3 MMOL/L (ref 23–29)
HCT VFR BLD CALC: 21.1 % (ref 40.5–52.5)
HEMATOLOGY PATH CONSULT: NO
HEMOGLOBIN: 7.3 G/DL (ref 13.5–17.5)
INR BLD: 1.38 (ref 0.87–1.14)
KETONES, URINE: NEGATIVE MG/DL
LACTIC ACID: 1.1 MMOL/L (ref 0.4–2)
LACTIC ACID: 1.1 MMOL/L (ref 0.4–2)
LEUKOCYTE ESTERASE, URINE: NEGATIVE
LYMPHOCYTES ABSOLUTE: 0.6 K/UL (ref 1–5.1)
LYMPHOCYTES RELATIVE PERCENT: 42 %
MACROCYTES: ABNORMAL
MAGNESIUM: 1.8 MG/DL (ref 1.8–2.4)
MCH RBC QN AUTO: 32.7 PG (ref 26–34)
MCHC RBC AUTO-ENTMCNC: 34.4 G/DL (ref 31–36)
MCV RBC AUTO: 95.1 FL (ref 80–100)
METHEMOGLOBIN VENOUS: 0.7 %
MICROSCOPIC EXAMINATION: YES
MONOCYTES ABSOLUTE: 0.2 K/UL (ref 0–1.3)
MONOCYTES RELATIVE PERCENT: 19 %
MYELOCYTE PERCENT: 1 %
NEUTROPHILS ABSOLUTE: 0.4 K/UL (ref 1.7–7.7)
NEUTROPHILS RELATIVE PERCENT: 26 %
NITRITE, URINE: NEGATIVE
O2 SAT, VEN: 98 %
O2 THERAPY: ABNORMAL
PCO2, VEN: 35.1 MMHG (ref 40–50)
PDW BLD-RTO: 21.7 % (ref 12.4–15.4)
PH UA: 6 (ref 5–8)
PH VENOUS: 7.49 (ref 7.35–7.45)
PLATELET # BLD: 47 K/UL (ref 135–450)
PMV BLD AUTO: 9.4 FL (ref 5–10.5)
PO2, VEN: 126 MMHG (ref 25–40)
POIKILOCYTES: ABNORMAL
POTASSIUM SERPL-SCNC: 3.6 MMOL/L (ref 3.5–5.1)
PRO-BNP: 8172 PG/ML (ref 0–449)
PROCALCITONIN: 1.27 NG/ML (ref 0–0.15)
PROTEIN UA: ABNORMAL MG/DL
PROTHROMBIN TIME: 16.8 SEC (ref 11.7–14.5)
RBC # BLD: 2.22 M/UL (ref 4.2–5.9)
RBC UA: ABNORMAL /HPF (ref 0–4)
SARS-COV-2, NAAT: ABNORMAL
SODIUM BLD-SCNC: 134 MMOL/L (ref 136–145)
SPECIFIC GRAVITY UA: 1.02 (ref 1–1.03)
TCO2 CALC VENOUS: 27 MMOL/L
TOTAL PROTEIN: 6.7 G/DL (ref 6.4–8.2)
TROPONIN: 0.08 NG/ML
TROPONIN: 0.2 NG/ML
TROPONIN: 0.27 NG/ML
URINE TYPE: ABNORMAL
UROBILINOGEN, URINE: 0.2 E.U./DL
WBC # BLD: 1.3 K/UL (ref 4–11)
WBC UA: ABNORMAL /HPF (ref 0–5)

## 2022-08-25 PROCEDURE — 2580000003 HC RX 258: Performed by: EMERGENCY MEDICINE

## 2022-08-25 PROCEDURE — 81001 URINALYSIS AUTO W/SCOPE: CPT

## 2022-08-25 PROCEDURE — 2580000003 HC RX 258: Performed by: INTERNAL MEDICINE

## 2022-08-25 PROCEDURE — 87635 SARS-COV-2 COVID-19 AMP PRB: CPT

## 2022-08-25 PROCEDURE — 93005 ELECTROCARDIOGRAM TRACING: CPT | Performed by: EMERGENCY MEDICINE

## 2022-08-25 PROCEDURE — 93005 ELECTROCARDIOGRAM TRACING: CPT | Performed by: INTERNAL MEDICINE

## 2022-08-25 PROCEDURE — G0378 HOSPITAL OBSERVATION PER HR: HCPCS

## 2022-08-25 PROCEDURE — 86850 RBC ANTIBODY SCREEN: CPT

## 2022-08-25 PROCEDURE — 6360000002 HC RX W HCPCS: Performed by: EMERGENCY MEDICINE

## 2022-08-25 PROCEDURE — 85025 COMPLETE CBC W/AUTO DIFF WBC: CPT

## 2022-08-25 PROCEDURE — 71045 X-RAY EXAM CHEST 1 VIEW: CPT

## 2022-08-25 PROCEDURE — 80061 LIPID PANEL: CPT

## 2022-08-25 PROCEDURE — 83735 ASSAY OF MAGNESIUM: CPT

## 2022-08-25 PROCEDURE — 51701 INSERT BLADDER CATHETER: CPT

## 2022-08-25 PROCEDURE — 83880 ASSAY OF NATRIURETIC PEPTIDE: CPT

## 2022-08-25 PROCEDURE — 86900 BLOOD TYPING SEROLOGIC ABO: CPT

## 2022-08-25 PROCEDURE — 96366 THER/PROPH/DIAG IV INF ADDON: CPT

## 2022-08-25 PROCEDURE — 6370000000 HC RX 637 (ALT 250 FOR IP): Performed by: EMERGENCY MEDICINE

## 2022-08-25 PROCEDURE — 6370000000 HC RX 637 (ALT 250 FOR IP): Performed by: INTERNAL MEDICINE

## 2022-08-25 PROCEDURE — 86901 BLOOD TYPING SEROLOGIC RH(D): CPT

## 2022-08-25 PROCEDURE — 96375 TX/PRO/DX INJ NEW DRUG ADDON: CPT

## 2022-08-25 PROCEDURE — 84484 ASSAY OF TROPONIN QUANT: CPT

## 2022-08-25 PROCEDURE — 96368 THER/DIAG CONCURRENT INF: CPT

## 2022-08-25 PROCEDURE — U0003 INFECTIOUS AGENT DETECTION BY NUCLEIC ACID (DNA OR RNA); SEVERE ACUTE RESPIRATORY SYNDROME CORONAVIRUS 2 (SARS-COV-2) (CORONAVIRUS DISEASE [COVID-19]), AMPLIFIED PROBE TECHNIQUE, MAKING USE OF HIGH THROUGHPUT TECHNOLOGIES AS DESCRIBED BY CMS-2020-01-R: HCPCS

## 2022-08-25 PROCEDURE — 86923 COMPATIBILITY TEST ELECTRIC: CPT

## 2022-08-25 PROCEDURE — 2700000000 HC OXYGEN THERAPY PER DAY

## 2022-08-25 PROCEDURE — 99285 EMERGENCY DEPT VISIT HI MDM: CPT

## 2022-08-25 PROCEDURE — 87040 BLOOD CULTURE FOR BACTERIA: CPT

## 2022-08-25 PROCEDURE — 82803 BLOOD GASES ANY COMBINATION: CPT

## 2022-08-25 PROCEDURE — 96365 THER/PROPH/DIAG IV INF INIT: CPT

## 2022-08-25 PROCEDURE — 94761 N-INVAS EAR/PLS OXIMETRY MLT: CPT

## 2022-08-25 PROCEDURE — 84145 PROCALCITONIN (PCT): CPT

## 2022-08-25 PROCEDURE — 51798 US URINE CAPACITY MEASURE: CPT

## 2022-08-25 PROCEDURE — 93010 ELECTROCARDIOGRAM REPORT: CPT | Performed by: INTERNAL MEDICINE

## 2022-08-25 PROCEDURE — 85610 PROTHROMBIN TIME: CPT

## 2022-08-25 PROCEDURE — 80053 COMPREHEN METABOLIC PANEL: CPT

## 2022-08-25 PROCEDURE — 36415 COLL VENOUS BLD VENIPUNCTURE: CPT

## 2022-08-25 PROCEDURE — P9040 RBC LEUKOREDUCED IRRADIATED: HCPCS

## 2022-08-25 PROCEDURE — 6360000002 HC RX W HCPCS

## 2022-08-25 PROCEDURE — 83605 ASSAY OF LACTIC ACID: CPT

## 2022-08-25 PROCEDURE — U0005 INFEC AGEN DETEC AMPLI PROBE: HCPCS

## 2022-08-25 PROCEDURE — 96367 TX/PROPH/DG ADDL SEQ IV INF: CPT

## 2022-08-25 RX ORDER — CARVEDILOL 6.25 MG/1
6.25 TABLET ORAL 2 TIMES DAILY
Status: DISCONTINUED | OUTPATIENT
Start: 2022-08-25 | End: 2022-08-30 | Stop reason: HOSPADM

## 2022-08-25 RX ORDER — ENOXAPARIN SODIUM 100 MG/ML
40 INJECTION SUBCUTANEOUS DAILY
Status: DISCONTINUED | OUTPATIENT
Start: 2022-08-25 | End: 2022-08-25

## 2022-08-25 RX ORDER — POLYETHYLENE GLYCOL 3350 17 G/17G
17 POWDER, FOR SOLUTION ORAL DAILY PRN
Status: DISCONTINUED | OUTPATIENT
Start: 2022-08-25 | End: 2022-08-30 | Stop reason: HOSPADM

## 2022-08-25 RX ORDER — ONDANSETRON 2 MG/ML
4 INJECTION INTRAMUSCULAR; INTRAVENOUS EVERY 6 HOURS PRN
Status: DISCONTINUED | OUTPATIENT
Start: 2022-08-25 | End: 2022-08-28

## 2022-08-25 RX ORDER — 0.9 % SODIUM CHLORIDE 0.9 %
500 INTRAVENOUS SOLUTION INTRAVENOUS ONCE
Status: COMPLETED | OUTPATIENT
Start: 2022-08-25 | End: 2022-08-25

## 2022-08-25 RX ORDER — SODIUM CHLORIDE 0.9 % (FLUSH) 0.9 %
5-40 SYRINGE (ML) INJECTION EVERY 12 HOURS SCHEDULED
Status: DISCONTINUED | OUTPATIENT
Start: 2022-08-25 | End: 2022-08-30 | Stop reason: HOSPADM

## 2022-08-25 RX ORDER — ACETAMINOPHEN 650 MG/1
650 SUPPOSITORY RECTAL EVERY 6 HOURS PRN
Status: DISCONTINUED | OUTPATIENT
Start: 2022-08-25 | End: 2022-08-30 | Stop reason: HOSPADM

## 2022-08-25 RX ORDER — ISOSORBIDE MONONITRATE 60 MG/1
60 TABLET, EXTENDED RELEASE ORAL DAILY
Status: DISCONTINUED | OUTPATIENT
Start: 2022-08-25 | End: 2022-08-30 | Stop reason: HOSPADM

## 2022-08-25 RX ORDER — HYDRALAZINE HYDROCHLORIDE 100 MG/1
100 TABLET, FILM COATED ORAL 3 TIMES DAILY
Status: ON HOLD | COMMUNITY
End: 2022-08-30 | Stop reason: HOSPADM

## 2022-08-25 RX ORDER — FUROSEMIDE 10 MG/ML
40 INJECTION INTRAMUSCULAR; INTRAVENOUS 2 TIMES DAILY
Status: DISCONTINUED | OUTPATIENT
Start: 2022-08-25 | End: 2022-08-29

## 2022-08-25 RX ORDER — TAMSULOSIN HYDROCHLORIDE 0.4 MG/1
0.4 CAPSULE ORAL DAILY
Status: DISCONTINUED | OUTPATIENT
Start: 2022-08-25 | End: 2022-08-30 | Stop reason: HOSPADM

## 2022-08-25 RX ORDER — FENOFIBRATE 54 MG/1
54 TABLET ORAL DAILY
Status: DISCONTINUED | OUTPATIENT
Start: 2022-08-25 | End: 2022-08-30 | Stop reason: HOSPADM

## 2022-08-25 RX ORDER — SODIUM CHLORIDE 9 MG/ML
INJECTION, SOLUTION INTRAVENOUS PRN
Status: DISCONTINUED | OUTPATIENT
Start: 2022-08-25 | End: 2022-08-30 | Stop reason: HOSPADM

## 2022-08-25 RX ORDER — ACETAMINOPHEN 325 MG/1
650 TABLET ORAL EVERY 6 HOURS PRN
Status: DISCONTINUED | OUTPATIENT
Start: 2022-08-25 | End: 2022-08-30 | Stop reason: HOSPADM

## 2022-08-25 RX ORDER — ACETAMINOPHEN 325 MG/1
650 TABLET ORAL ONCE
Status: COMPLETED | OUTPATIENT
Start: 2022-08-25 | End: 2022-08-25

## 2022-08-25 RX ORDER — SODIUM CHLORIDE 0.9 % (FLUSH) 0.9 %
5-40 SYRINGE (ML) INJECTION PRN
Status: DISCONTINUED | OUTPATIENT
Start: 2022-08-25 | End: 2022-08-30 | Stop reason: HOSPADM

## 2022-08-25 RX ORDER — ONDANSETRON 4 MG/1
4 TABLET, ORALLY DISINTEGRATING ORAL EVERY 8 HOURS PRN
Status: DISCONTINUED | OUTPATIENT
Start: 2022-08-25 | End: 2022-08-28

## 2022-08-25 RX ADMIN — ACETAMINOPHEN 650 MG: 325 TABLET ORAL at 08:39

## 2022-08-25 RX ADMIN — TAMSULOSIN HYDROCHLORIDE 0.4 MG: 0.4 CAPSULE ORAL at 16:29

## 2022-08-25 RX ADMIN — FENOFIBRATE 54 MG: 54 TABLET ORAL at 16:29

## 2022-08-25 RX ADMIN — CEFEPIME 2000 MG: 2 INJECTION, POWDER, FOR SOLUTION INTRAVENOUS at 08:50

## 2022-08-25 RX ADMIN — VANCOMYCIN HYDROCHLORIDE 1500 MG: 10 INJECTION, POWDER, LYOPHILIZED, FOR SOLUTION INTRAVENOUS at 09:26

## 2022-08-25 RX ADMIN — CEFEPIME 2000 MG: 2 INJECTION, POWDER, FOR SOLUTION INTRAVENOUS at 21:13

## 2022-08-25 RX ADMIN — ISOSORBIDE MONONITRATE 60 MG: 60 TABLET, EXTENDED RELEASE ORAL at 16:29

## 2022-08-25 RX ADMIN — SODIUM CHLORIDE 500 ML: 9 INJECTION, SOLUTION INTRAVENOUS at 08:47

## 2022-08-25 RX ADMIN — Medication 10 ML: at 21:11

## 2022-08-25 RX ADMIN — CARVEDILOL 6.25 MG: 6.25 TABLET, FILM COATED ORAL at 16:29

## 2022-08-25 RX ADMIN — FUROSEMIDE 40 MG: 10 INJECTION, SOLUTION INTRAMUSCULAR; INTRAVENOUS at 16:30

## 2022-08-25 ASSESSMENT — PAIN - FUNCTIONAL ASSESSMENT: PAIN_FUNCTIONAL_ASSESSMENT: NONE - DENIES PAIN

## 2022-08-25 NOTE — ED NOTES
Pt resting this time without complaint. Placed on [address] device to facilitate urination while receiving oxygen therapy, pt agreeable.       Saranya Tucker RN  08/25/22 [id number]

## 2022-08-25 NOTE — H&P
History & Physical      PCP: Samantha Romero MD    Date of Admission: 8/25/2022    Date of Service: 08/25/22     Chief Complaint:  shortness of breath, vomiting, fever    History Of Present Illness:    79 y.o. male with PMHx of MDS, MI, ICM, Afib, HTN, CKD, Depression, Hyperlipidemia, BPH who presented to [facility] with shortness of breath, vomiting, fever. Said he woke up this morning and began vomiting. Also was short of breath and dyspneic at rest but even worse with exertion. He felt fine when he went to bed last night. He felt that he was getting sicker and sicker as the day went on, and so his roommate Juliane Gil brought him to ER. In the ER he was worked up for possible pneumonia, since he had fever + SOB. Covid rapid was indeterminate so a new test was ordered. CXR showed bilateral pulmonary infiltrates. CBC showed pancytopenia. Troponin and procalcitonin elevated. Patient was admitted to inpatient for monitoring and treatment. Patient seen by hospital medicine in the afternoon. He was afebrile at the time, no acute respiratory distress, responsive and oriented x3. VSS. Gave history of his illness. Said he felt slight abdominal discomfort last few days, but denied n/c/d, only vomiting this morning. Denied chest pain, headache, weakness, numbness.  Said he was dyspneic on exertion but not on rest.       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    Backpain     Bilateral carotid bruits 9/6/2016    Chronic kidney disease 9/6/2016    Current moderate episode of major depressive disorder without prior episode ([facility] [address] 1/23/2019    Hyperlipidemia     Hypertension     Hypertrophy of prostate without urinary obstruction and other lower urinary tract symptoms (LUTS)     Nonrheumatic aortic valve insufficiency 9/6/2016    Rosacea     Taste perversion 8/19/2011       Past Surgical History:          Procedure Laterality Date    COLONOSCOPY  2/11/2008    COLONOSCOPY  11/14/2014    CT BONE MARROW BIOPSY  7/5/2022    CT BONE MARROW BIOPSY 7/5/2022 Lucia Preston MD [facility] CT SCAN    HERNIA REPAIR         Medications Prior to Admission:      Prior to Admission medications    Medication Sig Start Date End Date Taking? Authorizing Provider   hydrALAZINE (APRESOLINE) 100 MG tablet Take 100 mg by mouth 3 times daily   Yes Historical Provider, MD   carvedilol (COREG) 6.25 MG tablet Take 1 tablet by mouth in the morning and 1 tablet before bedtime. 8/15/22   Sydney Hercules MD   isosorbide mononitrate (IMDUR) 60 MG extended release tablet Take 1 tablet by mouth in the morning. 8/4/22   Patricia Castañeda MD   traZODone (DESYREL) 50 MG tablet Take 1 tablet by mouth nightly 7/28/22   Tejas Alston MD   FLUoxetine (PROZAC) 10 MG capsule Take one capsule by mouth daily. 7/28/22   Tejas Alston MD   fenofibrate (TRIGLIDE) 160 MG tablet TAKE ONE TABLET BY MOUTH DAILY 7/28/22   Tejas Alston MD   HYDROcodone-acetaminophen (NORCO) 5-325 MG per tablet Take 1 tablet by mouth 2 times daily as needed for Pain for up to 30 days. 7/28/22 8/27/22  Tejas Alston MD   tamsulosin (FLOMAX) 0.4 MG capsule Take 1 capsule by mouth in the morning. 7/28/22   Tejas Alston MD   furosemide (LASIX) 20 MG tablet Take 1 tablet by mouth in the morning. 7/28/22   Tejas Alston MD   Ascorbic Acid (VITAMIN C) 500 MG tablet Take 1,000 mg by mouth daily. Historical Provider, MD       Allergies:  Patient has no known allergies. Social History:      The patient currently lives with his friend Orlando Smith and step daughter Iram Marrufo. TOBACCO:   reports that he has never smoked. He has never used smokeless tobacco.  ETOH:   reports no history of alcohol use.   E-cigarette/Vaping       Questions Responses    E-cigarette/Vaping Use Never User    Start Date     Passive Exposure     Quit Date     Counseling Given     Comments               Family History:       Reviewed in detail and negative for DM, CAD, Cancer, CVA. Positive as follows:        Problem Relation Age of Onset    COPD Sister     COPD Brother         Birth defect       REVIEW OF SYSTEMS COMPLETED:   Pertinent positives as noted in the HPI. All other systems reviewed and negative. PHYSICAL EXAM PERFORMED:    /67   Pulse 74   Temp 98.8 °F (37.1 °C) (Oral)   Resp 18   Ht 6' 2\" (1.88 m)   Wt 190 lb (86.2 kg)   SpO2 100%   BMI 24.39 kg/m²     General appearance:  No apparent distress, appears stated age and cooperative. HEENT:  Normal cephalic, atraumatic without obvious deformity. Pupils equal, round, and reactive to light. Extra ocular muscles intact. Conjunctivae/corneas clear. Neck: Supple, with full range of motion. No jugular venous distention. Trachea midline. Respiratory:  Normal respiratory effort. Slightly muffled lung sounds in lower lobes bilaterally in posterior fields, otherwise clear to auscultation bilaterally without Rales/Wheezes/Rhonchi. Cardiovascular:  Regular rate and rhythm with normal S1, loud S2. No mumur. Abdomen: Soft, non-tender, non-distended with normal bowel sounds. Musculoskeletal:  No clubbing, cyanosis. 2+ pitting edema in bilateral ankles. Full range of motion without deformity. Skin: Skin color, texture, turgor normal.  No rashes or lesions. Neurologic:  Neurovascularly intact without any focal sensory/motor deficits.  Cranial nerves: II-XII intact, grossly non-focal.  Psychiatric:  Alert and oriented, thought content appropriate, normal insight  Capillary Refill: Brisk,3 seconds, normal  Peripheral Pulses: +2 palpable, equal bilaterally       Labs:     Recent Labs     08/25/22 0828   WBC 1.3*   HGB 7.3*   HCT 21.1*   PLT 47*     Recent Labs     08/25/22 0828   *   K 3.6   CL 99   CO2 24   BUN 19   CREATININE 1.3   CALCIUM 9.7     Recent Labs     08/25/22 0828   AST 18   ALT 6*   BILITOT 1.9*   ALKPHOS 46     Recent Labs     08/25/22 0828   INR 1.38*     Recent Labs 08/25/22  0828 08/25/22  1537   TROPONINI 0.08* 0.20*       Urinalysis:      Lab Results   Component Value Date/Time    NITRU Negative 08/25/2022 05:57 PM    WBCUA 3-5 08/25/2022 05:57 PM    BACTERIA 2+ 08/25/2022 05:57 PM    RBCUA 5-10 08/25/2022 05:57 PM    BLOODU Negative 08/25/2022 05:57 PM    SPECGRAV 1.020 08/25/2022 05:57 PM    GLUCOSEU Negative 08/25/2022 05:57 PM       Radiology:     CXR: I have reviewed the CXR with the following interpretation: bilateral infiltrates or congestion in lungs  EKG:  I have reviewed the EKG with the following interpretation: NSR with PVC    XR CHEST PORTABLE   Final Result   Moderate infiltrates and/or congestion identified in the lungs.                  Active Hospital Problems    Diagnosis Date Noted    Acute hypoxemic respiratory failure ([address] [J96.01] 08/25/2022     Priority: Medium    Pancytopenia ([address] [D61.818] 08/25/2022     Priority: Medium    Pneumonia due to infectious organism [J18.9] 08/25/2022     Priority: Medium    Acute on chronic combined systolic and diastolic CHF (congestive heart failure) ([address] [I50.43] 08/25/2022     Priority: Medium    Pulmonary congestion [R09.89] 08/25/2022     Priority: Medium    Cor pulmonale ([address] [I27.81] 08/25/2022     Priority: Medium    Elevated troponin [R77.8]      Priority: Medium       ASSESSMENT/PLAN:    Principal Problem(s):      Pancytopenia ([address]  Patient with history of myelodysplastic syndrome  Most recent blood transfusion was this past Tuesday  Sees Dr. Tabatha Calvillo for heme onc  Patient was only recently diagnosed in late June 2022  Patient denied history of recurrent infections, either long term or recently  CBC with diff today showed:  WBC of 1.3  RBC of 2.22  Platelet of 47  Absolute lymphocytes of 0.6  Absolute neutrophils of 0.4  Neutropenia + fever indicates a patient at extreme risk of opportunistic infection with high mortality rate     Pneumonia due to infectious microbe  CXR showed bilateral infiltrates or congestion in lungs  Patient pancytopenic  Neutropenia + fever + SOB/dyspnea  Patient's first covid 19 test was indeterminate  Labs on admission:  Lactic acid - 1.1  Procalcitonin - 1.27      Acute hypoxemic respiratory failure [facility])  Per RN note from this morning (8:23 am on 8/25/2022): \"EMS called by pt, on arrival to home pt's SpO2 high 70's to low 80's.  Pt 87% on RA upon arrival\"  Received oxygen therapy  Spo2 has been 98-99% since oxygen therapy given   Thus patient presented with low SpO2 of 87% + SOB/dyspnea   Patient on 3L nasal cannula now    PLAN:   IV cefepime 2,000 mg q12h to cover for opportunistic bacterial infections   Incentive spirometry  Trend procalcitonin   Trend lactic acid  Trend CRP  Daily CBC with diff  Sputum culture  Blood culture pending  Hold antiplatelet/blood thinner since platelet count of 47  Inpatient Consult order placed for patient's heme/onc physician, Dr. Junior Costello MD      Active Problems:     Acute on chronic combined systolic and diastolic CHF (congestive heart failure) ([address]    Pulmonary congestion    Cor pulmonale ([address]  Pro-bnp - 8,172  Most recent Echo 6/29/2022 showed EF of 45%-50%, with both diastolic and systolic dysfunction   CXR showed bilateral infiltrates or congestion in lungs      Elevated troponin  Blood levels:  0.08 ng/mL at 0828 on 8/25/2022  0.20 ng/mL at 1537 on 8/25/2022  Patient had first MI in late June 2022    PLAN:  Inpatient Consult order placed for patient's cardiologist, Dr. Rodrick Cody MD  Hold antiplatelet medication, since platelet count is 47, until consulting specialists evaluate  IV lasix 40 mg BID  Daily weights  I/O's  Trend troponins  Continue home meds:  carvedilol -  6.25 mg po BID  Isosorvide mononitrate - 60 mg po daily      Chronic Problems being managed in hospital:    Hypertension  Controlled for now, being monitored  Home med:  Hydralazine 100 mg po daily  Hold home med for now    Chronic kidney disease   Daily CMP  Consult nephro if evidence of VINNY arises  Daily weights, I/Os, IV lasix 40 mg bid    Current moderate episode of major depressive disorder without prior episode ([facility] [address]  Patient's home meds:  Fluoxetine 10 mg po daily   Trazodone 50 mg po nightly (helps to sleep)  Hold home meds for now. Hyperlipidemia  Continue home med:  Fenofibrate 160 mg po daily  Order lipid panel  Most recent lipid panel on file was 6/29/2022, showed fasting TG's of 172 and HDL of 15    Hypertrophy of prostate without urinary obstruction and other lower urinary tract symptoms (LUTS),  Continue home med of Tamsulosin 0.4 mg po daily   UA with microscopy ordered  Results show:   trace protein  5-10 RBC  2+ bacteria  Nitrite negative  Leuk est negative  Daily CMP, I/O's, symptom monitoring to see if UTI/cystitis is contributing to patient's clinical picture          DVT Prophylaxis:   - Pneumatic compression device. - No antiplatelet or anticoagulant for now. - Pt pancytopenic, platelet levels 47. Diet: ADULT DIET; Regular; Low Fat/Low Chol/High Fiber/NIKKI; Low Sodium (2 gm)    Code Status: Full Code    PT/OT Eval Status: none ordered, will reassess tomorrow     Dispo - 2-3 days, pending pt is stable & cleared by heme/onc and cardiology        [address], DO    Thank you Aaliyah Carroll MD for the opportunity to be involved in this patient's care. If you have any questions or concerns please feel free to contact me at [phone].

## 2022-08-25 NOTE — PROGRESS NOTES
Pt admitted to room via stretcher. VSS. Pt currently on 3L NC. Pt stating 99%. Pt oriented to room and call light, call light and bedside table with in reach, wheels locked, bed lowest position. Pt educated to call out when needing assistance to ambulate. Bed check in place. Male wick in place. Pt provided with water.

## 2022-08-25 NOTE — ED NOTES
@1011 [facility] Per   RE:neutropenic fever  @1013  called back and spoke to [address] [place] [address]

## 2022-08-25 NOTE — CONSULTS
Consult placed    [address],  Time:4:24 PM        Electronically signed by Samir Theodore on 8/25/2022 at 4:24 PM

## 2022-08-25 NOTE — ACP (ADVANCE CARE PLANNING)
Advance Care Planning     General Advance Care Planning (ACP) Conversation    Date of Conversation: 8/25/2022  Conducted with: Patient with Decision Making Capacity    Healthcare Decision Maker:    Primary Decision Maker: Roberto Calhoun - [phone]    Supplemental (Other) Decision Maker: Luanaita Najjar  Other - [phone]  Click here to complete Healthcare Decision Makers including selection of the Healthcare Decision Maker Relationship (ie \"Primary\"). Today we documented Decision Maker(s) consistent with Legal Next of Kin hierarchy.     Content/Action Overview:  Has NO ACP documents/care preferences - information provided, considering goals and options          Length of Voluntary ACP Conversation in minutes:  <16 minutes (Non-Billable)    NAHEED Romero

## 2022-08-25 NOTE — TELEPHONE ENCOUNTER
Consult [address], . Pancytyopenic/ possible covid.  [address] [facility],[address]

## 2022-08-25 NOTE — PROGRESS NOTES
Perfect serve message sent to Kalani, \"pt hasnt voided since he came into the ER. pt has 398 in bladder and denies needing to void. pt just given lasix and flomax. do you want to straight cath or monitor. \", awaiting response.

## 2022-08-25 NOTE — ED TRIAGE NOTES
Patient identified as a positive fall risk on the ED triage fall screening. Patient placed in fall precautions which includes:  yellow fall risk bracelet on wrist and yellow socks on feet. Patient instructed on importance of not getting out of bed or ambulating without assistance for safety. Pt verbalized understanding. Pt awoke this morning with nausea and shortness of breath. EMS called by pt, on arrival to home pt's SpO2 high 70's to low 80's. Pt 87% on RA upon arrival, no meds given by EMS. Pt has been wearing cardiac monitor at home, unknown period of time.  Monitor not present on arrival.

## 2022-08-25 NOTE — ED PROVIDER NOTES
any falls or trauma today) and headaches. Psychiatric/Behavioral:  Negative for confusion. The patient is nervous/anxious. Positives and Pertinent negatives as per HPI. 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    Backpain     Bilateral carotid bruits 9/6/2016    Chronic kidney disease 9/6/2016    Current moderate episode of major depressive disorder without prior episode ([facility] [address] 1/23/2019    Hyperlipidemia     Hypertension     Hypertrophy of prostate without urinary obstruction and other lower urinary tract symptoms (LUTS)     Nonrheumatic aortic valve insufficiency 9/6/2016    Rosacea     Taste perversion 8/19/2011         SURGICAL HISTORY       Past Surgical History:   Procedure Laterality Date    COLONOSCOPY  2/11/2008    COLONOSCOPY  11/14/2014    CT BONE MARROW BIOPSY  7/5/2022    CT BONE MARROW BIOPSY 7/5/2022 Nicole Valdivia MD [facility] CT SCAN    HERNIA REPAIR           CURRENT MEDICATIONS       Current Discharge Medication List        CONTINUE these medications which have NOT CHANGED    Details   hydrALAZINE (APRESOLINE) 100 MG tablet Take 100 mg by mouth 3 times daily      carvedilol (COREG) 6.25 MG tablet Take 1 tablet by mouth in the morning and 1 tablet before bedtime. Qty: 60 tablet, Refills: 2    Associated Diagnoses: Atrial fibrillation with RVR (HCC)      isosorbide mononitrate (IMDUR) 60 MG extended release tablet Take 1 tablet by mouth in the morning. Qty: 90 tablet, Refills: 3      traZODone (DESYREL) 50 MG tablet Take 1 tablet by mouth nightly  Qty: 30 tablet, Refills: 0      FLUoxetine (PROZAC) 10 MG capsule Take one capsule by mouth daily. Qty: 90 capsule, Refills: 0      fenofibrate (TRIGLIDE) 160 MG tablet TAKE ONE TABLET BY MOUTH DAILY  Qty: 90 tablet, Refills: 0      HYDROcodone-acetaminophen (NORCO) 5-325 MG per tablet Take 1 tablet by mouth 2 times daily as needed for Pain for up to 30 days.   Qty: 60 tablet, Refills: 0    Comments: Reduce doses taken as pain becomes manageable  Associated Diagnoses: Chronic bilateral low back pain without sciatica      tamsulosin (FLOMAX) 0.4 MG capsule Take 1 capsule by mouth in the morning. Qty: 90 capsule, Refills: 1      furosemide (LASIX) 20 MG tablet Take 1 tablet by mouth in the morning. Qty: 30 tablet, Refills: 1      Ascorbic Acid (VITAMIN C) 500 MG tablet Take 1,000 mg by mouth daily. ALLERGIES     Patient has no known allergies. FAMILY HISTORY       Family History   Problem Relation Age of Onset    COPD Sister     COPD Brother         Birth defect          SOCIAL HISTORY       Social History     Socioeconomic History    Marital status:      Spouse name: None    Number of children: None    Years of education: None    Highest education level: None   Tobacco Use    Smoking status: Never    Smokeless tobacco: Never   Vaping Use    Vaping Use: Never used   Substance and Sexual Activity    Alcohol use: No    Drug use: No    Sexual activity: Not Currently     Partners: Female       SCREENINGS    [place] Coma Scale  Eye Opening: Spontaneous  Best Verbal Response: Oriented  Best Motor Response: Obeys commands  [place] Coma Scale Score: 15           PHYSICAL EXAM    (up to 7 for level 4, 8 or more for level 5)     ED Triage Vitals   BP Temp Temp src Pulse Resp SpO2 Height Weight   -- -- -- -- -- -- -- --       Physical Exam  Vitals and nursing note reviewed. Constitutional:       General: He is awake. He is in acute distress (mild). Appearance: He is well-developed, well-groomed and normal weight. He is ill-appearing. He is not toxic-appearing or diaphoretic. Interventions: He is not intubated. HENT:      Head: Normocephalic and atraumatic. Right Ear: External ear normal.      Left Ear: External ear normal.      Nose: Nose normal.      Mouth/Throat:      Mouth: Mucous membranes are dry. Eyes:      General: No scleral icterus. Right eye: No discharge. Left eye: No discharge. Conjunctiva/sclera: Conjunctivae normal.      Pupils: Pupils are equal, round, and reactive to light. Neck:      Trachea: Trachea and phonation normal. No tracheal deviation. Cardiovascular:      Rate and Rhythm: Regular rhythm. Tachycardia present. Pulses: Normal pulses. Heart sounds: No friction rub. No gallop. Pulmonary:      Effort: Tachypnea and respiratory distress (mild) present. No bradypnea or accessory muscle usage. He is not intubated. Breath sounds: Normal air entry. No stridor. Examination of the right-upper field reveals wheezing and rhonchi. Examination of the left-upper field reveals wheezing and rhonchi. Examination of the right-middle field reveals wheezing and rhonchi. Examination of the left-middle field reveals wheezing and rhonchi. Examination of the right-lower field reveals wheezing and rhonchi. Examination of the left-lower field reveals wheezing and rhonchi. Wheezing and rhonchi present. No decreased breath sounds or rales. Chest:      Chest wall: No tenderness. Abdominal:      General: Abdomen is flat. Bowel sounds are normal. There is no distension. Palpations: Abdomen is soft. Tenderness: There is no abdominal tenderness. There is no guarding or rebound. Musculoskeletal:         General: No tenderness, deformity or signs of injury. Normal range of motion. Cervical back: Full passive range of motion without pain, normal range of motion and neck supple. No edema, erythema, signs of trauma, rigidity, torticollis or crepitus. No pain with movement, spinous process tenderness or muscular tenderness. Normal range of motion. Right lower leg: No edema. Left lower leg: No edema. Skin:     General: Skin is warm and dry. Coloration: Skin is not jaundiced or pale. Findings: No erythema or rash. Neurological:      General: No focal deficit present.       Mental Status: He is alert and oriented to person, place, and time. Mental status is at baseline. GCS: GCS eye subscore is 4. GCS verbal subscore is 5. GCS motor subscore is 6. Cranial Nerves: No dysarthria. Sensory: Sensation is intact. Motor: Weakness (4+/5 in all 4 extremities) present. No tremor, atrophy, abnormal muscle tone or seizure activity. Psychiatric:         Attention and Perception: Attention normal. He is attentive. Mood and Affect: Affect normal. Mood is anxious and depressed. Speech: Speech normal. Speech is not delayed or slurred. Behavior: Behavior is slowed. Behavior is cooperative.            DIAGNOSTIC RESULTS   :    Labs Reviewed   COVID-19, RAPID - Abnormal; Notable for the following components:       Result Value    SARS-CoV-2, NAAT Indeterminate (*)     All other components within normal limits   CBC WITH AUTO DIFFERENTIAL - Abnormal; Notable for the following components:    WBC 1.3 (*)     RBC 2.22 (*)     Hemoglobin 7.3 (*)     Hematocrit 21.1 (*)     RDW 21.7 (*)     Platelets 47 (*)     Neutrophils Absolute 0.4 (*)     Lymphocytes Absolute 0.6 (*)     Myelocyte Percent 1 (*)     Blasts Relative 6 (*)     Anisocytosis 2+ (*)     Macrocytes 1+ (*)     Poikilocytes Occasional (*)     All other components within normal limits    Narrative:     CALL  May  SAED tel. [phone],  Hematology results called to and read back by rica carter, 08/25/2022  13:10, by [facility]  Hematology results called to and read back by Matheus Lopez rn, 08/25/2022  09:35, by [facility]   COMPREHENSIVE METABOLIC PANEL - Abnormal; Notable for the following components:    Sodium 134 (*)     Glucose 165 (*)     GFR Non- 53 (*)     Total Bilirubin 1.9 (*)     ALT 6 (*)     All other components within normal limits   TROPONIN - Abnormal; Notable for the following components:    Troponin 0.08 (*)     All other components within normal limits   BRAIN NATRIURETIC PEPTIDE - Abnormal; Notable for the following components:    Pro-BNP 8,172 (*)     All other components within normal limits   PROTIME-INR - Abnormal; Notable for the following components:    Protime 16.8 (*)     INR 1.38 (*)     All other components within normal limits   BLOOD GAS, VENOUS - Abnormal; Notable for the following components:    pH, Henrique 7.493 (*)     pCO2, Henrique 35.1 (*)     pO2, Henrique 126.0 (*)     Carboxyhemoglobin 3.1 (*)     All other components within normal limits   URINALYSIS WITH MICROSCOPIC - Abnormal; Notable for the following components:    Protein, UA TRACE (*)     RBC, UA 5-10 (*)     Bacteria, UA 2+ (*)     All other components within normal limits   PROCALCITONIN - Abnormal; Notable for the following components:    Procalcitonin 1.27 (*)     All other components within normal limits   TROPONIN - Abnormal; Notable for the following components:    Troponin 0.20 (*)     All other components within normal limits   TROPONIN - Abnormal; Notable for the following components:    Troponin 0.27 (*)     All other components within normal limits   PROCALCITONIN - Abnormal; Notable for the following components:    Procalcitonin 5.37 (*)     All other components within normal limits   CBC WITH AUTO DIFFERENTIAL - Abnormal; Notable for the following components:    WBC 1.2 (*)     RBC 1.81 (*)     Hemoglobin 5.9 (*)     Hematocrit 17.3 (*)     RDW 21.7 (*)     Platelets 43 (*)     MPV 11.0 (*)     Neutrophils Absolute 0.2 (*)     Lymphocytes Absolute 0.9 (*)     Blasts Relative 5 (*)     Anisocytosis 2+ (*)     Macrocytes 1+ (*)     Microcytes 1+ (*)     Hypochromia Occasional (*)     All other components within normal limits    Narrative:     Gagan Skaggs tel. [phone],  Hematology results called to and read back by Steph Buckner RN, 08/26/2022  07:02, by [facility]  Hematology results called to and read back by Steph Buckner RN, 08/26/2022  06:58, by [facility]   C-REACTIVE PROTEIN - Abnormal; Notable for the following components:    .9 (*) finding         RADIOLOGY:   Non-plain film images such as CT, Ultrasound and MRI are read by the radiologist. Gurinder Carrillolard images are visualized and preliminarily interpreted by the  ED Provider with the belowfindings:        Interpretation per the Radiologist below, if available at the time of this note:    XR CHEST PORTABLE   Final Result   Moderate infiltrates and/or congestion identified in the lungs. PROCEDURES   Unless otherwise noted below, none     Procedures    CRITICAL CARE TIME   Time: 45 minutes  Includes examination, speaking with consultants, lab interpretation, charting, treating for neutropenic fever needing vancomycin and cefepime along with IV fluids for severe sepsis  Excludes separate billable procedures. Patient at risk for serious decompensation if not treated for this life-threatening condition. CONSULTS: Spoke with Dr. Bg Mckoy due to concern for neutropenic fever.   Paged hospitalist.    Tonya Lion TO HOSPITALIST  IP CONSULT TO CARDIOLOGY  IP CONSULT TO ONCOLOGY    EMERGENCY DEPARTMENT COURSE and DIFFERENTIAL DIAGNOSIS/MDM:   Vitals:    Vitals:    08/25/22 2341 08/26/22 0338 08/26/22 0409 08/26/22 1020   BP: 130/63 119/63 125/60 (!) 147/71   Pulse: 75 78 77 73   Resp: 16 18  16   Temp: 99 °F (37.2 °C) 98.9 °F (37.2 °C) 99.8 °F (37.7 °C) 98.9 °F (37.2 °C)   TempSrc: Oral Oral  Oral   SpO2: 98% 98% 96% 96%   Weight:  164 lb 10.9 oz (74.7 kg)     Height:           Patient was given the following medications:  Medications   cefepime (MAXIPIME) 2000 mg IVPB minibag (2,000 mg IntraVENous New Bag 8/26/22 1016)   sodium chloride flush 0.9 % injection 5-40 mL (10 mLs IntraVENous Given 8/26/22 1013)   sodium chloride flush 0.9 % injection 5-40 mL (has no administration in time range)   0.9 % sodium chloride infusion (has no administration in time range)   ondansetron (ZOFRAN-ODT) disintegrating tablet 4 mg (has no administration in time range)     Or   ondansetron (ZOFRAN) injection 4 mg (has no administration in time range)   polyethylene glycol (GLYCOLAX) packet 17 g (17 g Oral Given 8/26/22 0432)   acetaminophen (TYLENOL) tablet 650 mg (has no administration in time range)     Or   acetaminophen (TYLENOL) suppository 650 mg (has no administration in time range)   carvedilol (COREG) tablet 6.25 mg (6.25 mg Oral Given 8/26/22 1013)   fenofibrate (TRICOR) tablet 54 mg (54 mg Oral Given 8/26/22 1013)   isosorbide mononitrate (IMDUR) extended release tablet 60 mg (60 mg Oral Given 8/26/22 1013)   tamsulosin (FLOMAX) capsule 0.4 mg (0.4 mg Oral Given 8/26/22 1013)   furosemide (LASIX) injection 40 mg (40 mg IntraVENous Given 8/26/22 1013)   0.9 % sodium chloride infusion (has no administration in time range)   perflutren lipid microspheres (DEFINITY) injection 1.65 mg (has no administration in time range)   acetaminophen (TYLENOL) tablet 650 mg (650 mg Oral Given 8/25/22 0839)   0.9 % sodium chloride bolus (0 mLs IntraVENous Stopped 8/25/22 0948)   vancomycin 1500 mg in dextrose 5% 300 mL IVPB (0 mg IntraVENous Stopped 8/25/22 1126)     Patient was evaluated due to developing shortness of breath with oxygen requirement this morning and concern for pneumonia. 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 I did start him on broad-spectrum antibiotics with vancomycin and cefepime and oncology agreed with this. He will need further evaluation in the hospital due to concern for severe sepsis. His troponin was mildly elevated and this will need to be trended although at this time story not suggestive of acute coronary syndrome. Is this patient to be included in the SEP-1 Core Measure due to severe sepsis or septic shock?    Yes   SEP-1 CORE MEASURE DATA      Sepsis Criteria   Severe Sepsis Criteria   Septic Shock Criteria     Must be confirmed or suspected to move forward with diagnosis of sepsis. Must meet 2:    [x] Temperature > 100.9 F (38.3 C)        or < 96.8 F (36 C)  [x] HR > 90  [x] RR > 20  [x] WBC > 12 or < 4 or 10% bands      AND:      [x] Infection Confirmed or        Suspected. Must meet 1:    [] Lactate > 2       or   [x] Signs of Organ Dysfunction:    - SBP < 90 or MAP < 65  - Altered mental status  - Creatinine > 2 or increased from      baseline  - Urine Output < 0.5 ml/kg/hr  - Bilirubin > 2  - INR > 1.5 (not anticoagulated)  - Platelets < 485,039  - Acute Respiratory Failure as     evidenced by new need for NIPPV     or mechanical ventilation      [] No criteria met for Severe Sepsis. Must meet 1:    [] Lactate > 4        or   [] SBP < 90 or MAP < 65 for at        least two readings in the first        hour after fluid bolus        administration      [] Vasopressors initiated (if hypotension persists after fluid resuscitation)        [x] No criteria met for Septic Shock. Patient Vitals for the past 6 hrs:   BP Temp Pulse Resp SpO2   08/26/22 1020 (!) 147/71 98.9 °F (37.2 °C) 73 16 96 %   08/26/22 1048 -- -- -- 16 --      Recent Labs     08/25/22  0828 08/25/22  2142 08/26/22  0543   WBC 1.3*  --  1.2*   LACTA 1.1 1.1 0.8   CREATININE 1.3  --  1.5*   BILITOT 1.9*  --  1.3*   INR 1.38*  --   --    PLT 47*  --  43*         Time Severe Sepsis Identified: 0840    Fluid Resuscitation Rational: less than 30mL/kg because normal blood pressure and lactic acid and at this time he does not appear to be dehydrated and therefore I was judicious with fluids to avoid fluid overload. Repeat lactate level: not indicated due to initial lactate < 2    Reassessment Exam:   Not applicable. Patient does not have septic shock. The patient tolerated their visit well. The patient and / or the family were informed of the results of any tests, a time was given to answer questions. FINAL IMPRESSION      1. Neutropenic fever ([address]    2.  Acute respiratory failure with hypoxia ([address]    3. Severe sepsis [facility])          DISPOSITION/PLAN   DISPOSITION Admitted 08/25/2022 10:17:57 AM      PATIENT REFERRED TO:  No follow-up provider specified.     DISCHARGEMEDICATIONS:  Current Discharge Medication List          DISCONTINUED MEDICATIONS:  Current Discharge Medication List        STOP taking these medications       sennosides-docusate sodium (SENOKOT-S) 8.6-50 MG tablet Comments:   Reason for Stopping:                      (Please note that portions of this note were completed with a voicerecognition program.  Efforts were made to edit the dictations but occasionally words are mis-transcribed.)    Koko Mack MD (electronically signed)            Koko Mack MD  08/26/22 0743

## 2022-08-25 NOTE — PLAN OF CARE
Problem: Safety - Adult  Goal: Free from fall injury  Outcome: Progressing   Pt remains free from falls during this shift. Pt a/o and calls out appropriately. Bed in lowest position and wheels locked. Call light within reach. Bedside table within reach. Bed check in place.

## 2022-08-25 NOTE — ED NOTES
Both sets of blood cultures drawn following [facility] policy prior to administering antibiotics.      Vero Bird RN  08/25/22 5109

## 2022-08-25 NOTE — CARE COORDINATION
CASE MANAGEMENT INITIAL ASSESSMENT      Reviewed chart and completed assessment with patient and friend  Family present: friend, Royce Plaza  Explained Case Management role/services. yes    Primary contact information:daughter, [address] [place] :   Primary Decision Maker: Mikelyndaaric Levi - [phone]    Supplemental (Other) Decision Maker: Saad Nicholas - Other - [phone]          Can this person be reached and be able to respond quickly, such as within a few minutes or hours? Yes      Admit date/status:8/25/22  Diagnosis:COVID+   Is this a Readmission?:  No      Insurance:[employer]   Precert required for SNF: Yes       3 night stay required: No    Living arrangements, Adls, care needs, prior to admission:usually lives at home alone, however daughter and friend stay with him most of the time, independent in ADLs    Durable Medical Equipment at home:  Walker_x_Cane__RTS__ BSC__Shower Chair__  02__ HHN__ CPAP__  BiPap__  Hospital Bed__ W/C___ Other_____    Services in the home and/or outpatient, prior to admission:none    Current PCP:Dr. Brayan Robertson                                Medications:yes Prescription coverage? Yes Will pt require financial assistance with medications No     Transportation needs: car     Dialysis Facility (if applicable)   Name:  Address:  Dialysis Schedule:  Phone:  Fax:    PT/OT recs:    Hospital Exemption Notification (HEN):    Barriers to discharge:medical complications    Plan/comments:Referred to patient for d/c planning. Spoke to patient and friend. Patient is an 79year old male admitted for [facility]. Patient usually lives at home alone, however daughter and friend usually stay with patient most of the time. Patient reports he is independent in ADLs. Patient has had [facility] in past.  Case management to follow for d/c needs.   Electronically signed by NAHEED Gomez on 8/25/2022 at 10:48 AM      ECOC on chart for MD signature

## 2022-08-25 NOTE — PROGRESS NOTES
4 Eyes Skin Assessment     The patient is being assess for  Admission    I agree that 2 RN's have performed a thorough Head to Toe Skin Assessment on the patient. ALL assessment sites listed below have been assessed. Areas assessed by both nurses:     [x]   Head, Face, and Ears   [x]   Shoulders, Back, and Chest  [x]   Arms, Elbows, and Hands   [x]   Coccyx, Sacrum, and IschIum  [x]   Legs, Feet, and Heels        Does the Patient have Skin Breakdown?   No         Isma Prevention initiated:  No   Wound Care Orders initiated:  No      [facility] nurse consulted for Pressure Injury (Stage 3,4, Unstageable, DTI, NWPT, and Complex wounds), New and Established Ostomies:  No      Nurse 1 eSignature: Electronically signed by Cristina Fortune RN on 8/25/22 at 2:53 PM EDT    **SHARE this note so that the co-signing nurse is able to place an eSignature**    Nurse 2 eSignature: Electronically signed by [place] RN on 8/25/22 at 6:51 PM EDT

## 2022-08-25 NOTE — PROGRESS NOTES
Spoke with Dr. Manda Stokes. Reported that the pt's troponin went from 0.08 to 0.20. that the pt's VSS, denies chest pain, dizziness, or any SOB at this time. Stat EKG ordered. No new orders at this time. Meds given per mar.

## 2022-08-25 NOTE — ED NOTES
Pt's caregiver came out to nurse stating that pt was acting confused stating he had been in ED for several days. RN immediately assessed pt who was alert and orientted to everything except date. Pt states he doesn't typically concern himself with the date, could give president's name. Pt presented with no other evidence of increasing confusion, denies concerns at this time.      Saira Medina RN  08/25/22 3541

## 2022-08-25 NOTE — CONSULTS
Consult placed    Who:lolita  Date:8/25/2022,  Time:4:42 PM        Electronically signed by Merlinda Signs on 8/25/2022 at 4:42 PM

## 2022-08-26 ENCOUNTER — APPOINTMENT (OUTPATIENT)
Dept: CT IMAGING | Age: 82
DRG: 808 | End: 2022-08-26
Payer: COMMERCIAL

## 2022-08-26 ENCOUNTER — TELEPHONE (OUTPATIENT)
Dept: INTERNAL MEDICINE CLINIC | Age: 82
End: 2022-08-26

## 2022-08-26 ENCOUNTER — TELEPHONE (OUTPATIENT)
Dept: CARDIOLOGY CLINIC | Age: 82
End: 2022-08-26

## 2022-08-26 ENCOUNTER — TELEPHONE (OUTPATIENT)
Dept: CARDIOLOGY | Age: 82
End: 2022-08-26

## 2022-08-26 PROBLEM — K59.00 CONSTIPATION: Status: ACTIVE | Noted: 2022-08-26

## 2022-08-26 LAB
A/G RATIO: 1 (ref 1.1–2.2)
ALBUMIN SERPL-MCNC: 3.1 G/DL (ref 3.4–5)
ALP BLD-CCNC: 38 U/L (ref 40–129)
ALT SERPL-CCNC: 6 U/L (ref 10–40)
ANION GAP SERPL CALCULATED.3IONS-SCNC: 9 MMOL/L (ref 3–16)
ANISOCYTOSIS: ABNORMAL
AST SERPL-CCNC: 19 U/L (ref 15–37)
ATYPICAL LYMPHOCYTE RELATIVE PERCENT: 6 % (ref 0–6)
BASOPHILS ABSOLUTE: 0 K/UL (ref 0–0.2)
BASOPHILS RELATIVE PERCENT: 0 %
BILIRUB SERPL-MCNC: 1.3 MG/DL (ref 0–1)
BLASTS RELATIVE PERCENT: 5 %
BLOOD BANK DISPENSE STATUS: NORMAL
BLOOD BANK DISPENSE STATUS: NORMAL
BLOOD BANK PRODUCT CODE: NORMAL
BLOOD BANK PRODUCT CODE: NORMAL
BPU ID: NORMAL
BPU ID: NORMAL
BUN BLDV-MCNC: 28 MG/DL (ref 7–20)
C-REACTIVE PROTEIN: 185.9 MG/L (ref 0–5.1)
CALCIUM SERPL-MCNC: 9.3 MG/DL (ref 8.3–10.6)
CHLORIDE BLD-SCNC: 103 MMOL/L (ref 99–110)
CHOLESTEROL, TOTAL: 95 MG/DL (ref 0–199)
CO2: 26 MMOL/L (ref 21–32)
CREAT SERPL-MCNC: 1.5 MG/DL (ref 0.8–1.3)
DESCRIPTION BLOOD BANK: NORMAL
DESCRIPTION BLOOD BANK: NORMAL
EKG ATRIAL RATE: 78 BPM
EKG DIAGNOSIS: NORMAL
EKG P AXIS: 54 DEGREES
EKG P-R INTERVAL: 210 MS
EKG Q-T INTERVAL: 496 MS
EKG QRS DURATION: 164 MS
EKG QTC CALCULATION (BAZETT): 565 MS
EKG R AXIS: 51 DEGREES
EKG T AXIS: 85 DEGREES
EKG VENTRICULAR RATE: 78 BPM
EOSINOPHILS ABSOLUTE: 0 K/UL (ref 0–0.6)
EOSINOPHILS RELATIVE PERCENT: 1 %
GFR AFRICAN AMERICAN: 54
GFR NON-AFRICAN AMERICAN: 45
GLUCOSE BLD-MCNC: 107 MG/DL (ref 70–99)
HCT VFR BLD CALC: 17.3 % (ref 40.5–52.5)
HCT VFR BLD CALC: 18.2 % (ref 40.5–52.5)
HCT VFR BLD CALC: 23.7 % (ref 40.5–52.5)
HDLC SERPL-MCNC: 27 MG/DL (ref 40–60)
HEMATOLOGY PATH CONSULT: NO
HEMOGLOBIN: 5.9 G/DL (ref 13.5–17.5)
HEMOGLOBIN: 6.1 G/DL (ref 13.5–17.5)
HEMOGLOBIN: 7.8 G/DL (ref 13.5–17.5)
HYPOCHROMIA: ABNORMAL
LACTIC ACID: 0.8 MMOL/L (ref 0.4–2)
LACTIC ACID: 1.2 MMOL/L (ref 0.4–2)
LACTIC ACID: 3.6 MMOL/L (ref 0.4–2)
LDL CHOLESTEROL CALCULATED: 53 MG/DL
LYMPHOCYTES ABSOLUTE: 0.9 K/UL (ref 1–5.1)
LYMPHOCYTES RELATIVE PERCENT: 66 %
MACROCYTES: ABNORMAL
MCH RBC QN AUTO: 32.5 PG (ref 26–34)
MCHC RBC AUTO-ENTMCNC: 33.9 G/DL (ref 31–36)
MCV RBC AUTO: 95.8 FL (ref 80–100)
MICROCYTES: ABNORMAL
MONOCYTES ABSOLUTE: 0.1 K/UL (ref 0–1.3)
MONOCYTES RELATIVE PERCENT: 7 %
NEUTROPHILS ABSOLUTE: 0.2 K/UL (ref 1.7–7.7)
NEUTROPHILS RELATIVE PERCENT: 15 %
PDW BLD-RTO: 21.7 % (ref 12.4–15.4)
PLATELET # BLD: 43 K/UL (ref 135–450)
PLATELET SLIDE REVIEW: ABNORMAL
PMV BLD AUTO: 11 FL (ref 5–10.5)
POTASSIUM REFLEX MAGNESIUM: 3.6 MMOL/L (ref 3.5–5.1)
PROCALCITONIN: 5.37 NG/ML (ref 0–0.15)
RBC # BLD: 1.81 M/UL (ref 4.2–5.9)
SARS-COV-2, PCR: NOT DETECTED
SLIDE REVIEW: ABNORMAL
SODIUM BLD-SCNC: 138 MMOL/L (ref 136–145)
TOTAL PROTEIN: 6.1 G/DL (ref 6.4–8.2)
TRIGL SERPL-MCNC: 75 MG/DL (ref 0–150)
VLDLC SERPL CALC-MCNC: 15 MG/DL
WBC # BLD: 1.2 K/UL (ref 4–11)

## 2022-08-26 PROCEDURE — 6360000002 HC RX W HCPCS: Performed by: EMERGENCY MEDICINE

## 2022-08-26 PROCEDURE — 84145 PROCALCITONIN (PCT): CPT

## 2022-08-26 PROCEDURE — 86140 C-REACTIVE PROTEIN: CPT

## 2022-08-26 PROCEDURE — 6370000000 HC RX 637 (ALT 250 FOR IP): Performed by: INTERNAL MEDICINE

## 2022-08-26 PROCEDURE — 6370000000 HC RX 637 (ALT 250 FOR IP)

## 2022-08-26 PROCEDURE — 6360000004 HC RX CONTRAST MEDICATION: Performed by: INTERNAL MEDICINE

## 2022-08-26 PROCEDURE — 96366 THER/PROPH/DIAG IV INF ADDON: CPT

## 2022-08-26 PROCEDURE — G0378 HOSPITAL OBSERVATION PER HR: HCPCS

## 2022-08-26 PROCEDURE — 93005 ELECTROCARDIOGRAM TRACING: CPT | Performed by: INTERNAL MEDICINE

## 2022-08-26 PROCEDURE — 80053 COMPREHEN METABOLIC PANEL: CPT

## 2022-08-26 PROCEDURE — 2700000000 HC OXYGEN THERAPY PER DAY

## 2022-08-26 PROCEDURE — 2580000003 HC RX 258: Performed by: EMERGENCY MEDICINE

## 2022-08-26 PROCEDURE — 83605 ASSAY OF LACTIC ACID: CPT

## 2022-08-26 PROCEDURE — 93308 TTE F-UP OR LMTD: CPT

## 2022-08-26 PROCEDURE — 85014 HEMATOCRIT: CPT

## 2022-08-26 PROCEDURE — 6370000000 HC RX 637 (ALT 250 FOR IP): Performed by: NURSE PRACTITIONER

## 2022-08-26 PROCEDURE — 2580000003 HC RX 258: Performed by: INTERNAL MEDICINE

## 2022-08-26 PROCEDURE — 99205 OFFICE O/P NEW HI 60 MIN: CPT | Performed by: INTERNAL MEDICINE

## 2022-08-26 PROCEDURE — 93010 ELECTROCARDIOGRAM REPORT: CPT | Performed by: INTERNAL MEDICINE

## 2022-08-26 PROCEDURE — 6360000002 HC RX W HCPCS

## 2022-08-26 PROCEDURE — 94761 N-INVAS EAR/PLS OXIMETRY MLT: CPT

## 2022-08-26 PROCEDURE — 74176 CT ABD & PELVIS W/O CONTRAST: CPT

## 2022-08-26 PROCEDURE — 36415 COLL VENOUS BLD VENIPUNCTURE: CPT

## 2022-08-26 PROCEDURE — 85025 COMPLETE CBC W/AUTO DIFF WBC: CPT

## 2022-08-26 PROCEDURE — 85018 HEMOGLOBIN: CPT

## 2022-08-26 PROCEDURE — 96376 TX/PRO/DX INJ SAME DRUG ADON: CPT

## 2022-08-26 PROCEDURE — 36430 TRANSFUSION BLD/BLD COMPNT: CPT

## 2022-08-26 RX ORDER — HYDROCODONE BITARTRATE AND ACETAMINOPHEN 5; 325 MG/1; MG/1
1 TABLET ORAL EVERY 8 HOURS PRN
Status: DISCONTINUED | OUTPATIENT
Start: 2022-08-26 | End: 2022-08-30 | Stop reason: HOSPADM

## 2022-08-26 RX ORDER — DOCUSATE SODIUM 100 MG/1
100 CAPSULE, LIQUID FILLED ORAL 2 TIMES DAILY
Status: DISCONTINUED | OUTPATIENT
Start: 2022-08-26 | End: 2022-08-30 | Stop reason: HOSPADM

## 2022-08-26 RX ORDER — SODIUM CHLORIDE 9 MG/ML
INJECTION, SOLUTION INTRAVENOUS PRN
Status: DISCONTINUED | OUTPATIENT
Start: 2022-08-26 | End: 2022-08-30 | Stop reason: HOSPADM

## 2022-08-26 RX ORDER — POLYETHYLENE GLYCOL 3350 17 G/17G
17 POWDER, FOR SOLUTION ORAL ONCE
Status: COMPLETED | OUTPATIENT
Start: 2022-08-26 | End: 2022-08-26

## 2022-08-26 RX ORDER — SENNA PLUS 8.6 MG/1
1 TABLET ORAL NIGHTLY
Status: DISCONTINUED | OUTPATIENT
Start: 2022-08-26 | End: 2022-08-30 | Stop reason: HOSPADM

## 2022-08-26 RX ADMIN — SENNOSIDES 8.6 MG: 8.6 TABLET, COATED ORAL at 21:44

## 2022-08-26 RX ADMIN — Medication 10 ML: at 10:13

## 2022-08-26 RX ADMIN — DOCUSATE SODIUM 100 MG: 100 CAPSULE, LIQUID FILLED ORAL at 21:43

## 2022-08-26 RX ADMIN — FENOFIBRATE 54 MG: 54 TABLET ORAL at 10:13

## 2022-08-26 RX ADMIN — CARVEDILOL 6.25 MG: 6.25 TABLET, FILM COATED ORAL at 21:44

## 2022-08-26 RX ADMIN — CEFEPIME 2000 MG: 2 INJECTION, POWDER, FOR SOLUTION INTRAVENOUS at 21:43

## 2022-08-26 RX ADMIN — FUROSEMIDE 40 MG: 10 INJECTION, SOLUTION INTRAMUSCULAR; INTRAVENOUS at 17:54

## 2022-08-26 RX ADMIN — POLYETHYLENE GLYCOL 3350 17 G: 17 POWDER, FOR SOLUTION ORAL at 04:32

## 2022-08-26 RX ADMIN — CARVEDILOL 6.25 MG: 6.25 TABLET, FILM COATED ORAL at 10:13

## 2022-08-26 RX ADMIN — DOCUSATE SODIUM 100 MG: 100 CAPSULE, LIQUID FILLED ORAL at 17:54

## 2022-08-26 RX ADMIN — ACETAMINOPHEN 650 MG: 325 TABLET ORAL at 17:54

## 2022-08-26 RX ADMIN — CEFEPIME 2000 MG: 2 INJECTION, POWDER, FOR SOLUTION INTRAVENOUS at 10:16

## 2022-08-26 RX ADMIN — ISOSORBIDE MONONITRATE 60 MG: 60 TABLET, EXTENDED RELEASE ORAL at 10:13

## 2022-08-26 RX ADMIN — POLYETHYLENE GLYCOL 3350 17 G: 17 POWDER, FOR SOLUTION ORAL at 17:54

## 2022-08-26 RX ADMIN — HYDROCODONE BITARTRATE AND ACETAMINOPHEN 1 TABLET: 5; 325 TABLET ORAL at 19:06

## 2022-08-26 RX ADMIN — Medication 10 ML: at 21:44

## 2022-08-26 RX ADMIN — TAMSULOSIN HYDROCHLORIDE 0.4 MG: 0.4 CAPSULE ORAL at 10:13

## 2022-08-26 RX ADMIN — FUROSEMIDE 40 MG: 10 INJECTION, SOLUTION INTRAMUSCULAR; INTRAVENOUS at 10:13

## 2022-08-26 RX ADMIN — PERFLUTREN 1.65 MG: 6.52 INJECTION, SUSPENSION INTRAVENOUS at 11:44

## 2022-08-26 ASSESSMENT — ENCOUNTER SYMPTOMS
SORE THROAT: 0
BACK PAIN: 0
CHEST TIGHTNESS: 0
VOICE CHANGE: 0
COUGH: 1
NAUSEA: 1
CONSTIPATION: 1
EYES NEGATIVE: 1
ABDOMINAL PAIN: 0
VOMITING: 1
TROUBLE SWALLOWING: 0
COUGH: 0
VOMITING: 0
BACK PAIN: 1
SHORTNESS OF BREATH: 1
DIARRHEA: 0

## 2022-08-26 NOTE — TELEPHONE ENCOUNTER
Pt is currently admitted, pt has been scheduled for 09/02 w/AV for Watchman consult at 8:45 am. Please review this on AVS w/pt once he is released from hospital.

## 2022-08-26 NOTE — CARE COORDINATION
Chart reviewed. Spoke with Fabiola Crow RN and Dr Laury Lombardi, patient to get 2U PRBC's today. Palliative care consult and therapy pending. Refarral to [facility] for possible DCP needs.  Jaswinder Mccray RN

## 2022-08-26 NOTE — CONSULTS
PALLIATIVE MEDICINE CONSULTATION     Patient name:Roger Coello   HAA:[id number]    :1940  Room/Bed:0338/0338-01   LOS: 0 days         Date of consult:2022    Consult Information  Palliative Medicine Consult performed by: ALESSIA Cassidy CNP      Inpatient consult to Palliative Care  Consult performed by: ALESSIA Gottlieb CNP  Consult ordered by: Belle Hernandez MD  Reason for consult: goals of care, code status discussion    Number of admissions past 12 months: 3      ASSESSMENT/RECOMMENDATIONS     79 y.o. 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 He is resistant to talking about goals of care other than saying he wants to continue with aggressive medical treatment and wants to stay alive as long as possible. 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 Does not want any medical assistance at home. Would benefit from ongoing [address] discussions, perhaps without his friends at the bedside. Shortness of breath - no baseline oxygen requirement. Likely due to his anemia and cardiac issues. Appears comfortable on 2 L NC. Receiving blood at this time. Constipation - reports no bowel movement in 1 week. Says his doctor told him to take senna, but not clear if he actually did this. Says he is on chronic Vicodin. Has scheduled glycolax and colace. Will add in Senna. Patient/Family Goals of Care :    22    Spoke with patient. He is alert and oriented and appears decisional.  He is not enthusiastic about talking with me so our conversation was not very productive. His 2 female friends, Frod Parson and Koko Perkins, are also at the bedside. Koko Perkins and Ford Parson did most of the talking. 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   He wants to go home and get back to running his business. He guesses the blood transfusions he got previously are not helping him since he is here in the hospital.  His friends at the bedside worry that patient is doing too much with his business (driving and working) and are hopeful a doctor can tell him to slow down. Patient states he has no intention of slowing down. He wants to have a bowel movement and then get back home. He wants to continue with whatever treatment is needed to keep him from dying. He wants a better treatment than what he is getting so he doesn't have to be in the hospital.  I addressed his code status since he previously indicated he was a DNR CCA and now was saying his goal was to stay alive. Patient states he has changed his mind and now wants full resuscitation. If he dies he wants chest compressions to try to restart his heart. He wants to be intubated if needed to stay alive, even if that means lifelong ventilator assistance. He wants to be kept alive. He does not know what changed his mind about this code status preference. He does not have an appointed decision maker. He was supposed to have completed one on another admission naming Chery Garcia and Mervat Jones as his decision makers, but it never got completed. He does not have a relationship with his children and does not want them making any decisions for him. I suggested we complete a HCPOA now to name his decision makers since Damien Parker would force us to involve his children. He states he does not want to fill out this document now or even later tonight. He tells me we can try and ask him again tomorrow and maybe he will change his mind. Disposition/Discharge Plan:   - pending medical course       Advance Directives:  Surrogate Decision Maker: ÁLVARO, [facility] law would make his collective 2 children (for which he did not provide information) his decision maker.   Verbally states he wants his friends Chery Garcia and Mervat Jones to be his decision makers, but declines to complete HCPOA with me today  Code status:  Full Code      Palliative Performance Scale:     [] 60%  Amb reduced; Sig dz. Can't do hobbies/housework; Intake normal or reduced, Occasional assist; LOC full/confusion   [] 50%  Mainly sit/lie; Extensive disease. Mainly assist, Intake normal or reduced; Occasional assist; LOC full/confusion   [x] 40%  Mainly in bed; Extensive disease; Mainly assist; Intake normal or reduced; Occasional assist; LOC full/confusion   [] 30%  Bed bound, Extensive disease; Total care; Intake reduced; LOC full/confusion   [] 20%  Bed bound; Extensive disease; Total care; Intake minimal; Drowsy/coma   [] 10%  Bed bound; Extensive disease; Total care; Mouth care only; Drowsy/coma   []  0%   Death      Case discussed with: patient, family, attending MD  Thank you for allowing us to participate in the care of this patient. HISTORY     CC: shortness of breath  HPI: The patient is a 79 y.o. male with PMHx of MDS, MI, ICM, Afib, HTN, CKD, Depression, Hyperlipidemia, BPH who presented to [facility] with shortness of breath, vomiting, fever. Said he woke up this morning and began vomiting. Also was short of breath and dyspneic at rest but even worse with exertion. He felt fine when he went to bed last night. He felt that he was getting sicker and sicker as the day went on, and so his roommate [employer] brought him to ER. In the ER he was worked up for possible pneumonia, since he had fever + SOB. Covid rapid was indeterminate so a new test was ordered. CXR showed bilateral pulmonary infiltrates. CBC showed pancytopenia. Troponin and procalcitonin elevated. Patient was admitted to inpatient for monitoring and treatment. Patient seen by hospital medicine in the afternoon. He was afebrile at the time, no acute respiratory distress, responsive and oriented x3. VSS. Gave history of his illness.  Said he felt slight abdominal discomfort last few days, but denied n/c/d, only vomiting this morning. Denied chest pain, headache, weakness, numbness. Said he was dyspneic on exertion but not on rest.     Palliative Medicine SymptomScreening/ROS:    Review of Systems   Constitutional:  Positive for fever. Negative for activity change and fatigue. HENT:  Negative for sore throat, trouble swallowing and voice change. Eyes: Negative. Respiratory:  Positive for shortness of breath. Negative for cough. Cardiovascular:  Positive for leg swelling. Gastrointestinal:  Positive for constipation, nausea and vomiting. Genitourinary: Negative. Musculoskeletal:  Positive for back pain. Skin: Negative. Neurological:  Positive for weakness. Psychiatric/Behavioral: Negative. A complete 10 count ROS was obtained. Pertinent positives mentioned above in HPI/ROS. All others if not mentioned are negative. Home med list and hospital medications reviewed in chart as of 8/26/2022     EXAM     Vitals:    08/26/22 1245   BP: (!) 145/60   Pulse: 73   Resp: 16   Temp: 99.2 °F (37.3 °C)   SpO2: 97%       Physical Examination:   Physical Exam  Constitutional:       General: He is not in acute distress. Appearance: He is ill-appearing. Comments: Lying in bed, friends at the bedside   HENT:      Head: Normocephalic and atraumatic. Nose: Nose normal. No congestion or rhinorrhea. Mouth/Throat:      Mouth: Mucous membranes are dry. Pharynx: No oropharyngeal exudate or posterior oropharyngeal erythema. Eyes:      General: No scleral icterus. Right eye: No discharge. Left eye: No discharge. Extraocular Movements: Extraocular movements intact. Conjunctiva/sclera: Conjunctivae normal.      Pupils: Pupils are equal, round, and reactive to light. Cardiovascular:      Rate and Rhythm: Normal rate. Pulses: Normal pulses. Pulmonary:      Effort: Pulmonary effort is normal. No respiratory distress. Breath sounds: No stridor. No wheezing. Comments: 2 L NC  Abdominal:      General: There is no distension. Palpations: Abdomen is soft. Musculoskeletal:         General: Normal range of motion. Cervical back: Normal range of motion and neck supple. Right lower leg: Edema present. Left lower leg: Edema present. Skin:     General: Skin is warm and dry. Capillary Refill: Capillary refill takes 2 to 3 seconds. Neurological:      Mental Status: He is alert and oriented to person, place, and time. Mental status is at baseline. Psychiatric:         Mood and Affect: Mood normal.         Behavior: Behavior normal.         Thought Content:  Thought content normal.         Judgment: Judgment normal.           Current labs in the epic chart reviewed as of 8/26/2022   Review of previous notes, admits, labs, radiology and testing relevant to this consult done in this chart today 8/26/2022      Total time: 118 minutes  >50% of time spent counseling patient at bedside or POA/family member if applicable , reviewing information and discussing care, coordinating with care team  Signed By: Electronically signed by ALESSIA Thompson CNP on 8/26/2022 at 3:17 PM  Palliative Medicine   [phone]    August 26, 2022

## 2022-08-26 NOTE — TELEPHONE ENCOUNTER
----- Message from Zuleyka Evangelista sent at 8/26/2022  9:37 AM EDT -----  Contact: Ene/ heart failure nurse [phone]  Patient tested positive for covid yesterday, 8/25/22. Patient needs a hospital follow up appointment to be discharged within about 7 days. Caller states she is only working until 1 today.

## 2022-08-26 NOTE — CONSULTS
Consult placed  Added to the treatment team  Hulon Siemens  Date:8/26/2022,  Time:12:40 PM        Electronically signed by Antwan East on 8/26/2022 at 12:40 PM

## 2022-08-26 NOTE — CONSULTS
Consult Call Back    Who:Dr. Cheryl Valdez  Date:8/26/2022,  Time:7:55 AM    Electronically signed by Paula Garcia on 8/26/22 at 7:55 AM EDT

## 2022-08-26 NOTE — TELEPHONE ENCOUNTER
Practice manager Nael Burnham spoke to Sangita Balderasng her that Dr Brayan Robertson will review message Monday and we will call her back then-Per Dr Brayan Robertson.

## 2022-08-26 NOTE — PROGRESS NOTES
Received return call from Briana Grant MD office and MD would like to review patient chart/status on Monday prior to scheduling follow up to address appropriately.    Bushra Chaney RN

## 2022-08-26 NOTE — CONSULTS
[address] [facility]  [phone]      Attending Physician: Cristhian Greer MD  Reason for Consultation/Chief Complaint: Shortness of breath    Subjective   History of Present Illness:  Stas Abdi is a 79 y.o. patient who presented to the hospital with complaints of shortness of breath, patient presented to the hospital on August 25, 2022, was noted to have severe pancytopenia, underwent transfusion with 1 unit of blood. He has 1 unit pending currently. He was seen by hematology and has known high risk myelodysplasia, he has elected not to pursue chemotherapy and is being managed conservatively with transfusions. In the course of work-up, troponin levels were found to be elevated at 0.08, 0.20 and 0.27. Initially patient had an indeterminant rapid COVID test however follow-up COVID test was negative. Patient was noted to have fever, he is being treated for neutropenic fever and possible pneumonia. Patient has recently been seen in our office for hospital follow-up, he was admitted in June to July 2022 for new onset atrial fibrillation, he was seen by Dr. Robinson Dsouza as well as Dr. Melany Moore. He was noted to have LV dysfunction with EF of 45 to 50%. He has not been on any anticoagulation due to myelodysplasia. Past Medical History:   has a past medical history of Anosmia, Backpain, Bilateral carotid bruits, Chronic kidney disease, Current moderate episode of major depressive disorder without prior episode ([facility] [address] Hyperlipidemia, Hypertension, Hypertrophy of prostate without urinary obstruction and other lower urinary tract symptoms (LUTS), Nonrheumatic aortic valve insufficiency, Rosacea, and Taste perversion. Surgical History:   has a past surgical history that includes hernia repair; Colonoscopy (2/11/2008); Colonoscopy (11/14/2014); and CT BIOPSY BONE MARROW (7/5/2022). Social History:   reports that he has never smoked.  He has never used smokeless tobacco. He reports that he does not drink alcohol and does not use drugs. Family History:  family history includes COPD in his brother and sister. Home Medications:  Were reviewed and are listed in nursing record and/or below  Prior to Admission medications    Medication Sig Start Date End Date Taking? Authorizing Provider   hydrALAZINE (APRESOLINE) 100 MG tablet Take 100 mg by mouth 3 times daily   Yes Historical Provider, MD   carvedilol (COREG) 6.25 MG tablet Take 1 tablet by mouth in the morning and 1 tablet before bedtime. 8/15/22   Tiarra Donald MD   isosorbide mononitrate (IMDUR) 60 MG extended release tablet Take 1 tablet by mouth in the morning. 8/4/22   Elda Davis MD   traZODone (DESYREL) 50 MG tablet Take 1 tablet by mouth nightly 7/28/22   Ayse Barrios MD   FLUoxetine (PROZAC) 10 MG capsule Take one capsule by mouth daily. 7/28/22   Ayse Barrios MD   fenofibrate (TRIGLIDE) 160 MG tablet TAKE ONE TABLET BY MOUTH DAILY 7/28/22   Ayse Barrios MD   HYDROcodone-acetaminophen (NORCO) 5-325 MG per tablet Take 1 tablet by mouth 2 times daily as needed for Pain for up to 30 days. 7/28/22 8/27/22  Ayse Barrios MD   tamsulosin (FLOMAX) 0.4 MG capsule Take 1 capsule by mouth in the morning. 7/28/22   Ayse Barrios MD   furosemide (LASIX) 20 MG tablet Take 1 tablet by mouth in the morning. 7/28/22   Ayse Barrios MD   Ascorbic Acid (VITAMIN C) 500 MG tablet Take 1,000 mg by mouth daily.     Historical Provider, MD        CURRENT Medications:  aspirin EC tablet 325 mg, Daily  0.9 % sodium chloride infusion, PRN  cefepime (MAXIPIME) 2000 mg IVPB minibag, Q12H  sodium chloride flush 0.9 % injection 5-40 mL, 2 times per day  sodium chloride flush 0.9 % injection 5-40 mL, PRN  0.9 % sodium chloride infusion, PRN  ondansetron (ZOFRAN-ODT) disintegrating tablet 4 mg, Q8H PRN   Or  ondansetron (ZOFRAN) injection 4 mg, Q6H PRN  polyethylene glycol (GLYCOLAX) packet 17 g, Daily PRN  acetaminophen (TYLENOL) tablet 650 mg, Q6H PRN   Or  acetaminophen (TYLENOL) suppository 650 mg, Q6H PRN  carvedilol (COREG) tablet 6.25 mg, BID  fenofibrate (TRICOR) tablet 54 mg, Daily  isosorbide mononitrate (IMDUR) extended release tablet 60 mg, Daily  tamsulosin (FLOMAX) capsule 0.4 mg, Daily  furosemide (LASIX) injection 40 mg, BID        Allergies:  Patient has no known allergies. Review of Systems:   A 14 point review of symptoms completed. Pertinent positives identified in the HPI, all other review of symptoms negative as below.       Objective   PHYSICAL EXAM:    Vitals:    08/26/22 0409   BP: 125/60   Pulse: 77   Resp:    Temp: 99.8 °F (37.7 °C)   SpO2: 96%    Weight: 164 lb 10.9 oz (74.7 kg)         General Appearance:  Alert, cooperative, no distress, appears stated age   Head:  Normocephalic, without obvious abnormality, atraumatic   Eyes:  PERRL, conjunctiva/corneas clear   Nose: Nares normal, no drainage or sinus tenderness   Throat: Lips, mucosa, and tongue normal   Neck: Supple, symmetrical, trachea midline, no adenopathy, thyroid: not enlarged, symmetric, no tenderness/mass/nodules, no JVD   Lungs:   Clear to auscultation bilaterally, respirations unlabored   Chest Wall:  No deformity or tenderness   Heart:  irregular rate and rhythm, S1, S2 normal, 2 out of 6 systolic murmur   Abdomen:   Soft, non-tender, bowel sounds active all four quadrants,  no masses, no organomegaly   Extremities: Extremities tc edema    Pulses: 2+ and symmetric   Skin: Skin color pale   Pysch: Normal mood and affect   Neurologic: Normal gross motor and sensory exam.         Labs   CBC:   Lab Results   Component Value Date/Time    WBC 1.2 08/26/2022 05:43 AM    RBC 1.81 08/26/2022 05:43 AM    HGB 5.9 08/26/2022 05:43 AM    HCT 17.3 08/26/2022 05:43 AM    MCV 95.8 08/26/2022 05:43 AM    RDW 21.7 08/26/2022 05:43 AM    PLT 43 08/26/2022 05:43 AM     CMP:  Lab Results   Component Value Date/Time     08/26/2022 Bilateral carotid bruits    Current moderate episode of major depressive disorder without prior episode (HCC)    Atrial fibrillation with RVR (HCC)    VINNY (acute kidney injury) ([facility] [address]    Acute anemia    GI bleed    Thrombocytopenia (HCC)    Arrhythmia, atrial    Elevated troponin    Cardiomyopathy (HCC)    NSVT (nonsustained ventricular tachycardia) (HCC)    Myelodysplastic syndrome (HCC)    Chronic systolic (congestive) heart failure    Angina pectoris, unspecified    Atherosclerotic heart disease of native coronary artery with unspecified angina pectoris    Acute hypoxemic respiratory failure (HCC)    Anemia    Pancytopenia (HCC)    Pneumonia due to infectious organism    Acute on chronic combined systolic and diastolic CHF (congestive heart failure) (HCC)    Pulmonary congestion    Cor pulmonale (HCC)       Shortness of breath, likely related to anemia, has acute on chronic systolic heart failure, will continue with IV diuresis and transition to oral diuretics tomorrow, can obtain follow-up limited echocardiogram for LV function    Elevated troponin, not a candidate for anticoagulation due to anemia, echocardiogram as above. Not a candidate for ischemia testing including stress testing or cardiac catheterization due to myelodysplasia    Paroxysmal atrial fibrillation, currently in sinus rhythm, not a candidate for anticoagulation due to anemia/myelodysplasia, consider watchman as outpt    Hypertension, probable ischemic CM, continue beta-blocker, continue long-acting nitrates, no ACE due to renal insufficiency    Hchol, continue fibric acid deritvatives as at home on discharge    MDS, as per primary service and hematology, may need to consider palliative care consult    No further inpatient cardiac work-up or treatment is planned, will sign off, please call with questions    Thank you for allowing us to participate in the care of Dieter Patel. Please call me with any questions [phone].     Anitra Smith MD, [place]   [phone] [facility]  [phone] [address] [place] [address]  8/26/2022 9:43 AM

## 2022-08-26 NOTE — CONSENT
Informed Consent for Blood Component Transfusion Note    I have discussed with the patient the rationale for blood component transfusion; its benefits in treating or preventing fatigue, organ damage, or death; and its risk which includes mild transfusion reactions, rare risk of blood borne infection, or more serious but rare reactions. I have discussed the alternatives to transfusion, including the risk and consequences of not receiving transfusion. The patient had an opportunity to ask questions and had agreed to proceed with transfusion of blood components.     Electronically signed by Jose Morgan MD on 8/26/22 at 8:19 AM EDT

## 2022-08-26 NOTE — PROGRESS NOTES
Physical Therapy  Orders received and chart reviewed. Attempted to see pt at this time for PT evaluation. Per chart and RN pt currently with critical H&H, elevated troponin and receiving first of two units of blood. Requesting to hold PT this date and re-attempt 8/27. Will re-attempt as schedule permits.    Thanks,  Michela Black PT, DPT

## 2022-08-26 NOTE — CONSULTS
ONCOLOGY HEMATOLOGY CARE CONSULT NOTE      Requesting Physician:  Dr. Ryanne Carrillo:  Fever        HISTORY OF PRESENT ILLNESS:      Mr. Ivania Rice  is a 79 y.o. male we are seeing in consultation for fever. 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 I saw the patient for follow up on 7/20/2022. He was undecided. I then spoke to the family by phone on 7/25/2022. They declined chemotherapy. He was admitted with pancytopenia and fever. Currently, he is on O2 at 2 LPM by NC.     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    Backpain     Bilateral carotid bruits 9/6/2016    Chronic kidney disease 9/6/2016    Current moderate episode of major depressive disorder without prior episode ([facility] [address] 1/23/2019    Hyperlipidemia     Hypertension     Hypertrophy of prostate without urinary obstruction and other lower urinary tract symptoms (LUTS)     Nonrheumatic aortic valve insufficiency 9/6/2016    Rosacea     Taste perversion 8/19/2011     Past Surgical History:    Past Surgical History:   Procedure Laterality Date    COLONOSCOPY  2/11/2008    COLONOSCOPY  11/14/2014    CT BONE MARROW BIOPSY  7/5/2022    CT BONE MARROW BIOPSY 7/5/2022 Princess Rickie MD [facility] CT SCAN    HERNIA REPAIR         Current Medications:    Current Facility-Administered Medications   Medication Dose Route Frequency Provider Last Rate Last Admin    aspirin EC tablet 325 mg  325 mg Oral Daily Enrico Uriarte MD        cefepime (MAXIPIME) 2000 mg IVPB minibag  2,000 mg IntraVENous Q12H Jasmyn Polanco MD   Stopped at 08/25/22 2313    sodium chloride flush 0.9 % injection 5-40 mL  5-40 mL IntraVENous 2 times per day Enrico Uriarte MD   10 mL at 08/25/22 2111    sodium chloride flush 0.9 % injection 5-40 mL  5-40 mL IntraVENous PRN Enrico Uriarte MD        0.9 % sodium chloride infusion   IntraVENous PRN Enrico Uriarte MD        ondansetron (ZOFRAN-ODT) disintegrating tablet 4 mg  4 mg Oral Q8H PRN Enrico Uriarte MD        Or    ondansetron (ZOFRAN) injection 4 mg  4 mg IntraVENous Q6H PRN Enrico Uriarte MD        polyethylene glycol (GLYCOLAX) packet 17 g  17 g Oral Daily PRN Enrico Uriarte MD   17 g at 08/26/22 0432    acetaminophen (TYLENOL) tablet 650 mg  650 mg Oral Q6H PRN Enrico Uriarte MD        Or    acetaminophen (TYLENOL) suppository 650 mg  650 mg Rectal Q6H PRN Enrico Uriarte MD        carvedilol (COREG) tablet 6.25 mg  6.25 mg Oral BID Enrico Uriarte MD   6.25 mg at 08/25/22 1629    fenofibrate (TRICOR) tablet 54 mg  54 mg Oral Daily Enrico Uriarte MD   54 mg at 08/25/22 1629    isosorbide mononitrate (IMDUR) extended release tablet 60 mg  60 mg Oral Daily Enrico Uriarte MD   60 mg at 08/25/22 1629    tamsulosin (FLOMAX) capsule 0.4 mg  0.4 mg Oral Daily Enrico Uriarte MD   0.4 mg at 08/25/22 1629    furosemide (LASIX) injection 40 mg  40 mg IntraVENous BID Javad Cecilio Renata-Rached, DO   40 mg at 08/25/22 1630     Allergies:  No Known Allergies    Social History:   Social History     Socioeconomic History    Marital status:       Spouse name: Not on file    Number of children: Not on file    Years of education: Not on file    Highest education level: Not on file   Occupational History    Not on file   Tobacco Use    Smoking status: Never    Smokeless tobacco: Never   Vaping Use    Vaping Use: Never used   Substance and Sexual Activity    Alcohol use: No    Drug use: No    Sexual activity: Not Currently     Partners: Female   Other Topics Concern    Not on file   Social History Narrative    Not on file     Social Determinants of Health     Financial Resource Strain: Not on file   Food Insecurity: Not on file   Transportation Needs: Not on file   Physical Activity: Not on file   Stress: Not on file   Social Connections: Not on file   Intimate Partner Violence: Not on file   Housing Stability: Not on file          Family History:     Family History   Problem Relation Age of Onset    COPD Sister     COPD Brother         Birth defect     REVIEW OF SYSTEMS:    Review of Systems    PHYSICAL EXAM:      Vitals:  /60   Pulse 77   Temp 99.8 °F (37.7 °C)   Resp 18   Ht 6' 2\" (1.88 m)   Wt 164 lb 10.9 oz (74.7 kg)   SpO2 96%   BMI 21.14 kg/m²     CONSTITUTIONAL:  awake, alert, cooperative, no apparent distress, and appears stated age NAD  EYES:  pupils equal, round and reactive to light, extra ocular muscles intact,sclera clear, conjunctiva normal  NECK:  Supple, symmetrical, trachea midline, no adenopathy, thyroid symmetric, not enlarged and no tenderness, skin normal  HEMATOLOGIC/LYMPHATICS:  no cervical lymphadenopathy, nosupraclavicular lymphadenopathy, no axillary lymphadenopathy and no inguinal lymphadenopathy  LUNGS:  No increased work of breathing, good air exchange, clear to auscultation bilaterally, no crackles or wheezing  CARDIOVASCULAR:  , regular rate and rhythm, normal S1 and S2, no S3 or S4, and no murmur noted  ABDOMEN:  No scars, normal bowel sounds, soft, non-distended, non-tender, no masses palpated, no hepatosplenomegally      MUSCULOSKELETAL:  There is no redness, warmth, or swelling of the joints. Full range of motion noted. Motor strength is 5 out of 5 all extremities bilaterally. NEUROLOGIC:  Awake, alert, oriented to name, place andtime. Cranial nerves II-XII are grossly intact. Motor is 5 out of 5 bilaterally. SKIN:  no bruising or bleeding      DATA:    PT/INR:    Recent Labs     08/25/22  0828   PROT 6.7   INR 1.38*     PTT:No results for input(s): APTT in the last 72 hours.   CMP:    Lab Results   Component Value Date/Time     08/25/2022 08:28 AM    K 3.6 08/25/2022 08:28 AM    K 3.6 07/05/2022 06:40 AM    CL 99 08/25/2022 08:28 AM    CO2 24 08/25/2022 08:28 AM    BUN 19 08/25/2022 08:28 AM    PROT 6.7 08/25/2022 08:28 AM    PROT 7.3 01/12/2012 02:47 PM     Magnesium:    Lab Results   Component Value Date/Time    MG 1.80 08/25/2022 08:28 AM     Phosphorus: No components found for: PO4  Calcium:  No results found for: CA  CBC:    Lab Results   Component Value Date/Time    WBC 1.3 08/25/2022 08:28 AM    RBC 2.22 08/25/2022 08:28 AM    HGB 7.3 08/25/2022 08:28 AM    HCT 21.1 08/25/2022 08:28 AM    MCV 95.1 08/25/2022 08:28 AM    RDW 21.7 08/25/2022 08:28 AM    PLT 47 08/25/2022 08:28 AM     DIFF:    Lab Results   Component Value Date/Time    MCV 95.1 08/25/2022 08:28 AM    RDW 21.7 08/25/2022 08:28 AM      LDH:  @labcrnt(LDH)@  Uric Acid:  @labcrnt(URIC)@    Radiology Review:  XR CHEST PORTABLE (8/26/2022): Impression   Moderate infiltrates and/or congestion identified in the lungs. Problem List  Patient Active Problem List   Diagnosis    Hypertension    Rosacea    Mixed hyperlipidemia    Enlarged prostate with urinary obstruction    Taste perversion    Anosmia    Back pain    Nonrheumatic aortic valve insufficiency    Bilateral carotid bruits    Current moderate episode of major depressive disorder without prior episode (HCC)    Atrial fibrillation with RVR (HCC)    VINNY (acute kidney injury) (HCC)    Acute anemia    GI bleed    Thrombocytopenia (HCC)    Arrhythmia, atrial    Elevated troponin    Cardiomyopathy (HCC)    NSVT (nonsustained ventricular tachycardia) (HCC)    Myelodysplastic syndrome (HCC)    Chronic systolic (congestive) heart failure    Angina pectoris, unspecified    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  - Transfusional support only. 2.) Neutropenic fever  - Tm 101.2 on admission. Afebrile since then. - SARS-CoV-2 NAAT indeterminate.   Await confirmatory test.  - Blood cultures collected on 8/25/2022 are in process. - Cefepime started. 3.) Parox atrial fib  - Not on anticoagulation. 4.) sCHF  - Echocardiogram, 6/28/2022, showed LVEF 45-50% w/ basal to mid inferior, basal inferoseptal hypokinesis. 5.) Code status  - We discussed his code status this morning (8/26/2022). He does not want chest compressions, defibrillation, or intubation. He would want to try to treat his issues to prevent a decline and prolong his life short of those measures. - Changed to DNR-CCA. Thank you for asking me to see the patient.        Roney Rosario MD  PleaseContact Through [facility]

## 2022-08-26 NOTE — DISCHARGE INSTRUCTIONS
FOLLOW-UP APPOINTMENTS    [place] OFFICE - Follow-up appointment on September 2nd at 8:45am with Nasrin Brito MD, [facility]. You and your one visitor will need to have your mouth and nose covered  with a mask. No children please. [facility],  [facility], [address], [address] [facility] [facility], [address] [facility]. Office #: [phone]. If you are unable to make this appointment, please call to reschedule. Directions to [facility]  [address] [place]. [address] exit. Right off exit. Cross over [employer]. Right on [address] Left into hospital. Follow the signs to the emergency room ( turn left toward the Emergency room). Go right at the first stop sign. Just past the Emergency room at the second stop sign turn right and go up the ramp and park on the top level if possible. Go in the glass doors of the [facility] on the top level of the garage [address]. As soon as you get in the door turn left and our office is the one with the glass doors. [place] OFFICE - Follow-up appointment on September 13th at [address] [facility] with Ree Nava CNP. Please arrive 15 minutes early to complete necessary paperwork. You and your one visitor will need to have your mouth and nose covered with a mask. No children please. [facility] Office [address]:  [phone]. If you are unable to make this appointment, please call to reschedule [phone]. To get to the office go to the side of the hospital at [facility], enter at the [facility], entrance that faces [address]. Take the elevator to the 3rd floor turn right off elevator then take hallway to the right. Go down the palomo and office will be on your right [address].

## 2022-08-26 NOTE — TELEPHONE ENCOUNTER
Patient admitted in hospital  scheduled Watchman consult with Dr. Cynthia Dempsey on 9/2/2022 at 8:45 AM [facility]

## 2022-08-26 NOTE — PROGRESS NOTES
Critical lab  WBC 1.2  Hgb/Hct 5.9/17.3  Neutrophil Absolute 0.2    Dr. Danica Machuca was perfect served.

## 2022-08-26 NOTE — CARE COORDINATION
[facility]    Referral received from CM to follow for home care services. I will follow for needs, and speak with patient to verify demos.     [facility] consult    S3; lives alone  Sn/pt/ot plan    Naye Arreaga RN, BSN CTN  [facility] [phone] DATE OF PROCEDURE: 3/12/2020    SURGEON: Dr. Hemant Moran    PROCEDURE: LEEP Procedure    Ms. Shen is being seen in consultation at the request of Dr. Moran. A copy of this report has been sent to Luisa.    History reviewed. No pertinent past medical history.    History reviewed. No pertinent surgical history.    Social History     Socioeconomic History   • Marital status: Single     Spouse name: Not on file   • Number of children: Not on file   • Years of education: Not on file   • Highest education level: Not on file   Occupational History   • Not on file   Social Needs   • Financial resource strain: Not on file   • Food insecurity:     Worry: Not on file     Inability: Not on file   • Transportation needs:     Medical: Not on file     Non-medical: Not on file   Tobacco Use   • Smoking status: Current Every Day Smoker     Packs/day: 0.50     Types: Cigarettes   • Smokeless tobacco: Never Used   Substance and Sexual Activity   • Alcohol use: Yes   • Drug use: Never   • Sexual activity: Not on file   Lifestyle   • Physical activity:     Days per week: Not on file     Minutes per session: Not on file   • Stress: Not on file   Relationships   • Social connections:     Talks on phone: Not on file     Gets together: Not on file     Attends Spiritism service: Not on file     Active member of club or organization: Not on file     Attends meetings of clubs or organizations: Not on file     Relationship status: Not on file   • Intimate partner violence:     Fear of current or ex partner: Not on file     Emotionally abused: Not on file     Physically abused: Not on file     Forced sexual activity: Not on file   Other Topics Concern   • Not on file   Social History Narrative   • Not on file       Review of patient's family status indicates:    Mother                                                   Paternal Aunt                                            Paternal Uncle                                             REVIEW OF  SYSTEMS:  Eye Problem(s): negative  ENT Problem(s): No oropharyngeal problems. and No headaches.  Cardiovascular problem(s): negative  Respiratory problem(s): negative  Gastrointestinal problem(s): negative GI  Genitourinary problem(s): negative  Musculoskeletal problem(s): negative  Integumentary problem(s): negative  Neurological problem(s): negative  Psychiatric problem(s): negative  Endocrine problem(s): negative  Hematologic and/or Lymphatic problem(s): negative    PHYSICAL EXAM:  GENERAL APPEARANCE: healthy, alert, in no distress and well nourished  HEAD: Negative  EYES: Right - normal              Left - normal  EARS: Right tympanic membrane- normal              Left tympanic membrane- normal  NOSE: nares normal  MOUTH: normal  THROAT: oropharynx clear  NECK: supple without meningismus  CHEST: symmetrical  LUNGS: clear to auscultation  HEART: normal, regular rate and rhythm, no murmur noted  ABDOMEN: soft without masses, tenderness, guarding or organomegaly  EXTREMITIES: Full ROM, without swelling or tenderness  SKIN: skin color, texture and turgor are normal  NEUROLOGIC: normal,  no focal deficit noted  VASCULAR: Normal  MUSCULOSKELETAL: Normal    ASSESSMENT:  Send copies to referring physician .   Pt has no pertinent medical Hx. She has no pertinet surgical hx. Patient is a low risk candidate for intermediate risk surgery.      PLAN:  Pre-op eval  - Most recent labs to be reviewed  - EKG: NSR   - Physical Exam is stable   - Pt is cleared for surgery  - Follow up with PCP in 1 month post-op      ASHLEY Nuñez

## 2022-08-26 NOTE — ACP (ADVANCE CARE PLANNING)
Advance Care Planning     General Advance Care Planning (ACP) Conversation    Date of Conversation: 8/25/2022  Conducted with: Patient with Decision Making Capacity    Healthcare Decision Maker:    Primary Decision Maker: Scar Oelaryyana - [phone]    Supplemental (Other) Decision Maker: Rip Diane - Other - [phone]  Click here to complete Healthcare Decision Makers including selection of the Healthcare Decision Maker Relationship (ie \"Primary\"). Today we discussed Healthcare Decision Makers. The patient is considering options. Content/Action Overview:  Has NO ACP documents/care preferences - requested patient complete ACP documents  Reviewed DNR/DNI and patient elects Full Code (Attempt Resuscitation)  treatment goals, benefit/burden of treatment options, ventilation preferences, hospitalization preferences, resuscitation preferences, and hospice care    Patient is decisional. He says he has changed his mind from previous discussion and would like to be resuscitated. 2 friends at the bedside for the conversation as well. See consult note for further. Encouraged patient to complete HCPOA document today since he says he does not want his children contacted or making decisions for him. He would like his friends at the bedside to be his decision makers. Patient refuses and asks me to come back tomorrow and ask him again if he would like to do a HCPOA.     Length of Voluntary ACP Conversation in minutes:  <16 minutes (Non-Billable)    ALESSIA Cassidy - CNP

## 2022-08-26 NOTE — PROGRESS NOTES
Progress Note      PCP: [facility], MD    Date of Admission: 8/25/2022    Chief Complaint: shortness of breath, vomiting, fever    Hospital Course:    79 y.o. male with PMHx of MDS, MI, ICM, Afib, HTN, CKD, Depression, Hyperlipidemia, BPH who [facility] to [facility] with shortness of breath, vomiting, fever. Said he woke up this morning and began vomiting. Also was short of breath and dyspneic at rest but even worse with exertion. He felt fine when he went to bed last night. He felt that he was getting sicker and sicker as the day went on, and so his roommate Marco Antonio Plummer brought him to ER. In the ER he was worked up for possible pneumonia, since he had fever + SOB. Covid rapid was indeterminate so a new test was ordered. CXR showed bilateral pulmonary infiltrates. CBC showed pancytopenia. Troponin and procalcitonin elevated. Patient was admitted to inpatient for monitoring and treatment. Patient seen by hospital medicine in the afternoon. He was afebrile at the time, no acute respiratory distress, responsive and oriented x3. VSS. Gave history of his illness. Said he felt slight abdominal discomfort last few days, but denied n/c/d, only vomiting this morning. Denied chest pain, headache, weakness, numbness. Said he was dyspneic on exertion but not on rest.     Subjective: Patient seen in am. He says he feels fine. He was afebrile at the time, no acute respiratory distress, responsive and oriented x3. VSS. Denied n/d, chest pain, headache, weakness, numbness. Pollyann Look he has been eating every day this week, but has not had a single bowel movement.        Medications:  Reviewed    Infusion Medications    sodium chloride      sodium chloride       Scheduled Medications    docusate sodium  100 mg Oral BID    polyethylene glycol  17 g Oral Once    cefepime  2,000 mg IntraVENous Q12H    sodium chloride flush  5-40 mL IntraVENous 2 times per day    carvedilol  6.25 mg Oral BID fenofibrate  54 mg Oral Daily    isosorbide mononitrate  60 mg Oral Daily    tamsulosin  0.4 mg Oral Daily    furosemide  40 mg IntraVENous BID     PRN Meds: sodium chloride, sodium chloride flush, sodium chloride, ondansetron **OR** ondansetron, polyethylene glycol, acetaminophen **OR** acetaminophen      Intake/Output Summary (Last 24 hours) at 8/26/2022 1425  Last data filed at 8/26/2022 0410  Gross per 24 hour   Intake 235 ml   Output 798 ml   Net -563 ml       Physical Exam Performed:    BP (!) 145/60   Pulse 73   Temp 99.2 °F (37.3 °C) (Oral)   Resp 16   Ht 6' 2\" (1.88 m)   Wt 164 lb 10.9 oz (74.7 kg)   SpO2 97%   BMI 21.14 kg/m²     General appearance:  No apparent distress, appears stated age and cooperative. HEENT:  Normal cephalic, atraumatic without obvious deformity. Pupils equal, round, and reactive to light. Extra ocular muscles intact. Conjunctivae/corneas clear. Neck: Supple, with full range of motion. No jugular venous distention. Trachea midline. Respiratory:  Normal respiratory effort. Slightly muffled lung sounds in lower lobes bilaterally in posterior fields, otherwise clear to auscultation bilaterally without Rales/Wheezes/Rhonchi. Cardiovascular:  Regular rate and rhythm with normal S1, loud S2. No mumur. Abdomen: Soft, non-tender, non-distended with normal bowel sounds. Musculoskeletal:  No clubbing, cyanosis. 2+ pitting edema in bilateral ankles. Full range of motion without deformity. Skin: Skin color, texture, turgor normal.  No rashes or lesions. Neurologic:  Neurovascularly intact without any focal sensory/motor deficits.  Cranial nerves: II-XII intact, grossly non-focal.  Psychiatric:  Alert and oriented, thought content appropriate, normal insight  Capillary Refill: Brisk,3 seconds, normal  Peripheral Pulses: +2 palpable, equal bilaterally       Labs:   Recent Labs     08/25/22  0828 08/26/22  0543 08/26/22  1200   WBC 1.3* 1.2*  --    HGB 7.3* 5.9* 6.1*   HCT 21.1* 17.3* 18.2*   PLT 47* 43*  --      Recent Labs     08/25/22  0828 08/26/22  0543   * 138   K 3.6 3.6   CL 99 103   CO2 24 26   BUN 19 28*   CREATININE 1.3 1.5*   CALCIUM 9.7 9.3     Recent Labs     08/25/22  0828 08/26/22  0543   AST 18 19   ALT 6* 6*   BILITOT 1.9* 1.3*   ALKPHOS 46 38*     Recent Labs     08/25/22  0828   INR 1.38*     Recent Labs     08/25/22  0828 08/25/22  1537 08/25/22  2142   TROPONINI 0.08* 0.20* 0.27*       Urinalysis:      Lab Results   Component Value Date/Time    NITRU Negative 08/25/2022 05:57 PM    WBCUA 3-5 08/25/2022 05:57 PM    BACTERIA 2+ 08/25/2022 05:57 PM    RBCUA 5-10 08/25/2022 05:57 PM    BLOODU Negative 08/25/2022 05:57 PM    SPECGRAV 1.020 08/25/2022 05:57 PM    GLUCOSEU Negative 08/25/2022 05:57 PM       Radiology:  XR CHEST PORTABLE   Final Result   Moderate infiltrates and/or congestion identified in the lungs.          CT CHEST ABDOMEN PELVIS WO CONTRAST    (Results Pending)           Assessment/Plan:    Active Hospital Problems    Diagnosis     Constipation [K59.00]      Priority: Medium    Acute hypoxemic respiratory failure (HCC) [J96.01]      Priority: Medium    Pancytopenia ([facility] [D61.818]      Priority: Medium    Pneumonia due to infectious organism [J18.9]      Priority: Medium    Acute on chronic combined systolic and diastolic CHF (congestive heart failure) ([facility] [I50.43]      Priority: Medium    Pulmonary congestion [R09.89]      Priority: Medium    Cor pulmonale (HCC) [I27.81]      Priority: Medium    Elevated troponin [R77.8]      Priority: Medium       Principal Problem(s):       Pancytopenia ([facility]  Patient with history of myelodysplastic syndrome  Most recent blood transfusion was this past Tuesday  Sees Dr. Richards Ben Bolt for heme onc  Patient was only recently diagnosed in late June 2022  Patient denied history of recurrent infections, either long term or recently  CBC with diff 8/26 showed:  WBC of 1.2, was 1.3 on 8/25  RBC of 1.81, was 2.22 on 8/25  Platelet of 43, was 47 on 8/25  Absolute neutrophils of 0.2, was 0.4 on 8/25  Neutropenia + fever indicates a patient at extreme risk of opportunistic infection with high mortality rate      Pneumonia due to infectious microbe  CXR showed bilateral infiltrates or congestion in lungs  Patient pancytopenic  Neutropenia + fever + SOB/dyspnea  Patient's covid negative  Labs:  Lactic acid - 1.2  Procalcitonin - 5.37  CRP - 185.9  Procalcitonin and CRP are significantly elevated  Blood cultures negative so far      Acute hypoxemic respiratory failure [facility])  Per RN note from this morning (8:23 am on 8/25/2022): \"EMS called by pt, on arrival to home pt's SpO2 high 70's to low 80's.  Pt 87% on RA upon arrival\"  Received oxygen therapy  Spo2 has been 97-99% since oxygen therapy given   Thus patient presented with low SpO2 of 87% + SOB/dyspnea   Patient on 3L nasal cannula now     PLAN:   IV cefepime 2,000 mg q12h to cover for opportunistic bacterial infections   Patient given 2 units PRBCs today  Incentive spirometry  Trend procalcitonin   Trend lactic acid  Trend CRP  Daily CBC with diff  Daily cmp   Sputum culture pending  Hold antiplatelet/blood thinner since platelet count of 47  Inpatient Consult order placed for patient's heme/onc physician, Dr. Denise Mendez MD  Appreciate rec's        Active Problems:      Acute on chronic combined systolic and diastolic CHF (congestive heart failure) ([address]    Pulmonary congestion    Cor pulmonale ([address]  Pro-bnp - 8,172  Most recent Echo 6/29/2022 showed EF of 45%-50%, with both diastolic and systolic dysfunction   CXR showed bilateral infiltrates or congestion in lungs       Elevated troponin  Blood levels:  0.08 ng/mL at 0828 on 8/25/2022  0.20 ng/mL at 1537 on 8/25/2022  0.27 ng/mL at 2142 on 8/25/2022  Patient had first MI in late June 2022     PLAN:  Inpatient Consult order placed for cardiology:  Hold antiplatelet & anticoagulant medication, due to anemia/MDS  IV lasix 40 mg BID  Per cardio, transition to oral diuretics tomorrow  Continue home meds:  carvedilol -  6.25 mg po BID  Isosorvide mononitrate - 60 mg po daily   Can obtain follow-up limited echocardiogram for LV function  No cardio workup needed  SOB is likely due to anemia/MDS  Dc trending troponins   Daily weights  I/O's       Constipation    Possible obstruction/ileus/enteritis/colitis   Patient said he has been eating everyday but has not had a bowel movement in a week  Patient presented to ER after waking up to episodes of vomiting  Patient said his belly had been bothering him and feeling \"yucky\" for a few days before this  Pancytopenia, neutropenic fever are both associated with mucositis of GI tract, or enteritis/colitis      PLAN:  Docusate 100 mg BID   Polyethylene glycol once daily  Ordered CT abdomen,pelvis,chest without contrast      Chronic Problems being managed in hospital:     Hypertension  Controlled for now, being monitored  Home med:  Hydralazine 100 mg po daily  Hold home med for now     Chronic kidney disease   Daily CMP  Consult nephro if evidence of VINNY arises  Daily weights, I/Os, IV lasix 40 mg bid     Current moderate episode of major depressive disorder without prior episode ([facility]  Patient's home meds:  Fluoxetine 10 mg po daily   Trazodone 50 mg po nightly (helps to sleep)  Hold home meds for now. Hyperlipidemia  Continue home med:  Fenofibrate 160 mg po daily  Order lipid panel  Most recent lipid panel on file was 6/29/2022, showed fasting TG's of 172 and HDL of 15     Hypertrophy of prostate without urinary obstruction and other lower urinary tract symptoms (LUTS),  Continue home med of Tamsulosin 0.4 mg po daily   UA with microscopy ordered  Results show:   trace protein  5-10 RBC  2+ bacteria  Nitrite negative  Leuk est negative  Daily CMP, I/O's, symptom monitoring to see if UTI/cystitis is contributing to patient's clinical picture      DVT Prophylaxis: - Pneumatic compression device. Diet: ADULT DIET;  Regular; Low Fat/Low Chol/High Fiber/NIKKI; Low Sodium (2 gm)    Code Status: DNR-CCA    PT/OT Eval Status: none ordered, will reassess tomorrow     Dispo - 2-3 days, pending pt is stable and procalcitonin trends down to baseline     Javad Yanez-Adolfo, DO

## 2022-08-26 NOTE — PROGRESS NOTES
LM with Jihan Grijalva MD office to obtain hospital follow up appt. Awaiting returned call.  Robyn Casanova RN

## 2022-08-27 ENCOUNTER — APPOINTMENT (OUTPATIENT)
Dept: GENERAL RADIOLOGY | Age: 82
DRG: 808 | End: 2022-08-27
Payer: COMMERCIAL

## 2022-08-27 LAB
A/G RATIO: 1.2 (ref 1.1–2.2)
ALBUMIN SERPL-MCNC: 3.1 G/DL (ref 3.4–5)
ALP BLD-CCNC: 40 U/L (ref 40–129)
ALT SERPL-CCNC: 7 U/L (ref 10–40)
ANION GAP SERPL CALCULATED.3IONS-SCNC: 12 MMOL/L (ref 3–16)
AST SERPL-CCNC: 22 U/L (ref 15–37)
ATYPICAL LYMPHOCYTE RELATIVE PERCENT: 2 % (ref 0–6)
BASOPHILS ABSOLUTE: 0 K/UL (ref 0–0.2)
BASOPHILS RELATIVE PERCENT: 0 %
BILIRUB SERPL-MCNC: 1.7 MG/DL (ref 0–1)
BUN BLDV-MCNC: 35 MG/DL (ref 7–20)
C-REACTIVE PROTEIN: 248.9 MG/L (ref 0–5.1)
CALCIUM SERPL-MCNC: 9.1 MG/DL (ref 8.3–10.6)
CHLORIDE BLD-SCNC: 100 MMOL/L (ref 99–110)
CO2: 25 MMOL/L (ref 21–32)
CREAT SERPL-MCNC: 1.6 MG/DL (ref 0.8–1.3)
EKG ATRIAL RATE: 128 BPM
EKG ATRIAL RATE: 75 BPM
EKG DIAGNOSIS: NORMAL
EKG DIAGNOSIS: NORMAL
EKG P AXIS: 11 DEGREES
EKG P AXIS: 46 DEGREES
EKG P-R INTERVAL: 128 MS
EKG P-R INTERVAL: 208 MS
EKG Q-T INTERVAL: 392 MS
EKG Q-T INTERVAL: 472 MS
EKG QRS DURATION: 166 MS
EKG QRS DURATION: 166 MS
EKG QTC CALCULATION (BAZETT): 527 MS
EKG QTC CALCULATION (BAZETT): 572 MS
EKG R AXIS: 30 DEGREES
EKG R AXIS: 45 DEGREES
EKG T AXIS: 101 DEGREES
EKG T AXIS: 70 DEGREES
EKG VENTRICULAR RATE: 128 BPM
EKG VENTRICULAR RATE: 75 BPM
EOSINOPHILS ABSOLUTE: 0 K/UL (ref 0–0.6)
EOSINOPHILS RELATIVE PERCENT: 0 %
GFR AFRICAN AMERICAN: 50
GFR NON-AFRICAN AMERICAN: 41
GLUCOSE BLD-MCNC: 163 MG/DL (ref 70–99)
HCT VFR BLD CALC: 23.3 % (ref 40.5–52.5)
HEMATOLOGY PATH CONSULT: YES
HEMOGLOBIN: 7.9 G/DL (ref 13.5–17.5)
LYMPHOCYTES ABSOLUTE: 0.4 K/UL (ref 1–5.1)
LYMPHOCYTES RELATIVE PERCENT: 21 %
MCH RBC QN AUTO: 31.9 PG (ref 26–34)
MCHC RBC AUTO-ENTMCNC: 34.2 G/DL (ref 31–36)
MCV RBC AUTO: 93.4 FL (ref 80–100)
MONOCYTES ABSOLUTE: 0.7 K/UL (ref 0–1.3)
MONOCYTES RELATIVE PERCENT: 37 %
MONONUCLEAR UNIDENTIFIED CELLS: 15 %
NEUTROPHILS ABSOLUTE: 0.5 K/UL (ref 1.7–7.7)
NEUTROPHILS RELATIVE PERCENT: 25 %
PDW BLD-RTO: 20.5 % (ref 12.4–15.4)
PLATELET # BLD: 43 K/UL (ref 135–450)
PLATELET SLIDE REVIEW: ABNORMAL
PMV BLD AUTO: 10.5 FL (ref 5–10.5)
POTASSIUM REFLEX MAGNESIUM: 3.6 MMOL/L (ref 3.5–5.1)
PROCALCITONIN: 9.87 NG/ML (ref 0–0.15)
RBC # BLD: 2.49 M/UL (ref 4.2–5.9)
SODIUM BLD-SCNC: 137 MMOL/L (ref 136–145)
TOTAL PROTEIN: 5.6 G/DL (ref 6.4–8.2)
WBC # BLD: 1.8 K/UL (ref 4–11)

## 2022-08-27 PROCEDURE — 96361 HYDRATE IV INFUSION ADD-ON: CPT

## 2022-08-27 PROCEDURE — 6360000002 HC RX W HCPCS

## 2022-08-27 PROCEDURE — G0378 HOSPITAL OBSERVATION PER HR: HCPCS

## 2022-08-27 PROCEDURE — 97162 PT EVAL MOD COMPLEX 30 MIN: CPT

## 2022-08-27 PROCEDURE — 2580000003 HC RX 258

## 2022-08-27 PROCEDURE — 36415 COLL VENOUS BLD VENIPUNCTURE: CPT

## 2022-08-27 PROCEDURE — 93010 ELECTROCARDIOGRAM REPORT: CPT | Performed by: INTERNAL MEDICINE

## 2022-08-27 PROCEDURE — 85025 COMPLETE CBC W/AUTO DIFF WBC: CPT

## 2022-08-27 PROCEDURE — 6370000000 HC RX 637 (ALT 250 FOR IP): Performed by: INTERNAL MEDICINE

## 2022-08-27 PROCEDURE — 71046 X-RAY EXAM CHEST 2 VIEWS: CPT

## 2022-08-27 PROCEDURE — 97530 THERAPEUTIC ACTIVITIES: CPT

## 2022-08-27 PROCEDURE — 96376 TX/PRO/DX INJ SAME DRUG ADON: CPT

## 2022-08-27 PROCEDURE — 6370000000 HC RX 637 (ALT 250 FOR IP)

## 2022-08-27 PROCEDURE — 86140 C-REACTIVE PROTEIN: CPT

## 2022-08-27 PROCEDURE — 2700000000 HC OXYGEN THERAPY PER DAY

## 2022-08-27 PROCEDURE — 96366 THER/PROPH/DIAG IV INF ADDON: CPT

## 2022-08-27 PROCEDURE — 2580000003 HC RX 258: Performed by: INTERNAL MEDICINE

## 2022-08-27 PROCEDURE — 6370000000 HC RX 637 (ALT 250 FOR IP): Performed by: NURSE PRACTITIONER

## 2022-08-27 PROCEDURE — 96367 TX/PROPH/DG ADDL SEQ IV INF: CPT

## 2022-08-27 PROCEDURE — 84145 PROCALCITONIN (PCT): CPT

## 2022-08-27 PROCEDURE — 2580000003 HC RX 258: Performed by: EMERGENCY MEDICINE

## 2022-08-27 PROCEDURE — 6360000002 HC RX W HCPCS: Performed by: EMERGENCY MEDICINE

## 2022-08-27 PROCEDURE — 80053 COMPREHEN METABOLIC PANEL: CPT

## 2022-08-27 PROCEDURE — 94761 N-INVAS EAR/PLS OXIMETRY MLT: CPT

## 2022-08-27 RX ORDER — FLUOXETINE 10 MG/1
10 CAPSULE ORAL DAILY
Status: DISCONTINUED | OUTPATIENT
Start: 2022-08-27 | End: 2022-08-30 | Stop reason: HOSPADM

## 2022-08-27 RX ORDER — TRAZODONE HYDROCHLORIDE 50 MG/1
50 TABLET ORAL NIGHTLY
Status: DISCONTINUED | OUTPATIENT
Start: 2022-08-27 | End: 2022-08-30 | Stop reason: HOSPADM

## 2022-08-27 RX ADMIN — TAMSULOSIN HYDROCHLORIDE 0.4 MG: 0.4 CAPSULE ORAL at 09:21

## 2022-08-27 RX ADMIN — CARVEDILOL 6.25 MG: 6.25 TABLET, FILM COATED ORAL at 09:21

## 2022-08-27 RX ADMIN — AZITHROMYCIN MONOHYDRATE 500 MG: 500 INJECTION, POWDER, LYOPHILIZED, FOR SOLUTION INTRAVENOUS at 17:04

## 2022-08-27 RX ADMIN — HYDROCODONE BITARTRATE AND ACETAMINOPHEN 1 TABLET: 5; 325 TABLET ORAL at 17:00

## 2022-08-27 RX ADMIN — Medication 10 ML: at 20:11

## 2022-08-27 RX ADMIN — CARVEDILOL 6.25 MG: 6.25 TABLET, FILM COATED ORAL at 19:56

## 2022-08-27 RX ADMIN — FLUOXETINE 10 MG: 10 CAPSULE ORAL at 15:04

## 2022-08-27 RX ADMIN — Medication 10 ML: at 15:10

## 2022-08-27 RX ADMIN — SODIUM CHLORIDE 25 ML: 9 INJECTION, SOLUTION INTRAVENOUS at 09:28

## 2022-08-27 RX ADMIN — CEFEPIME 2000 MG: 2 INJECTION, POWDER, FOR SOLUTION INTRAVENOUS at 09:29

## 2022-08-27 RX ADMIN — DOCUSATE SODIUM 100 MG: 100 CAPSULE, LIQUID FILLED ORAL at 19:56

## 2022-08-27 RX ADMIN — CEFEPIME 2000 MG: 2 INJECTION, POWDER, FOR SOLUTION INTRAVENOUS at 20:09

## 2022-08-27 RX ADMIN — FUROSEMIDE 40 MG: 10 INJECTION, SOLUTION INTRAMUSCULAR; INTRAVENOUS at 18:23

## 2022-08-27 RX ADMIN — Medication 10 ML: at 18:23

## 2022-08-27 RX ADMIN — ISOSORBIDE MONONITRATE 60 MG: 60 TABLET, EXTENDED RELEASE ORAL at 09:21

## 2022-08-27 RX ADMIN — SODIUM CHLORIDE 25 ML: 9 INJECTION, SOLUTION INTRAVENOUS at 15:12

## 2022-08-27 RX ADMIN — FUROSEMIDE 40 MG: 10 INJECTION, SOLUTION INTRAMUSCULAR; INTRAVENOUS at 09:21

## 2022-08-27 RX ADMIN — Medication 10 ML: at 09:21

## 2022-08-27 RX ADMIN — FENOFIBRATE 54 MG: 54 TABLET ORAL at 09:21

## 2022-08-27 RX ADMIN — DOCUSATE SODIUM 100 MG: 100 CAPSULE, LIQUID FILLED ORAL at 09:21

## 2022-08-27 RX ADMIN — VANCOMYCIN HYDROCHLORIDE 1000 MG: 1 INJECTION, POWDER, LYOPHILIZED, FOR SOLUTION INTRAVENOUS at 15:16

## 2022-08-27 RX ADMIN — SENNOSIDES 8.6 MG: 8.6 TABLET, COATED ORAL at 19:56

## 2022-08-27 RX ADMIN — TRAZODONE HYDROCHLORIDE 50 MG: 50 TABLET ORAL at 19:56

## 2022-08-27 ASSESSMENT — PAIN - FUNCTIONAL ASSESSMENT: PAIN_FUNCTIONAL_ASSESSMENT: ACTIVITIES ARE NOT PREVENTED

## 2022-08-27 ASSESSMENT — PAIN DESCRIPTION - ORIENTATION: ORIENTATION: LEFT

## 2022-08-27 ASSESSMENT — PAIN DESCRIPTION - PAIN TYPE: TYPE: ACUTE PAIN

## 2022-08-27 ASSESSMENT — PAIN DESCRIPTION - DESCRIPTORS: DESCRIPTORS: THROBBING;SORE;SHOOTING

## 2022-08-27 ASSESSMENT — PAIN DESCRIPTION - LOCATION: LOCATION: TOE (COMMENT WHICH ONE)

## 2022-08-27 ASSESSMENT — PAIN SCALES - GENERAL: PAINLEVEL_OUTOF10: 9

## 2022-08-27 ASSESSMENT — PAIN DESCRIPTION - ONSET: ONSET: GRADUAL

## 2022-08-27 NOTE — PROGRESS NOTES
Physical Therapy  Facility/Department: [facility] C4 [facility]U  Physical Therapy Initial Assessment    Name: Valentine Stein  : 1940  MRN: [id number]  Date of Service: 2022    Discharge Recommendations:  Home with Home health PT   PT Equipment Recommendations  Equipment Needed: No (Pt has needed DME)      Patient Diagnosis(es): The primary encounter diagnosis was Neutropenic fever ([facility] Diagnoses of Acute respiratory failure with hypoxia (HCC) and Severe sepsis ([facility] were also pertinent to this visit. Past Medical History:  has a past medical history of Anosmia, Backpain, Bilateral carotid bruits, Chronic kidney disease, Current moderate episode of major depressive disorder without prior episode ([facility] Hyperlipidemia, Hypertension, Hypertrophy of prostate without urinary obstruction and other lower urinary tract symptoms (LUTS), Nonrheumatic aortic valve insufficiency, Rosacea, and Taste perversion. Past Surgical History:  has a past surgical history that includes hernia repair; Colonoscopy (2008); Colonoscopy (2014); and CT BIOPSY BONE MARROW (2022). Assessment   Body Structures, Functions, Activity Limitations Requiring Skilled Therapeutic Intervention: Decreased functional mobility ; Decreased strength;Decreased endurance;Decreased balance;Decreased posture  Assessment: Patient is an 79 y.o male presenting to hospital with deficits in functional mobility compared to baseline level of function d/t impairments in strength, balance and overall activity tolerance. Pt will continue to benefit from skilled PT services to maximize return to PLOF, reduce risk of falls and allow for safe d/c home when medically appropriate.   Treatment Diagnosis: functional mobility deficit  Specific Instructions for Next Treatment: Mobility and endurance training  Therapy Prognosis: Good  Decision Making: Medium Complexity  Barriers to Learning: none  Requires PT Follow-Up: Yes  Activity Tolerance  Activity Tolerance: Patient tolerated evaluation without incident;Patient limited by fatigue  Activity Tolerance Comments: Pt rperoting SOB with ambulation, SPO2 was 99% and HR 78 BPM     Plan   Plan  Plan: 2-3 times per week  Specific Instructions for Next Treatment: Mobility and endurance training  Current Treatment Recommendations: Strengthening, Functional mobility training, Gait training, Endurance training, Stair training, Home exercise program, Therapeutic activities, Equipment evaluation, education, & procurement, Patient/Caregiver education & training, Balance training  Safety Devices  Type of Devices: All fall risk precautions in place, Gait belt, Left in chair, Patient at risk for falls, Call light within reach, Chair alarm in place, Nurse notified     Restrictions  Restrictions/Precautions  Restrictions/Precautions: Up as Tolerated, Fall Risk, General Precautions  Position Activity Restriction  Other position/activity restrictions: 4L O2, lo     Subjective   Pain: Denies pain  General  Chart Reviewed: Yes  Patient assessed for rehabilitation services?: Yes  Additional Pertinent Hx: MDS, MI, ICM, Afib, HTN, CKD, Depression, Hyperlipidemia, BPH  Response To Previous Treatment: Not applicable  Family / Caregiver Present: Yes  Referring Practitioner:  Destini Allen MD  Referral Date : 08/26/22  Diagnosis: 79 y.o male presenting to hospital w/ SOB, admitted with anemia (Hgb 5.9 s/p 2 units) and elevated troponin (peaked at 0.27 and no longer trending)  Follows Commands: Within Functional Limits  General Comment  Comments: Pt was in bed upon arrival to room and RN present and aware of PT eval.  Subjective  Subjective: Pt was agreeable to PT eval.         Social/Functional History  Social/Functional History  Lives With: Family  Type of Home: House  Home Layout: Able to Live on Main level with bedroom/bathroom  Home Access: Stairs to enter with rails  Entrance Stairs - Number of Steps: 6  Entrance Stairs - Rails: Both  Bathroom Shower/Tub: Tub/Shower unit  Bathroom Toilet: Standard  Bathroom Equipment: Grab bars in shower, Shower chair  Home Equipment: [place] Medina, 4 wheeled (transport chair)  Has the patient had two or more falls in the past year or any fall with injury in the past year?: No  Receives Help From: Family  ADL Assistance: [address]: Needs assistance  Homemaking Responsibilities: No  Ambulation Assistance: Independent (w/ rollator; transport chair in community)  Transfer Assistance: Independent  Active : No  Leisure & Hobbies: Watching TV  [address]: Within Functional Limits  Hearing  Hearing: Exceptions to [facility]  Hearing Exceptions: Hard of hearing/hearing concerns; No hearing aid    Cognition   Orientation  Overall Orientation Status: Impaired  Orientation Level: Oriented to place;Oriented to situation;Oriented to person (Re-oriented to month and year (family reproting he would not know at baseline))  Cognition  Overall Cognitive Status: WFL     Objective   Heart Rate: 71  Heart Rate Source: Monitor  BP: (!) 146/77  BP Location: Left upper arm  BP Method: Automatic  Patient Position: Semi fowlers  MAP (Calculated): 100  Resp: 26  SpO2: 100 %  O2 Device: High flow nasal cannula  Comment: on 4L     Observation/Palpation  Posture: Fair  Gross Assessment  AROM: Within functional limits  PROM: Within functional limits  Strength: Generally decreased, functional (grossly 3+/5)  Coordination: Generally decreased, functional (grossly 3+/5)  Tone: Normal  Sensation: Intact                 Bed Mobility Training  Bed Mobility Training: Yes  Overall Level of Assistance: Stand-by assistance  Supine to Sit: Stand-by assistance (HOB elevated toward left)  Sit to Supine:  (Pt left sitting up in chair)  Balance  Sitting: Intact  Standing: With support  Standing - Static: Good  Standing - Dynamic: Fair  Transfer Training  Transfer Training: Yes  Overall Level of Assistance: Stand-by assistance  Interventions: Verbal cues  Sit to Stand: Stand-by assistance (increased time from EOB up to RW; cues for HP)  Stand to Sit: Stand-by assistance (cues for HP with increased time)  Bed to Chair: Stand-by assistance (stand step with RW toward left)  Gait Training  Gait Training: Yes  Right Side Weight Bearing: As tolerated  Left Side Weight Bearing: As tolerated  Gait  Overall Level of Assistance: Stand-by assistance  Interventions: Verbal cues; Safety awareness training  Base of Support: Narrowed  Speed/Lainey: Slow;Shuffled  Step Length: Right shortened;Left shortened  Gait Abnormalities: Decreased step clearance  Distance (ft): 30 Feet  Assistive Device: Gait belt;Walker, rolling                    AM-PAC Score  AM-PAC Inpatient Mobility Raw Score : 18 (08/27/22 0932)  AM-PAC Inpatient T-Scale Score : 43.63 (08/27/22 0932)  Mobility Inpatient CMS 0-100% Score: 46.58 (08/27/22 0932)  Mobility Inpatient CMS G-Code Modifier : CK (08/27/22 0932)    Goals  Short Term Goals  Time Frame for Short term goals: one week unless otherwsie stated  Short term goal 1: Patient will perform bed mobility Mod IND  Short term goal 2: Patient will perform sit <> stand transfers Mod IND to AAD  Short term goal 3: Patient will ambulate 100 ft with RW Mod IND  Short term goal 4: Patient will negotiate 6 stairs with B rails and supervision  Short term goal 5: Patient will be IND with LE HEP to increase functional strength to maximize functional mobility       Education  Patient Education  Education Given To: Patient; Family  Education Provided: Role of Therapy; Fall Prevention Strategies;Transfer Training;Energy Conservation;Plan of Care (D/C recommendations)  Education Method: Verbal  Barriers to Learning: None  Education Outcome: Verbalized understanding      Therapy Time   Individual Concurrent Group Co-treatment   Time In 0830         Time Out 0903         Minutes 33         Timed Code Treatment Minutes: 23 Minutes

## 2022-08-27 NOTE — PROGRESS NOTES
4 Eyes Skin Assessment     The patient is being assess for  Transfer to New Unit    I agree that 2 RN's have performed a thorough Head to Toe Skin Assessment on the patient. ALL assessment sites listed below have been assessed. Areas assessed by both nurses:  [x]   Head, Face, and Ears   [x]   Shoulders, Back, and Chest  [x]   Arms, Elbows, and Hands   [x]   Coccyx, Sacrum, and Ischum  [x]   Legs, Feet, and Heels        Does the Patient have Skin Breakdown?   No         Isma Prevention initiated:  Yes   Wound Care Orders initiated:  NA      [facility] nurse consulted for Pressure Injury (Stage 3,4, Unstageable, DTI, NWPT, and Complex wounds):  NA      Nurse 1 eSignature: Electronically signed by Belinda Vargas RN on 8/27/22 at 2:07 AM EDT    **SHARE this note so that the co-signing nurse is able to place an eSignature**    Nurse 2 eSignature: Electronically signed by Jamee Mullins RN on 8/27/22 at 2:12 AM EDT

## 2022-08-27 NOTE — PROGRESS NOTES
Progress Note      PCP: Koko Springer MD    Date of Admission: 8/25/2022    Chief Complaint: shortness of breath, vomiting, fever    Hospital Course:    79 y.o. male with PMHx of MDS, MI, ICM, Afib, HTN, CKD, Depression, Hyperlipidemia, BPH who presented to [facility] with shortness of breath, vomiting, fever. Said he woke up this morning and began vomiting. Also was short of breath and dyspneic at rest but even worse with exertion. He felt fine when he went to bed last night. He felt that he was getting sicker and sicker as the day went on, and so his roommate Gabriel Martinez brought him to ER. In the ER he was worked up for possible pneumonia, since he had fever + SOB. Covid rapid was indeterminate so a new test was ordered. CXR showed bilateral pulmonary infiltrates. CBC showed pancytopenia. Troponin and procalcitonin elevated. Patient was admitted to inpatient for monitoring and treatment. Patient seen by hospital medicine in the afternoon. He was afebrile at the time, no acute respiratory distress, responsive and oriented x3. VSS. Gave history of his illness. Said he felt slight abdominal discomfort last few days, but denied n/c/d, only vomiting this morning. Denied chest pain, headache, weakness, numbness. Said he was dyspneic on exertion but not on rest.     Subjective: Patient seen in am. Despite labs trending downward, he reports feeling \"100%\" better this morning. He had therapy in the morning and was able to walk to the chair at bedside with their help. He was on 4L O2 early in AM, down to 2L at this time. Patient denies any cough, headache, chest pain, fever, nausea, vomiting, palpitations, or new onset weakness. He reports having a recent BM yesterday.        Medications:  Reviewed    Infusion Medications    sodium chloride      sodium chloride       Scheduled Medications    docusate sodium  100 mg Oral BID    senna  1 tablet Oral Nightly    cefepime  2,000 mg IntraVENous Q12H    sodium chloride flush  5-40 mL IntraVENous 2 times per day    carvedilol  6.25 mg Oral BID    fenofibrate  54 mg Oral Daily    isosorbide mononitrate  60 mg Oral Daily    tamsulosin  0.4 mg Oral Daily    furosemide  40 mg IntraVENous BID     PRN Meds: sodium chloride, HYDROcodone 5 mg - acetaminophen, sodium chloride flush, sodium chloride, ondansetron **OR** ondansetron, polyethylene glycol, acetaminophen **OR** acetaminophen      Intake/Output Summary (Last 24 hours) at 8/27/2022 0856  Last data filed at 8/27/2022 0342  Gross per 24 hour   Intake 867.5 ml   Output 1575 ml   Net -707.5 ml         Physical Exam Performed:    BP (!) 146/77   Pulse 72   Temp 98.5 °F (36.9 °C) (Oral)   Resp 26   Ht 6' 2\" (1.88 m)   Wt 165 lb 12.6 oz (75.2 kg)   SpO2 100%   BMI 21.29 kg/m²     General appearance:  No apparent distress, appears stated age and cooperative. HEENT:  Normal cephalic, atraumatic without obvious deformity. Pupils equal, round, and reactive to light. Extra ocular muscles intact. Conjunctivae/corneas clear. Neck: Supple, with full range of motion. No jugular venous distention. Trachea midline. Respiratory:  Normal respiratory effort. Slightly muffled lung sounds in lower lobes bilaterally in posterior fields, otherwise clear to auscultation bilaterally without Rales/Wheezes/Rhonchi. Cardiovascular:  Regular rate and rhythm with normal S1, loud S2. No mumur. Abdomen: Soft, non-tender, non-distended with normal bowel sounds. Musculoskeletal:  No clubbing, cyanosis. 2+ pitting edema in bilateral ankles. Full range of motion without deformity. Skin: Skin color, texture, turgor normal.  No rashes or lesions. Neurologic:  Neurovascularly intact without any focal sensory/motor deficits.  Cranial nerves: II-XII intact, grossly non-focal.  Psychiatric:  Alert and oriented, thought content appropriate, normal insight  Capillary Refill: Brisk,3 seconds, normal  Peripheral Pulses: +2 palpable, equal bilaterally       Labs:   Recent Labs     08/25/22  0828 08/26/22  0543 08/26/22  1200 08/26/22  2214 08/27/22  0421   WBC 1.3* 1.2*  --   --  1.8*   HGB 7.3* 5.9* 6.1* 7.8* 7.9*   HCT 21.1* 17.3* 18.2* 23.7* 23.3*   PLT 47* 43*  --   --  43*       Recent Labs     08/25/22  0828 08/26/22  0543 08/27/22  0421   * 138 137   K 3.6 3.6 3.6   CL 99 103 100   CO2 24 26 25   BUN 19 28* 35*   CREATININE 1.3 1.5* 1.6*   CALCIUM 9.7 9.3 9.1       Recent Labs     08/25/22  0828 08/26/22  0543 08/27/22  0421   AST 18 19 22   ALT 6* 6* 7*   BILITOT 1.9* 1.3* 1.7*   ALKPHOS 46 38* 40       Recent Labs     08/25/22  0828   INR 1.38*       Recent Labs     08/25/22  0828 08/25/22  1537 08/25/22  2142   TROPONINI 0.08* 0.20* 0.27*         Urinalysis:      Lab Results   Component Value Date/Time    NITRU Negative 08/25/2022 05:57 PM    WBCUA 3-5 08/25/2022 05:57 PM    BACTERIA 2+ 08/25/2022 05:57 PM    RBCUA 5-10 08/25/2022 05:57 PM    BLOODU Negative 08/25/2022 05:57 PM    SPECGRAV 1.020 08/25/2022 05:57 PM    GLUCOSEU Negative 08/25/2022 05:57 PM       Radiology:  CT CHEST ABDOMEN PELVIS WO CONTRAST   Final Result   Chest CT:      Ground-glass opacity within both lungs, with smooth interlobular septal   thickening, and small bilateral pleural effusions, the constellation of which   is most compatible with pulmonary edema. 7 mm nodule in the periphery of the right middle lobe. Please see   recommendations below. Bronchial wall thickening, which may be seen in inflammatory conditions such   as bronchitis, reactive airways disease, and smoking. Abdomen and pelvis CT:      Moderate to large amount of stool within the rectum and a moderate amount of   stool within the sigmoid colon, findings which may suggest   constipation/impaction. Cholelithiasis. Bilateral perinephric fat stranding, which is likely age related, but please   correlate with any clinical evidence of urinary tract infection. Pulmonary congestion [R09.89]      Priority: Medium    Cor pulmonale (HCC) [I27.81]      Priority: Medium    Elevated troponin [R77.8]      Priority: Medium       Principal Problem(s):       Pancytopenia ([address]  Patient with history of myelodysplastic syndrome  Most recent blood transfusion was this past Tuesday  Sees Dr. Bg Mckoy for heme onc  Patient was only recently diagnosed in late June 2022  Patient denied history of recurrent infections, either long term or recently  CBC with diff 8/26 showed:  WBC of 1.2, was 1.3 on 8/25  RBC of 1.81, was 2.22 on 8/25  Platelet of 43, was 47 on 8/25  Absolute neutrophils of 0.2, was 0.4 on 8/25  Neutropenia + fever indicates a patient at extreme risk of opportunistic infection with high mortality rate      Pneumonia due to infectious microbe  CXR showed bilateral infiltrates or congestion in lungs  Patient pancytopenic  Neutropenia + fever + SOB/dyspnea  Patient's covid negative  Labs:  Lactic acid - 1.2  Procalcitonin - 5.37  CRP - 185.9  Procalcitonin and CRP are significantly elevated  Blood cultures negative so far      Acute hypoxemic respiratory failure [facility])  Per RN note from this morning (8:23 am on 8/25/2022): \"EMS called by pt, on arrival to home pt's SpO2 high 70's to low 80's. Pt 87% on RA upon arrival\"  Received oxygen therapy  Spo2 has been 97-99% since oxygen therapy given   Thus patient presented with low SpO2 of 87% + SOB/dyspnea   Patient on 3L nasal cannula now     PLAN:   Continue IV cefepime 2,000 mg q12h    - Ordered procalcitonin, increased to 9.87 from 5.37  - CRP increased to 248.9, up from 185.9  - Added Azithromycin and Vancomycin IV  2. Repeat Chest x-ray 8/27 shows stable pattern of ground-glass and interstitial opacities consistent with pulmonary edema, small bilateral pleural effusions   - Attempt to wean patient off oxygen, he is on 3L high flow oxygen this morning, decreased it to 2L nasal cannula, will continue to wean off and monitor SpO2. He was not on oxygen at home  3. Patient given 2 units PRBCs on 8/26  4. Incentive spirometry  5. Trend procalcitonin   6. Trend lactic acid  7. Trend CRP  8. Daily CBC with diff  9. Daily cmp   10. Sputum culture pending  11. Hold antiplatelet/blood thinner since platelet count of 47  12.  Inpatient Consult order placed for patient's heme/onc physician, Dr. Jazmine Lazo MD  Appreciate rec's        Active Problems:      Acute on chronic combined systolic and diastolic CHF (congestive heart failure) ([address]    Pulmonary congestion    Cor pulmonale ([address]  Pro-bnp - 8,172  Most recent Echo 6/29/2022 showed EF of 45%-50%, with both diastolic and systolic dysfunction   CXR showed bilateral infiltrates or congestion in lungs       Elevated troponin  Blood levels:  0.08 ng/mL at 0828 on 8/25/2022  0.20 ng/mL at 1537 on 8/25/2022  0.27 ng/mL at 2142 on 8/25/2022  Patient had first MI in late June 2022     PLAN:  Inpatient Consult order placed for cardiology:  Hold antiplatelet & anticoagulant medication, due to anemia/MDS  IV lasix 40 mg BID  Per cardio, transition to oral diuretics tomorrow  Continue home meds:  carvedilol -  6.25 mg po BID  Isosorvide mononitrate - 60 mg po daily   Can obtain follow-up limited echocardiogram for LV function  No cardio workup needed  SOB is likely due to anemia/MDS  Dc trending troponins   Daily weights  I/O's       Constipation    Possible obstruction/ileus/enteritis/colitis   Patient said he has been eating everyday but has not had a bowel movement in a week  Patient presented to ER after waking up to episodes of vomiting  Patient said his belly had been bothering him and feeling \"yucky\" for a few days before this  Pancytopenia, neutropenic fever are both associated with mucositis of GI tract, or enteritis/colitis      PLAN:  Docusate 100 mg BID   Polyethylene glycol once daily  Ordered CT abdomen,pelvis,chest without contrast      Chronic Problems being managed in hospital: Hypertension  Controlled for now, being monitored  Home med:  Hydralazine 100 mg po daily  Hold home med for now     Chronic kidney disease   Daily CMP  Consult nephro if evidence of VINNY arises  Daily weights, I/Os, IV lasix 40 mg bid     Current moderate episode of major depressive disorder without prior episode ([facility] [address] [facility]  Patient's home meds:  Fluoxetine 10 mg po daily   Trazodone 50 mg po nightly (helps to sleep)  Hold home meds for now. Hyperlipidemia  Continue home med:  Fenofibrate 160 mg po daily  Order lipid panel  Most recent lipid panel on file was 6/29/2022, showed fasting TG's of 172 and HDL of 15     Hypertrophy of prostate without urinary obstruction and other lower urinary tract symptoms (LUTS),  Continue home med of Tamsulosin 0.4 mg po daily   UA with microscopy ordered  Results show:   trace protein  5-10 RBC  2+ bacteria  Nitrite negative  Leuk est negative  Daily CMP, I/O's, symptom monitoring to see if UTI/cystitis is contributing to patient's clinical picture      DVT Prophylaxis: - Pneumatic compression device. Diet: ADULT DIET;  Regular; Low Fat/Low Chol/High Fiber/NIKKI; Low Sodium (2 gm)    Code Status: Full Code    PT/OT Eval Status: none ordered, will reassess tomorrow     Dispo - 2-3 days, pending pt is stable and procalcitonin trends down to baseline     Rachael Calles DO

## 2022-08-27 NOTE — PROGRESS NOTES
ONCOLOGY FOLLOW-UP:       Primary Oncologist:  Dr. Krueger Court:       Patient Active Problem List   Diagnosis Code    Hypertension I10    Rosacea L71.9    Mixed hyperlipidemia E78.2    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, N13.8    Taste perversion R43.2    Anosmia R43.0    Back pain M54.9    Nonrheumatic aortic valve insufficiency I35.1    Bilateral carotid bruits R09.89    Current moderate episode of major depressive disorder without prior episode ([facility]) F32.1    Atrial fibrillation with RVR ([facility]) I48.91    VINNY (acute kidney injury) (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    Chronic systolic (congestive) heart failure I50.22    Angina pectoris, unspecified I20.9    Atherosclerotic heart disease of native coronary artery with unspecified angina pectoris I25.119    Acute hypoxemic respiratory failure ([facility]) J96.01    Anemia D64.9    Pancytopenia ([facility]) D61.818    Pneumonia due to infectious organism J18.9    Acute on chronic combined systolic and diastolic CHF (congestive heart failure) ([facility]) I50.43    Pulmonary congestion R09.89    Cor pulmonale ([facility]) I27.81    Constipation K59.00       INTERVAL HISTORY:       Pt transfused PRBC yesterday. Had drop in 02 sats and elevated BP. He is now afebrile. REVIEW OF SYSTEMS:       Constitutional: Denies fever, sweats, weight loss     Eyes: No visual changes or diplopia. No scleral icterus. ENT: No Headaches, hearing loss or vertigo. No mouth sores or sore throat. Cardiovascular: No chest pain, dyspnea on exertion, palpitations or loss of consciousness. Respiratory: No cough or wheezing, no sputum production. No hemoptysis. .    Gastrointestinal: No abdominal pain, appetite loss, blood in stools.  No change in bowel habits. Genitourinary: No dysuria, trouble voiding, or hematuria. Musculoskeletal:  Generalized weakness. No joint complaints. Integumentary: No rash or pruritis. Neurological: No headache, diplopia. No change in gait, balance, or coordination. No paresthesias. Endocrine: No temperature intolerance. No excessive thirst, fluid intake, or urination. Hematologic/Lymphatic: No abnormal bruising or ecchymoses, blood clots or swollen lymph nodes. Allergic/Immunologic: No nasal congestion or hives.          PHYSICAL EXAM:       /65   Pulse 72   Temp 98 °F (36.7 °C) (Oral)   Resp 18   Ht 6' 2\" (1.88 m)   Wt 165 lb 12.6 oz (75.2 kg)   SpO2 99%   BMI 21.29 kg/m²     General appearance: alert and cooperative  Head: Normocephalic, without obvious abnormality, atraumatic  Neck: No palpable lymphadenopathy in supraclavicular or cervical chains  Lungs: Clear to auscultation bilaterally, no audible rales, wheezes or crackles  Heart: Regular rate and rhythm, S1, S2 normal  Abdomen: Soft, non-tender; bowel sounds normal; no masses,  no organomegaly  Extremities: without cyanosis, clubbing, edema or asymmetry  Skin: No jaundice, purpura or petechiae    LABS:     CBC:   Lab Results   Component Value Date    WBC 1.8 (LL) 08/27/2022    HGB 7.9 (L) 08/27/2022    HCT 23.3 (L) 08/27/2022    MCV 93.4 08/27/2022    PLT 43 (L) 08/27/2022    LYMPHOPCT 21.0 08/27/2022    RBC 2.49 (L) 08/27/2022    MCH 31.9 08/27/2022    MCHC 34.2 08/27/2022    RDW 20.5 (H) 08/27/2022       BMP:   Lab Results   Component Value Date/Time     08/27/2022 04:21 AM    K 3.6 08/27/2022 04:21 AM     08/27/2022 04:21 AM    CO2 25 08/27/2022 04:21 AM    BUN 35 08/27/2022 04:21 AM    CREATININE 1.6 08/27/2022 04:21 AM    GLUCOSE 163 08/27/2022 04:21 AM    GLUCOSE 78 01/12/2012 02:47 PM    CALCIUM 9.1 08/27/2022 04:21 AM        Hepatic Function Panel:   Lab Results   Component Value Date/Time    ALKPHOS 40 08/27/2022 04:21 AM    ALT 7 08/27/2022 04:21 AM    AST 22 08/27/2022 04:21 AM    PROT 5.6 08/27/2022 04:21 AM    PROT 7.3 01/12/2012 02:47 PM    BILITOT 1.7 08/27/2022 04:21 AM    LABALBU 3.1 08/27/2022 04:21 AM      IMAGING:     Echo Limited    Result Date: 8/26/2022  Transthoracic Echocardiography Report (TTE)  Demographics   Patient Name       Ever Berrios   Date of Study      08/26/2022        Gender              Male   Patient Number     [id number]        Date of Birth       1940   Visit Number       [id number]         Age                 79 year(s)   Accession Number   [id number]        Room Number         4996   Corporate ID       [id number]          Sonographer         Reji Solorio   Ordering Physician Edilberto Bailey MD  Interpreting        [address] [place] [address].                                       Physician           Wilian De La Rosa MD  Procedure Type of Study   TTE procedure:ECHOCARDIOGRAM LIMITED. Procedure Date Date: 08/26/2022 Start: 11:26 AM Study Location: [address] [place] [address] - Echo Lab Technical Quality: Limited visualization due to poor acoustical window. Additional Indications:LV Fx. Patient Status: Routine Contrast Medium: Definity. Amount - 2 ml Height: 73 inches Weight: 165 pounds BSA: 1.98 m2 BMI: 21.77 kg/m2 BP: 148/70 mmHg  Conclusions   Summary  Limited echo for LV function with limited doppler/no color. -- The left ventricular systolic function is mildly reduced with an ejection  fraction of 45 - 50 %. EF by Ulloa's method estimated at 48%. Normal left  ventricular size with mild concentric left ventricular hypertrophy. Hypokinesis of the basal to mid inferoseptal/anteroseptal/inferior wall  segments. Grade I diastolic dysfunction with normal filling pressure. Compared to last echo on 6/29/2022, no change in EF.    Signature   ------------------------------------------------------------------  Electronically signed by Wilian De La Rosa MD (Interpreting  physician) on 08/26/2022 at 12:54 PM ------------------------------------------------------------------   Findings   Left Ventricle  The left ventricular systolic function is mildly reduced with an ejection  fraction of 45 - 50 %. EF by Ulloa's method estimated at 48%. Septal bounce present. Normal left ventricular size with mild concentric left ventricular  hypertrophy. Hypokinesis of the basal to mid inferoseptal/anteroseptal/inferior wall  segments. Grade I diastolic dysfunction with normal filling pressure. Definity® used for myocardial border enhancement. Mitral Valve  The mitral valve is normal in structure. Left Atrium  The left atrium is normal in size. Aorta  The aortic root is normal in size. Right Ventricle  The right ventricle is normal in size   Tricuspid Valve  The tricuspid valve is normal in structure   Right Atrium  The right atrial size is normal.  Right atrial area is cm2. Pulmonic Valve  The pulmonic valve is normal in structure   Pericardial Effusion  No pericardial effusion noted. Pleural Effusion  No pleural effusion. Miscellaneous  No obvious masses, thrombi or vegetations are noted. IVC is normal in size (< 2.1 cm) and collapses > 50% with respiration  consistent with normal right atrial pressure (3 mmHg).   M-Mode/2D Measurements (cm)   LV Diastolic Dimension: 7.95 cm LV Systolic Dimension: 7.05 cm  LV Septum Diastolic: 2.36 cm  LV PW Diastolic: 2.81 cm                                  LA Dimension: 4.1 cm  Doppler Measurements                                  MV Peak E-Wave: 99.2 cm/s                                 MV Peak A-Wave: 119 cm/s                                 MV E/A Ratio: 0.83   E' Septal Velocity: 5.75 cm/s  E' Lateral Velocity: 12.2 cm/s  E/E' ratio: 12.7      XR CHEST PORTABLE    Result Date: 8/25/2022  EXAMINATION: ONE XRAY VIEW OF THE CHEST 8/25/2022 8:40 am COMPARISON: July 2, 2022 HISTORY: ORDERING SYSTEM PROVIDED HISTORY: SOB TECHNOLOGIST PROVIDED HISTORY: Reason for exam:->SOB Reason for Exam: sob FINDINGS: Moderate infiltrates and/or congestion identified in the lungs. Trace right pleural effusion. No pneumothorax. Top-normal cardiac size. Significant osteopenic changes and degenerative changes noted in the bony structures. Moderate infiltrates and/or congestion identified in the lungs. CT CHEST ABDOMEN PELVIS WO CONTRAST    Result Date: 8/26/2022  EXAMINATION: CT OF THE CHEST, ABDOMEN, AND PELVIS WITHOUT CONTRAST 8/26/2022 11:22 am TECHNIQUE: CT of the chest, abdomen and pelvis was performed without the administration of intravenous contrast. Multiplanar reformatted images are provided for review. Automated exposure control, iterative reconstruction, and/or weight based adjustment of the mA/kV was utilized to reduce the radiation dose to as low as reasonably achievable. COMPARISON: No recent available. HISTORY: ORDERING SYSTEM PROVIDED HISTORY: patient came to ER yesterday with vomiting. Says he has been eating every day, but no bowel movements in a week. TECHNOLOGIST PROVIDED HISTORY: Reason for exam:->patient came to ER yesterday with vomiting. Says he has been eating every day, but no bowel movements in a week. Additional Contrast?->None Reason for Exam: no bm several days, abd pain FINDINGS: Chest: Mediastinum: Visualized thyroid unremarkable. No pathologically enlarged mediastinal lymph nodes are seen. Esophagus is unremarkable. Cardiac chambers are enlarged. No pericardial effusion. Limited noncontrast imaging of the thoracic aorta and pulmonary arteries unremarkable. Lungs/pleura: Small bilateral pleural effusions are seen. Adjacent atelectasis is noted. There is ground-glass opacity noted seen throughout both lungs, greatest centrally. Smooth interlobular septal thickening is noted bilaterally. 7 mm nodule within the periphery of the right middle lobe noted (2, 84). Diffuse bronchial wall thickening is found. No filling defects are seen within the airways. Soft Tissues/Bones: No acute or suspicious bony abnormalities are identified. Visualized extra thoracic soft tissues are unremarkable. Abdomen/Pelvis: Lack of intravenous contrast limits evaluation of the solid abdominal viscera, the hollow abdominal viscera, and the vascular structures. Organs: With that said, no acute or suspicious hepatic abnormality identified. Gallstone is noted. Noncontrast imaging of the spleen unremarkable. Adrenals unremarkable. Noncontrast imaging the pancreas unremarkable. Bilateral perinephric fat stranding is seen. No acute or suspicious renal abnormality detected otherwise. GI/Bowel: Moderate to large amount of stool is seen within the rectum. Moderate amount of stool seen within the sigmoid colon. More proximally, the large bowel is unremarkable appearance. There is mild diverticulosis without CT evidence of diverticulitis. The appendix is normal. Stomach unremarkable. Duodenal diverticula are noted without CT evidence of diverticulitis. Small bowel otherwise unremarkable. Pelvis: Urinary bladder and prostate unremarkable. No free pelvic fluid. Evidence of previous right inguinal hernia repair. Peritoneum/Retroperitoneum: Infrarenal fusiform aortic aneurysm is found measuring maximally 3.8 cm. No CT evidence of rupture is identified. No lymphadenopathy. 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 No acute bony abnormalities are identified. The extra-abdominal and extra pelvic soft tissues are unremarkable. Chest CT: Ground-glass opacity within both lungs, with smooth interlobular septal thickening, and small bilateral pleural effusions, the constellation of which is most compatible with pulmonary edema. 7 mm nodule in the periphery of the right middle lobe. Please see recommendations below. Bronchial wall thickening, which may be seen in inflammatory conditions such as bronchitis, reactive airways disease, and smoking.  Abdomen and pelvis CT: Moderate to large amount of stool within the rectum and a moderate amount of stool within the sigmoid colon, findings which may suggest constipation/impaction. Cholelithiasis. Bilateral perinephric fat stranding, which is likely age related, but please correlate with any clinical evidence of urinary tract infection. Infrarenal abdominal aortic aneurysm measuring 3.8 cm maximally. See recommendations below. RECOMMENDATIONS: 7 mm right solid pulmonary nodule. Recommend a non-contrast Chest CT at 6-12 months. If patient is high risk for malignancy, recommend an additional non-contrast Chest CT at 18-24 months; if patient is low risk for malignancy a non-contrast Chest CT at 18-24 months is optional. These guidelines do not apply to immunocompromised patients and patients with cancer. Follow up in patients with significant comorbidities as clinically warranted. For lung cancer screening, adhere to Lung-RADS guidelines. Reference: Radiology. 2017; 284(1):228-43. 3.8 cm infrarenal abdominal aortic aneurysm. Recommend follow-up every 2 years. Reference: J Am Nima Radiol 5831;24:646-456. 7 mm right solid pulmonary nodule. Recommend a non-contrast Chest CT at 6-12 months. If patient is high risk for malignancy, recommend an additional non-contrast Chest CT at 18-24 months; if patient is low risk for malignancy a non-contrast Chest CT at 18-24 months is optional. These guidelines do not apply to immunocompromised patients and patients with cancer. Follow up in patients with significant comorbidities as clinically warranted. For lung cancer screening, adhere to Lung-RADS guidelines. Reference: Radiology. 2017; 284(1):228-43. 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  - Transfusional support only. - Hgb 5.9 -> 7.9 with transfusion     2.) Neutropenic fever  - Tm 101.2 on admission. Afebrile since then. - SARS-CoV-2 NAAT indeterminate.   Await confirmatory test.  - Blood cultures collected on 8/25/2022 are in process. - Cefepime started. 3.) Parox atrial fib  - Not on anticoagulation. 4.) sCHF  - Echocardiogram, 6/28/2022, showed LVEF 45-50% w/ basal to mid inferior, basal inferoseptal hypokinesis.   - cardiology following     5.) Code status  - Changed to [facility] after discussion with Dr. Kayla Grijalva yesterday, but changed his mind and now back to full code    Jeremie Hodgson MD

## 2022-08-27 NOTE — PLAN OF CARE
Problem: Safety - Adult  Goal: Free from fall injury  8/26/2022 2111 by Larry Scanlon RN  Outcome: Progressing  8/26/2022 1042 by Fco Rider  Outcome: Progressing     Problem: Chronic Conditions and Co-morbidities  Goal: Patient's chronic conditions and co-morbidity symptoms are monitored and maintained or improved  Outcome: Progressing     Problem: Discharge Planning  Goal: Discharge to home or other facility with appropriate resources  Outcome: Progressing

## 2022-08-27 NOTE — CONSULTS
Pharmacy Note  Vancomycin Consult    Valentine Stein is a 79 y.o. male started on Vancomycin for CAP; consult received from Dr. Donnell Mccabe ) to manage therapy. Also receiving the following antibiotics:   - Cefepime, Zithormax . Allergies:  Patient has no known allergies. Tmax:  97.8    Recent Labs     08/26/22  0543 08/27/22  0421   CREATININE 1.5* 1.6*       Recent Labs     08/26/22  0543 08/27/22  0421   WBC 1.2* 1.8*       Estimated Creatinine Clearance: 37 mL/min (A) (based on SCr of 1.6 mg/dL (H)). Intake/Output Summary (Last 24 hours) at 8/27/2022 1401  Last data filed at 8/27/2022 1240  Gross per 24 hour   Intake 1859.26 ml   Output 1575 ml   Net 284.26 ml       Wt Readings from Last 1 Encounters:   08/27/22 165 lb 12.6 oz (75.2 kg)         Body mass index is 21.29 kg/m². Culture Date      Source                       Results  8/25/22              Blood                       NGTD    Loading dose (critically ill or in ICU, require dialysis or renal replacement therapy): Vancomycin 25 mg/kg IVPB x 1 (maximum 2500 mg). Maintenance dose: 15 mg/kg (maximum: 2000 mg/dose and 4500 mg/day) starting at the next dosing interval determined by renal function  Pulse dose: fluctuating renal function, VINNY, ESRD   Goal Vancomycin trough: 10-15 mcg/mL or 15-20 mcg/mL   Goal Vancomycin AUC: 400-600     Assessment/Plan:  - Pt.received 1,500mg X1 on 8/25 ~9:30am.  - Recommended to continue 1,000mg q24h based on current renal labs   - Regimen 1000 mg IV every 24 hours. Start time 14:30 on 08/27/2022  - Exposure target AUC24 (range) 400-600 mg/L.hr  Predictions  - AUC24,ss 536 mg/L.hr  - Probability of AUC24 > 400 81 %  - Ctrough,ss 17.5 mg/L  - Thank you for the consult.

## 2022-08-28 LAB
A/G RATIO: 0.9 (ref 1.1–2.2)
ALBUMIN SERPL-MCNC: 2.8 G/DL (ref 3.4–5)
ALP BLD-CCNC: 38 U/L (ref 40–129)
ALT SERPL-CCNC: 7 U/L (ref 10–40)
ANION GAP SERPL CALCULATED.3IONS-SCNC: 9 MMOL/L (ref 3–16)
AST SERPL-CCNC: 18 U/L (ref 15–37)
ATYPICAL LYMPHOCYTE RELATIVE PERCENT: 1 % (ref 0–6)
BASOPHILS ABSOLUTE: 0 K/UL (ref 0–0.2)
BASOPHILS RELATIVE PERCENT: 0 %
BILIRUB SERPL-MCNC: 1.2 MG/DL (ref 0–1)
BUN BLDV-MCNC: 43 MG/DL (ref 7–20)
C-REACTIVE PROTEIN: 228.2 MG/L (ref 0–5.1)
CALCIUM SERPL-MCNC: 9.5 MG/DL (ref 8.3–10.6)
CHLORIDE BLD-SCNC: 98 MMOL/L (ref 99–110)
CO2: 26 MMOL/L (ref 21–32)
CREAT SERPL-MCNC: 1.6 MG/DL (ref 0.8–1.3)
EOSINOPHILS ABSOLUTE: 0 K/UL (ref 0–0.6)
EOSINOPHILS RELATIVE PERCENT: 1 %
GFR AFRICAN AMERICAN: 50
GFR NON-AFRICAN AMERICAN: 41
GLUCOSE BLD-MCNC: 107 MG/DL (ref 70–99)
HCT VFR BLD CALC: 23.8 % (ref 40.5–52.5)
HEMATOLOGY PATH CONSULT: NO
HEMOGLOBIN: 8.2 G/DL (ref 13.5–17.5)
LACTIC ACID: 0.9 MMOL/L (ref 0.4–2)
LYMPHOCYTES ABSOLUTE: 0.7 K/UL (ref 1–5.1)
LYMPHOCYTES RELATIVE PERCENT: 43 %
MAGNESIUM: 1.7 MG/DL (ref 1.8–2.4)
MCH RBC QN AUTO: 32 PG (ref 26–34)
MCHC RBC AUTO-ENTMCNC: 34.4 G/DL (ref 31–36)
MCV RBC AUTO: 93.1 FL (ref 80–100)
MONOCYTES ABSOLUTE: 0.4 K/UL (ref 0–1.3)
MONOCYTES RELATIVE PERCENT: 25 %
MONONUCLEAR UNIDENTIFIED CELLS: 12 %
NEUTROPHILS ABSOLUTE: 0.3 K/UL (ref 1.7–7.7)
NEUTROPHILS RELATIVE PERCENT: 18 %
NUCLEATED RED BLOOD CELLS: 1 /100 WBC
PDW BLD-RTO: 20.9 % (ref 12.4–15.4)
PLATELET # BLD: 43 K/UL (ref 135–450)
PLATELET SLIDE REVIEW: ABNORMAL
PMV BLD AUTO: 10 FL (ref 5–10.5)
POTASSIUM REFLEX MAGNESIUM: 3.3 MMOL/L (ref 3.5–5.1)
PROCALCITONIN: 10.13 NG/ML (ref 0–0.15)
RBC # BLD: 2.55 M/UL (ref 4.2–5.9)
SODIUM BLD-SCNC: 133 MMOL/L (ref 136–145)
TOTAL PROTEIN: 6 G/DL (ref 6.4–8.2)
WBC # BLD: 1.5 K/UL (ref 4–11)

## 2022-08-28 PROCEDURE — 2580000003 HC RX 258

## 2022-08-28 PROCEDURE — 80053 COMPREHEN METABOLIC PANEL: CPT

## 2022-08-28 PROCEDURE — 36415 COLL VENOUS BLD VENIPUNCTURE: CPT

## 2022-08-28 PROCEDURE — 86140 C-REACTIVE PROTEIN: CPT

## 2022-08-28 PROCEDURE — 6370000000 HC RX 637 (ALT 250 FOR IP): Performed by: INTERNAL MEDICINE

## 2022-08-28 PROCEDURE — 2580000003 HC RX 258: Performed by: INTERNAL MEDICINE

## 2022-08-28 PROCEDURE — 6360000002 HC RX W HCPCS: Performed by: EMERGENCY MEDICINE

## 2022-08-28 PROCEDURE — 2580000003 HC RX 258: Performed by: EMERGENCY MEDICINE

## 2022-08-28 PROCEDURE — 96366 THER/PROPH/DIAG IV INF ADDON: CPT

## 2022-08-28 PROCEDURE — 83605 ASSAY OF LACTIC ACID: CPT

## 2022-08-28 PROCEDURE — 6370000000 HC RX 637 (ALT 250 FOR IP)

## 2022-08-28 PROCEDURE — G0378 HOSPITAL OBSERVATION PER HR: HCPCS

## 2022-08-28 PROCEDURE — 84145 PROCALCITONIN (PCT): CPT

## 2022-08-28 PROCEDURE — 1200000000 HC SEMI PRIVATE

## 2022-08-28 PROCEDURE — 6360000002 HC RX W HCPCS

## 2022-08-28 PROCEDURE — 96376 TX/PRO/DX INJ SAME DRUG ADON: CPT

## 2022-08-28 PROCEDURE — 96361 HYDRATE IV INFUSION ADD-ON: CPT

## 2022-08-28 PROCEDURE — 94761 N-INVAS EAR/PLS OXIMETRY MLT: CPT

## 2022-08-28 PROCEDURE — 6370000000 HC RX 637 (ALT 250 FOR IP): Performed by: NURSE PRACTITIONER

## 2022-08-28 PROCEDURE — 85025 COMPLETE CBC W/AUTO DIFF WBC: CPT

## 2022-08-28 PROCEDURE — 83735 ASSAY OF MAGNESIUM: CPT

## 2022-08-28 PROCEDURE — 2700000000 HC OXYGEN THERAPY PER DAY

## 2022-08-28 RX ORDER — PSEUDOEPHEDRINE HCL 30 MG
100 TABLET ORAL 2 TIMES DAILY
Qty: 60 CAPSULE | Refills: 1 | Status: CANCELLED | OUTPATIENT
Start: 2022-08-28 | End: 2022-09-27

## 2022-08-28 RX ORDER — LANOLIN ALCOHOL/MO/W.PET/CERES
400 CREAM (GRAM) TOPICAL DAILY
Status: COMPLETED | OUTPATIENT
Start: 2022-08-28 | End: 2022-08-28

## 2022-08-28 RX ADMIN — DOCUSATE SODIUM 100 MG: 100 CAPSULE, LIQUID FILLED ORAL at 08:49

## 2022-08-28 RX ADMIN — Medication 10 ML: at 14:42

## 2022-08-28 RX ADMIN — Medication 10 ML: at 17:52

## 2022-08-28 RX ADMIN — FLUOXETINE 10 MG: 10 CAPSULE ORAL at 08:49

## 2022-08-28 RX ADMIN — FENOFIBRATE 54 MG: 54 TABLET ORAL at 08:48

## 2022-08-28 RX ADMIN — TRAZODONE HYDROCHLORIDE 50 MG: 50 TABLET ORAL at 20:40

## 2022-08-28 RX ADMIN — TAMSULOSIN HYDROCHLORIDE 0.4 MG: 0.4 CAPSULE ORAL at 08:49

## 2022-08-28 RX ADMIN — FUROSEMIDE 40 MG: 10 INJECTION, SOLUTION INTRAMUSCULAR; INTRAVENOUS at 17:52

## 2022-08-28 RX ADMIN — VANCOMYCIN HYDROCHLORIDE 1000 MG: 1 INJECTION, POWDER, LYOPHILIZED, FOR SOLUTION INTRAVENOUS at 14:44

## 2022-08-28 RX ADMIN — CARVEDILOL 6.25 MG: 6.25 TABLET, FILM COATED ORAL at 20:40

## 2022-08-28 RX ADMIN — CARVEDILOL 6.25 MG: 6.25 TABLET, FILM COATED ORAL at 08:48

## 2022-08-28 RX ADMIN — Medication 400 MG: at 12:10

## 2022-08-28 RX ADMIN — SENNOSIDES 8.6 MG: 8.6 TABLET, COATED ORAL at 20:40

## 2022-08-28 RX ADMIN — CEFEPIME 2000 MG: 2 INJECTION, POWDER, FOR SOLUTION INTRAVENOUS at 08:59

## 2022-08-28 RX ADMIN — HYDROCODONE BITARTRATE AND ACETAMINOPHEN 1 TABLET: 5; 325 TABLET ORAL at 16:13

## 2022-08-28 RX ADMIN — Medication 10 ML: at 08:48

## 2022-08-28 RX ADMIN — POTASSIUM BICARBONATE 20 MEQ: 782 TABLET, EFFERVESCENT ORAL at 18:32

## 2022-08-28 RX ADMIN — FUROSEMIDE 40 MG: 10 INJECTION, SOLUTION INTRAMUSCULAR; INTRAVENOUS at 08:48

## 2022-08-28 RX ADMIN — Medication 10 ML: at 13:17

## 2022-08-28 RX ADMIN — CEFEPIME 2000 MG: 2 INJECTION, POWDER, FOR SOLUTION INTRAVENOUS at 20:47

## 2022-08-28 RX ADMIN — DOCUSATE SODIUM 100 MG: 100 CAPSULE, LIQUID FILLED ORAL at 20:40

## 2022-08-28 RX ADMIN — Medication 10 ML: at 20:48

## 2022-08-28 RX ADMIN — ISOSORBIDE MONONITRATE 60 MG: 60 TABLET, EXTENDED RELEASE ORAL at 08:49

## 2022-08-28 RX ADMIN — AZITHROMYCIN MONOHYDRATE 500 MG: 500 INJECTION, POWDER, LYOPHILIZED, FOR SOLUTION INTRAVENOUS at 17:57

## 2022-08-28 ASSESSMENT — PAIN SCALES - GENERAL
PAINLEVEL_OUTOF10: 8
PAINLEVEL_OUTOF10: 0
PAINLEVEL_OUTOF10: 0

## 2022-08-28 ASSESSMENT — PAIN - FUNCTIONAL ASSESSMENT: PAIN_FUNCTIONAL_ASSESSMENT: ACTIVITIES ARE NOT PREVENTED

## 2022-08-28 ASSESSMENT — PAIN DESCRIPTION - DESCRIPTORS: DESCRIPTORS: ACHING;TIGHTNESS

## 2022-08-28 ASSESSMENT — PAIN DESCRIPTION - LOCATION: LOCATION: BACK

## 2022-08-28 NOTE — FLOWSHEET NOTE
08/28/22 0829   Vital Signs   Temp 98.5 °F (36.9 °C)   Temp Source Oral   Heart Rate 75   Heart Rate Source Monitor   Resp 22   BP (!) 147/67   BP Location Left upper arm   BP Method Automatic   MAP (Calculated) 93.67   Patient Position Semi fowlers   Level of Consciousness 0   MEWS Score 2   Output (mL)   Urine 125 mL   Emesis 0 mL   Stool (measured)  0 mL   Blood 0 mL   Other 0 mL   External Urinary Catheter   Placement Date/Time: 08/27/22 0000     Placement   (pt willingly removed this device overnight - will keep at bedside for future use if applicable)   Urine Assessment   Urinary Status Voiding   Urine Color Yellow/straw   Urine Appearance Clear   Urine Odor No odor   Stool Assessment   Incontinence No   Last BM (including prior to admit) 08/26/22   Unmeasured Output   Stool Occurrence 0   Emesis Occurrence 0

## 2022-08-28 NOTE — PROGRESS NOTES
Progress Note      PCP: Lavern Tellez MD    Date of Admission: 8/25/2022    Chief Complaint: shortness of breath, vomiting, fever    Hospital Course:    79 y.o. male with PMHx of MDS, MI, ICM, Afib, HTN, CKD, Depression, Hyperlipidemia, BPH who presented to [facility] with shortness of breath, vomiting, fever. Said he woke up this morning and began vomiting. Also was short of breath and dyspneic at rest but even worse with exertion. He felt fine when he went to bed last night. He felt that he was getting sicker and sicker as the day went on, and so his roommate Aguilar Sheffield brought him to ER. In the ER he was worked up for possible pneumonia, since he had fever + SOB. Covid rapid was indeterminate so a new test was ordered. CXR showed bilateral pulmonary infiltrates. CBC showed pancytopenia. Troponin and procalcitonin elevated. Patient was admitted to inpatient for monitoring and treatment. 8/26/2022 - Patient seen by hospital medicine in the afternoon. He was afebrile at the time, no acute respiratory distress, responsive and oriented x3. VSS. Gave history of his illness. Said he felt slight abdominal discomfort last few days, but denied n/c/d, only vomiting this morning. Denied chest pain, headache, weakness, numbness. Blair An he was dyspneic on exertion but not on rest.    8/27/2022 - Patient seen in am. Despite labs trending downward, he reports feeling \"100%\" better this morning. He had therapy in the morning and was able to walk to the chair at bedside with their help. He was on 4L O2 early in AM, down to 2L at this time. Patient denies any cough, headache, chest pain, fever, nausea, vomiting, palpitations, or new onset weakness. He reports having a recent BM yesterday. Subjective: 8/28/2022 - Patient seen in am. He is afebrile, no acute respiratory distress, responsive and oriented x3. VSS, denied n/v/c/d, chest pain, headache, weakness, numbness.  Dyspneic on exertion but not on rest. His roommate Miki Osgood said he seems more confused than the day before. Setting up his HCPOA was discussed with him and Girish Osgood. He is being weaned off [place] oxygen , was on room air today. Medications:  Reviewed    Infusion Medications    sodium chloride      sodium chloride Stopped (08/28/22 1315)     Scheduled Medications    potassium bicarb-citric acid  20 mEq Oral Daily    traZODone  50 mg Oral Nightly    FLUoxetine  10 mg Oral Daily    azithromycin  500 mg IntraVENous Q24H    vancomycin  1,000 mg IntraVENous Q24H    docusate sodium  100 mg Oral BID    senna  1 tablet Oral Nightly    cefepime  2,000 mg IntraVENous Q12H    sodium chloride flush  5-40 mL IntraVENous 2 times per day    carvedilol  6.25 mg Oral BID    fenofibrate  54 mg Oral Daily    isosorbide mononitrate  60 mg Oral Daily    tamsulosin  0.4 mg Oral Daily    furosemide  40 mg IntraVENous BID     PRN Meds: sodium chloride, HYDROcodone 5 mg - acetaminophen, sodium chloride flush, sodium chloride, ondansetron **OR** ondansetron, polyethylene glycol, acetaminophen **OR** acetaminophen      Intake/Output Summary (Last 24 hours) at 8/28/2022 1504  Last data filed at 8/28/2022 1319  Gross per 24 hour   Intake 1314.06 ml   Output 1175 ml   Net 139.06 ml       Physical Exam Performed:    /75   Pulse 73   Temp 98.6 °F (37 °C) (Oral)   Resp 22   Ht 6' 2\" (1.88 m)   Wt 166 lb (75.3 kg)   SpO2 92%   BMI 21.31 kg/m²     General appearance:  No apparent distress, appears stated age and cooperative. HEENT:  Normal cephalic, atraumatic without obvious deformity. Pupils equal, round, and reactive to light. Extra ocular muscles intact. Conjunctivae/corneas clear. Neck: Supple, with full range of motion. No jugular venous distention. Trachea midline. Respiratory:  Normal respiratory effort.  Slightly muffled lung sounds in lower lobes bilaterally in posterior fields, otherwise clear to auscultation bilaterally without Rales/Wheezes/Rhonchi. Cardiovascular:  Regular rate and rhythm with normal S1, loud S2. No mumur. Abdomen: Soft, non-tender, non-distended with normal bowel sounds. Musculoskeletal:  No clubbing, cyanosis. 2+ pitting edema in bilateral ankles. Full range of motion without deformity. Skin: Skin color, texture, turgor normal.  No rashes or lesions. Neurologic:  Neurovascularly intact without any focal sensory/motor deficits. Cranial nerves: II-XII intact, grossly non-focal.  Psychiatric:  Alert and oriented, thought content appropriate. Capillary Refill: Brisk,3 seconds, normal  Peripheral Pulses: +2 palpable, equal bilaterally       Labs:   Recent Labs     08/26/22  0543 08/26/22  1200 08/26/22  2214 08/27/22  0421 08/28/22  0446   WBC 1.2*  --   --  1.8* 1.5*   HGB 5.9*   < > 7.8* 7.9* 8.2*   HCT 17.3*   < > 23.7* 23.3* 23.8*   PLT 43*  --   --  43* 43*    < > = values in this interval not displayed. Recent Labs     08/26/22  0543 08/27/22  0421 08/28/22  0446    137 133*   K 3.6 3.6 3.3*    100 98*   CO2 26 25 26   BUN 28* 35* 43*   CREATININE 1.5* 1.6* 1.6*   CALCIUM 9.3 9.1 9.5     Recent Labs     08/26/22  0543 08/27/22  0421 08/28/22  0446   AST 19 22 18   ALT 6* 7* 7*   BILITOT 1.3* 1.7* 1.2*   ALKPHOS 38* 40 38*     No results for input(s): INR in the last 72 hours. Recent Labs     08/25/22  1537 08/25/22  2142   TROPONINI 0.20* 0.27*       Urinalysis:      Lab Results   Component Value Date/Time    NITRU Negative 08/25/2022 05:57 PM    WBCUA 3-5 08/25/2022 05:57 PM    BACTERIA 2+ 08/25/2022 05:57 PM    RBCUA 5-10 08/25/2022 05:57 PM    BLOODU Negative 08/25/2022 05:57 PM    SPECGRAV 1.020 08/25/2022 05:57 PM    GLUCOSEU Negative 08/25/2022 05:57 PM       Radiology:  XR CHEST (2 VW)   Final Result   Stable pattern of ground-glass and interstitial opacities consistent with   pulmonary edema. Small bilateral pleural effusions.          CT CHEST ABDOMEN PELVIS WO CONTRAST   Final guidelines do not apply to immunocompromised patients and patients with   cancer. Follow up in patients with significant comorbidities as clinically   warranted. For lung cancer screening, adhere to Lung-RADS guidelines. Reference: Radiology. 2017; 284(1):228-43. XR CHEST PORTABLE   Final Result   Moderate infiltrates and/or congestion identified in the lungs. Assessment/Plan:    Active Hospital Problems    Diagnosis     Constipation [K59.00]      Priority: Medium    Acute hypoxemic respiratory failure (HCC) [J96.01]      Priority: Medium    Pancytopenia (HCC) [D61.818]      Priority: Medium    Pneumonia due to infectious organism [J18.9]      Priority: Medium    Acute on chronic combined systolic and diastolic CHF (congestive heart failure) (HCC) [I50.43]      Priority: Medium    Pulmonary congestion [R09.89]      Priority: Medium    Cor pulmonale (HCC) [I27.81]      Priority: Medium    Elevated troponin [R77.8]      Priority: Medium       Principal Problem(s):       Pancytopenia ([address]  Patient with history of myelodysplastic syndrome  Sees Dr. Rufus Tirado for heme onc  Patient was only recently diagnosed in late June 2022  Patient denied history of recurrent infections, either long term or recently  CBC with diff 8/28 showed:  WBC of 1.2, was 1.3 on 8/25  Hgb of 8.22  Platelet of 43  Absolute neutrophils of 0.3  Neutropenia + fever indicates a patient at extreme risk of opportunistic infection with high mortality rate      Pneumonia due to infectious microbe  CXR showed bilateral infiltrates or congestion in lungs  Patient pancytopenic  Neutropenia + fever + SOB/dyspnea  Patient's covid negative  Labs:  Lactic acid - 1.2  Procalcitonin - 10.13  CRP - 228.2  Procalcitonin and CRP are still significantly elevated  Blood cultures negative      Acute hypoxemic respiratory failure [facility])  Per RN note from this morning (8:23 am on 8/25/2022):   \"EMS called by pt, on arrival to home pt's SpO2 high 70's to low 80's. Pt 87% on RA upon arrival\"  Thus patient presented with low SpO2 of 87% + SOB/dyspnea   Received oxygen therapy  Spo2 goal >90%  He was not on oxygen at home     PLAN:   Continue IV abx:   IV cefepime 2,000 mg q12h    IV azithromycin 500 mg q24h  IV vancomycin 1,000 mg q24h   Ordered procalcitonin, increased to 9.87 from 5.37  - CRP increased to 248.9, up from 185.9  - Added Azithromycin and Vancomycin IV  2. Repeat Chest x-ray 8/27 shows stable pattern of ground-glass and interstitial opacities consistent with pulmonary edema, small bilateral pleural effusions   - can't rule out pneumonia with this picture   - Patient currently being weaned off NC, continue to wean off as much as possible  3. Patient given 2 units PRBCs on 8/26  4. Incentive spirometry  5. Trend procalcitonin   6. Trend lactic acid  7. Trend CRP  8. Daily CBC with diff  9. Daily cmp   10. Sputum culture pending  11. Hold antiplatelet/blood thinner   12.  Inpatient Consult order placed for heme/onc  Appreciate rec's        Active Problems:      Acute on chronic combined systolic and diastolic CHF (congestive heart failure) (HCC)    Pulmonary congestion    Cor pulmonale (HCC)  Pro-bnp at admission- 8,172  Most recent Echo 6/29/2022 showed EF of 45%-50%, with both diastolic and systolic dysfunction   CXR showed bilateral infiltrates or congestion in lungs  Inpatient Consult or cardiology on 8/26/2022:  Hold antiplatelet & anticoagulant medication, due to anemia/MDS  IV lasix 40 mg BID  Per cardio, rec'd transition to oral diuretics tomorrow  Can obtain follow-up limited echocardiogram for LV function  No cardio workup needed  SOB is likely due to anemia/MDS  Dc trending troponins       Elevated troponin  Blood levels:  0.08 ng/mL at 0828 on 8/25/2022  0.20 ng/mL at 1537 on 8/25/2022  0.27 ng/mL at 2142 on 8/25/2022  Patient had first MI in late June 2022     PLAN:  DC troponins  Continue home meds:  carvedilol -  6.25 mg po BID  Isosorvide mononitrate - 60 mg po daily   IV lasix 40 mg BID  Possible switch to oral diuretic tomorrow  Diuresis indicated because: Repeat Chest x-ray 8/27 shows stable pattern of ground-glass and interstitial opacities consistent with pulmonary edema, small bilateral pleural effusions  Daily weights  I/O's         Constipation    Possible obstruction/ileus/enteritis/colitis   Patient said he has been eating everyday but has not had a bowel movement in a week  Patient presented to ER after waking up to episodes of vomiting  Patient said his belly had been bothering him and feeling \"yucky\" for a few days before this  Pancytopenia, neutropenic fever are both associated with mucositis of GI tract, or enteritis/colitis      PLAN:  Docusate 100 mg BID   Polyethylene glycol once daily  Senna 8.6 mg tablet po nightly   8/26 - CT abdomen,pelvis,chest without contrast:  Moderate to large amount of stool within the rectum and a moderate amount of stool within the sigmoid colon, findings which may suggest constipation/impaction.      Chronic Problems being managed in hospital:     Hypertension  Controlled for now, being monitored  Home med:  Hydralazine 100 mg po daily  Hold home med for now     Chronic kidney disease   Daily CMP  Consult nephro if evidence of VINNY arises  Daily weights, I/Os, IV lasix 40 mg bid     Current moderate episode of major depressive disorder without prior episode ([address]  CONTINUE Patient's home meds:  Fluoxetine 10 mg po daily   Trazodone 50 mg po nightly (helps to sleep)     Hyperlipidemia  Continue home med:  Fenofibrate 160 mg po daily  Ordered lipid panel  Previous lipid panel on file was 6/29/2022, showed fasting TG's of 172 and HDL of 15  8/25/2022 lipid panel results:  Unremarkable, TG's of 75 mg/dL, HDL of 27      Hypertrophy of prostate without urinary obstruction and other lower urinary tract symptoms (LUTS),  Continue home med of Tamsulosin 0.4 mg po daily   UA with microscopy ordered  Results show:   trace protein  5-10 RBC  2+ bacteria  Nitrite negative  Leuk est negative  Daily CMP, I/O's, symptom monitoring to see if UTI/cystitis is contributing to patient's clinical picture      DVT Prophylaxis: - Pneumatic compression device. Diet: ADULT DIET; Regular; Low Fat/Low Chol/High Fiber/NIKKI; Low Sodium (2 gm)    Code Status: Full Code    PT/OT Eval Status: consult ordered    Dispo - 2-3 more days.  Need patient's Procalc/lactate/CRP to trend towards baseline    Javad Yanez-Rached, DO

## 2022-08-28 NOTE — PROGRESS NOTES
ONCOLOGY FOLLOW-UP:       Primary Oncologist:  Dr. Carol Wetzel:       Patient Active Problem List   Diagnosis Code    Hypertension I10    Rosacea L71.9    Mixed hyperlipidemia E78.2    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, N13.8    Taste perversion R43.2    Anosmia R43.0    Back pain M54.9    Nonrheumatic aortic valve insufficiency I35.1    Bilateral carotid bruits R09.89    Current moderate episode of major depressive disorder without prior episode ([facility]) F32.1    Atrial fibrillation with RVR ([facility]) I48.91    VINNY (acute kidney injury) ([facility] [address] N17.9    Acute anemia D64.9    GI bleed K92.2    Thrombocytopenia ([facility]) D69.6    Arrhythmia, atrial I49.8    Elevated troponin R77.8    Cardiomyopathy ([facility]) I42.9    NSVT (nonsustained ventricular tachycardia) ([facility]) I47.2    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    Chronic systolic (congestive) heart failure I50.22    Angina pectoris, unspecified I20.9    Atherosclerotic heart disease of native coronary artery with unspecified angina pectoris I25.119    Acute hypoxemic respiratory failure ([facility]) J96.01    Anemia D64.9    Pancytopenia ([facility]) D61.818    Pneumonia due to infectious organism J18.9    Acute on chronic combined systolic and diastolic CHF (congestive heart failure) ([facility]) I50.43    Pulmonary congestion R09.89    Cor pulmonale ([facility]) I27.81    Constipation K59.00       INTERVAL HISTORY:       Pt transfused PRBC 8/26. He is now afebrile. REVIEW OF SYSTEMS:       Constitutional: Denies fever, sweats, weight loss     Eyes: No visual changes or diplopia. No scleral icterus. ENT: No Headaches, hearing loss or vertigo. No mouth sores or sore throat. Cardiovascular: No chest pain, dyspnea on exertion, palpitations or loss of consciousness. Respiratory: No cough or wheezing, no sputum production. No hemoptysis. .    Gastrointestinal: No abdominal pain, appetite loss, blood in stools. No change in bowel habits.   Genitourinary: No dysuria, trouble voiding, or hematuria. Musculoskeletal:  Generalized weakness. No joint complaints. Integumentary: No rash or pruritis. Neurological: No headache, diplopia. No change in gait, balance, or coordination. No paresthesias. Endocrine: No temperature intolerance. No excessive thirst, fluid intake, or urination. Hematologic/Lymphatic: No abnormal bruising or ecchymoses, blood clots or swollen lymph nodes. Allergic/Immunologic: No nasal congestion or hives.          PHYSICAL EXAM:       BP (!) 147/67   Pulse 75   Temp 98.5 °F (36.9 °C) (Oral)   Resp 22   Ht 6' 2\" (1.88 m)   Wt 166 lb (75.3 kg)   SpO2 92%   BMI 21.31 kg/m²     General appearance: alert and cooperative  Head: Normocephalic, without obvious abnormality, atraumatic  Neck: No palpable lymphadenopathy in supraclavicular or cervical chains  Lungs: Clear to auscultation bilaterally, no audible rales, wheezes or crackles  Heart: Regular rate and rhythm, S1, S2 normal  Abdomen: Soft, non-tender; bowel sounds normal; no masses,  no organomegaly  Extremities: without cyanosis, clubbing, edema or asymmetry  Skin: No jaundice, purpura or petechiae    LABS:     CBC:   Lab Results   Component Value Date    WBC 1.5 (LL) 08/28/2022    HGB 8.2 (L) 08/28/2022    HCT 23.8 (L) 08/28/2022    MCV 93.1 08/28/2022    PLT 43 (L) 08/28/2022    LYMPHOPCT 43.0 08/28/2022    RBC 2.55 (L) 08/28/2022    MCH 32.0 08/28/2022    MCHC 34.4 08/28/2022    RDW 20.9 (H) 08/28/2022       BMP:   Lab Results   Component Value Date/Time     08/28/2022 04:46 AM    K 3.3 08/28/2022 04:46 AM    CL 98 08/28/2022 04:46 AM    CO2 26 08/28/2022 04:46 AM    BUN 43 08/28/2022 04:46 AM    CREATININE 1.6 08/28/2022 04:46 AM    GLUCOSE 107 08/28/2022 04:46 AM    GLUCOSE 78 01/12/2012 02:47 PM    CALCIUM 9.5 08/28/2022 04:46 AM        Hepatic Function Panel:   Lab Results   Component Value Date/Time    ALKPHOS 38 08/28/2022 04:46 AM    ALT 7 08/28/2022 04:46 AM    AST 18 08/28/2022 04:46 AM    PROT 6.0 08/28/2022 04:46 AM    PROT 7.3 01/12/2012 02:47 PM    BILITOT 1.2 08/28/2022 04:46 AM    LABALBU 2.8 08/28/2022 04:46 AM      IMAGING:     Echo Limited    Result Date: 8/26/2022  Transthoracic Echocardiography Report (TTE)  Demographics   Patient Name       Leighann Becker   Date of Study      08/26/2022        Gender              Male   Patient Number     [id number]        Date of Birth       1940   Visit Number       [id number]         Age                 79 year(s)   Accession Number   [id number]        Room Number         5630   Corporate ID       [id number]          Sonographer         Derian Anaya   Ordering Physician Alycia Coy MD  Interpreting        [address]                                       Physician           Leonides Tineo MD  Procedure Type of Study   TTE procedure:ECHOCARDIOGRAM LIMITED. Procedure Date Date: 08/26/2022 Start: 11:26 AM Study Location: [address] [place] [address] - Echo Lab Technical Quality: Limited visualization due to poor acoustical window. Additional Indications:LV Fx. Patient Status: Routine Contrast Medium: Definity. Amount - 2 ml Height: 73 inches Weight: 165 pounds BSA: 1.98 m2 BMI: 21.77 kg/m2 BP: 148/70 mmHg  Conclusions   Summary  Limited echo for LV function with limited doppler/no color. -- The left ventricular systolic function is mildly reduced with an ejection  fraction of 45 - 50 %. EF by Ulloa's method estimated at 48%. Normal left  ventricular size with mild concentric left ventricular hypertrophy. Hypokinesis of the basal to mid inferoseptal/anteroseptal/inferior wall  segments. Grade I diastolic dysfunction with normal filling pressure. Compared to last echo on 6/29/2022, no change in EF.    Signature   ------------------------------------------------------------------  Electronically signed by Leonides Tineo MD (Interpreting  physician) on 08/26/2022 at 12:54 PM  ------------------------------------------------------------------   Findings Left Ventricle  The left ventricular systolic function is mildly reduced with an ejection  fraction of 45 - 50 %. EF by Ulloa's method estimated at 48%. Septal bounce present. Normal left ventricular size with mild concentric left ventricular  hypertrophy. Hypokinesis of the basal to mid inferoseptal/anteroseptal/inferior wall  segments. Grade I diastolic dysfunction with normal filling pressure. Definity® used for myocardial border enhancement. Mitral Valve  The mitral valve is normal in structure. Left Atrium  The left atrium is normal in size. Aorta  The aortic root is normal in size. Right Ventricle  The right ventricle is normal in size   Tricuspid Valve  The tricuspid valve is normal in structure   Right Atrium  The right atrial size is normal.  Right atrial area is cm2. Pulmonic Valve  The pulmonic valve is normal in structure   Pericardial Effusion  No pericardial effusion noted. Pleural Effusion  No pleural effusion. Miscellaneous  No obvious masses, thrombi or vegetations are noted. IVC is normal in size (< 2.1 cm) and collapses > 50% with respiration  consistent with normal right atrial pressure (3 mmHg).   M-Mode/2D Measurements (cm)   LV Diastolic Dimension: 1.40 cm LV Systolic Dimension: 0.99 cm  LV Septum Diastolic: 3.27 cm  LV PW Diastolic: 4.78 cm                                  LA Dimension: 4.1 cm  Doppler Measurements                                  MV Peak E-Wave: 99.2 cm/s                                 MV Peak A-Wave: 119 cm/s                                 MV E/A Ratio: 0.83   E' Septal Velocity: 5.75 cm/s  E' Lateral Velocity: 12.2 cm/s  E/E' ratio: 12.7      XR CHEST PORTABLE    Result Date: 8/25/2022  EXAMINATION: ONE XRAY VIEW OF THE CHEST 8/25/2022 8:40 am COMPARISON: July 2, 2022 HISTORY: ORDERING SYSTEM PROVIDED HISTORY: SOB TECHNOLOGIST PROVIDED HISTORY: Reason for exam:->SOB Reason for Exam: sob FINDINGS: Moderate infiltrates and/or congestion identified in the lungs. Trace right pleural effusion. No pneumothorax. Top-normal cardiac size. Significant osteopenic changes and degenerative changes noted in the bony structures. Moderate infiltrates and/or congestion identified in the lungs. CT CHEST ABDOMEN PELVIS WO CONTRAST    Result Date: 8/26/2022  EXAMINATION: CT OF THE CHEST, ABDOMEN, AND PELVIS WITHOUT CONTRAST 8/26/2022 11:22 am TECHNIQUE: CT of the chest, abdomen and pelvis was performed without the administration of intravenous contrast. Multiplanar reformatted images are provided for review. Automated exposure control, iterative reconstruction, and/or weight based adjustment of the mA/kV was utilized to reduce the radiation dose to as low as reasonably achievable. COMPARISON: No recent available. HISTORY: ORDERING SYSTEM PROVIDED HISTORY: patient came to ER yesterday with vomiting. Says he has been eating every day, but no bowel movements in a week. TECHNOLOGIST PROVIDED HISTORY: Reason for exam:->patient came to ER yesterday with vomiting. Says he has been eating every day, but no bowel movements in a week. Additional Contrast?->None Reason for Exam: no bm several days, abd pain FINDINGS: Chest: Mediastinum: Visualized thyroid unremarkable. No pathologically enlarged mediastinal lymph nodes are seen. Esophagus is unremarkable. Cardiac chambers are enlarged. No pericardial effusion. Limited noncontrast imaging of the thoracic aorta and pulmonary arteries unremarkable. Lungs/pleura: Small bilateral pleural effusions are seen. Adjacent atelectasis is noted. There is ground-glass opacity noted seen throughout both lungs, greatest centrally. Smooth interlobular septal thickening is noted bilaterally. 7 mm nodule within the periphery of the right middle lobe noted (2, 84). Diffuse bronchial wall thickening is found. No filling defects are seen within the airways. Soft Tissues/Bones: No acute or suspicious bony abnormalities are identified. Visualized extra thoracic soft tissues are unremarkable. Abdomen/Pelvis: Lack of intravenous contrast limits evaluation of the solid abdominal viscera, the hollow abdominal viscera, and the vascular structures. Organs: With that said, no acute or suspicious hepatic abnormality identified. Gallstone is noted. Noncontrast imaging of the spleen unremarkable. Adrenals unremarkable. Noncontrast imaging the pancreas unremarkable. Bilateral perinephric fat stranding is seen. No acute or suspicious renal abnormality detected otherwise. GI/Bowel: Moderate to large amount of stool is seen within the rectum. Moderate amount of stool seen within the sigmoid colon. More proximally, the large bowel is unremarkable appearance. There is mild diverticulosis without CT evidence of diverticulitis. The appendix is normal. Stomach unremarkable. Duodenal diverticula are noted without CT evidence of diverticulitis. Small bowel otherwise unremarkable. Pelvis: Urinary bladder and prostate unremarkable. No free pelvic fluid. Evidence of previous right inguinal hernia repair. Peritoneum/Retroperitoneum: Infrarenal fusiform aortic aneurysm is found measuring maximally 3.8 cm. No CT evidence of rupture is identified. No lymphadenopathy. 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 No acute bony abnormalities are identified. The extra-abdominal and extra pelvic soft tissues are unremarkable. Chest CT: Ground-glass opacity within both lungs, with smooth interlobular septal thickening, and small bilateral pleural effusions, the constellation of which is most compatible with pulmonary edema. 7 mm nodule in the periphery of the right middle lobe. Please see recommendations below. Bronchial wall thickening, which may be seen in inflammatory conditions such as bronchitis, reactive airways disease, and smoking.  Abdomen and pelvis CT: Moderate to large amount of stool within the rectum and a moderate amount of stool within the sigmoid colon, findings which may suggest constipation/impaction. Cholelithiasis. Bilateral perinephric fat stranding, which is likely age related, but please correlate with any clinical evidence of urinary tract infection. Infrarenal abdominal aortic aneurysm measuring 3.8 cm maximally. See recommendations below. RECOMMENDATIONS: 7 mm right solid pulmonary nodule. Recommend a non-contrast Chest CT at 6-12 months. If patient is high risk for malignancy, recommend an additional non-contrast Chest CT at 18-24 months; if patient is low risk for malignancy a non-contrast Chest CT at 18-24 months is optional. These guidelines do not apply to immunocompromised patients and patients with cancer. Follow up in patients with significant comorbidities as clinically warranted. For lung cancer screening, adhere to Lung-RADS guidelines. Reference: Radiology. 2017; 284(1):228-43. 3.8 cm infrarenal abdominal aortic aneurysm. Recommend follow-up every 2 years. Reference: J Am Nima Radiol 7372;30:395-443. 7 mm right solid pulmonary nodule. Recommend a non-contrast Chest CT at 6-12 months. If patient is high risk for malignancy, recommend an additional non-contrast Chest CT at 18-24 months; if patient is low risk for malignancy a non-contrast Chest CT at 18-24 months is optional. These guidelines do not apply to immunocompromised patients and patients with cancer. Follow up in patients with significant comorbidities as clinically warranted. For lung cancer screening, adhere to Lung-RADS guidelines. Reference: Radiology. 2017; 284(1):228-43. 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  - Transfusional support only. - Hgb 5.9 -> 8.2 with transfusion     2.) Neutropenic fever  - Tm 101.2 on admission. Afebrile since then. - SARS-CoV-2 NAAT indeterminate. Await confirmatory test.  - Blood cultures negative to date  - Cefepime started. 3.) Parox atrial fib  - Not on anticoagulation. 4. ) sCHF  - Echocardiogram, 6/28/2022, showed LVEF 45-50% w/ basal to mid inferior, basal inferoseptal hypokinesis.   - cardiology following     5.) Code status  - Changed to [facility] after discussion with Dr. Landy Walton on Friday, but changed his mind and now back to full code    Tosha Sidhu MD

## 2022-08-29 ENCOUNTER — TELEPHONE (OUTPATIENT)
Dept: INTERNAL MEDICINE CLINIC | Age: 82
End: 2022-08-29

## 2022-08-29 LAB
A/G RATIO: 0.9 (ref 1.1–2.2)
ALBUMIN SERPL-MCNC: 2.7 G/DL (ref 3.4–5)
ALP BLD-CCNC: 35 U/L (ref 40–129)
ALT SERPL-CCNC: 7 U/L (ref 10–40)
ANION GAP SERPL CALCULATED.3IONS-SCNC: 9 MMOL/L (ref 3–16)
AST SERPL-CCNC: 18 U/L (ref 15–37)
ATYPICAL LYMPHOCYTE RELATIVE PERCENT: 1 % (ref 0–6)
BASOPHILS ABSOLUTE: 0 K/UL (ref 0–0.2)
BASOPHILS RELATIVE PERCENT: 0 %
BILIRUB SERPL-MCNC: 1 MG/DL (ref 0–1)
BLOOD CULTURE, ROUTINE: NORMAL
BUN BLDV-MCNC: 42 MG/DL (ref 7–20)
C-REACTIVE PROTEIN: 109.1 MG/L (ref 0–5.1)
CALCIUM SERPL-MCNC: 9.5 MG/DL (ref 8.3–10.6)
CHLORIDE BLD-SCNC: 96 MMOL/L (ref 99–110)
CO2: 28 MMOL/L (ref 21–32)
CREAT SERPL-MCNC: 1.7 MG/DL (ref 0.8–1.3)
CULTURE, BLOOD 2: NORMAL
EOSINOPHILS ABSOLUTE: 0 K/UL (ref 0–0.6)
EOSINOPHILS RELATIVE PERCENT: 0 %
GFR AFRICAN AMERICAN: 47
GFR NON-AFRICAN AMERICAN: 39
GLUCOSE BLD-MCNC: 109 MG/DL (ref 70–99)
HCT VFR BLD CALC: 22.2 % (ref 40.5–52.5)
HEMATOLOGY PATH CONSULT: NORMAL
HEMATOLOGY PATH CONSULT: YES
HEMOGLOBIN: 7.6 G/DL (ref 13.5–17.5)
LACTIC ACID: 0.7 MMOL/L (ref 0.4–2)
LACTIC ACID: 0.8 MMOL/L (ref 0.4–2)
LACTIC ACID: 0.8 MMOL/L (ref 0.4–2)
LACTIC ACID: 2 MMOL/L (ref 0.4–2)
LYMPHOCYTES ABSOLUTE: 1.1 K/UL (ref 1–5.1)
LYMPHOCYTES RELATIVE PERCENT: 69 %
MAGNESIUM: 1.9 MG/DL (ref 1.8–2.4)
MCH RBC QN AUTO: 31.8 PG (ref 26–34)
MCHC RBC AUTO-ENTMCNC: 34.5 G/DL (ref 31–36)
MCV RBC AUTO: 92.2 FL (ref 80–100)
MONOCYTES ABSOLUTE: 0.4 K/UL (ref 0–1.3)
MONOCYTES RELATIVE PERCENT: 22 %
NEUTROPHILS ABSOLUTE: 0.1 K/UL (ref 1.7–7.7)
NEUTROPHILS RELATIVE PERCENT: 8 %
PDW BLD-RTO: 20.6 % (ref 12.4–15.4)
PLATELET # BLD: 37 K/UL (ref 135–450)
PLATELET SLIDE REVIEW: ABNORMAL
PMV BLD AUTO: 9.6 FL (ref 5–10.5)
POTASSIUM REFLEX MAGNESIUM: 3.4 MMOL/L (ref 3.5–5.1)
RBC # BLD: 2.4 M/UL (ref 4.2–5.9)
SLIDE REVIEW: ABNORMAL
SODIUM BLD-SCNC: 133 MMOL/L (ref 136–145)
TOTAL PROTEIN: 5.8 G/DL (ref 6.4–8.2)
VANCOMYCIN TROUGH: 18.9 UG/ML (ref 10–20)
WBC # BLD: 1.6 K/UL (ref 4–11)

## 2022-08-29 PROCEDURE — 83735 ASSAY OF MAGNESIUM: CPT

## 2022-08-29 PROCEDURE — 6370000000 HC RX 637 (ALT 250 FOR IP): Performed by: INTERNAL MEDICINE

## 2022-08-29 PROCEDURE — 6370000000 HC RX 637 (ALT 250 FOR IP): Performed by: NURSE PRACTITIONER

## 2022-08-29 PROCEDURE — 6360000002 HC RX W HCPCS

## 2022-08-29 PROCEDURE — 6370000000 HC RX 637 (ALT 250 FOR IP)

## 2022-08-29 PROCEDURE — 97116 GAIT TRAINING THERAPY: CPT

## 2022-08-29 PROCEDURE — 1200000000 HC SEMI PRIVATE

## 2022-08-29 PROCEDURE — 80053 COMPREHEN METABOLIC PANEL: CPT

## 2022-08-29 PROCEDURE — 86140 C-REACTIVE PROTEIN: CPT

## 2022-08-29 PROCEDURE — 85025 COMPLETE CBC W/AUTO DIFF WBC: CPT

## 2022-08-29 PROCEDURE — 97530 THERAPEUTIC ACTIVITIES: CPT

## 2022-08-29 PROCEDURE — 83605 ASSAY OF LACTIC ACID: CPT

## 2022-08-29 PROCEDURE — 80202 ASSAY OF VANCOMYCIN: CPT

## 2022-08-29 PROCEDURE — 36415 COLL VENOUS BLD VENIPUNCTURE: CPT

## 2022-08-29 PROCEDURE — 2580000003 HC RX 258: Performed by: INTERNAL MEDICINE

## 2022-08-29 PROCEDURE — 6360000002 HC RX W HCPCS: Performed by: INTERNAL MEDICINE

## 2022-08-29 PROCEDURE — 2580000003 HC RX 258

## 2022-08-29 PROCEDURE — 97166 OT EVAL MOD COMPLEX 45 MIN: CPT

## 2022-08-29 PROCEDURE — 6360000002 HC RX W HCPCS: Performed by: EMERGENCY MEDICINE

## 2022-08-29 PROCEDURE — 97535 SELF CARE MNGMENT TRAINING: CPT

## 2022-08-29 PROCEDURE — 2580000003 HC RX 258: Performed by: EMERGENCY MEDICINE

## 2022-08-29 RX ORDER — FUROSEMIDE 20 MG/1
20 TABLET ORAL DAILY
Status: DISCONTINUED | OUTPATIENT
Start: 2022-08-29 | End: 2022-08-30 | Stop reason: HOSPADM

## 2022-08-29 RX ADMIN — FUROSEMIDE 40 MG: 10 INJECTION, SOLUTION INTRAMUSCULAR; INTRAVENOUS at 08:41

## 2022-08-29 RX ADMIN — SODIUM CHLORIDE: 9 INJECTION, SOLUTION INTRAVENOUS at 10:25

## 2022-08-29 RX ADMIN — TRAZODONE HYDROCHLORIDE 50 MG: 50 TABLET ORAL at 20:24

## 2022-08-29 RX ADMIN — DOCUSATE SODIUM 100 MG: 100 CAPSULE, LIQUID FILLED ORAL at 08:42

## 2022-08-29 RX ADMIN — AZITHROMYCIN MONOHYDRATE 500 MG: 500 INJECTION, POWDER, LYOPHILIZED, FOR SOLUTION INTRAVENOUS at 16:00

## 2022-08-29 RX ADMIN — CEFEPIME 2000 MG: 2 INJECTION, POWDER, FOR SOLUTION INTRAVENOUS at 10:26

## 2022-08-29 RX ADMIN — ISOSORBIDE MONONITRATE 60 MG: 60 TABLET, EXTENDED RELEASE ORAL at 08:41

## 2022-08-29 RX ADMIN — TAMSULOSIN HYDROCHLORIDE 0.4 MG: 0.4 CAPSULE ORAL at 08:41

## 2022-08-29 RX ADMIN — SENNOSIDES 8.6 MG: 8.6 TABLET, COATED ORAL at 20:24

## 2022-08-29 RX ADMIN — Medication 10 ML: at 20:24

## 2022-08-29 RX ADMIN — CARVEDILOL 6.25 MG: 6.25 TABLET, FILM COATED ORAL at 08:42

## 2022-08-29 RX ADMIN — VANCOMYCIN HYDROCHLORIDE 750 MG: 750 INJECTION, POWDER, LYOPHILIZED, FOR SOLUTION INTRAVENOUS at 16:48

## 2022-08-29 RX ADMIN — CARVEDILOL 6.25 MG: 6.25 TABLET, FILM COATED ORAL at 20:24

## 2022-08-29 RX ADMIN — FLUOXETINE 10 MG: 10 CAPSULE ORAL at 08:41

## 2022-08-29 RX ADMIN — CEFEPIME 2000 MG: 2 INJECTION, POWDER, FOR SOLUTION INTRAVENOUS at 20:24

## 2022-08-29 RX ADMIN — DOCUSATE SODIUM 100 MG: 100 CAPSULE, LIQUID FILLED ORAL at 20:24

## 2022-08-29 RX ADMIN — FENOFIBRATE 54 MG: 54 TABLET ORAL at 08:41

## 2022-08-29 ASSESSMENT — PAIN SCALES - GENERAL
PAINLEVEL_OUTOF10: 0
PAINLEVEL_OUTOF10: 0

## 2022-08-29 NOTE — ACP (ADVANCE CARE PLANNING)
Advance Care Planning     Advance Care Planning Inpatient Note  Spiritual Care Department    Today's Date: 8/29/2022  Unit: [facility] C4 PCU    Received request from HealthCare Provider and family. Upon review of chart and communication with care team, patient's decision making abilities are not in question. . Patient, Child/Children, and Friends was/were present in the room during visit. Goals of ACP Conversation:  Discuss advance care planning documents  Facilitate a discussion related to patient's goals of care as they align with the patient's values and beliefs. Health Care Decision Makers:       Primary Decision Maker: Ji [employer] - [phone]    Secondary Decision Maker: Jagruti Barry Pfpwl - [phone]  Summary:  Completed New Documents  Updated Healthcare Decision [facility]    Advance Care Planning Documents (Patient Wishes):  Healthcare Power of /Advance Directive Appointment of Health Care Agent     Assessment:   assisted patient with completion of [address] document. Patient states that he is not interested in completing a Living Will at this time. When asked about the use of a ventilator or CPR, patient states that he wants both forms of life saving intervention for the foreseeable future. Interventions:  Provided education on documents for clarity and greater understanding  Assisted in the completion of documents according to patient's wishes at this time  Encouraged ongoing ACP conversation with future decision makers and loved ones    Care Preferences Communicated:   Ventilation:   If the patient, in their present state of health, suddenly became very ill and unable to breathe on their own,     the patient would desire the use of a ventilator (breathing machine).       Resuscitation:  In the event the patient's heart stopped as a result of an underlying serious health condition, the patient communicates a preference for      resuscitative attempts

## 2022-08-29 NOTE — TELEPHONE ENCOUNTER
Left detailed message for [facility] with patient's appointment time and date, per Marisol's request.

## 2022-08-29 NOTE — PROGRESS NOTES
08/29/2022 02:35 AM    AST 18 08/29/2022 02:35 AM    PROT 5.8 08/29/2022 02:35 AM    PROT 7.3 01/12/2012 02:47 PM    BILITOT 1.0 08/29/2022 02:35 AM    LABALBU 2.7 08/29/2022 02:35 AM      IMAGING:     Echo Limited    Result Date: 8/26/2022  Transthoracic Echocardiography Report (TTE)  Demographics   Patient Name       Jasiel Malone   Date of Study      08/26/2022        Gender              Male   Patient Number     [id number]        Date of Birth       1940   Visit Number       [id number]         Age                 79 year(s)   Accession Number   [id number]        Room Number         6587   Corporate ID       [id number]          Sonographer         Harry Bess   Ordering Physician Felix Rosales MD  Interpreting        [address] [place] [address].                                       Physician           Christianne Mckenzie MD  Procedure Type of Study   TTE procedure:ECHOCARDIOGRAM LIMITED. Procedure Date Date: 08/26/2022 Start: 11:26 AM Study Location: [address] [place] [address] - Echo Lab Technical Quality: Limited visualization due to poor acoustical window. Additional Indications:LV Fx. Patient Status: Routine Contrast Medium: Definity. Amount - 2 ml Height: 73 inches Weight: 165 pounds BSA: 1.98 m2 BMI: 21.77 kg/m2 BP: 148/70 mmHg  Conclusions   Summary  Limited echo for LV function with limited doppler/no color. -- The left ventricular systolic function is mildly reduced with an ejection  fraction of 45 - 50 %. EF by Ulloa's method estimated at 48%. Normal left  ventricular size with mild concentric left ventricular hypertrophy. Hypokinesis of the basal to mid inferoseptal/anteroseptal/inferior wall  segments. Grade I diastolic dysfunction with normal filling pressure. Compared to last echo on 6/29/2022, no change in EF.    Signature   ------------------------------------------------------------------  Electronically signed by Christianne Mckenzie MD (Interpreting  physician) on 08/26/2022 at 12:54 PM ------------------------------------------------------------------   Findings   Left Ventricle  The left ventricular systolic function is mildly reduced with an ejection  fraction of 45 - 50 %. EF by Ulloa's method estimated at 48%. Septal bounce present. Normal left ventricular size with mild concentric left ventricular  hypertrophy. Hypokinesis of the basal to mid inferoseptal/anteroseptal/inferior wall  segments. Grade I diastolic dysfunction with normal filling pressure. Definity® used for myocardial border enhancement. Mitral Valve  The mitral valve is normal in structure. Left Atrium  The left atrium is normal in size. Aorta  The aortic root is normal in size. Right Ventricle  The right ventricle is normal in size   Tricuspid Valve  The tricuspid valve is normal in structure   Right Atrium  The right atrial size is normal.  Right atrial area is cm2. Pulmonic Valve  The pulmonic valve is normal in structure   Pericardial Effusion  No pericardial effusion noted. Pleural Effusion  No pleural effusion. Miscellaneous  No obvious masses, thrombi or vegetations are noted. IVC is normal in size (< 2.1 cm) and collapses > 50% with respiration  consistent with normal right atrial pressure (3 mmHg).   M-Mode/2D Measurements (cm)   LV Diastolic Dimension: 0.56 cm LV Systolic Dimension: 2.42 cm  LV Septum Diastolic: 7.54 cm  LV PW Diastolic: 2.36 cm                                  LA Dimension: 4.1 cm  Doppler Measurements                                  MV Peak E-Wave: 99.2 cm/s                                 MV Peak A-Wave: 119 cm/s                                 MV E/A Ratio: 0.83   E' Septal Velocity: 5.75 cm/s  E' Lateral Velocity: 12.2 cm/s  E/E' ratio: 12.7      XR CHEST PORTABLE    Result Date: 8/25/2022  EXAMINATION: ONE XRAY VIEW OF THE CHEST 8/25/2022 8:40 am COMPARISON: July 2, 2022 HISTORY: ORDERING SYSTEM PROVIDED HISTORY: SOB TECHNOLOGIST PROVIDED HISTORY: Reason for exam:->SOB Reason for Exam: sob FINDINGS: Moderate infiltrates and/or congestion identified in the lungs. Trace right pleural effusion. No pneumothorax. Top-normal cardiac size. Significant osteopenic changes and degenerative changes noted in the bony structures. Moderate infiltrates and/or congestion identified in the lungs. CT CHEST ABDOMEN PELVIS WO CONTRAST    Result Date: 8/26/2022  EXAMINATION: CT OF THE CHEST, ABDOMEN, AND PELVIS WITHOUT CONTRAST 8/26/2022 11:22 am TECHNIQUE: CT of the chest, abdomen and pelvis was performed without the administration of intravenous contrast. Multiplanar reformatted images are provided for review. Automated exposure control, iterative reconstruction, and/or weight based adjustment of the mA/kV was utilized to reduce the radiation dose to as low as reasonably achievable. COMPARISON: No recent available. HISTORY: ORDERING SYSTEM PROVIDED HISTORY: patient came to ER yesterday with vomiting. Says he has been eating every day, but no bowel movements in a week. TECHNOLOGIST PROVIDED HISTORY: Reason for exam:->patient came to ER yesterday with vomiting. Says he has been eating every day, but no bowel movements in a week. Additional Contrast?->None Reason for Exam: no bm several days, abd pain FINDINGS: Chest: Mediastinum: Visualized thyroid unremarkable. No pathologically enlarged mediastinal lymph nodes are seen. Esophagus is unremarkable. Cardiac chambers are enlarged. No pericardial effusion. Limited noncontrast imaging of the thoracic aorta and pulmonary arteries unremarkable. Lungs/pleura: Small bilateral pleural effusions are seen. Adjacent atelectasis is noted. There is ground-glass opacity noted seen throughout both lungs, greatest centrally. Smooth interlobular septal thickening is noted bilaterally. 7 mm nodule within the periphery of the right middle lobe noted (2, 84). Diffuse bronchial wall thickening is found. No filling defects are seen within the airways. Soft Tissues/Bones: No acute or suspicious bony abnormalities are identified. Visualized extra thoracic soft tissues are unremarkable. Abdomen/Pelvis: Lack of intravenous contrast limits evaluation of the solid abdominal viscera, the hollow abdominal viscera, and the vascular structures. Organs: With that said, no acute or suspicious hepatic abnormality identified. Gallstone is noted. Noncontrast imaging of the spleen unremarkable. Adrenals unremarkable. Noncontrast imaging the pancreas unremarkable. Bilateral perinephric fat stranding is seen. No acute or suspicious renal abnormality detected otherwise. GI/Bowel: Moderate to large amount of stool is seen within the rectum. Moderate amount of stool seen within the sigmoid colon. More proximally, the large bowel is unremarkable appearance. There is mild diverticulosis without CT evidence of diverticulitis. The appendix is normal. Stomach unremarkable. Duodenal diverticula are noted without CT evidence of diverticulitis. Small bowel otherwise unremarkable. Pelvis: Urinary bladder and prostate unremarkable. No free pelvic fluid. Evidence of previous right inguinal hernia repair. Peritoneum/Retroperitoneum: Infrarenal fusiform aortic aneurysm is found measuring maximally 3.8 cm. No CT evidence of rupture is identified. No lymphadenopathy. 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 No acute bony abnormalities are identified. The extra-abdominal and extra pelvic soft tissues are unremarkable. Chest CT: Ground-glass opacity within both lungs, with smooth interlobular septal thickening, and small bilateral pleural effusions, the constellation of which is most compatible with pulmonary edema. 7 mm nodule in the periphery of the right middle lobe. Please see recommendations below. Bronchial wall thickening, which may be seen in inflammatory conditions such as bronchitis, reactive airways disease, and smoking.  Abdomen and pelvis CT: Moderate to large amount of stool within the rectum and a moderate amount of stool within the sigmoid colon, findings which may suggest constipation/impaction. Cholelithiasis. Bilateral perinephric fat stranding, which is likely age related, but please correlate with any clinical evidence of urinary tract infection. Infrarenal abdominal aortic aneurysm measuring 3.8 cm maximally. See recommendations below. RECOMMENDATIONS: 7 mm right solid pulmonary nodule. Recommend a non-contrast Chest CT at 6-12 months. If patient is high risk for malignancy, recommend an additional non-contrast Chest CT at 18-24 months; if patient is low risk for malignancy a non-contrast Chest CT at 18-24 months is optional. These guidelines do not apply to immunocompromised patients and patients with cancer. Follow up in patients with significant comorbidities as clinically warranted. For lung cancer screening, adhere to Lung-RADS guidelines. Reference: Radiology. 2017; 284(1):228-43. 3.8 cm infrarenal abdominal aortic aneurysm. Recommend follow-up every 2 years. Reference: J Am Nima Radiol 6341;28:124-736. 7 mm right solid pulmonary nodule. Recommend a non-contrast Chest CT at 6-12 months. If patient is high risk for malignancy, recommend an additional non-contrast Chest CT at 18-24 months; if patient is low risk for malignancy a non-contrast Chest CT at 18-24 months is optional. These guidelines do not apply to immunocompromised patients and patients with cancer. Follow up in patients with significant comorbidities as clinically warranted. For lung cancer screening, adhere to Lung-RADS guidelines. Reference: Radiology. 2017; 284(1):228-43. 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  - Transfusional support only. - Hgb 5.9 -> 8.2 with transfusion     2.) Neutropenic fever  - Tm 101.2 on admission. Afebrile since then. - SARS-CoV-2 NAAT indeterminate. Confirmatory test negative.   - Blood cultures negative to date  - Cefepime/Vanco started. 3.) Parox atrial fib  - Not on anticoagulation. 4.) sCHF  - Echocardiogram, 6/28/2022, showed LVEF 45-50% w/ basal to mid inferior, basal inferoseptal hypokinesis. - cardiology following     5.) Code status  - Changed to [facility] after discussion with myself on Friday, but changed his mind and now back to full code    Dispo:  He is afebrile at this point. His counts are not expected to improve. Can discharge and we can transfuse as an outpatient as needed.     Gretchen Simpson MD

## 2022-08-29 NOTE — PLAN OF CARE
OT evaluation complete. Plan to increase patient's ADLs/functional status and maximize occupational engagement.

## 2022-08-29 NOTE — PROGRESS NOTES
Kamaljit Norris -   8/27/22 5:50 PM   [address] or Facility: [facility] From: Danis Kim RE: Nafisa Morse 1940 RM: [id number] Patient has extremely thick, overgrown toenails and is c/o 9/10 pain in the second on the left foot after \"bumping it\" getting in/out of a wheelchair today. No visble trauma noticable upon assessment. Would a Podiatry consult be appropriate so that his toenails can be addressed? Need Callback: NO CALLBACK REQ C4 PROGRESSIVE CARE  Read 5:59 PM        Jose Wang MD -   8/27/22 6:00 PM   Yes absolutely.  Please place the consult   Read 8/28/22 8:10 PM

## 2022-08-29 NOTE — TELEPHONE ENCOUNTER
----- Message from Patricia Jones MD sent at 8/29/2022 10:14 AM EDT -----  Contact: Ene/ heart failure nurse [phone]    ----- Message -----  From: Yolette Joce  Sent: 8/29/2022   9:05 AM EDT  To: Patricia Jones MD    Per you, Nurse informed that we call back this week.   ----- Message -----  From: Mariam Lozoya  Sent: 8/26/2022   9:44 AM EDT  To: Patricia Jones MD    Patient tested positive for covid yesterday, 8/25/22. Patient needs a hospital follow up appointment to be discharged within about 7 days. Caller states she is only working until 1 today.

## 2022-08-29 NOTE — CONSULTS
Podiatry Consult Note    History of Present Illness: The patient is a pleasant 79year old gentleman we were asked to see for thick, long and painful mycotic toe nails. The patient has not been able trim them for months. He has no other pedal complaints.       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    Backpain     Bilateral carotid bruits 9/6/2016    Chronic kidney disease 9/6/2016    Current moderate episode of major depressive disorder without prior episode ([facility] [address] 1/23/2019    Hyperlipidemia     Hypertension     Hypertrophy of prostate without urinary obstruction and other lower urinary tract symptoms (LUTS)     Nonrheumatic aortic valve insufficiency 9/6/2016    Rosacea     Taste perversion 8/19/2011       Past Surgical History:        Procedure Laterality Date    COLONOSCOPY  2/11/2008    COLONOSCOPY  11/14/2014    CT BONE MARROW BIOPSY  7/5/2022    CT BONE MARROW BIOPSY 7/5/2022 Nicole Valdivia MD [facility] CT SCAN    HERNIA REPAIR         Current Medications:    Current Facility-Administered Medications: vancomycin (VANCOCIN) 750 mg in dextrose 5 % 250 mL IVPB, 750 mg, IntraVENous, Q24H  furosemide (LASIX) tablet 20 mg, 20 mg, Oral, Daily  traZODone (DESYREL) tablet 50 mg, 50 mg, Oral, Nightly  FLUoxetine (PROZAC) capsule 10 mg, 10 mg, Oral, Daily  azithromycin (ZITHROMAX) 500 mg in D5W 250ml addavial, 500 mg, IntraVENous, Q24H  0.9 % sodium chloride infusion, , IntraVENous, PRN  docusate sodium (COLACE) capsule 100 mg, 100 mg, Oral, BID  senna (SENOKOT) tablet 8.6 mg, 1 tablet, Oral, Nightly  HYDROcodone-acetaminophen (NORCO) 5-325 MG per tablet 1 tablet, 1 tablet, Oral, Q8H PRN  cefepime (MAXIPIME) 2000 mg IVPB minibag, 2,000 mg, IntraVENous, Q12H  sodium chloride flush 0.9 % injection 5-40 mL, 5-40 mL, IntraVENous, 2 times per day  sodium chloride flush 0.9 % injection 5-40 mL, 5-40 mL, IntraVENous, PRN  0.9 % sodium chloride infusion, , IntraVENous, PRN  polyethylene the skin. No erythema. No acute signs of infection. Nails: Long, thick, yellow, brittle and with subungual debris x10. Neurologic:  Protective sensation is grossly intact to light touch at the level of the toes, bilateral.  Vascular:  DP and PT pulses are palpable, bilateral.  CFT is brisk to all toes. Moderate edema at the right and left lower extremity. Musculoskeletal:  Patient has 5/5 strength on inversion/everison/dorsiflexion/plantarflexion, bilateral.  No gross musculoskeletal deformities noted.     Labs:  CBC with Differential:    Lab Results   Component Value Date/Time    WBC 1.6 08/29/2022 02:35 AM    RBC 2.40 08/29/2022 02:35 AM    HGB 7.6 08/29/2022 02:35 AM    HCT 22.2 08/29/2022 02:35 AM    PLT 37 08/29/2022 02:35 AM    MCV 92.2 08/29/2022 02:35 AM    MCH 31.8 08/29/2022 02:35 AM    MCHC 34.5 08/29/2022 02:35 AM    RDW 20.6 08/29/2022 02:35 AM    NRBC 1 08/28/2022 04:46 AM    SEGSPCT 73.2 05/03/2016 02:55 PM    BANDSPCT 1 08/25/2022 08:28 AM    BLASTSPCT 5 08/26/2022 05:43 AM    LYMPHOPCT 69.0 08/29/2022 02:35 AM    MONOPCT 22.0 08/29/2022 02:35 AM    MYELOPCT 1 08/25/2022 08:28 AM    EOSPCT 0.5 08/19/2011 04:01 PM    BASOPCT 0.0 08/29/2022 02:35 AM    MONOSABS 0.4 08/29/2022 02:35 AM    LYMPHSABS 1.1 08/29/2022 02:35 AM    EOSABS 0.0 08/29/2022 02:35 AM    BASOSABS 0.0 08/29/2022 02:35 AM     CMP:    Lab Results   Component Value Date/Time     08/29/2022 02:35 AM    K 3.4 08/29/2022 02:35 AM    CL 96 08/29/2022 02:35 AM    CO2 28 08/29/2022 02:35 AM    BUN 42 08/29/2022 02:35 AM    CREATININE 1.7 08/29/2022 02:35 AM    GFRAA 47 08/29/2022 02:35 AM    AGRATIO 0.9 08/29/2022 02:35 AM    LABGLOM 39 08/29/2022 02:35 AM    LABGLOM 53 05/03/2016 02:55 PM    GLUCOSE 109 08/29/2022 02:35 AM    GLUCOSE 78 01/12/2012 02:47 PM    PROT 5.8 08/29/2022 02:35 AM    PROT 7.3 01/12/2012 02:47 PM    LABALBU 2.7 08/29/2022 02:35 AM    CALCIUM 9.5 08/29/2022 02:35 AM    BILITOT 1.0 08/29/2022 02:35 AM ALKPHOS 35 08/29/2022 02:35 AM    AST 18 08/29/2022 02:35 AM    ALT 7 08/29/2022 02:35 AM     PT/INR:    Lab Results   Component Value Date/Time    PROTIME 16.8 08/25/2022 08:28 AM    INR 1.38 08/25/2022 08:28 AM     Last 3 Troponin:    Lab Results   Component Value Date/Time    TROPONINI 0.27 08/25/2022 09:42 PM    TROPONINI 0.20 08/25/2022 03:37 PM    TROPONINI 0.08 08/25/2022 08:28 AM     HgBA1c:    Lab Results   Component Value Date/Time    LABA1C 5.8 06/30/2022 05:32 AM     Impression/Recommendations: The patient is a pleasant 79year old gentleman with:  1) Onychomycosis  - Debrided toe nails in height and length x10 without incident  - Will sign off for now. Please feel free to contact me with any questions  2) Pain in toes, right  3) Pain in toes, left    Thank you for the opportunity to take part in this patient's care. Please feel free to contact me with any questions.     Huber Guzmán [facility], [address], [address]  Office: [phone]  Cell: [phone]

## 2022-08-29 NOTE — PROGRESS NOTES
Occupational Therapy  Facility/Department: [facility] C4 PCU  Occupational Therapy Initial Assessment & Treatment    Name: Blanca Naranjo  : 1940  MRN: [id number]  Date of Service: 2022    Discharge Recommendations:  24 hour supervision or assist, Home with Home health OT     Patient Diagnosis(es): The primary encounter diagnosis was Neutropenic fever ([facility] [address] Diagnoses of Acute respiratory failure with hypoxia (HCC) and Severe sepsis ([facility] [address] were also pertinent to this visit. Past Medical History:  has a past medical history of Anosmia, Backpain, Bilateral carotid bruits, Chronic kidney disease, Current moderate episode of major depressive disorder without prior episode ([facility] [address] Hyperlipidemia, Hypertension, Hypertrophy of prostate without urinary obstruction and other lower urinary tract symptoms (LUTS), Nonrheumatic aortic valve insufficiency, Rosacea, and Taste perversion. Past Surgical History:  has a past surgical history that includes hernia repair; Colonoscopy (2008); Colonoscopy (2014); and CT BIOPSY BONE MARROW (2022). Assessment   Performance deficits / Impairments: Decreased functional mobility ; Decreased ADL status; Decreased strength;Decreased cognition;Decreased safe awareness;Decreased endurance;Decreased balance  Assessment: Pt is an 80 yo M who presented to [facility] w/ fever and vomting. PTA, pt was IND w/ ADLs and required assistance w/ IADLs, living w/ family. Upon eval, pt was SBA w/ SW for all fun mob and SBA/SPV for ADLs, requiring verbal cues for initiation and safety. Pt will continue to benefit from inpatient services, as well as HHOT upon d/c w/ 24hr assist to maximize safety and function in the home environment.   Prognosis: Good  Decision Making: Medium Complexity  REQUIRES OT FOLLOW-UP: Yes  Activity Tolerance  Activity Tolerance: Patient Tolerated treatment well        Plan   Plan  Times per Week: 3-5x/wk  Current Treatment Recommendations: Strengthening, Balance training, Functional mobility training, Endurance training, Cognitive reorientation, Safety education & training, Patient/Caregiver education & training, Self-Care / ADL     Restrictions  Restrictions/Precautions  Restrictions/Precautions: Up as Tolerated, Fall Risk, General Precautions  Position Activity Restriction  Other position/activity restrictions: 4L O2, lo    Subjective   General  Chart Reviewed: Yes, Progress Notes  Patient assessed for rehabilitation services?: Yes  Subjective  Subjective: pt seated up in chair w/ family present, pleasant and agreeable to OT eval  Pt denies pain    Social/Functional History  Social/Functional History  Lives With: Family  Type of Home: House  Home Layout: Able to Live on Main level with bedroom/bathroom  Home Access: Stairs to enter with rails  Entrance Stairs - Number of Steps: 6  Entrance Stairs - Rails: Both  Bathroom Shower/Tub: Tub/Shower unit  Bathroom Toilet: Standard  Bathroom Equipment: Grab bars in shower, Shower chair  Home Equipment: Bibi Richard, 4 wheeled (transport chair)  Has the patient had two or more falls in the past year or any fall with injury in the past year?: No  Receives Help From: Family  ADL Assistance: [address] [place] [address] Avenue: Needs assistance  Homemaking Responsibilities: No  Ambulation Assistance: Independent (w/ rollator; transport chair in community)  Transfer Assistance: Independent  Active : No  Leisure & Hobbies: Watching TV     Objective   Heart Rate: 73  Heart Rate Source: Monitor  BP: (!) 150/68  BP Location: Left upper arm  BP Method: Automatic  Patient Position: Up in chair  MAP (Calculated): 95.33  Resp: 16  SpO2: 96 %  O2 Device: None (Room air)  Pain: pt denies any pain       Safety Devices  Type of Devices: All fall risk precautions in place;Gait belt;Left in chair;Patient at risk for falls;Call light within reach; Chair alarm in place;Nurse notified    Bed Mobility Training  Bed Mobility Training: No (pt seated in chair upon deficits  Initiation: Requires cues for some  Sequencing: Requires cues for some  Orientation  Overall Orientation Status: Impaired  Orientation Level: Oriented to person;Disoriented to place; Disoriented to time;Oriented to situation     Education Given To: Patient; Family  Education Provided: Role of Therapy;Plan of Care;Home Exercise Program;Precautions; ADL Adaptive Strategies;Transfer Training;Energy Conservation  Education Method: Demonstration;Verbal  Barriers to Learning: None  Education Outcome: Verbalized understanding;Continued education needed    Disease Specific Education: Pt educated on importance of OOB mobility, prevention of complications of bedrest, and general safety during hospitalization. Pt verbalized understanding      AM-PAC Score  AM-PAC Inpatient Daily Activity Raw Score: 20 (08/29/22 1444)  AM-PAC Inpatient ADL T-Scale Score : 42.03 (08/29/22 1444)  ADL Inpatient CMS 0-100% Score: 38.32 (08/29/22 1444)  ADL Inpatient CMS G-Code Modifier : CJ (08/29/22 1444)    Goals  Short Term Goals  Time Frame for Short term goals: 1 week (9/5) unless otherwise stated  Short Term Goal 1: Pt will complete total body dressing w/ Mimi an LRAD  Short Term Goal 2: Pt will complete toileting w/ SBA and LRAD  Short Term Goal 3: Pt will complete 2-3 grooming tasks in stance at sink w/ SPV and min VCs (8/31)  Short Term Goal 4: Pt will participate in x10 BUE therex to increase UE strength and endurance  Patient Goals   Patient goals : to go home       Therapy Time   Individual Concurrent Group Co-treatment   Time In 1000         Time Out 1034         Minutes 34         Timed Code Treatment Minutes: 24 Minutes (10 min eval)     If pt is unable to be seen after this session, please let this note serve as discharge summary. Please see case management note for discharge disposition. Thank you.      Brian Galloway OTR/L

## 2022-08-29 NOTE — PROGRESS NOTES
Progress Note      PCP: Damon Hogan MD    Date of Admission: 8/25/2022    Chief Complaint: Shortness of breath, vomiting, fever    Hospital Course: 79 y.o. male with PMHx of MDS, MI, ICM, Afib, HTN, CKD, Depression, Hyperlipidemia, BPH who presented to [facility] with shortness of breath, vomiting, fever. Said he woke up 8/25/2022 morning and began vomiting. Also was short of breath and dyspneic at rest but even worse with exertion. He felt fine when he went to bed last night. He felt that he was getting sicker and sicker as the day went on, and so his roommate Les Garcia brought him to ER. Patient was worked up for possible pneumonia due to fever and shortness of breath, chest x-ray showed bilateral pulmonary infiltrates, CBC showed pancytopenia, troponin and procalcitonin were elevated. 8/26/2022 patient was  8/27/2022 patient was able to walk to chair with PT help and slowly weaned down on O2. CXR on this day indicated stable pattern of ground-glass and interstitial opacities consistent with pulmonary edema   8/28/2022 patient was further weaned off of O2 and was able to breathe on room air    Subjective: 8/29/2022  Patient was seen in the AM.  He is afebrile acute respiratory distress, he is responsive oriented x3, patient is hard of hearing requiring elevated voice when speaking to him. Vital signs are stable aside from an elevated blood pressure of 150/68 taken at 10:15 AM.  Patient denies nausea or vomiting chest pain, headache. Nurse states that patient was confused this a.m., but he is slowly progressing to baseline. He is currently on room air and doing well. Patient was able to eat well without any concerns, patient is hoping for discharge today.     Medications:  Reviewed    Infusion Medications    sodium chloride      sodium chloride 10 mL/hr at 08/29/22 1025     Scheduled Medications    vancomycin  750 mg IntraVENous Q24H    traZODone  50 mg Oral Nightly    FLUoxetine  10 mg Oral Daily    azithromycin  500 mg IntraVENous Q24H    docusate sodium  100 mg Oral BID    senna  1 tablet Oral Nightly    cefepime  2,000 mg IntraVENous Q12H    sodium chloride flush  5-40 mL IntraVENous 2 times per day    carvedilol  6.25 mg Oral BID    fenofibrate  54 mg Oral Daily    isosorbide mononitrate  60 mg Oral Daily    tamsulosin  0.4 mg Oral Daily    furosemide  40 mg IntraVENous BID     PRN Meds: sodium chloride, HYDROcodone 5 mg - acetaminophen, sodium chloride flush, sodium chloride, polyethylene glycol, acetaminophen **OR** acetaminophen      Intake/Output Summary (Last 24 hours) at 8/29/2022 1219  Last data filed at 8/28/2022 2049  Gross per 24 hour   Intake 1042.44 ml   Output 525 ml   Net 517.44 ml       Physical Exam Performed:    BP (!) 150/68   Pulse 74   Temp 98.1 °F (36.7 °C) (Oral)   Resp 16   Ht 6' 2\" (1.88 m)   Wt 163 lb 1.6 oz (74 kg)   SpO2 95%   BMI 20.94 kg/m²     General appearance: No apparent distress, appears stated age and cooperative. Neck: Supple, with full range of motion. No jugular venous distention. Trachea midline. Respiratory:  Normal respiratory effort. Clear to auscultation, bilaterally without Rales/Wheezes/Rhonchi. Cardiovascular: Regular rate and rhythm with normal S1/S2 without murmurs, rubs or gallops. Abdomen: Soft, non-tender, non-distended with normal bowel sounds. Musculoskeletal: No clubbing, cyanosis or edema bilaterally. Full range of motion without deformity. Skin: Skin color, texture, turgor normal.  No rashes or lesions. Neurologic:  Neurovascularly intact without any focal sensory/motor deficits.  Cranial nerves: II-XII intact, grossly non-focal.  Psychiatric: Alert and oriented, thought content appropriate, normal insight  Capillary Refill: Brisk,3 seconds, normal   Peripheral Pulses: +2 palpable, equal bilaterally       Labs:   Recent Labs     08/27/22  0421 08/28/22  0446 08/29/22  0235   WBC 1.8* 1.5* 1.6*   HGB 7.9* 8.2* 7.6*   HCT 23.3* 23.8* 22.2*   PLT 43* 43* 37*     Recent Labs     08/27/22  0421 08/28/22  0446 08/29/22  0235    133* 133*   K 3.6 3.3* 3.4*    98* 96*   CO2 25 26 28   BUN 35* 43* 42*   CREATININE 1.6* 1.6* 1.7*   CALCIUM 9.1 9.5 9.5     Recent Labs     08/27/22  0421 08/28/22  0446 08/29/22  0235   AST 22 18 18   ALT 7* 7* 7*   BILITOT 1.7* 1.2* 1.0   ALKPHOS 40 38* 35*     No results for input(s): INR in the last 72 hours. No results for input(s): Ardelle Chalk in the last 72 hours. Urinalysis:      Lab Results   Component Value Date/Time    NITRU Negative 08/25/2022 05:57 PM    WBCUA 3-5 08/25/2022 05:57 PM    BACTERIA 2+ 08/25/2022 05:57 PM    RBCUA 5-10 08/25/2022 05:57 PM    BLOODU Negative 08/25/2022 05:57 PM    SPECGRAV 1.020 08/25/2022 05:57 PM    GLUCOSEU Negative 08/25/2022 05:57 PM       Radiology:  XR CHEST (2 VW)   Final Result   Stable pattern of ground-glass and interstitial opacities consistent with   pulmonary edema. Small bilateral pleural effusions. CT CHEST ABDOMEN PELVIS WO CONTRAST   Final Result   Chest CT:      Ground-glass opacity within both lungs, with smooth interlobular septal   thickening, and small bilateral pleural effusions, the constellation of which   is most compatible with pulmonary edema. 7 mm nodule in the periphery of the right middle lobe. Please see   recommendations below. Bronchial wall thickening, which may be seen in inflammatory conditions such   as bronchitis, reactive airways disease, and smoking. Abdomen and pelvis CT:      Moderate to large amount of stool within the rectum and a moderate amount of   stool within the sigmoid colon, findings which may suggest   constipation/impaction. Cholelithiasis. Bilateral perinephric fat stranding, which is likely age related, but please   correlate with any clinical evidence of urinary tract infection. Infrarenal abdominal aortic aneurysm measuring 3.8 cm maximally.   See recommendations below. RECOMMENDATIONS:   7 mm right solid pulmonary nodule. Recommend a non-contrast Chest CT at 6-12   months. If patient is high risk for malignancy, recommend an additional   non-contrast Chest CT at 18-24 months; if patient is low risk for malignancy   a non-contrast Chest CT at 18-24 months is optional.      These guidelines do not apply to immunocompromised patients and patients with   cancer. Follow up in patients with significant comorbidities as clinically   warranted. For lung cancer screening, adhere to Lung-RADS guidelines. Reference: Radiology. 2017; 284(1):228-43. 3.8 cm infrarenal abdominal aortic aneurysm. Recommend follow-up every 2   years. Reference: J Am Nima Radiol 4049;67:587-638.      7 mm right solid pulmonary nodule. Recommend a non-contrast Chest CT at 6-12   months. If patient is high risk for malignancy, recommend an additional   non-contrast Chest CT at 18-24 months; if patient is low risk for malignancy   a non-contrast Chest CT at 18-24 months is optional.      These guidelines do not apply to immunocompromised patients and patients with   cancer. Follow up in patients with significant comorbidities as clinically   warranted. For lung cancer screening, adhere to Lung-RADS guidelines. Reference: Radiology. 2017; 284(1):228-43. XR CHEST PORTABLE   Final Result   Moderate infiltrates and/or congestion identified in the lungs.                  Assessment/Plan:    Active Hospital Problems    Diagnosis     Constipation [K59.00]      Priority: Medium    Acute hypoxemic respiratory failure (HCC) [J96.01]      Priority: Medium    Pancytopenia ([address] [D61.818]      Priority: Medium    Pneumonia due to infectious organism [J18.9]      Priority: Medium    Acute on chronic combined systolic and diastolic CHF (congestive heart failure) ([address] [I50.43]      Priority: Medium    Pulmonary congestion [R09.89]      Priority: Medium    Cor pulmonale ([address] [I27.81]      Priority: Medium    Elevated troponin [R77.8]      Priority: Medium       Pancytopenia /MDS, Neutropenic Fever with  pneumonia POA   -Patient is able to tolerate room air  -Incentive spirometry  -Daily CBC w/ diff  -Daily CMP  -Trend procalcitonin, lactic acid, CRP  -Continue IV antibiotics: Cefepime 2000 mg every 12 hours, azithromycin 500 mg every 24 hours, vancomycin 1000 mg every 24 hours  -Discussed with oncologist and agreed that upon discharge patient can be switched to oral antibiotic Levaquin    Acute on chronic combined systolic and diastolic CHF, pulmonary congestion, cor pulmonale  -Continue home meds: carvedilol 6.25mg po BID, isosorvide mononitrate 60mg po daily  -Reviewed last echo dated 8/26/2022 showed LVEF 45 to 33%; grade 1 diastolic dysfunction. Hypokinesis of the basal to mid inferoseptal/anteroseptal/inferior wall segments.  - Received  IV Lasix 40 mg twice daily - Transition to PO starting tomorrow   -Monitor I/O    Constipation  - Docusate 100mg BID  -PEG once daily  -Senna 8.6mg tablet po nightly    HTN  -Elevated today, continue to monitor  -Hold home med hydralazine 100mg po daily    CKD stage III  -daily CMP  -IV lasix 40mg BID    Major depressive disorder  -Continue patient home meds: Fluoxetine 10mg po daily, trazodone 50mg po nightly    HLD  -continue home med: fenofibrate 160mg po daily    DVT Prophylaxis: Pneumatic compression device  Diet: ADULT DIET; Regular; Low Fat/Low Chol/High Fiber/NIKKI; Low Sodium (2 gm)  Code Status: Full Code    PT/OT Eval Status: Consult ordered    Dispo -1-2 more days. Awaiting patient    Cindi Sue DO    Addendum to Resident  Progress note:  Pt seen,examined and evaluated with resident and discussed regarding POC .  I have reviewed the current history, physical findings, labs and assessment and plan , edited the note where appropriate and agree with note as documented by resident DO ( Dr. Moose Esquivel)    Patient seen and examined with resident team.  Daughter and significant other/friend at bedside. Discussed results, care plan with patient and family. Patient lacks insight into his medical problems. Explained to the best of my ability. Neutropenia continues to be an issue with his  this morning. Continue empiric broad-spectrum antibiotics. Blood cultures thus far negative. W/d/w oncology regarding transitioning to p.o. Levaquin at discharge. DC IV Lasix BID given elevated BUN/creatinine and transition to p.o. Lasix 20 mg daily starting tomorrow.     Maurice Walsh MD  Hospitalist Physician

## 2022-08-30 ENCOUNTER — APPOINTMENT (OUTPATIENT)
Dept: CT IMAGING | Age: 82
DRG: 808 | End: 2022-08-30
Payer: COMMERCIAL

## 2022-08-30 VITALS
HEIGHT: 74 IN | DIASTOLIC BLOOD PRESSURE: 54 MMHG | WEIGHT: 161.6 LBS | RESPIRATION RATE: 20 BRPM | SYSTOLIC BLOOD PRESSURE: 129 MMHG | TEMPERATURE: 98.2 F | HEART RATE: 75 BPM | BODY MASS INDEX: 20.74 KG/M2 | OXYGEN SATURATION: 94 %

## 2022-08-30 PROBLEM — R41.0 ACUTE DELIRIUM: Status: ACTIVE | Noted: 2022-08-30

## 2022-08-30 LAB
A/G RATIO: 0.8 (ref 1.1–2.2)
ALBUMIN SERPL-MCNC: 2.8 G/DL (ref 3.4–5)
ALP BLD-CCNC: 39 U/L (ref 40–129)
ALT SERPL-CCNC: 8 U/L (ref 10–40)
AMMONIA: 18 UMOL/L (ref 16–60)
ANION GAP SERPL CALCULATED.3IONS-SCNC: 8 MMOL/L (ref 3–16)
AST SERPL-CCNC: 19 U/L (ref 15–37)
BASOPHILS ABSOLUTE: 0 K/UL (ref 0–0.2)
BASOPHILS RELATIVE PERCENT: 0.7 %
BILIRUB SERPL-MCNC: 0.9 MG/DL (ref 0–1)
BUN BLDV-MCNC: 36 MG/DL (ref 7–20)
C-REACTIVE PROTEIN: 65.4 MG/L (ref 0–5.1)
CALCIUM SERPL-MCNC: 9.6 MG/DL (ref 8.3–10.6)
CHLORIDE BLD-SCNC: 98 MMOL/L (ref 99–110)
CO2: 28 MMOL/L (ref 21–32)
CREAT SERPL-MCNC: 1.3 MG/DL (ref 0.8–1.3)
EOSINOPHILS ABSOLUTE: 0 K/UL (ref 0–0.6)
EOSINOPHILS RELATIVE PERCENT: 0.7 %
GFR AFRICAN AMERICAN: >60
GFR NON-AFRICAN AMERICAN: 53
GLUCOSE BLD-MCNC: 111 MG/DL (ref 70–99)
HCT VFR BLD CALC: 23.7 % (ref 40.5–52.5)
HEMATOLOGY PATH CONSULT: NO
HEMOGLOBIN: 8.2 G/DL (ref 13.5–17.5)
LACTIC ACID: 0.9 MMOL/L (ref 0.4–2)
LYMPHOCYTES ABSOLUTE: 0.7 K/UL (ref 1–5.1)
LYMPHOCYTES RELATIVE PERCENT: 42.4 %
MAGNESIUM: 1.8 MG/DL (ref 1.8–2.4)
MCH RBC QN AUTO: 31.9 PG (ref 26–34)
MCHC RBC AUTO-ENTMCNC: 34.4 G/DL (ref 31–36)
MCV RBC AUTO: 92.5 FL (ref 80–100)
MONOCYTES ABSOLUTE: 0.8 K/UL (ref 0–1.3)
MONOCYTES RELATIVE PERCENT: 48.6 %
NEUTROPHILS ABSOLUTE: 0.1 K/UL (ref 1.7–7.7)
NEUTROPHILS RELATIVE PERCENT: 7.6 %
PDW BLD-RTO: 20.4 % (ref 12.4–15.4)
PLATELET # BLD: 31 K/UL (ref 135–450)
PMV BLD AUTO: 10.1 FL (ref 5–10.5)
POTASSIUM REFLEX MAGNESIUM: 3.3 MMOL/L (ref 3.5–5.1)
PROCALCITONIN: 3.17 NG/ML (ref 0–0.15)
RBC # BLD: 2.56 M/UL (ref 4.2–5.9)
SODIUM BLD-SCNC: 134 MMOL/L (ref 136–145)
TOTAL PROTEIN: 6.2 G/DL (ref 6.4–8.2)
VANCOMYCIN RANDOM: 14.8 UG/ML
WBC # BLD: 1.7 K/UL (ref 4–11)

## 2022-08-30 PROCEDURE — 86140 C-REACTIVE PROTEIN: CPT

## 2022-08-30 PROCEDURE — 97116 GAIT TRAINING THERAPY: CPT

## 2022-08-30 PROCEDURE — 70450 CT HEAD/BRAIN W/O DYE: CPT

## 2022-08-30 PROCEDURE — 97110 THERAPEUTIC EXERCISES: CPT

## 2022-08-30 PROCEDURE — 82140 ASSAY OF AMMONIA: CPT

## 2022-08-30 PROCEDURE — 6370000000 HC RX 637 (ALT 250 FOR IP): Performed by: INTERNAL MEDICINE

## 2022-08-30 PROCEDURE — 6370000000 HC RX 637 (ALT 250 FOR IP)

## 2022-08-30 PROCEDURE — 97530 THERAPEUTIC ACTIVITIES: CPT

## 2022-08-30 PROCEDURE — 6360000002 HC RX W HCPCS: Performed by: EMERGENCY MEDICINE

## 2022-08-30 PROCEDURE — 80202 ASSAY OF VANCOMYCIN: CPT

## 2022-08-30 PROCEDURE — 84145 PROCALCITONIN (PCT): CPT

## 2022-08-30 PROCEDURE — 85025 COMPLETE CBC W/AUTO DIFF WBC: CPT

## 2022-08-30 PROCEDURE — 83605 ASSAY OF LACTIC ACID: CPT

## 2022-08-30 PROCEDURE — 36415 COLL VENOUS BLD VENIPUNCTURE: CPT

## 2022-08-30 PROCEDURE — 2580000003 HC RX 258: Performed by: EMERGENCY MEDICINE

## 2022-08-30 PROCEDURE — 80053 COMPREHEN METABOLIC PANEL: CPT

## 2022-08-30 PROCEDURE — 83735 ASSAY OF MAGNESIUM: CPT

## 2022-08-30 RX ORDER — LEVOFLOXACIN 500 MG/1
500 TABLET, FILM COATED ORAL DAILY
Qty: 3 TABLET | Refills: 0 | Status: SHIPPED | OUTPATIENT
Start: 2022-08-30 | End: 2022-09-02

## 2022-08-30 RX ORDER — DOXYCYCLINE HYCLATE 100 MG
100 TABLET ORAL 2 TIMES DAILY
Qty: 6 TABLET | Refills: 0 | Status: SHIPPED | OUTPATIENT
Start: 2022-08-30 | End: 2022-09-02

## 2022-08-30 RX ADMIN — CARVEDILOL 6.25 MG: 6.25 TABLET, FILM COATED ORAL at 08:36

## 2022-08-30 RX ADMIN — ISOSORBIDE MONONITRATE 60 MG: 60 TABLET, EXTENDED RELEASE ORAL at 08:36

## 2022-08-30 RX ADMIN — FLUOXETINE 10 MG: 10 CAPSULE ORAL at 08:36

## 2022-08-30 RX ADMIN — FENOFIBRATE 54 MG: 54 TABLET ORAL at 08:36

## 2022-08-30 RX ADMIN — FUROSEMIDE 20 MG: 20 TABLET ORAL at 08:36

## 2022-08-30 RX ADMIN — TAMSULOSIN HYDROCHLORIDE 0.4 MG: 0.4 CAPSULE ORAL at 08:36

## 2022-08-30 RX ADMIN — DOCUSATE SODIUM 100 MG: 100 CAPSULE, LIQUID FILLED ORAL at 08:36

## 2022-08-30 RX ADMIN — POTASSIUM BICARBONATE 20 MEQ: 782 TABLET, EFFERVESCENT ORAL at 08:36

## 2022-08-30 RX ADMIN — CEFEPIME 2000 MG: 2 INJECTION, POWDER, FOR SOLUTION INTRAVENOUS at 08:54

## 2022-08-30 NOTE — CARE COORDINATION
CASE MANAGEMENT DISCHARGE SUMMARY      Discharge to: Home with family support/ refuses home care        IMM given: (date) 8/28/2022    New Durable Medical Equipment ordered/agency: none    Transportation:    Family/car:here to transport       Confirmed discharge plan with:MD/RN/Patient and primary decision maker     Patient: yes     Family:  yes    Name: Jessica Abbott Contact number:[phone]          RN, name: Lizeth Leos

## 2022-08-30 NOTE — FLOWSHEET NOTE
08/29/22 2008   Vital Signs   Temp 99 °F (37.2 °C)   Temp Source Oral   Heart Rate 69   Heart Rate Source Monitor   Resp 16   BP (!) 165/81   BP Location Left upper arm   BP Method Automatic   MAP (Calculated) 109   Patient Position Semi fowlers   Level of Consciousness 0   MEWS Score 1   Pain Assessment   Pain Assessment 0-10   Pain Level 0   Patient's Stated Pain Goal 0 - No pain   Opioid-Induced Sedation   POSS Score 1   RASS   Sanchez Agitation Sedation Scale (RASS) 0   Oxygen Therapy   SpO2 92 %   Pulse Oximetry Type Intermittent   Pulse Oximeter Device Mode Intermittent   Pulse Oximeter Device Location Left;Finger   O2 Device None (Room air)   Skin Assessment Clean, dry, & intact   Patient Observation   Observations Resting in bed   Patient A/O VSS no distress noted. All needs addressed at this time.

## 2022-08-30 NOTE — DISCHARGE SUMMARY
Discharge Summary    Patient ID: Demarcus Hicks      Patient's PCP: Niall Hughes MD    Admit Date: 8/25/2022     Discharge Date: 8/30/2022      Admitting Physician: Raphael Isaac MD     Discharge Physician: Denette Curling, MD     Discharge Diagnoses: Active Hospital Problems    Diagnosis     Constipation [K59.00]      Priority: Medium    Acute hypoxemic respiratory failure (HCC) [J96.01]      Priority: Medium    Pancytopenia (HCC) [D61.818]      Priority: Medium    Pneumonia due to infectious organism [J18.9]      Priority: Medium    Acute on chronic combined systolic and diastolic CHF (congestive heart failure) (HCC) [I50.43]      Priority: Medium    Pulmonary congestion [R09.89]      Priority: Medium    Cor pulmonale (HCC) [I27.81]      Priority: Medium    Elevated troponin [R77.8]      Priority: Medium       The patient was seen and examined on day of discharge and this discharge summary is in conjunction with any daily progress note from day of discharge. Hospital Course: 79 y.o. male with PMHx of MDS, MI, ICM, Afib, HTN, CKD, Depression, Hyperlipidemia, BPH who presented to [facility] with shortness of breath, vomiting, fever. Said he woke up 8/25/2022 morning and began vomiting. Also was short of breath and dyspneic at rest but even worse with exertion. He felt fine when he went to bed last night. He felt that he was getting sicker and sicker as the day went on, and so his roommate Osiris Dillon brought him to ER. Patient was worked up for possible pneumonia due to fever and shortness of breath, chest x-ray showed bilateral pulmonary infiltrates, CBC showed pancytopenia, troponin and procalcitonin were elevated. 8/27/2022 patient was able to walk to chair with PT help and slowly weaned down on O2.  CXR on this day indicated stable pattern of ground-glass and interstitial opacities consistent with pulmonary edema   8/28/2022 patient was further weaned off of O2 and was able to breathe on room air   8/30/2022 Patient was seen in AM. He is responsive but very disoriented. He is alert but only oriented to self, not oriented to place or time. Nurse states patient was slightly confused this morning around 0800, and I received page from nurse around 1000 about patient being further confused and disoriented. Upon patient family arriving to hospital, he has calmed down and become less disoriented. Follow up around 1530 showed patient being much more responsive and oriented. Patient and family were urging for discharge at this time, and they indicated his confusion was due to hospital stay. Patient was discharged without home health due to patient and family refusal.    By problem list  Pancytopenia /MDS, Neutropenic Fever with  pneumonia POA   -Discontinue IV antibiotics: Cefepime, azithromycin, vancomycin  -Start oral antibiotics: Doxycycline 100 mg twice daily for 3 days, levofloxacin 500 mg daily for 3 days  -Follow-up with oncology    Acute on chronic combined systolic and diastolic CHF, pulmonary congestion, cor pulmonale  -Continue home meds: carvedilol 6.25mg po BID, isosorbide mononitrate 60mg po daily  -Oral Lasix 20 mg daily     Constipation  - Docusate 100mg BID  -PEG once daily  -Senna 8.6mg tablet po nightly     HTN  -Coming down to normal levels of 129/54  -Continue taking Coreg 6.25 mg twice daily  -Discontinue hydralazine 100 mg, follow-up with cardiology if blood pressure is uncontrolled at home    Acute delirium  -Due to patient being in delirious state upon examination this a.m.  CT head was ordered  -Results of CT head indicated no acute intracranial abnormalities  -Upon patient family arrival to hospital, patient snapped out of delirious state and he was able to orient himself     CKD stage III  -Oral Lasix 20 mg daily     Major depressive disorder  -Continue patient home meds: Fluoxetine 10mg po daily, trazodone 50mg po nightly     HLD  -continue home med: fenofibrate 160mg po daily      Physical Exam Performed:   BP (!) 129/54   Pulse 75   Temp 98.2 °F (36.8 °C) (Axillary)   Resp 20   Ht 6' 2\" (1.88 m)   Wt 161 lb 9.6 oz (73.3 kg)   SpO2 94%   BMI 20.75 kg/m²       General appearance:  No apparent distress, appears stated age and cooperative. HEENT:  Normal cephalic, atraumatic without obvious deformity. Pupils equal, round, and reactive to light. Extra ocular muscles intact. Conjunctivae/corneas clear. Neck: Supple, with full range of motion. No jugular venous distention. Trachea midline. Respiratory:  Normal respiratory effort. Clear to auscultation, bilaterally without Rales/Wheezes/Rhonchi. Cardiovascular:  Regular rate and rhythm with normal S1/S2 without murmurs, rubs or gallops. Abdomen: Soft, non-tender, non-distended with normal bowel sounds. Musculoskeletal:  No clubbing, cyanosis or edema bilaterally. Full range of motion without deformity. Skin: Skin color, texture, turgor normal.  No rashes or lesions. Neurologic:  Neurovascularly intact without any focal sensory/motor deficits. Cranial nerves: II-XII intact, grossly non-focal.  Psychiatric:  Alert and oriented, thought content appropriate, normal insight  Capillary Refill: Brisk,< 3 seconds   Peripheral Pulses: +2 palpable, equal bilaterally       Labs:  For convenience and continuity at follow-up the following most recent labs are provided:      CBC:    Lab Results   Component Value Date/Time    WBC 1.7 08/30/2022 03:06 AM    HGB 8.2 08/30/2022 03:06 AM    HCT 23.7 08/30/2022 03:06 AM    PLT 31 08/30/2022 03:06 AM       Renal:    Lab Results   Component Value Date/Time     08/30/2022 03:06 AM    K 3.3 08/30/2022 03:06 AM    CL 98 08/30/2022 03:06 AM    CO2 28 08/30/2022 03:06 AM    BUN 36 08/30/2022 03:06 AM    CREATININE 1.3 08/30/2022 03:06 AM    CALCIUM 9.6 08/30/2022 03:06 AM    PHOS 2.0 07/04/2022 07:28 AM         Significant Diagnostic Studies    Radiology:   CT HEAD WO CONTRAST   Final Result No acute intracranial abnormality. XR CHEST (2 VW)   Final Result   Stable pattern of ground-glass and interstitial opacities consistent with   pulmonary edema. Small bilateral pleural effusions. CT CHEST ABDOMEN PELVIS WO CONTRAST   Final Result   Chest CT:      Ground-glass opacity within both lungs, with smooth interlobular septal   thickening, and small bilateral pleural effusions, the constellation of which   is most compatible with pulmonary edema. 7 mm nodule in the periphery of the right middle lobe. Please see   recommendations below. Bronchial wall thickening, which may be seen in inflammatory conditions such   as bronchitis, reactive airways disease, and smoking. Abdomen and pelvis CT:      Moderate to large amount of stool within the rectum and a moderate amount of   stool within the sigmoid colon, findings which may suggest   constipation/impaction. Cholelithiasis. Bilateral perinephric fat stranding, which is likely age related, but please   correlate with any clinical evidence of urinary tract infection. Infrarenal abdominal aortic aneurysm measuring 3.8 cm maximally. See   recommendations below. RECOMMENDATIONS:   7 mm right solid pulmonary nodule. Recommend a non-contrast Chest CT at 6-12   months. If patient is high risk for malignancy, recommend an additional   non-contrast Chest CT at 18-24 months; if patient is low risk for malignancy   a non-contrast Chest CT at 18-24 months is optional.      These guidelines do not apply to immunocompromised patients and patients with   cancer. Follow up in patients with significant comorbidities as clinically   warranted. For lung cancer screening, adhere to Lung-RADS guidelines. Reference: Radiology. 2017; 284(1):228-43. 3.8 cm infrarenal abdominal aortic aneurysm. Recommend follow-up every 2   years. Reference: J Am Nima Radiol 8825;53:664-257.      7 mm right solid pulmonary nodule. Recommend a non-contrast Chest CT at 6-12   months. If patient is high risk for malignancy, recommend an additional   non-contrast Chest CT at 18-24 months; if patient is low risk for malignancy   a non-contrast Chest CT at 18-24 months is optional.      These guidelines do not apply to immunocompromised patients and patients with   cancer. Follow up in patients with significant comorbidities as clinically   warranted. For lung cancer screening, adhere to Lung-RADS guidelines. Reference: Radiology. 2017; 284(1):228-43. XR CHEST PORTABLE   Final Result   Moderate infiltrates and/or congestion identified in the lungs. Consults:   IP CONSULT TO HOSPITALIST  IP CONSULT TO CARDIOLOGY  IP CONSULT TO ONCOLOGY  IP CONSULT TO PALLIATIVE CARE  IP CONSULT TO PHARMACY  IP CONSULT TO PODIATRY    Disposition: To home    Condition at Discharge: Stable    Discharge Instructions/Follow-up: Follow-up with PCP and oncology    Code Status:  Full Code     Activity: activity as tolerated    Diet: regular diet      Discharge Medications:   Discharge Medication List as of 8/30/2022  4:29 PM             Details   doxycycline hyclate (VIBRA-TABS) 100 MG tablet Take 1 tablet by mouth 2 times daily for 3 days, Disp-6 tablet, R-0Normal      levoFLOXacin (LEVAQUIN) 500 MG tablet Take 1 tablet by mouth daily for 3 days, Disp-3 tablet, R-0Normal                Details   carvedilol (COREG) 6.25 MG tablet Take 1 tablet by mouth in the morning and 1 tablet before bedtime. , Disp-60 tablet, R-2Normal      isosorbide mononitrate (IMDUR) 60 MG extended release tablet Take 1 tablet by mouth in the morning., Disp-90 tablet, R-3Normal      traZODone (DESYREL) 50 MG tablet Take 1 tablet by mouth nightly, Disp-30 tablet, R-0Normal      FLUoxetine (PROZAC) 10 MG capsule Take one capsule by mouth daily. , Disp-90 capsule, R-0Normal      fenofibrate (TRIGLIDE) 160 MG tablet TAKE ONE TABLET BY MOUTH DAILY, Disp-90 tablet, R-0Normal tamsulosin (FLOMAX) 0.4 MG capsule Take 1 capsule by mouth in the morning., Disp-90 capsule, R-1Normal      furosemide (LASIX) 20 MG tablet Take 1 tablet by mouth in the morning., Disp-30 tablet, R-1Normal      Ascorbic Acid (VITAMIN C) 500 MG tablet Take 1,000 mg by mouth daily. Historical Med             Time Spent on discharge is more than 45 minutes in the examination, evaluation, counseling and review of medications and discharge plan. SignedAshley Estrada DO   8/30/2022      Thank you Perlita Garcia MD for the opportunity to be involved in this patient's care. If you have any questions or concerns please feel free to contact me at [phone]. Addendum to Resident discharge summary note:  Pt seen,examined and evaluated with resident and discussed regarding POC . I have reviewed the current history, physical findings, labs and assessment and plan , edited the note where appropriate and agree with note as documented by resident DO ( Dr. Isi Alonso)    patient seen and evaluated on the day of discharge. Patient informed about following up with appointments. Patient verbalized understanding for follow-up appointments. The patient and / or the family were informed of the results of tests, a time was given to answer questions, a plan was proposed and they agreed with plan. Medical reconciliation performed. Patient discharged stable condition.      Jolanta Hdz MD  Hospitalist Physician

## 2022-08-30 NOTE — PROGRESS NOTES
Physical Therapy  Facility/Department: [facility] C4 PCU  Daily Treatment Note  NAME: Ward Wilder  : 1940  MRN: [id number]    Date of Service: 2022    Discharge Recommendations:  Home with Home health PT, 24 hour supervision or assist   PT Equipment Recommendations  Equipment Needed: No  Other: pt owns 4WW and transport chair  [facility] 6 Clicks Inpatient Mobility:  AM-PAC Mobility Inpatient   How much difficulty turning over in bed?: None  How much difficulty sitting down on / standing up from a chair with arms?: A Little  How much difficulty moving from lying on back to sitting on side of bed?: None  How much help from another person moving to and from a bed to a chair?: A Little  How much help from another person needed to walk in hospital room?: A Little  How much help from another person for climbing 3-5 steps with a railing?: A Little  AM-PAC Inpatient Mobility Raw Score : 20  AM-PAC Inpatient T-Scale Score : 47.67  Mobility Inpatient CMS 0-100% Score: 35.83  Mobility Inpatient CMS G-Code Modifier : CJ  Patient Diagnosis(es): The primary encounter diagnosis was Neutropenic fever ([facility] Diagnoses of Acute respiratory failure with hypoxia (HCC) and Severe sepsis ([facility] were also pertinent to this visit. Assessment   Assessment: Patient completed transfers with cga (to min A low surfaces) and ambulates with RW and CGA. Pt requires max cues to maintain RICKI within RW. Per family, pt has 24/7 assistance, Ax2 when climbing steps into home, and they feel he is at his baseline with mobility and that he will be safe at home with their care. P.T .will continue to follow throughout LOS. Recommending DC to home with 24/7 assistance and home PT.   Activity Tolerance: Patient tolerated treatment well;Patient limited by endurance  Equipment Needed: No  Other: pt owns 4WW and transport chair     Plan    Plan  Plan: 3-5 times per week  Specific Instructions for Next Treatment: Mobility and endurance training  Current Treatment Recommendations: Strengthening; Functional mobility training;Gait training; Endurance training;Stair training;Home exercise program;Therapeutic activities; Equipment evaluation, education, & procurement;Patient/Caregiver education & training;Balance training;Transfer training; Safety education & training     Restrictions  Restrictions/Precautions  Restrictions/Precautions: Up as Tolerated, Fall Risk, General Precautions  Position Activity Restriction  Other position/activity restrictions: 4L O2, lo     Subjective    Subjective  Subjective: Pt in bed, agreeable to therapy  Pain: Pt denies pain  Orientation  Overall Orientation Status: Impaired  Orientation Level: Disoriented to time;Oriented to person;Oriented to situation;Disoriented to place     Objective   Vitals   Vitals:    08/30/22 1134   BP: (!) 129/54   Pulse: 75   Resp: 20   Temp: 98.2 °F (36.8 °C)   SpO2: 94%          Bed Mobility Training  Bed Mobility Training: Yes  Interventions: Verbal cues  Supine to Sit: Supervision  Balance  Sitting: Intact  Standing: Impaired  Standing - Static: Good (rw)  Standing - Dynamic: Fair (rw)  Transfer Training  Transfer Training: Yes  Interventions: Verbal cues; Visual cues  Sit to Stand: Contact-guard assistance;Minimum assistance (cga from bed, Min A from low seat)  Stand to Sit: Stand-by assistance  Gait Training  Gait Training: Yes  Gait  Overall Level of Assistance: Contact-guard assistance  Interventions: Verbal cues; Safety awareness training  Speed/Lainey: Slow;Shuffled  Step Length: Right shortened;Left shortened  Gait Abnormalities: Decreased step clearance  Distance (ft): 100 Feet (2x) Sitting rest in between walks d/t pt fatigue  Assistive Device: Gait belt;Walker, rolling     PT Exercises  Exercise Treatment: performed BLE TE 15X EACH: AP, GS, SLR, HIP ABD, LAQ, MARCHES     Safety Devices  Type of Devices:  All fall risk precautions in place;Gait belt;Left in chair;Patient at risk for falls;Call light within reach; Chair alarm in place;Nurse notified       Goals  Short Term Goals  Time Frame for Short term goals: one week unless otherwsie stated  Short term goal 1: Patient will perform bed mobility Mod IND -8/30 SPV  Short term goal 2: Patient will perform sit <> stand transfers Mod IND to AAD  -8/30 cga (min A low surfaces)  Short term goal 3: Patient will ambulate 100 ft with RW Mod IND  -8/30 100' cga rw  Short term goal 4: Patient will negotiate 6 stairs with B rails and supervision  -8/30 NT  Short term goal 5: Patient will be IND with LE HEP to increase functional strength to maximize functional mobility  -8/30 initiated    Education  Patient Education  Education Given To: Patient; Family  Education Provided: Role of Therapy; Fall Prevention Strategies;Transfer Training;Energy Conservation;Plan of Care;Precautions;Orientation  Education Provided Comments: Pt educated on importance of OOB mobility, safe hand placement with transfers, and maintaining RICKI within RW - requires reinforcement  Education Method: Verbal  Barriers to Learning: Cognition  Education Outcome: Verbalized understanding;Continued education needed    Therapy Time   Individual Concurrent Group Co-treatment   Time In 1448         Time Out 1528         Minutes 40         Timed Code Treatment Minutes: Candi Moreno

## 2022-08-30 NOTE — PROGRESS NOTES
ONCOLOGY FOLLOW-UP:       Primary Oncologist:  Dr. 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, N13.8    Taste perversion R43.2    Anosmia R43.0    Back pain M54.9    Nonrheumatic aortic valve insufficiency I35.1    Bilateral carotid bruits R09.89    Current moderate episode of major depressive disorder without prior episode ([facility]) F32.1    Atrial fibrillation with RVR ([facility]) I48.91    VINNY (acute kidney injury) ([facility] N17.9    Acute anemia D64.9    GI bleed K92.2    Thrombocytopenia ([facility]) D69.6    Arrhythmia, atrial I49.8    Elevated troponin R77.8    Cardiomyopathy ([facility]) I42.9    NSVT (nonsustained ventricular tachycardia) ([facility]) I47.2    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    Chronic systolic (congestive) heart failure I50.22    Angina pectoris, unspecified I20.9    Atherosclerotic heart disease of native coronary artery with unspecified angina pectoris I25.119    Acute hypoxemic respiratory failure ([facility]) J96.01    Anemia D64.9    Pancytopenia ([facility]) D61.818    Pneumonia due to infectious organism J18.9    Acute on chronic combined systolic and diastolic CHF (congestive heart failure) ([facility]) I50.43    Pulmonary congestion R09.89    Cor pulmonale ([facility]) I27.81    Constipation K59.00       INTERVAL HISTORY:       Afebrile and on room air. Hopes to go home. REVIEW OF SYSTEMS:       Constitutional: Denies fever, sweats, weight loss     Eyes: No visual changes or diplopia. No scleral icterus. ENT: No Headaches, hearing loss or vertigo. No mouth sores or sore throat. Cardiovascular: No chest pain, dyspnea on exertion, palpitations or loss of consciousness. Respiratory: No cough or wheezing, no sputum production. No hemoptysis. .    Gastrointestinal: No abdominal pain, appetite loss, blood in stools. No change in bowel habits.   Genitourinary: No dysuria, trouble voiding, or hematuria. Musculoskeletal:  Generalized weakness. No joint complaints. Integumentary: No rash or pruritis. Neurological: No headache, diplopia. No change in gait, balance, or coordination. No paresthesias. Endocrine: No temperature intolerance. No excessive thirst, fluid intake, or urination. Hematologic/Lymphatic: No abnormal bruising or ecchymoses, blood clots or swollen lymph nodes. Allergic/Immunologic: No nasal congestion or hives.          PHYSICAL EXAM:       BP (!) 157/71   Pulse 73   Temp 99 °F (37.2 °C) (Oral)   Resp 20   Ht 6' 2\" (1.88 m)   Wt 161 lb 9.6 oz (73.3 kg)   SpO2 94%   BMI 20.75 kg/m²     General appearance: alert and cooperative  Head: Normocephalic, without obvious abnormality, atraumatic  Neck: No palpable lymphadenopathy in supraclavicular or cervical chains  Lungs: Clear to auscultation bilaterally, no audible rales, wheezes or crackles  Heart: Regular rate and rhythm, S1, S2 normal  Abdomen: Soft, non-tender; bowel sounds normal; no masses,  no organomegaly  Extremities: without cyanosis, clubbing, edema or asymmetry  Skin: No jaundice, purpura or petechiae    LABS:     CBC:   Lab Results   Component Value Date    WBC 1.7 (LL) 08/30/2022    HGB 8.2 (L) 08/30/2022    HCT 23.7 (L) 08/30/2022    MCV 92.5 08/30/2022    PLT 31 (L) 08/30/2022    LYMPHOPCT 42.4 08/30/2022    RBC 2.56 (L) 08/30/2022    MCH 31.9 08/30/2022    MCHC 34.4 08/30/2022    RDW 20.4 (H) 08/30/2022       BMP:   Lab Results   Component Value Date/Time     08/30/2022 03:06 AM    K 3.3 08/30/2022 03:06 AM    CL 98 08/30/2022 03:06 AM    CO2 28 08/30/2022 03:06 AM    BUN 36 08/30/2022 03:06 AM    CREATININE 1.3 08/30/2022 03:06 AM    GLUCOSE 111 08/30/2022 03:06 AM    GLUCOSE 78 01/12/2012 02:47 PM    CALCIUM 9.6 08/30/2022 03:06 AM        Hepatic Function Panel:   Lab Results   Component Value Date/Time    ALKPHOS 39 08/30/2022 03:06 AM    ALT 8 08/30/2022 03:06 AM    AST 19 08/30/2022 03:06 AM    PROT 6.2 08/30/2022 03:06 AM    PROT 7.3 01/12/2012 02:47 PM    BILITOT 0.9 08/30/2022 03:06 AM    LABALBU 2.8 08/30/2022 03:06 AM      IMAGING:     Echo Limited    Result Date: 8/26/2022  Transthoracic Echocardiography Report (TTE)  Demographics   Patient Name       Terrie Rojas   Date of Study      08/26/2022        Gender              Male   Patient Number     [id number]        Date of Birth       1940   Visit Number       [id number]         Age                 79 year(s)   Accession Number   [id number]        Room Number         6612   Corporate ID       [id number]          Sonographer         Leroy [facility]   Ordering Physician Eugenio Ochoa MD  Interpreting        [address] [place] [address].                                       Physician           Tyrone Romeo MD  Procedure Type of Study   TTE procedure:ECHOCARDIOGRAM LIMITED. Procedure Date Date: 08/26/2022 Start: 11:26 AM Study Location: [address] [place] [address] - Echo Lab Technical Quality: Limited visualization due to poor acoustical window. Additional Indications:LV Fx. Patient Status: Routine Contrast Medium: Definity. Amount - 2 ml Height: 73 inches Weight: 165 pounds BSA: 1.98 m2 BMI: 21.77 kg/m2 BP: 148/70 mmHg  Conclusions   Summary  Limited echo for LV function with limited doppler/no color. -- The left ventricular systolic function is mildly reduced with an ejection  fraction of 45 - 50 %. EF by Ulloa's method estimated at 48%. Normal left  ventricular size with mild concentric left ventricular hypertrophy. Hypokinesis of the basal to mid inferoseptal/anteroseptal/inferior wall  segments. Grade I diastolic dysfunction with normal filling pressure. Compared to last echo on 6/29/2022, no change in EF.    Signature   ------------------------------------------------------------------  Electronically signed by Tyrone Romeo MD (Interpreting  physician) on 08/26/2022 at 12:54 PM  ------------------------------------------------------------------ Findings   Left Ventricle  The left ventricular systolic function is mildly reduced with an ejection  fraction of 45 - 50 %. EF by Ulloa's method estimated at 48%. Septal bounce present. Normal left ventricular size with mild concentric left ventricular  hypertrophy. Hypokinesis of the basal to mid inferoseptal/anteroseptal/inferior wall  segments. Grade I diastolic dysfunction with normal filling pressure. Definity® used for myocardial border enhancement. Mitral Valve  The mitral valve is normal in structure. Left Atrium  The left atrium is normal in size. Aorta  The aortic root is normal in size. Right Ventricle  The right ventricle is normal in size   Tricuspid Valve  The tricuspid valve is normal in structure   Right Atrium  The right atrial size is normal.  Right atrial area is cm2. Pulmonic Valve  The pulmonic valve is normal in structure   Pericardial Effusion  No pericardial effusion noted. Pleural Effusion  No pleural effusion. Miscellaneous  No obvious masses, thrombi or vegetations are noted. IVC is normal in size (< 2.1 cm) and collapses > 50% with respiration  consistent with normal right atrial pressure (3 mmHg).   M-Mode/2D Measurements (cm)   LV Diastolic Dimension: 7.34 cm LV Systolic Dimension: 4.89 cm  LV Septum Diastolic: 6.40 cm  LV PW Diastolic: 4.93 cm                                  LA Dimension: 4.1 cm  Doppler Measurements                                  MV Peak E-Wave: 99.2 cm/s                                 MV Peak A-Wave: 119 cm/s                                 MV E/A Ratio: 0.83   E' Septal Velocity: 5.75 cm/s  E' Lateral Velocity: 12.2 cm/s  E/E' ratio: 12.7      XR CHEST PORTABLE    Result Date: 8/25/2022  EXAMINATION: ONE XRAY VIEW OF THE CHEST 8/25/2022 8:40 am COMPARISON: July 2, 2022 HISTORY: ORDERING SYSTEM PROVIDED HISTORY: SOB TECHNOLOGIST PROVIDED HISTORY: Reason for exam:->SOB Reason for Exam: sob FINDINGS: Moderate infiltrates and/or congestion identified in the lungs. Trace right pleural effusion. No pneumothorax. Top-normal cardiac size. Significant osteopenic changes and degenerative changes noted in the bony structures. Moderate infiltrates and/or congestion identified in the lungs. CT CHEST ABDOMEN PELVIS WO CONTRAST    Result Date: 8/26/2022  EXAMINATION: CT OF THE CHEST, ABDOMEN, AND PELVIS WITHOUT CONTRAST 8/26/2022 11:22 am TECHNIQUE: CT of the chest, abdomen and pelvis was performed without the administration of intravenous contrast. Multiplanar reformatted images are provided for review. Automated exposure control, iterative reconstruction, and/or weight based adjustment of the mA/kV was utilized to reduce the radiation dose to as low as reasonably achievable. COMPARISON: No recent available. HISTORY: ORDERING SYSTEM PROVIDED HISTORY: patient came to ER yesterday with vomiting. Says he has been eating every day, but no bowel movements in a week. TECHNOLOGIST PROVIDED HISTORY: Reason for exam:->patient came to ER yesterday with vomiting. Says he has been eating every day, but no bowel movements in a week. Additional Contrast?->None Reason for Exam: no bm several days, abd pain FINDINGS: Chest: Mediastinum: Visualized thyroid unremarkable. No pathologically enlarged mediastinal lymph nodes are seen. Esophagus is unremarkable. Cardiac chambers are enlarged. No pericardial effusion. Limited noncontrast imaging of the thoracic aorta and pulmonary arteries unremarkable. Lungs/pleura: Small bilateral pleural effusions are seen. Adjacent atelectasis is noted. There is ground-glass opacity noted seen throughout both lungs, greatest centrally. Smooth interlobular septal thickening is noted bilaterally. 7 mm nodule within the periphery of the right middle lobe noted (2, 84). Diffuse bronchial wall thickening is found. No filling defects are seen within the airways.  Soft Tissues/Bones: No acute or suspicious bony abnormalities are identified. Visualized extra thoracic soft tissues are unremarkable. Abdomen/Pelvis: Lack of intravenous contrast limits evaluation of the solid abdominal viscera, the hollow abdominal viscera, and the vascular structures. Organs: With that said, no acute or suspicious hepatic abnormality identified. Gallstone is noted. Noncontrast imaging of the spleen unremarkable. Adrenals unremarkable. Noncontrast imaging the pancreas unremarkable. Bilateral perinephric fat stranding is seen. No acute or suspicious renal abnormality detected otherwise. GI/Bowel: Moderate to large amount of stool is seen within the rectum. Moderate amount of stool seen within the sigmoid colon. More proximally, the large bowel is unremarkable appearance. There is mild diverticulosis without CT evidence of diverticulitis. The appendix is normal. Stomach unremarkable. Duodenal diverticula are noted without CT evidence of diverticulitis. Small bowel otherwise unremarkable. Pelvis: Urinary bladder and prostate unremarkable. No free pelvic fluid. Evidence of previous right inguinal hernia repair. Peritoneum/Retroperitoneum: Infrarenal fusiform aortic aneurysm is found measuring maximally 3.8 cm. No CT evidence of rupture is identified. No lymphadenopathy. 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 No acute bony abnormalities are identified. The extra-abdominal and extra pelvic soft tissues are unremarkable. Chest CT: Ground-glass opacity within both lungs, with smooth interlobular septal thickening, and small bilateral pleural effusions, the constellation of which is most compatible with pulmonary edema. 7 mm nodule in the periphery of the right middle lobe. Please see recommendations below. Bronchial wall thickening, which may be seen in inflammatory conditions such as bronchitis, reactive airways disease, and smoking.  Abdomen and pelvis CT: Moderate to large amount of stool within the rectum and a moderate amount of stool within the sigmoid colon, findings which may suggest constipation/impaction. Cholelithiasis. Bilateral perinephric fat stranding, which is likely age related, but please correlate with any clinical evidence of urinary tract infection. Infrarenal abdominal aortic aneurysm measuring 3.8 cm maximally. See recommendations below. RECOMMENDATIONS: 7 mm right solid pulmonary nodule. Recommend a non-contrast Chest CT at 6-12 months. If patient is high risk for malignancy, recommend an additional non-contrast Chest CT at 18-24 months; if patient is low risk for malignancy a non-contrast Chest CT at 18-24 months is optional. These guidelines do not apply to immunocompromised patients and patients with cancer. Follow up in patients with significant comorbidities as clinically warranted. For lung cancer screening, adhere to Lung-RADS guidelines. Reference: Radiology. 2017; 284(1):228-43. 3.8 cm infrarenal abdominal aortic aneurysm. Recommend follow-up every 2 years. Reference: J Am Nima Radiol 4534;26:575-441. 7 mm right solid pulmonary nodule. Recommend a non-contrast Chest CT at 6-12 months. If patient is high risk for malignancy, recommend an additional non-contrast Chest CT at 18-24 months; if patient is low risk for malignancy a non-contrast Chest CT at 18-24 months is optional. These guidelines do not apply to immunocompromised patients and patients with cancer. Follow up in patients with significant comorbidities as clinically warranted. For lung cancer screening, adhere to Lung-RADS guidelines. Reference: Radiology. 2017; 284(1):228-43. 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  - Transfusional support only. - Hgb 5.9 -> 8.2 with transfusion     2.) Neutropenic fever  - Tm 101.2 on admission. Afebrile since then. - SARS-CoV-2 NAAT indeterminate. Confirmatory test negative. - Blood cultures negative to date  - Cefepime/Vanco started.   Okay to transition to oral Levaquin at discharge. 3.) Parox atrial fib  - Not on anticoagulation. 4.) sCHF  - Echocardiogram, 6/28/2022, showed LVEF 45-50% w/ basal to mid inferior, basal inferoseptal hypokinesis. - Corge 6.25 mg BID, Lasix 20 mg QD, IMDUR 60 mg.     5.) Code status  - Changed to [facility] after discussion with myself on Friday, but changed his mind and now back to full code    Dispo:  He is afebrile at this point. His counts are not expected to improve. Can discharge and we can transfuse as an outpatient as needed.     Ryan Parson MD

## 2022-08-30 NOTE — DISCHARGE INSTR - COC
Continuity of Care Form    Patient Name: Shemar Chang   :  1940  MRN:  [id number]    [facility] date:  2022  Discharge date:  ***    Code Status Order: Full Code   Advance Directives:     Admitting Physician:  Cristopher Halsted, MD  PCP: Laurie Robbins MD    Discharging Nurse: [facility] Unit/Room#: 7265/2406-76  Discharging Unit Phone Number: ***    Emergency Contact:   Extended Emergency Contact Information  Primary Emergency Contact: Jen Paniagua  Address: [place] [address]           [facility] [address]  Home Phone: [phone]  Mobile Phone: [phone]  Relation: Other  Secondary Emergency Contact: Gemma Guzman  Address: [place] [address], [address]  Home Phone: [phone]  Mobile Phone: [phone]  Relation: Child    Past Surgical History:  Past Surgical History:   Procedure Laterality Date    COLONOSCOPY  2008    COLONOSCOPY  2014    CT BONE MARROW BIOPSY  2022    CT BONE MARROW BIOPSY 2022 Kasi Coronel MD [facility] CT SCAN    HERNIA REPAIR         Immunization History:   Immunization History   Administered Date(s) Administered     Influenza, Courtney Care (age 72 y+), High Dose 2021    COVID-19, PFIZER PURPLE top, DILUTE for use, (age 15 y+), 30mcg/0.3mL 2021, 2021    Influenza 2012, 10/30/2013    Influenza Virus Vaccine 2014, 2015    Influenza, High Dose (Fluzone 65 yrs and older) 2016    Pneumococcal Conjugate 13-valent (Greer Hedge) 2016    Pneumococcal Polysaccharide ([id number]) 2012       Active Problems:  Patient Active Problem List   Diagnosis Code    Hypertension I10    Rosacea L71.9    Mixed hyperlipidemia E78.2    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, N13.8    Taste perversion R43.2    Anosmia R43.0    Back pain M54.9    Nonrheumatic aortic valve insufficiency I35.1    Bilateral carotid bruits R09.89    Current moderate episode of major depressive disorder without prior episode ([address] F32.1    Atrial fibrillation with RVR ([facility]) I48.91    VINNY (acute kidney injury) ([facility] N17.9    Acute anemia D64.9    GI bleed K92.2    Thrombocytopenia ([facility]) D69.6    Arrhythmia, atrial I49.8    Elevated troponin R77.8    Cardiomyopathy ([facility]) I42.9    NSVT (nonsustained ventricular tachycardia) ([facility]) I47.2    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    Chronic systolic (congestive) heart failure I50.22    Angina pectoris, unspecified I20.9    Atherosclerotic heart disease of native coronary artery with unspecified angina pectoris I25.119    Acute hypoxemic respiratory failure ([facility]) J96.01    Anemia D64.9    Pancytopenia ([facility]) D61.818    Pneumonia due to infectious organism J18.9    Acute on chronic combined systolic and diastolic CHF (congestive heart failure) ([facility]) I50.43    Pulmonary congestion R09.89    Cor pulmonale ([facility]) I27.81    Constipation K59.00       Isolation/Infection:   Isolation            No Isolation          Patient Infection Status       Infection Onset Added Last Indicated Last Indicated By Review Planned Expiration Resolved Resolved By    None active    Resolved    COVID-19 08/25/22 08/25/22 08/25/22 COVID-19, Rapid   08/26/22 Lisa Becerra RN    COVID-19 (Rule Out) 08/25/22 08/25/22 08/25/22 COVID-19, Rapid (Ordered)   08/25/22 Rule-Out Test Resulted    C-diff Rule Out 07/03/22 07/03/22 07/03/22 Clostridium difficile toxin/antigen (Ordered)   07/04/22 Rule-Out Test Resulted    COVID-19 (Rule Out) 07/03/22 07/03/22 07/03/22 COVID-19, Rapid (Ordered)   07/03/22 Rule-Out Test Resulted    COVID-19 (Rule Out) 06/28/22 06/28/22 06/28/22 COVID-19 & Influenza Combo (Ordered)   06/28/22 Rule-Out Test Resulted            Nurse Assessment:  Last Vital Signs: BP (!) 129/54   Pulse 75   Temp 98.2 °F (36.8 °C) (Axillary)   Resp 20   Ht 6' 2\" (1.88 m)   Wt 161 lb 9.6 oz (73.3 kg)   SpO2 94%   BMI 20.75 kg/m²     Last documented pain score (0-10 scale): Pain Level: 0  Last Weight:   Wt Readings from Last 1 Encounters:   22 161 lb 9.6 oz (73.3 kg)     Mental Status:  {IP PT MENTAL STATUS:}    IV Access:  { DANY IV ACCESS:478412454}    Nursing Mobility/ADLs:  Walking   {CHP DME VEHL:094760703}  Transfer  {CHP DME MLNT:281143922}  Bathing  {CHP DME TFFJ:877088116}  Dressing  {CHP DME OIKW:444404274}  Toileting  {CHP DME UWCH:661226676}  Feeding  {CHP DME KMOM:817036839}  Med Admin  {CHP DME WKVF:218937485}  Med Delivery   { DANY MED Delivery:010001484}    Wound Care Documentation and Therapy:        Elimination:  Continence: Bowel: {YES / WO:18185}  Bladder: {YES / UC:}  Urinary Catheter: {Urinary Catheter:164301809}   Colostomy/Ileostomy/Ileal Conduit: {YES / PD:34133}       Date of Last BM: ***    Intake/Output Summary (Last 24 hours) at 2022 1621  Last data filed at 2022 0854  Gross per 24 hour   Intake 300 ml   Output 600 ml   Net -300 ml     I/O last 3 completed shifts: In: 685.5 [P.O.:418;  I.V.:31.4; IV Piggyback:236]  Out: 400 [Urine:400]    Safety Concerns:     [address] Safety Concerns:068289920}    Impairments/Disabilities:      [address] Impairments/Disabilities:617100522}    Nutrition Therapy:  Current Nutrition Therapy:   [address] [employer] Diet List:915792259}    Routes of Feeding: {CHP DME Other Feedings:941184964}  Liquids: {Slp liquid thickness:22717}  Daily Fluid Restriction: {CHP DME Yes amt example:361368649}  Last Modified Barium Swallow with Video (Video Swallowing Test): {Done Not Done AWRL:752605809}    Treatments at the Time of Hospital Discharge:   Respiratory Treatments: ***  Oxygen Therapy:  {Therapy; copd oxygen:34472}  Ventilator:    { CC Vent HOGO:760048846}    Rehab Therapies: {THERAPEUTIC INTERVENTION:7036889471}  Weight Bearing Status/Restrictions: [address] HIRSCH Weight Bearin}  Other Medical Equipment (for information only, NOT a DME order):  {EQUIPMENT:690825000}  Other Treatments: ***    Patient's personal belongings (please select all that are sent with patient):  {CHP DME Belongings:785315385}    RN SIGNATURE:  {Esignature:849138727}    CASE MANAGEMENT/SOCIAL WORK SECTION    Inpatient Status Date: ***    Readmission Risk Assessment Score:  Readmission Risk              Risk of Unplanned Readmission:  15           Discharging to Facility/ Agency   Name:   Address:  Phone:  Fax:    Dialysis Facility (if applicable)   Name:  Address:  Dialysis Schedule:  Phone:  Fax:    / signature: {Esignature:791480527}    PHYSICIAN SECTION    Prognosis: {Prognosis:8177632087}    Condition at Discharge: [address] [place] [address] Patient Condition:321096440}    Rehab Potential (if transferring to Rehab): {Prognosis:1233502953}    Recommended Labs or Other Treatments After Discharge: ***    Physician Certification: I certify the above information and transfer of Libby Villatoro  is necessary for the continuing treatment of the diagnosis listed and that he requires {Admit to Appropriate Level of Care:44633} for {GREATER/LESS:879717542} 30 days.      Update Admission H&P: {[facility]}    PHYSICIAN SIGNATURE:  {Esignature:309263080}

## 2022-08-31 ENCOUNTER — CARE COORDINATION (OUTPATIENT)
Dept: CASE MANAGEMENT | Age: 82
End: 2022-08-31

## 2022-08-31 NOTE — CARE COORDINATION
[facility] Transitions Initial Follow Up Call    Call within 2 business days of discharge: Yes    Patient: Lor Brower Patient : 1940   MRN: [id number]  Reason for Admission: neutropenic fever  Discharge Date: 22 RARS: Readmission Risk Score: 22.4      Last Discharge [facility]       Date Complaint Diagnosis Description Type Department Provider    22 Shortness of Breath Neutropenic fever ([address] . .. ED to Hosp-Admission (Discharged) (ADMITTED) Delaney Morgan MD; Jazz Flores. .. Spoke with: na    Attempted to reach patient via phone for initial post hospital transition call.   Message received Terrell Null, mailbox is full\"  No option to leave message    Care Transitions 24 Hour Call    Care Transitions Interventions         Follow Up  Future Appointments   Date Time Provider [place]   2022  8:45 AM MD Allison Dent [facility]   2022 10:40 AM ALESSIA Taveras CNP [facility] None   2022  9:00 AM ALESSIA Reid CNP [facility]   2022  8:30 AM MD Allison Schmidt RN

## 2022-08-31 NOTE — TELEPHONE ENCOUNTER
Spoke with family Daughter Hood Hidalgo patient will be at the [facility] appointment on 9/2/2022

## 2022-09-01 ENCOUNTER — CARE COORDINATION (OUTPATIENT)
Dept: CASE MANAGEMENT | Age: 82
End: 2022-09-01

## 2022-09-01 DIAGNOSIS — I50.22 CHRONIC SYSTOLIC (CONGESTIVE) HEART FAILURE (HCC): Primary | ICD-10-CM

## 2022-09-01 PROCEDURE — 1111F DSCHRG MED/CURRENT MED MERGE: CPT | Performed by: INTERNAL MEDICINE

## 2022-09-01 NOTE — CARE COORDINATION
[facility] Transitions Initial Follow Up Call    Call within 2 business days of discharge: Yes    Patient: Patricia Lucas Patient : 1940   MRN: [id number]  Reason for Admission: neutropenic fever  Discharge Date: 22 RARS: Readmission Risk Score: 22.4      Last Discharge  [facility]       Date Complaint Diagnosis Description Type Department Provider    22 Shortness of Breath Neutropenic fever ([facility] . .. ED to Hosp-Admission (Discharged) (ADMITTED) Syeda Perez MD; Nelli Curtis. .. Spoke with: Gracia CARDOZA paperwork reviewed    Facility: [facility]  Non-face-to-face services provided:  Obtained and reviewed discharge summary and/or continuity of care documents    Transitions of Care Initial Call  Challenges to be reviewed by the provider   Additional needs identified to be addressed with provider: No  none             Method of communication with provider : none    Advance Care Planning:   Does patient have an Advance Directive: reviewed and current. Care Transition Nurse contacted the family by telephone to perform post hospital discharge assessment. Verified name and  with caregiver as identifiers. Provided introduction to self, and explanation of the CTN role. CTN reviewed discharge instructions, medical action plan and red flags with caregiver who verbalized understanding. Caregiver given an opportunity to ask questions and does not have any further questions or concerns at this time. Were discharge instructions available to patient? Yes. Reviewed appropriate site of care based on symptoms and resources available to patient including: PCP  Specialist. The caregiver agrees to contact the PCP office for questions related to their healthcare. Medication reconciliation was performed with caregiver, who verbalizes understanding of administration of home medications. Advised obtaining a 90-day supply of all daily and as-needed medications.      Was patient discharged with a pulse oximeter? no  Attempt to reach patient without success. Call placed to Jorge Luis Ansari and verified patient's . Pleasant and agreeable to transition call. Patient is \"doing great\" per Royce Plaza. Stated patient is back to normal.   Post d/c summary available and confirmed new prescriptions were picked up and taking as prescribed. Noted in system- 1111f added. Follow up visits reviewed and noted in system. Royce Plaza to take patient to scheduled visits. Denied any acute needs at present time. Agreeable to f/u calls. Educated on the use of urgent care or physicians 24 hr access line if assistance is needed after hours. CTN provided contact information. Plan for follow-up call in 5-7 days based on severity of symptoms and risk factors.   Plan for next call: follow up appointment-PCP appt        Care Transitions 24 Hour Call    Do you have a copy of your discharge instructions?: Yes  Do you have all of your prescriptions and are they filled?: Yes  Have you been contacted by a [address]?: No  Have you scheduled your follow up appointment?: Yes  How are you going to get to your appointment?: Car - family or friend to transport  Do you feel like you have everything you need to keep you well at home?: Yes  Care Transitions Interventions         Follow Up  Future Appointments   Date Time Provider [place]   2022  8:45 AM MD Kori Hansen   2022 10:40 AM ALESSIA Aldana CNP [facility] None   2022  9:00 AM ALESSIA Grady CNP [facility]   2022  8:30 AM MD Kori Medina, IDANIA

## 2022-09-02 ENCOUNTER — CLINICAL DOCUMENTATION (OUTPATIENT)
Dept: SPIRITUAL SERVICES | Age: 82
End: 2022-09-02

## 2022-09-02 ENCOUNTER — FOLLOWUP TELEPHONE ENCOUNTER (OUTPATIENT)
Dept: TELEMETRY | Age: 82
End: 2022-09-02

## 2022-09-02 NOTE — ACP (ADVANCE CARE PLANNING)
Advance Care Planning   Ambulatory ACP Specialist Patient Outreach    Date:  9/2/2022  ACP Specialist:  Klaus Lehman    Outreach call to patient in follow-up to ACP Specialist referral from: Mindy Pimentel MD    [x] PCP  [] Provider   [] Ambulatory Care Management [] Other for Reason:    [x] Advance Directive Assistance  [] Code Status Discussion  [] Complete Portable DNR Order  [] Discuss Goals of Care  [] Complete POST/MOST  [] Early ACP Decision-Making  [] Other    Date Referral Received:7/28/22    Today's Outreach:  [] First   [] Second  [x] Third                               Third outreach made by [x]  phone  [] email []   [employer]     Intervention:  [] Spoke with Patient  [x] Left VM requesting return call      Outcome: Third Outreach. Left detailed VM for Patient to return call. Patient does not have Email or MyChar on file. Next Step:   [] ACP scheduled conversation  [] Outreach again in one week               [] Email / Mail ACP Info Sheets  [] Email / Mail Advance Directive            [x] Close Referral. Routing closure to referring provider/staff and to ACP Specialist . [] Closure Letter mailed to Patient with Invitation to Contact ACP Specialist if/when ready.     Thank you for this referral.

## 2022-09-07 ENCOUNTER — OFFICE VISIT (OUTPATIENT)
Dept: INTERNAL MEDICINE CLINIC | Age: 82
End: 2022-09-07

## 2022-09-07 VITALS
BODY MASS INDEX: 21.05 KG/M2 | HEART RATE: 60 BPM | DIASTOLIC BLOOD PRESSURE: 60 MMHG | WEIGHT: 164 LBS | SYSTOLIC BLOOD PRESSURE: 130 MMHG | HEIGHT: 74 IN | OXYGEN SATURATION: 96 % | RESPIRATION RATE: 16 BRPM

## 2022-09-07 DIAGNOSIS — D70.9 NEUTROPENIC FEVER (HCC): ICD-10-CM

## 2022-09-07 DIAGNOSIS — D61.818 PANCYTOPENIA (HCC): ICD-10-CM

## 2022-09-07 DIAGNOSIS — J18.9 PNEUMONIA DUE TO INFECTIOUS ORGANISM, UNSPECIFIED LATERALITY, UNSPECIFIED PART OF LUNG: ICD-10-CM

## 2022-09-07 DIAGNOSIS — Z09 HOSPITAL DISCHARGE FOLLOW-UP: Primary | ICD-10-CM

## 2022-09-07 DIAGNOSIS — R50.81 NEUTROPENIC FEVER (HCC): ICD-10-CM

## 2022-09-07 DIAGNOSIS — D46.9 MYELODYSPLASTIC SYNDROME (HCC): ICD-10-CM

## 2022-09-07 PROCEDURE — 99214 OFFICE O/P EST MOD 30 MIN: CPT | Performed by: NURSE PRACTITIONER

## 2022-09-07 PROCEDURE — 1111F DSCHRG MED/CURRENT MED MERGE: CPT | Performed by: NURSE PRACTITIONER

## 2022-09-07 PROCEDURE — 1123F ACP DISCUSS/DSCN MKR DOCD: CPT | Performed by: NURSE PRACTITIONER

## 2022-09-07 ASSESSMENT — ENCOUNTER SYMPTOMS
BACK PAIN: 0
SHORTNESS OF BREATH: 0
COUGH: 0

## 2022-09-07 NOTE — PROGRESS NOTES
Post-Discharge Transitional Care Follow Up      Laila Tariq   YOB: 1940    Date of Office Visit:  9/7/2022  Date of Hospital Admission: 8/25/22  Date of Hospital Discharge: 8/30/22  Readmission Risk Score (high >=14%. Medium >=10%):Readmission Risk Score: 22.4      Care management risk score Rising risk (score 2-5) and Complex Care (Scores >=6): No Risk Score On File     Non face to face  following discharge, date last encounter closed (first attempt may have been earlier): 09/01/2022     Call initiated 2 business days of discharge: Yes     Hospital discharge follow-up  -     SD DISCHARGE MEDS RECONCILED W/ CURRENT OUTPATIENT MED LIST  Pneumonia due to infectious organism, unspecified laterality, unspecified part of lung  Myelodysplastic syndrome ([address]  Pancytopenia ([address]  Neutropenic fever ([address]    Medical Decision Making: moderate complexity  No follow-ups on file. On this date 9/7/2022 I have spent 40 minutes reviewing previous notes, test results and face to face with the patient discussing the diagnosis and importance of compliance with the treatment plan as well as documenting on the day of the visit. Subjective:   HPI    Inpatient course: Discharge summary reviewed- see chart. Interval history/Current status: patient recently admitted to [facility] 8/25-8/30. Patient with MDS, h/o MI, ICM, a-fib, Hypertension, CKD, depression, hyperlipidemia, and BPH presented with shortness of breath, vomiting, and fevers. He was treated for pneumonia, neutropenic fever. Labs had pancytopenia. They stopped his Hydralazine and Vicodin. He completed his Levaquin and Doxycycline.   He is to follow up with Dr. Suzette Vargas and Cardiologist.    Patient Active Problem List   Diagnosis    Hypertension    Rosacea    Mixed hyperlipidemia    Enlarged prostate with urinary obstruction    Taste perversion    Anosmia    Back pain    Nonrheumatic aortic valve insufficiency    Bilateral carotid bruits    Current moderate episode of major depressive disorder without prior episode ([facility]    Atrial fibrillation with RVR ([facility])    VINNY (acute kidney injury) ([facility]    Acute anemia    GI bleed    Thrombocytopenia ([facility])    Arrhythmia, atrial    Elevated troponin    Cardiomyopathy ([facility])    NSVT (nonsustained ventricular tachycardia) ([facility])    Myelodysplastic syndrome ([facility])    Chronic systolic (congestive) heart failure    Angina pectoris, unspecified    Atherosclerotic heart disease of native coronary artery with unspecified angina pectoris    Acute hypoxemic respiratory failure ([facility])    Anemia    Pancytopenia ([facility] [address]    Pneumonia due to infectious organism    Acute on chronic combined systolic and diastolic CHF (congestive heart failure) ([facility]    Pulmonary congestion    Cor pulmonale ([facility])    Constipation    Acute delirium       Medications listed as ordered at the time of discharge from hospital     Medication List            Accurate as of September 7, 2022  8:46 AM. If you have any questions, ask your nurse or doctor. CONTINUE taking these medications      carvedilol 6.25 MG tablet  Commonly known as: COREG  Take 1 tablet by mouth in the morning and 1 tablet before bedtime. fenofibrate 160 MG tablet  Commonly known as: TRIGLIDE  TAKE ONE TABLET BY MOUTH DAILY     FLUoxetine 10 MG capsule  Commonly known as: PROZAC  Take one capsule by mouth daily. furosemide 20 MG tablet  Commonly known as: Lasix  Take 1 tablet by mouth in the morning. isosorbide mononitrate 60 MG extended release tablet  Commonly known as: IMDUR  Take 1 tablet by mouth in the morning. tamsulosin 0.4 MG capsule  Commonly known as: FLOMAX  Take 1 capsule by mouth in the morning.      traZODone 50 MG tablet  Commonly known as: DESYREL  Take 1 tablet by mouth nightly     vitamin C 500 MG tablet  Commonly known as: ASCORBIC ACID               Medications marked \"taking\" at this time  No outpatient medications have been marked as taking for the 9/7/22 Ulloa's method estimated at 48%. Normal left   ventricular size with mild concentric left ventricular hypertrophy. Hypokinesis of the basal to mid inferoseptal/anteroseptal/inferior wall   segments. Grade I diastolic dysfunction with normal filling pressure. Compared to last echo on 6/29/2022, no change in EF. Completed Echo 6/28/22   Summary   Normal left ventricular size with mild concentric left ventricular   hypertrophy. The left ventricular systolic function is mildly reduced with an ejection   fraction of 45 - 50 %. Basal to mid inferior, basal inferoseptal hypokinesis. Septal bounce noted. Grade I diastolic dysfunction with normal filling pressure. Normal right ventricular size with normal function. Mild mitral and tricuspid regurgitation. Mild aortic stenosis. Systolic pulmonic artery pressure (SPAP) is estimated at 48 mmHg consistent   with mild pulmonary hypertension (Right atrial pressure of 8 mmHg). Compared with the previous study performed 5-, left ventricular   function decreased with new regional wall motion abnormalities noted. Assessment/Plan    Pancytopenia /MDS, Neutropenic Fever with pneumonia  -Completed Doxycycline 100 mg twice daily for 3 days, levofloxacin 500 mg daily for 3 days  -Follow-up with oncology    Chronic combined systolic and diastolic CHF, pulmonary congestion, cor pulmonale  -Continue home meds: carvedilol 6.25mg po BID, isosorbide mononitrate 60mg po daily  -Oral Lasix 20 mg daily  -F/w Cardiologist.     HTN  -BP stable  -Continue taking Coreg 6.25 mg twice daily  -Discontinued hydralazine 100 mg  -BP is stable. CKD stage III  -Oral Lasix 20 mg daily     Major depressive disorder  -Continue patient home meds: Fluoxetine 10mg po daily, trazodone 50mg po nightly     HLD  -continue home med: fenofibrate 160mg po daily    An electronic signature was used to authenticate this note.   --ALESSIA Hartmann - CNP  9/7/2022

## 2022-09-07 NOTE — TELEPHONE ENCOUNTER
I attempted to contact patient to reschedule his Watchman consult.   Patient was sick on 9/2/2022 appointment

## 2022-09-09 ENCOUNTER — CARE COORDINATION (OUTPATIENT)
Dept: CASE MANAGEMENT | Age: 82
End: 2022-09-09

## 2022-09-13 ENCOUNTER — HOSPITAL ENCOUNTER (OUTPATIENT)
Age: 82
Discharge: HOME | End: 2022-09-13
Payer: COMMERCIAL

## 2022-09-13 ENCOUNTER — OFFICE VISIT (OUTPATIENT)
Dept: CARDIOLOGY CLINIC | Age: 82
End: 2022-09-13
Payer: COMMERCIAL

## 2022-09-13 VITALS
HEART RATE: 59 BPM | WEIGHT: 164 LBS | BODY MASS INDEX: 21.05 KG/M2 | HEIGHT: 74 IN | SYSTOLIC BLOOD PRESSURE: 124 MMHG | OXYGEN SATURATION: 96 % | DIASTOLIC BLOOD PRESSURE: 54 MMHG

## 2022-09-13 DIAGNOSIS — I25.5 ISCHEMIC CARDIOMYOPATHY: ICD-10-CM

## 2022-09-13 DIAGNOSIS — I25.10 CORONARY ARTERY DISEASE INVOLVING NATIVE CORONARY ARTERY OF NATIVE HEART WITHOUT ANGINA PECTORIS: Primary | ICD-10-CM

## 2022-09-13 LAB
ABO/RH: NORMAL
ANTIBODY SCREEN: NORMAL

## 2022-09-13 PROCEDURE — 99214 OFFICE O/P EST MOD 30 MIN: CPT | Performed by: NURSE PRACTITIONER

## 2022-09-13 PROCEDURE — 86900 BLOOD TYPING SEROLOGIC ABO: CPT

## 2022-09-13 PROCEDURE — 36430 TRANSFUSION BLD/BLD COMPNT: CPT

## 2022-09-13 PROCEDURE — 1123F ACP DISCUSS/DSCN MKR DOCD: CPT | Performed by: NURSE PRACTITIONER

## 2022-09-13 PROCEDURE — 86923 COMPATIBILITY TEST ELECTRIC: CPT

## 2022-09-13 PROCEDURE — P9040 RBC LEUKOREDUCED IRRADIATED: HCPCS

## 2022-09-13 PROCEDURE — 86901 BLOOD TYPING SEROLOGIC RH(D): CPT

## 2022-09-13 PROCEDURE — 86850 RBC ANTIBODY SCREEN: CPT

## 2022-09-13 RX ORDER — ROSUVASTATIN CALCIUM 20 MG/1
20 TABLET, COATED ORAL DAILY
Qty: 30 TABLET | Refills: 3 | Status: SHIPPED | OUTPATIENT
Start: 2022-09-13

## 2022-09-13 NOTE — PROGRESS NOTES
[facility]   Cardiology Note              Date:  September 13, 2022  Patientname: Bella Hernandez  YOB: 1940    Primary Care physician: Rivas Sue MD    HISTORY OF PRESENT ILLNESS: Bella Hernandez is a 79 y.o. male with a history of presumed CAD, atrial arrhythmias, CHF, HTN, HLD, CKD, MDS. He presented 6/28/2022 for body aches and found to have AF RVR, VINNY, and anemia. Echo showed EF 45-50%. Eventually diagnosed with myelodysplastic syndrome. No ischemia evaluation or anticoagulants due to MDS. Today he presents for follow up for presumed CAD and CHF. He is in a wheelchair today. He feels good and Denies chest pain, shortness of breath, palpitations and dizziness. He has chronic edema he states is at baseline. He is not very active at home. Patient's family members would like him to take a statin. Office weight today 9/14/2022: 164 lbs  Hospital discharge weight 8/30/2022: 161 lbs    Past Medical History:   has a past medical history of Anosmia, Backpain, Bilateral carotid bruits, Chronic kidney disease, Current moderate episode of major depressive disorder without prior episode ([address] Hyperlipidemia, Hypertension, Hypertrophy of prostate without urinary obstruction and other lower urinary tract symptoms (LUTS), Nonrheumatic aortic valve insufficiency, Rosacea, and Taste perversion. Past Surgical History:   has a past surgical history that includes hernia repair; Colonoscopy (2/11/2008); Colonoscopy (11/14/2014); and CT BIOPSY BONE MARROW (7/5/2022). Home Medications:    Prior to Admission medications    Medication Sig Start Date End Date Taking? Authorizing Provider   carvedilol (COREG) 6.25 MG tablet Take 1 tablet by mouth in the morning and 1 tablet before bedtime. 8/15/22   Lauren Navarro MD   isosorbide mononitrate (IMDUR) 60 MG extended release tablet Take 1 tablet by mouth in the morning.  8/4/22   Ana Hewitt MD   traZODone (DESYREL) 50 MG tablet Take 1 tablet by mouth nightly 7/28/22   Vallecitotracy Howell, MD   FLUoxetine (PROZAC) 10 MG capsule Take one capsule by mouth daily. 7/28/22   Vallecito Dante, MD   fenofibrate (TRIGLIDE) 160 MG tablet TAKE ONE TABLET BY MOUTH DAILY 7/28/22   Vallecito Dante, MD   tamsulosin (FLOMAX) 0.4 MG capsule Take 1 capsule by mouth in the morning. 7/28/22   Vallecito MD Dante   furosemide (LASIX) 20 MG tablet Take 1 tablet by mouth in the morning. 7/28/22 Marlow Less, MD   Ascorbic Acid (VITAMIN C) 500 MG tablet Take 1,000 mg by mouth daily. Historical Provider, MD       Allergies:  Patient has no known allergies. Social History:   reports that he has never smoked. He has never used smokeless tobacco. He reports that he does not drink alcohol and does not use drugs. Family History: family history includes COPD in his brother and sister. Review of Systems   Review of Systems   Constitutional:  Positive for fatigue. Respiratory: Negative. Cardiovascular:  Positive for leg swelling. Negative for chest pain and palpitations. Gastrointestinal: Negative. Neurological: Negative. OBJECTIVE:    Vital signs:    BP (!) 124/54   Pulse 59   Ht 6' 2\" (1.88 m)   Wt 164 lb (74.4 kg)   SpO2 96%   BMI 21.06 kg/m²      Physical Exam:  Constitutional:  Comfortable and alert, NAD, appears older than stated age, chronically ill  Eyes: PERRL, sclera nonicteric  Neck:  Supple, no masses, no thyroidmegaly, no JVD  Skin:  Warm and dry; no rash or lesions  Heart:  Regular, normal apex, S1 and S2 normal, no M/G/R  Lungs:  Normal respiratory effort; clear; no wheezing/rhonchi/rales  Abdomen: soft, non tender, + bowel sounds  Extremities:  +BLE edema  Neuro: alert and oriented, moves legs and arms equally, normal mood and affect    Data Reviewed:      Echo 6/29/2022:  Normal left ventricular size with mild concentric left ventricular   hypertrophy.    The left ventricular systolic function is mildly reduced with an ejection   fraction of 45 - 50 %. Basal to mid inferior, basal inferoseptal hypokinesis. Septal bounce noted. Grade I diastolic dysfunction with normal filling pressure. Normal right ventricular size with normal function. Mild mitral and tricuspid regurgitation. Mild aortic stenosis. Systolic pulmonic artery pressure (SPAP) is estimated at 48 mmHg consistent   with mild pulmonary hypertension (Right atrial pressure of 8 mmHg). Compared with the previous study performed 5-, left ventricular   function decreased with new regional wall motion abnormalities noted. Cardiology Labs Reviewed:   CBC: No results for input(s): WBC, HGB, HCT, PLT in the last 72 hours. BMP:No results for input(s): NA, K, CO2, BUN, CREATININE, LABGLOM, GLUCOSE in the last 72 hours. PT/INR: No results for input(s): PROTIME, INR in the last 72 hours. APTT:No results for input(s): APTT in the last 72 hours. FASTING LIPID PANEL:  Lab Results   Component Value Date/Time    HDL 27 08/25/2022 09:42 PM    LDLCALC 53 08/25/2022 09:42 PM    TRIG 75 08/25/2022 09:42 PM     LIVER PROFILE:No results for input(s): AST, ALT, ALB in the last 72 hours. BNP:   Lab Results   Component Value Date/Time    PROBNP 8,172 08/25/2022 08:28 AM    PROBNP 10,156 06/29/2022 12:31 AM     Reviewed all labs and imaging today    Assessment:   Chronic systolic CHF: appears compensated  Cardiomyopathy, likely ischemic: EF 45-50% on echo 6/2022  Paroxsymal atrial arrhythmias (AF/AFL/AT): stable, regular today              -[id number] score 3 (age, HTN)  Presumed CAD: no current angina  HTN: stable  HLD  CKD  Myelodysplastic syndrome: declined chemotherapy, transfusional support; follows with Dr. Mckenzie Mullins:   1. Continue medical management for presumed CAD/likely ischemic cardiomyopathy. He is not a candidate for ischemic evaluation or invasive cardiac procedures due to pancytopenia/MDS  2.  Continue carvedilol, imdur, lasix; not on ACE/ARB/ARNI due to labile renal function and lower BP  3. Add statin for presumed CAD  4. Lipids/LFTs in 2 months  5. Follows with EP for atrial arrhythmias. Not on anticoagulation due to anemia. 6. He has been referred to [facility], unclear if he will be able to tolerate antiplatelet therapy  7.  Follow up as planned with EP and with Dr. Becca Donaldson in 6 months    ALESSIA Torrez-CNP  [employer]  [phone]

## 2022-09-13 NOTE — PATIENT INSTRUCTIONS
Start crestor 2 mg daily for possible CAD  Blood work 2 months, Obtain fasting labs, do not eat or drink 8 hours prior, water and black coffee ok  Continue other medications  Check BP at home and call the office if consistently out of goal range  Follow up with Dr. Frank Guerra and Dr. Chris Young

## 2022-09-14 ENCOUNTER — HOSPITAL ENCOUNTER (OUTPATIENT)
Dept: NURSING | Age: 82
Setting detail: INFUSION SERIES
Discharge: HOME | End: 2022-09-14
Payer: COMMERCIAL

## 2022-09-14 VITALS
HEART RATE: 66 BPM | TEMPERATURE: 98.2 F | WEIGHT: 164 LBS | HEIGHT: 74 IN | RESPIRATION RATE: 16 BRPM | SYSTOLIC BLOOD PRESSURE: 178 MMHG | BODY MASS INDEX: 21.05 KG/M2 | DIASTOLIC BLOOD PRESSURE: 80 MMHG | OXYGEN SATURATION: 98 %

## 2022-09-14 DIAGNOSIS — D46.9 MYELODYSPLASTIC SYNDROME (HCC): Primary | ICD-10-CM

## 2022-09-14 DIAGNOSIS — D61.818 PANCYTOPENIA (HCC): ICD-10-CM

## 2022-09-14 PROCEDURE — 6370000000 HC RX 637 (ALT 250 FOR IP): Performed by: INTERNAL MEDICINE

## 2022-09-14 PROCEDURE — P9040 RBC LEUKOREDUCED IRRADIATED: HCPCS

## 2022-09-14 PROCEDURE — 36430 TRANSFUSION BLD/BLD COMPNT: CPT

## 2022-09-14 PROCEDURE — 99211 OFF/OP EST MAY X REQ PHY/QHP: CPT

## 2022-09-14 RX ORDER — DIPHENHYDRAMINE HCL 25 MG
25 TABLET ORAL ONCE
Status: COMPLETED | OUTPATIENT
Start: 2022-09-14 | End: 2022-09-14

## 2022-09-14 RX ORDER — ACETAMINOPHEN 325 MG/1
650 TABLET ORAL ONCE
Status: COMPLETED | OUTPATIENT
Start: 2022-09-14 | End: 2022-09-14

## 2022-09-14 RX ADMIN — DIPHENHYDRAMINE HCL 25 MG: 25 TABLET ORAL at 07:18

## 2022-09-14 RX ADMIN — ACETAMINOPHEN 650 MG: 325 TABLET ORAL at 07:18

## 2022-09-14 ASSESSMENT — ENCOUNTER SYMPTOMS
RESPIRATORY NEGATIVE: 1
GASTROINTESTINAL NEGATIVE: 1

## 2022-09-14 ASSESSMENT — PAIN SCALES - GENERAL
PAINLEVEL_OUTOF10: 0
PAINLEVEL_OUTOF10: 0

## 2022-09-14 NOTE — PROGRESS NOTES
2nd unit of PRBC's has infused without any signs of adverse reactions  IV site unremarkable  Pt without c/o's NS infusing to clear tubing  pt was up to BR  gait steady and jessica well  Will monitor

## 2022-09-14 NOTE — PROGRESS NOTES
Blood tubing clear  IV was removed  Cath tip intact  Pressure then pressure dressing applied and was secured with a coban dressing  IV site unremarkable  Pt jessica well  Discharge instructions were then reviewed with pt and his family and copy was given  Understanding was verbalized  Pt was discharged via w/c with family in stable condition

## 2022-09-14 NOTE — PROGRESS NOTES
No changes noted  Pt has been resting quietly and dozing at times  awake and without c/o's at this time  1st unit of PRBC's has infused without any signs of adverse reactions  2nd unit PRBC's started  IV site unremarkable  Pt jessica well  Will stay with pt for 15 mins to monitor for signs of adverse reactions

## 2022-09-14 NOTE — PROGRESS NOTES
Pt here via w/c with family for 2 units of PRBC's  Pt without c/o's today but reports he feels very tired and weak   Current H&H is 6.1 & 18.2 Informed consent has been received from Dr. Landy Walton and is in the chart  Blood consent obtained  IV # 20 started in RFA on 1st attempt per myself   Pt  jessica well  Pre meds of tylenol 650 mg and benadryl 25 mg were given then 1st unit of PRBC's started at 60 cc hr  IV site unremarkable  Pt jessica well  Will stay with pt for 15 mins to monitor for signs of adverse reactions  Blankets have been applied for comfort

## 2022-09-14 NOTE — PROGRESS NOTES
Blood continues to infuse without any signs of adverse reactions  Pt dozing in short intervals when not  disturbed   IV site unremarkable   Will continue to monitor

## 2022-09-16 ENCOUNTER — CARE COORDINATION (OUTPATIENT)
Dept: CASE MANAGEMENT | Age: 82
End: 2022-09-16

## 2022-09-16 NOTE — CARE COORDINATION
[facility] Transitions Follow Up Call    2022    Patient: Lor Brower  Patient : 1940   MRN: [id number]  Reason for Admission: pancytopenia  Discharge Date: 22 RARS: Readmission Risk Score: 22.4         Spoke with: Jocelin CARDOZA paperwork reviewed    EC, Tasia Corona answered call and verified . Pleasant and agreeable to transition call. patient doing well and getting energy back. Patient had to have blood infusion earlier this week and noticed an increase of energy for the patient. Tasia Corona stated that patient continues to have difficult time sleeping at night. Discussed over the counter options and reaching out to PCP for further recommendations. Denied any acute needs at present time. Agreeable to f/u calls. Educated on the use of urgent care or physicians 24 hr access line if assistance is needed after hours. Care Transitions Subsequent and Final Call    Subsequent and Final Calls  Do you have any ongoing symptoms?: No  Have your medications changed?: No  Do you have any questions related to your medications?: No  Do you currently have any active services?: No  Do you have any needs or concerns that I can assist you with?: No  Identified Barriers: None  Care Transitions Interventions  Other Interventions:              Follow Up  Future Appointments   Date Time Provider [place]i [place]   2022  8:30 AM MD Allison Schmidt [facility]   10/7/2022  8:15 AM MD Allison Dent RN

## 2022-09-23 ENCOUNTER — CARE COORDINATION (OUTPATIENT)
Dept: CASE MANAGEMENT | Age: 82
End: 2022-09-23

## 2022-09-23 RX ORDER — TRAZODONE HYDROCHLORIDE 50 MG/1
TABLET ORAL
Qty: 30 TABLET | Refills: 0 | Status: SHIPPED | OUTPATIENT
Start: 2022-09-23

## 2022-09-23 RX ORDER — FUROSEMIDE 20 MG/1
TABLET ORAL
Qty: 30 TABLET | Refills: 0 | Status: SHIPPED | OUTPATIENT
Start: 2022-09-23

## 2022-09-23 NOTE — CARE COORDINATION
[facility] Transitions Follow Up Call    2022    Patient: Maria R Villa  Patient : 1940   MRN: [id number]  Reason for Admission: pancytopenia  Discharge Date: 22 RARS: Readmission Risk Score: 22.4         Spoke with: na    Attempted to reach patient via phone for transition call. VM left stating purpose of call along with my contact information requesting a return call. Care Transitions Subsequent and Final Call    Subsequent and Final Calls  Care Transitions Interventions  Other Interventions:              Follow Up  Future Appointments   Date Time Provider Candi [place]   2022  8:30 AM Staci Eisenmenger, MD [facility] Patron [address] [place] [address]   10/7/2022  8:15 AM MD Giovanni Chery [facility]       Kenya Priest RN

## 2022-09-26 ENCOUNTER — OFFICE VISIT (OUTPATIENT)
Dept: CARDIOLOGY CLINIC | Age: 82
End: 2022-09-26
Payer: COMMERCIAL

## 2022-09-26 VITALS
HEIGHT: 74 IN | BODY MASS INDEX: 21.05 KG/M2 | WEIGHT: 164 LBS | OXYGEN SATURATION: 96 % | SYSTOLIC BLOOD PRESSURE: 104 MMHG | DIASTOLIC BLOOD PRESSURE: 52 MMHG | HEART RATE: 70 BPM

## 2022-09-26 DIAGNOSIS — I47.29 NSVT (NONSUSTAINED VENTRICULAR TACHYCARDIA): ICD-10-CM

## 2022-09-26 DIAGNOSIS — I42.9 CARDIOMYOPATHY, UNSPECIFIED TYPE (HCC): ICD-10-CM

## 2022-09-26 DIAGNOSIS — I48.92 ATRIAL FLUTTER, UNSPECIFIED TYPE (HCC): ICD-10-CM

## 2022-09-26 DIAGNOSIS — I48.91 ATRIAL FIBRILLATION WITH RVR (HCC): Primary | ICD-10-CM

## 2022-09-26 PROCEDURE — 1123F ACP DISCUSS/DSCN MKR DOCD: CPT | Performed by: INTERNAL MEDICINE

## 2022-09-26 PROCEDURE — 93000 ELECTROCARDIOGRAM COMPLETE: CPT | Performed by: INTERNAL MEDICINE

## 2022-09-26 PROCEDURE — 99214 OFFICE O/P EST MOD 30 MIN: CPT | Performed by: INTERNAL MEDICINE

## 2022-09-26 PROCEDURE — 93244 EXT ECG>48HR<7D REV&INTERPJ: CPT | Performed by: INTERNAL MEDICINE

## 2022-09-26 ASSESSMENT — ENCOUNTER SYMPTOMS
RIGHT EYE: 0
LEFT EYE: 0
HEMATOCHEZIA: 0
HEMATEMESIS: 0
SHORTNESS OF BREATH: 0
WHEEZING: 0
STRIDOR: 0

## 2022-09-26 NOTE — PROGRESS NOTES
Assessment:     1. Atrial flutter/atrial arrhythmia: Patient was hospitalized in late June 2022 for issues related to generalized body aches. During that admission, he was found to have frequent episodes of atrial arrhythmia with rapid ventricular rates and left bundle branch block. The appearance of the ECGs is most suggestive of atrial flutter with variable conduction (mostly 2:1). He does have a hint of ventricular preexcitation on his resting sinus rhythm ECG. There were no episodes of pre-excitated of atrial fibrillation noted. Of note, he was also noted to have mild LV systolic dysfunction with some regional wall motion abnormalities. Due to the finding of pancytopenia, decision was made to avoid invasive procedures including coronary angiography. He was started on medical therapy. Due to issues related to hypotension, the doses of many of his medications had to be reduced. Patient denies any significant palpitations with only a sensation of an irregular heartbeat for a few seconds with no associated symptoms. Given his overall health condition, he is not a suitable candidate for invasive procedures at this time. Rate control with beta-blockers is reasonable as long as the blood pressure tolerates it. His ECG today shows marked sinus rhythm with first-degree AV block, (last time heart rate in the 40s). He does not have dizziness and has not had episodes of syncope. Continue with the reduced dose of (6.25 mg twice daily). Patient has an elevated [id number] score. However, given his recent bleeding issues and marked pancytopenia, he is not a suitable candidate for anticoagulation. The risks and benefits of anticoagulation in this case were discussed with the patient and his family; they are in agreement with above plan. 2.  Cardiomyopathy: Newly noted with LVEF of 45-50%. On medical therapy. No significant evidence of volume overload. Evaluation as above.       3.  Hypotension: improved with medication changes. As above (BP 86/40 upon arrival). Monitor closely. Plan:     Continue current cardiac medications as prescribed. Follow up with me in 4 months    Subjective:       Patient ID: Estephania Potts is a 79 y.o. male. Chief Complaint:  Chief Complaint   Patient presents with    Follow-up    Atrial Fibrillation       HPI    Patient is a pleasant 79 y.o. male who presents for evaluation of AF/AT/AFL. Patient presented to hospital on 6/28/2022 with general body aches. ECG showed atrial arrhythmias with RVR and wide complex. Labs showed thrombocytopenia and anemia. He was also in new onset cardiomyopathy. [address] was deferred at the time due to anemia. Office Visit ([address], 8/15/2022): Patient is here today for EP evaluation after his hospital discharge and cardiology clinic visit. He presents in a wheelchair. He reports occasional palpitations lasting few seconds with no associated symptoms. He is taking his medications as prescribed. Patient denies current edema, chest pain, shortness of breath, dizziness or syncope. Has not noted any new bleeding issues. Hospital Discharge Summary (08/25/2022-08/30/2022)  Hospital Course: 79 y.o. male with PMHx of MDS, MI, ICM, Afib, HTN, CKD, Depression, Hyperlipidemia, BPH who presented to [facility] with shortness of breath, vomiting, fever. Said he woke up 8/25/2022 morning and began vomiting. Also was short of breath and dyspneic at rest but even worse with exertion. He felt fine when he went to bed last night. He felt that he was getting sicker and sicker as the day went on, and so his roommate Christiano Hodge brought him to ER. Patient was worked up for possible pneumonia due to fever and shortness of breath, chest x-ray showed bilateral pulmonary infiltrates, CBC showed pancytopenia, troponin and procalcitonin were elevated. 8/27/2022 patient was able to walk to chair with PT help and slowly weaned down on O2.  CXR on this day indicated stable pattern of ground-glass and interstitial opacities consistent with pulmonary edema   8/28/2022 patient was further weaned off of O2 and was able to breathe on room air   8/30/2022 Patient was seen in AM. He is responsive but very disoriented. He is alert but only oriented to self, not oriented to place or time. Nurse states patient was slightly confused this morning around 0800, and I received page from nurse around 1000 about patient being further confused and disoriented. Upon patient family arriving to hospital, he has calmed down and become less disoriented. Follow up around 1530 showed patient being much more responsive and oriented. Patient and family were urging for discharge at this time, and they indicated his confusion was due to hospital stay. Patient was discharged without home health due to patient and family refusal.    Patient wore a cardiac event monitor from 08/23/2022 to 08/26/2022 which demonstrated predominately SR with an average HR of 73 (). PAC burden 0.20%, PVC burden 1.18%. Office Visit ([facility], 09/26/2022)  Today the patient presents for follow up. He presents today in a wheelchair. He states that he has been feeling well overall since being discharged. He reports he does occasionally feel palpitations but they are only a few seconds in duration. Patient denies current edema, chest pain, shortness of breath, dizziness or syncope. He denies any recent issues with bleeding or bruising. Patient is taking all cardiac medications as prescribed and tolerates them well. Review of Systems  Review of Systems   Constitutional: Negative for malaise/fatigue, weight gain and weight loss. HENT:  Negative for nosebleeds and stridor. Eyes:  Negative for vision loss in left eye and vision loss in right eye. Cardiovascular:  Positive for palpitations. Negative for chest pain, dyspnea on exertion, leg swelling and syncope.    Respiratory:  Negative for shortness of breath and wheezing. Hematologic/Lymphatic: Negative for bleeding problem. Does not bruise/bleed easily. Skin:  Negative for itching and rash. Musculoskeletal:  Negative for joint pain and joint swelling. Gastrointestinal:  Negative for hematemesis and hematochezia. Genitourinary:  Negative for dysuria and hematuria. Neurological:  Negative for dizziness and light-headedness. Psychiatric/Behavioral:  Negative for altered mental status. The patient is not nervous/anxious. Past Medical History:   Diagnosis Date    Anosmia 8/19/2011    Backpain     Bilateral carotid bruits 9/6/2016    Chronic kidney disease 9/6/2016    Current moderate episode of major depressive disorder without prior episode ([facility] [address] 1/23/2019    Hyperlipidemia     Hypertension     Hypertrophy of prostate without urinary obstruction and other lower urinary tract symptoms (LUTS)     Nonrheumatic aortic valve insufficiency 9/6/2016    Rosacea     Taste perversion 8/19/2011         Social History     Socioeconomic History    Marital status:       Spouse name: Not on file    Number of children: Not on file    Years of education: Not on file    Highest education level: Not on file   Occupational History    Not on file   Tobacco Use    Smoking status: Never    Smokeless tobacco: Never   Vaping Use    Vaping Use: Never used   Substance and Sexual Activity    Alcohol use: No    Drug use: No    Sexual activity: Not Currently     Partners: Female   Other Topics Concern    Not on file   Social History Narrative    Not on file     Social Determinants of Health     Financial Resource Strain: Not on file   Food Insecurity: Not on file   Transportation Needs: Not on file   Physical Activity: Not on file   Stress: Not on file   Social Connections: Not on file   Intimate Partner Violence: Not on file   Housing Stability: Not on file         Family History   Problem Relation Age of Onset    COPD Sister     COPD Brother Birth defect         Objective:     BP (!) 104/52   Pulse 70   Ht 6' 2\" (1.88 m)   Wt 164 lb (74.4 kg)   SpO2 96%   BMI 21.06 kg/m²     Physical Exam  Constitutional:       Appearance: Normal appearance. HENT:      Head: Normocephalic and atraumatic. Nose: Nose normal. No rhinorrhea. Eyes:      General: No scleral icterus. Conjunctiva/sclera: Conjunctivae normal.   Cardiovascular:      Rate and Rhythm: Normal rate and regular rhythm. Pulmonary:      Effort: Pulmonary effort is normal.      Breath sounds: Normal breath sounds. Abdominal:      General: There is no distension. Musculoskeletal:         General: Normal range of motion. Cervical back: Normal range of motion and neck supple. Skin:     General: Skin is warm and dry. Neurological:      General: No focal deficit present. Mental Status: He is alert and oriented to person, place, and time. Psychiatric:         Mood and Affect: Mood normal.         Behavior: Behavior normal.       ECG Interpretation: (Date: 09/26/2022)  Rhythm:Sinus Rhythm   Rate: 66 BPM      ECG Interpretation:  (Date: 8/15/2022)  Rhythm: Sinus Bradycardia  Rate: 46 BPM  PAC's / PVC's present: No  Conduction abnormalities: None  Axis: normal    Limited Echocardiogram (Date: 08/26/2022)  Summary   Limited echo for LV function with limited doppler/no color. -- The left ventricular systolic function is mildly reduced with an ejection   fraction of 45 - 50 %. EF by Ulloa's method estimated at 48%. Normal left   ventricular size with mild concentric left ventricular hypertrophy. Hypokinesis of the basal to mid inferoseptal/anteroseptal/inferior wall   segments. Grade I diastolic dysfunction with normal filling pressure. Compared to last echo on 6/29/2022, no change in EF. Echocardiogram  (Date: 6/29/2022)    Summary  Normal left ventricular size with mild concentric left ventricular  hypertrophy.   The left ventricular systolic function is mildly reduced with an ejection  fraction of 45 - 50 %. Basal to mid inferior, basal inferoseptal hypokinesis. Septal bounce noted. Grade I diastolic dysfunction with normal filling pressure. Normal right ventricular size with normal function. Mild mitral and tricuspid regurgitation. Mild aortic stenosis. Systolic pulmonic artery pressure (SPAP) is estimated at 48 mmHg consistent  with mild pulmonary hypertension (Right atrial pressure of 8 mmHg). Stress Test (Date: N/A)        Current Medications     Current Outpatient Medications   Medication Sig Dispense Refill    furosemide (LASIX) 20 MG tablet TAKE ONE TABLET BY MOUTH EVERY MORNING 30 tablet 0    traZODone (DESYREL) 50 MG tablet TAKE ONE TABLET BY MOUTH ONCE NIGHTLY 30 tablet 0    rosuvastatin (CRESTOR) 20 MG tablet Take 1 tablet by mouth daily 30 tablet 3    carvedilol (COREG) 6.25 MG tablet Take 1 tablet by mouth in the morning and 1 tablet before bedtime. 60 tablet 2    isosorbide mononitrate (IMDUR) 60 MG extended release tablet Take 1 tablet by mouth in the morning. 90 tablet 3    FLUoxetine (PROZAC) 10 MG capsule Take one capsule by mouth daily. 90 capsule 0    fenofibrate (TRIGLIDE) 160 MG tablet TAKE ONE TABLET BY MOUTH DAILY 90 tablet 0    tamsulosin (FLOMAX) 0.4 MG capsule Take 1 capsule by mouth in the morning. 90 capsule 1    Ascorbic Acid (VITAMIN C) 500 MG tablet Take 1,000 mg by mouth daily. No current facility-administered medications for this visit.            Lab Review     Lab Results   Component Value Date/Time     08/30/2022 03:06 AM    K 3.3 08/30/2022 03:06 AM    CL 98 08/30/2022 03:06 AM    CO2 28 08/30/2022 03:06 AM    BUN 36 08/30/2022 03:06 AM    CREATININE 1.3 08/30/2022 03:06 AM    GLUCOSE 111 08/30/2022 03:06 AM    GLUCOSE 78 01/12/2012 02:47 PM    CALCIUM 9.6 08/30/2022 03:06 AM        Lab Results   Component Value Date    WBC 1.7 (LL) 08/30/2022    HGB 8.2 (L) 08/30/2022    HCT 23.7 (L) 08/30/2022    MCV 92.5 08/30/2022    PLT 31 (L) 08/30/2022       Lab Results   Component Value Date    TSHFT4 0.88 06/30/2022    TSH 1.65 06/29/2022       No results found for: BNP    I, Cecil Rao RN, am scribing for and in the presence of Dr. Nathan Belter. 09/26/22 8:55 AM   Cecil Rao RN

## 2022-09-27 ENCOUNTER — CARE COORDINATION (OUTPATIENT)
Dept: CASE MANAGEMENT | Age: 82
End: 2022-09-27

## 2022-09-27 NOTE — CARE COORDINATION
[facility] Transitions Follow Up Call    2022    Patient: Sherrel Jeans  Patient : 1940   MRN: [id number]  Reason for Admission: pancytopenia  Discharge Date: 22 RARS: Readmission Risk Score: 22.4         Spoke with: [address] Transitions Follow Up Call    Needs to be reviewed by the provider   Additional needs identified to be addressed with provider: No  none             Method of communication with provider : none      Care Transition Nurse contacted the patient by telephone to follow up after admission. Verified name and  with patient as identifiers. Addressed changes since last contact: none  Discussed follow-up appointments. If no appointment was previously scheduled, appointment scheduling offered: Yes. Is follow up appointment scheduled within 7 days of discharge? Yes. CTN reviewed discharge instructions, medical action plan and red flags with patient and discussed any barriers to care and/or understanding of plan of care after discharge. Discussed appropriate site of care based on symptoms and resources available to patient including: PCP  Specialist. The patient agrees to contact the PCP office for questions related to their healthcare. Patients top risk factors for readmission:  atrial fib  Interventions to address risk factors:  follow up with PCP       Non-[facility] follow up appointment(s): na  Patient answered call and verified . Patient pleasant and agreeable to transition call. Patient doing well and appreciative of follow up call. Patient seen by provider yesterday and no changes in medications. Confirmed that he is taking as directed. Denied any acute needs at present time. Agreeable to f/u calls. Educated on the use of urgent care or physicians 24 hr access line if assistance is needed after hours. CTN provided contact information for future needs. Plan for follow-up call in 7-10 days based on severity of symptoms and risk factors.       Care Transitions Subsequent and Final Call    Subsequent and Final Calls  Do you have any ongoing symptoms?: No  Have your medications changed?: No  Do you have any questions related to your medications?: No  Do you currently have any active services?: No  Do you have any needs or concerns that I can assist you with?: No  Identified Barriers: None  Care Transitions Interventions  Other Interventions:              Follow Up  Future Appointments   Date Time Provider Candi Lake   10/7/2022  8:15 AM MD Kush Corrigan   1/30/2023 10:45 AM MD Kush Vazquez RN

## 2022-09-28 ENCOUNTER — HOSPITAL ENCOUNTER (OUTPATIENT)
Age: 82
Discharge: HOME | End: 2022-09-28
Payer: COMMERCIAL

## 2022-09-28 LAB
ABO/RH: NORMAL
ANTIBODY SCREEN: NORMAL

## 2022-09-28 PROCEDURE — P9040 RBC LEUKOREDUCED IRRADIATED: HCPCS

## 2022-09-28 PROCEDURE — 86901 BLOOD TYPING SEROLOGIC RH(D): CPT

## 2022-09-28 PROCEDURE — 86923 COMPATIBILITY TEST ELECTRIC: CPT

## 2022-09-28 PROCEDURE — 86850 RBC ANTIBODY SCREEN: CPT

## 2022-09-28 PROCEDURE — 86900 BLOOD TYPING SEROLOGIC ABO: CPT

## 2022-09-29 ENCOUNTER — HOSPITAL ENCOUNTER (OUTPATIENT)
Dept: NURSING | Age: 82
Setting detail: INFUSION SERIES
Discharge: HOME | End: 2022-09-29
Payer: COMMERCIAL

## 2022-09-29 VITALS
OXYGEN SATURATION: 98 % | TEMPERATURE: 98.6 F | WEIGHT: 164 LBS | HEART RATE: 57 BPM | SYSTOLIC BLOOD PRESSURE: 144 MMHG | RESPIRATION RATE: 16 BRPM | HEIGHT: 74 IN | DIASTOLIC BLOOD PRESSURE: 71 MMHG | BODY MASS INDEX: 21.05 KG/M2

## 2022-09-29 DIAGNOSIS — D46.9 MYELODYSPLASTIC SYNDROME (HCC): Primary | ICD-10-CM

## 2022-09-29 DIAGNOSIS — D61.818 PANCYTOPENIA (HCC): ICD-10-CM

## 2022-09-29 PROCEDURE — P9040 RBC LEUKOREDUCED IRRADIATED: HCPCS

## 2022-09-29 PROCEDURE — 36430 TRANSFUSION BLD/BLD COMPNT: CPT

## 2022-09-29 PROCEDURE — 99211 OFF/OP EST MAY X REQ PHY/QHP: CPT

## 2022-09-29 PROCEDURE — 6370000000 HC RX 637 (ALT 250 FOR IP): Performed by: NURSE PRACTITIONER

## 2022-09-29 RX ORDER — ACETAMINOPHEN 325 MG/1
650 TABLET ORAL ONCE
Status: COMPLETED | OUTPATIENT
Start: 2022-09-29 | End: 2022-09-29

## 2022-09-29 RX ORDER — DIPHENHYDRAMINE HCL 25 MG
25 TABLET ORAL ONCE
Status: COMPLETED | OUTPATIENT
Start: 2022-09-29 | End: 2022-09-29

## 2022-09-29 RX ADMIN — ACETAMINOPHEN 650 MG: 325 TABLET ORAL at 07:23

## 2022-09-29 RX ADMIN — DIPHENHYDRAMINE HCL 25 MG: 25 TABLET ORAL at 07:23

## 2022-09-29 ASSESSMENT — PAIN SCALES - GENERAL
PAINLEVEL_OUTOF10: 0
PAINLEVEL_OUTOF10: 0

## 2022-09-29 NOTE — DISCHARGE INSTRUCTIONS
Blood Transfusion Information  WHAT IS A BLOOD TRANSFUSION? A transfusion is the replacement of blood or some of its parts. Blood is made up of multiple cells which provide different functions. Red blood cells carry oxygen and are used for blood loss replacement. White blood cells fight against infection. Platelets control bleeding. Plasma helps clot blood. Other blood products are available for specialized needs, such as hemophilia or other clotting disorders. BEFORE THE TRANSFUSION   Who gives blood for transfusions? You may be able to donate blood to be used at a later date on yourself (autologous donation). Relatives can be asked to donate blood. This is generally not any safer than if you have received blood from a stranger. The same precautions are taken to ensure safety when a relative's blood is donated. Healthy volunteers who are fully evaluated to make sure their blood is safe. This is blood bank blood. Transfusion therapy is the safest it has ever been in the practice of medicine. Before blood is taken from a donor, a complete history is taken to make sure that person has no history of diseases nor engages in risky social behavior (examples are intravenous drug use or sexual activity with multiple partners ) The donor's travel history is screened to minimize risk of transmitting infections, such as malaria. The donated blood is tested for signs of infectious diseases, such as HIV and hepatitis. The blood is then tested to be sure it is compatible with you in order to minimize the chance of a transfusion reaction. If you or a relative donates blood, this is often done in anticipation of surgery and is not appropriate for emergency situations. It takes many days to process the donated blood. RISKS AND COMPLICATIONS  Although transfusion therapy is very safe and saves many lives, the main dangers of transfusion include:   Getting an infectious disease. Developing a transfusion reaction. This is an allergic reaction to something in the blood you were given. Every precaution is taken to prevent this. The decision to have a blood transfusion has been considered carefully by your caregiver before blood is given. Blood is not given unless the benefits outweigh the risks. AFTER THE TRANSFUSION  Right after receiving a blood transfusion, you will usually feel much better and more energetic. This is especially true if your red blood cells have gotten low (anemic). The transfusion raises the level of the red blood cells which carry oxygen, and this usually causes an energy increase. The nurse administering the transfusion will monitor you carefully for complications. HOME CARE INSTRUCTIONS   No special instructions are needed after a transfusion. You may find your energy is better. Speak with your caregiver about any limitations on activity for underlying diseases you may have. SEEK MEDICAL CARE IF:   Your condition is not improving after your transfusion. You develop redness or irritation at the intravenous (IV) site. SEEK IMMEDIATE MEDICAL CARE IF:   Any of the following symptoms occur over the next 12 hours:  Shaking chills. You have a temperature by mouth above 102° F (38.9° C), not controlled by medicine. Chest, back, or muscle pain. People around you feel you are not acting correctly or are confused. Shortness of breath or difficulty breathing. Dizziness and fainting. You get a rash or develop hives. You have a decrease in urine output. Your urine turns a dark color or changes to pink, red, or brown. Any of the following symptoms occur over the next 10 days: You have a temperature by mouth above 102° F (38.9° C), not controlled by medicine. Shortness of breath. Weakness after normal activity. The white part of the eye turns yellow (jaundice). You have a decrease in the amount of urine or are urinating less often.   Your urine turns a dark color or changes to pink, red, or brown.        In case of emergency,  CALL 911  . FOLLOW UP WITH DR. ROD AS NEEDED OR SCHEDULED

## 2022-09-29 NOTE — PROGRESS NOTES
No changes noted   Pt without c/o's at this time  1st unit of PRBC's has infused without any signs of adverse reactions  2nd unit PRBC's started  IV site unremarkable  Pt jessica well  Will stay with pt for 15 mins to monitor for signs of adverse reactions

## 2022-09-29 NOTE — PROGRESS NOTES
Pt here via w/c with family for 2 units of PRBC's  Pt without c/o's today but reports he feels very tired and weak   Current Hgb is 5.7  Informed consent has been received from Dr. Buzz Dillon office and is on the chart  Blood consent obtained  IV # 22 started in RFA on 1st attempt per myself   Pt  jessica well  Pre meds of tylenol 650 mg and benadryl 25 mg were given then 1st unit of PRBC's started at 60 cc hr  IV site unremarkable  Pt jessica well  Will stay with pt for 15 mins to monitor for signs of adverse reactions  Blankets have been applied for comfort

## 2022-09-30 DIAGNOSIS — I48.91 ATRIAL FIBRILLATION WITH RVR (HCC): ICD-10-CM

## 2022-10-04 ENCOUNTER — CARE COORDINATION (OUTPATIENT)
Dept: CASE MANAGEMENT | Age: 82
End: 2022-10-04

## 2022-10-04 NOTE — CARE COORDINATION
[facility] Care Transitions Follow Up Call  Patient: Bal Cox  Patient : 1940   MRN: [id number]  Reason for Admission: pancytopenia  Discharge Date: 22 RARS: Readmission Risk Score: 22.4      Needs to be reviewed by the provider   Additional needs identified to be addressed with provider: No  none             Attempted to reach patient via phone for transition call. VM left stating purpose of call along with my contact information requesting a return call.       Follow Up  Future Appointments   Date Time Provider Candi Lake   10/7/2022  8:15 AM MD Josafat Layne   2023 10:45 AM MD Josafat Miller     Care Transitions Subsequent and Final Call    Subsequent and Final Calls  Care Transitions Interventions  Other Interventions:           Karen Bowers RN

## 2022-10-05 ENCOUNTER — HOSPITAL ENCOUNTER (EMERGENCY)
Age: 82
Discharge: HOME | End: 2022-10-05
Attending: EMERGENCY MEDICINE
Payer: COMMERCIAL

## 2022-10-05 VITALS
DIASTOLIC BLOOD PRESSURE: 63 MMHG | HEART RATE: 65 BPM | OXYGEN SATURATION: 97 % | BODY MASS INDEX: 21.2 KG/M2 | WEIGHT: 160 LBS | SYSTOLIC BLOOD PRESSURE: 147 MMHG | HEIGHT: 73 IN | RESPIRATION RATE: 20 BRPM | TEMPERATURE: 98.1 F

## 2022-10-05 DIAGNOSIS — D69.6 THROMBOCYTOPENIA (HCC): Primary | ICD-10-CM

## 2022-10-05 DIAGNOSIS — D64.9 NORMOCYTIC ANEMIA: ICD-10-CM

## 2022-10-05 DIAGNOSIS — N17.9 AKI (ACUTE KIDNEY INJURY) (HCC): ICD-10-CM

## 2022-10-05 LAB
ABO/RH: NORMAL
ANION GAP SERPL CALCULATED.3IONS-SCNC: 8 MMOL/L (ref 3–16)
ANTIBODY SCREEN: NORMAL
BLOOD BANK DISPENSE STATUS: NORMAL
BLOOD BANK PRODUCT CODE: NORMAL
BPU ID: NORMAL
BUN BLDV-MCNC: 27 MG/DL (ref 7–20)
CALCIUM SERPL-MCNC: 9 MG/DL (ref 8.3–10.6)
CHLORIDE BLD-SCNC: 99 MMOL/L (ref 99–110)
CO2: 27 MMOL/L (ref 21–32)
CREAT SERPL-MCNC: 2.3 MG/DL (ref 0.8–1.3)
DESCRIPTION BLOOD BANK: NORMAL
GFR AFRICAN AMERICAN: 33
GFR NON-AFRICAN AMERICAN: 27
GLUCOSE BLD-MCNC: 134 MG/DL (ref 70–99)
POTASSIUM REFLEX MAGNESIUM: 3.8 MMOL/L (ref 3.5–5.1)
SODIUM BLD-SCNC: 134 MMOL/L (ref 136–145)

## 2022-10-05 PROCEDURE — 86900 BLOOD TYPING SEROLOGIC ABO: CPT

## 2022-10-05 PROCEDURE — 6370000000 HC RX 637 (ALT 250 FOR IP): Performed by: EMERGENCY MEDICINE

## 2022-10-05 PROCEDURE — 80048 BASIC METABOLIC PNL TOTAL CA: CPT

## 2022-10-05 PROCEDURE — 36430 TRANSFUSION BLD/BLD COMPNT: CPT

## 2022-10-05 PROCEDURE — 86923 COMPATIBILITY TEST ELECTRIC: CPT

## 2022-10-05 PROCEDURE — 36415 COLL VENOUS BLD VENIPUNCTURE: CPT

## 2022-10-05 PROCEDURE — P9036 PLATELET PHERESIS IRRADIATED: HCPCS

## 2022-10-05 PROCEDURE — 86901 BLOOD TYPING SEROLOGIC RH(D): CPT

## 2022-10-05 PROCEDURE — 99285 EMERGENCY DEPT VISIT HI MDM: CPT

## 2022-10-05 PROCEDURE — P9040 RBC LEUKOREDUCED IRRADIATED: HCPCS

## 2022-10-05 PROCEDURE — 85025 COMPLETE CBC W/AUTO DIFF WBC: CPT

## 2022-10-05 PROCEDURE — 86850 RBC ANTIBODY SCREEN: CPT

## 2022-10-05 RX ORDER — OXYCODONE HYDROCHLORIDE AND ACETAMINOPHEN 5; 325 MG/1; MG/1
1 TABLET ORAL ONCE
Status: COMPLETED | OUTPATIENT
Start: 2022-10-05 | End: 2022-10-05

## 2022-10-05 RX ORDER — SODIUM CHLORIDE 9 MG/ML
INJECTION, SOLUTION INTRAVENOUS PRN
Status: DISCONTINUED | OUTPATIENT
Start: 2022-10-05 | End: 2022-10-06 | Stop reason: HOSPADM

## 2022-10-05 RX ADMIN — OXYCODONE HYDROCHLORIDE AND ACETAMINOPHEN 1 TABLET: 5; 325 TABLET ORAL at 11:52

## 2022-10-05 ASSESSMENT — ENCOUNTER SYMPTOMS
BACK PAIN: 0
WHEEZING: 0
NAUSEA: 0
RHINORRHEA: 0
DIARRHEA: 0
COUGH: 0
SHORTNESS OF BREATH: 0
PHOTOPHOBIA: 0
ABDOMINAL PAIN: 0
VOMITING: 0

## 2022-10-05 ASSESSMENT — PAIN SCALES - GENERAL: PAINLEVEL_OUTOF10: 3

## 2022-10-05 ASSESSMENT — PAIN - FUNCTIONAL ASSESSMENT
PAIN_FUNCTIONAL_ASSESSMENT: NONE - DENIES PAIN
PAIN_FUNCTIONAL_ASSESSMENT: NONE - DENIES PAIN

## 2022-10-05 NOTE — ED NOTES
Per lab blood and platelets will have to be ordered d/t products needing irradiation. Dr. Lisa loredo.       Glenn Estrada RN  10/05/22 0679

## 2022-10-05 NOTE — ED PROVIDER NOTES
Emergency Department Provider Note  Location: [place] [address] ED  10/5/2022     Patient Identification  Sherran Nissen is a 79 y.o. male    Chief Complaint  Tranfusion (Patient states he is here for a blood transfusion. Oncology sent him to ER it appears for critically low platelets. )          HPI  (History provided by patient)  Patient is an 58-year-old male with known myelodysplastic syndrome currently being followed by [facility] who presents for blood transfusion. He had outpatient lab work done this morning which showed critical thrombocytopenia with a platelet level of 3 and anemia normocytic H&H 6.7/19. His at the [facility] nurse practitioner had ordered outpatient labs but there may have been delay receiving blood products until tomorrow so he presents to the emergency department. He is currently being transitioned to hospice care and does not have interest in being admitted for further work-up. He is here for transfusion and discharge. He reports his general fatigue and no other new symptoms no exacerbating or alleviating factors. Denies any chest pain or shortness of breath or lightheadedness. Denies any black bloody or tarry stools or other evidence of bleeding. 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 Past surgical history:  has a past surgical history that includes hernia repair; Colonoscopy (2/11/2008); Colonoscopy (11/14/2014); and CT BIOPSY BONE MARROW (7/5/2022).     Home medications:   Prior to Admission medications    Medication Sig Start Date End Date Taking? Authorizing Provider   furosemide (LASIX) 20 MG tablet TAKE ONE TABLET BY MOUTH EVERY MORNING 9/23/22   Kojo Mohan MD   traZODone (DESYREL) 50 MG tablet TAKE ONE TABLET BY MOUTH ONCE NIGHTLY 9/23/22   Kojo Mohan MD   rosuvastatin (CRESTOR) 20 MG tablet Take 1 tablet by mouth daily 9/13/22   Amy Jose Manuel, APRN - CNP   carvedilol (COREG) 6.25 MG tablet Take 1 tablet by mouth in the morning and 1 tablet before bedtime. 8/15/22   Lindsay Hernandez MD   isosorbide mononitrate (IMDUR) 60 MG extended release tablet Take 1 tablet by mouth in the morning. 8/4/22   Ray Stearsn MD   FLUoxetine (PROZAC) 10 MG capsule Take one capsule by mouth daily. 7/28/22   Kojo Mohan MD   fenofibrate (TRIGLIDE) 160 MG tablet TAKE ONE TABLET BY MOUTH DAILY 7/28/22   Kojo Mohan MD   tamsulosin (FLOMAX) 0.4 MG capsule Take 1 capsule by mouth in the morning. 7/28/22   Kojo Mohan MD   Ascorbic Acid (VITAMIN C) 500 MG tablet Take 1,000 mg by mouth daily. Historical Provider, MD       Social history:  reports that he has never smoked. He has never used smokeless tobacco. He reports that he does not drink alcohol and does not use drugs. Family history:    Family History   Problem Relation Age of Onset    COPD Sister     COPD Brother         Birth defect         ROS  Review of Systems   Constitutional:  Negative for chills and fever. HENT:  Negative for congestion and rhinorrhea. Eyes:  Negative for photophobia and visual disturbance. Respiratory:  Negative for cough, shortness of breath and wheezing. Cardiovascular:  Negative for chest pain and palpitations. Gastrointestinal:  Negative for abdominal pain, diarrhea, nausea and vomiting. Genitourinary:  Negative for dysuria and hematuria. Musculoskeletal:  Negative for back pain and neck pain.    Skin:  Negative for rash and wound.   Neurological:  Negative for syncope and weakness. Psychiatric/Behavioral:  Negative for agitation and confusion. Exam  ED Triage Vitals [10/05/22 0913]   BP Temp Temp Source Heart Rate Resp SpO2 Height Weight   (!) 121/53 97.5 °F (36.4 °C) Oral 57 20 96 % 6' 1\" (1.854 m) 160 lb (72.6 kg)       Physical Exam  Vitals and nursing note reviewed. Constitutional:       General: He is not in acute distress. Appearance: He is well-developed. HENT:      Head: Normocephalic and atraumatic. Nose: Nose normal. No congestion. Mouth/Throat:      Pharynx: Oropharynx is clear. Eyes:      General: No scleral icterus. Extraocular Movements: Extraocular movements intact. Pupils: Pupils are equal, round, and reactive to light. Cardiovascular:      Rate and Rhythm: Normal rate and regular rhythm. Heart sounds: No murmur heard. Pulmonary:      Effort: Pulmonary effort is normal.      Breath sounds: Normal breath sounds. Abdominal:      General: There is no distension. Palpations: Abdomen is soft. Tenderness: There is no abdominal tenderness. Genitourinary:     Comments: Patient declines rectal exam  Musculoskeletal:         General: No deformity. Normal range of motion. Cervical back: Normal range of motion and neck supple. Skin:     General: Skin is warm. Findings: No rash. Neurological:      Mental Status: He is alert and oriented to person, place, and time. Motor: No abnormal muscle tone.       Coordination: Coordination normal.   Psychiatric:         Mood and Affect: Mood normal.         Behavior: Behavior normal.         ED Course    ED Medication Orders (From admission, onward)      Start Ordered     Status Ordering Provider    10/05/22 1200 10/05/22 1149  oxyCODONE-acetaminophen (PERCOCET) 5-325 MG per tablet 1 tablet  ONCE         Last MAR action: Given - by Denita Marie on 10/05/22 at [address] [place] [address]., [address] [place] [address]    10/05/22 0987 10/05/22 4147 0.9 % sodium chloride infusion  PRN         Acknowledged BLANCA MEI                Radiology  No results found. Labs  Results for orders placed or performed during the hospital encounter of 10/05/22   BMP w/ Reflex to MG   Result Value Ref Range    Sodium 134 (L) 136 - 145 mmol/L    Potassium reflex Magnesium 3.8 3.5 - 5.1 mmol/L    Chloride 99 99 - 110 mmol/L    CO2 27 21 - 32 mmol/L    Anion Gap 8 3 - 16    Glucose 134 (H) 70 - 99 mg/dL    BUN 27 (H) 7 - 20 mg/dL    Creatinine 2.3 (H) 0.8 - 1.3 mg/dL    GFR Non-African American 27 (A) >60    GFR  33 (A) >60    Calcium 9.0 8.3 - 10.6 mg/dL   CBC with Auto Differential   Result Value Ref Range    WBC 6.1 4.0 - 11.0 K/uL    RBC 2.11 (L) 4.20 - 5.90 M/uL    Hemoglobin 6.3 (LL) 13.5 - 17.5 g/dL    Hematocrit 18.8 (LL) 40.5 - 52.5 %    MCV 88.8 80.0 - 100.0 fL    MCH 30.0 26.0 - 34.0 pg    MCHC 33.8 31.0 - 36.0 g/dL    RDW 18.8 (H) 12.4 - 15.4 %    Platelets 5 (LL) 533 - 450 K/uL    MPV 11.1 (H) 5.0 - 10.5 fL    PLATELET SLIDE REVIEW Decreased     SLIDE REVIEW see below     Path Consult Yes     Neutrophils % 3.0 %    Lymphocytes % 71.0 %    Monocytes % 25.0 %    Eosinophils % 0.0 %    Basophils % 0.0 %    Neutrophils Absolute 0.2 (LL) 1.7 - 7.7 K/uL    Lymphocytes Absolute 4.3 1.0 - 5.1 K/uL    Monocytes Absolute 1.5 (H) 0.0 - 1.3 K/uL    Eosinophils Absolute 0.0 0.0 - 0.6 K/uL    Basophils Absolute 0.0 0.0 - 0.2 K/uL    Unid. Mononu 1 (A) %    Anisocytosis Occasional (A)     Hypochromia Occasional (A)     Poikilocytes Occasional (A)    TYPE AND SCREEN   Result Value Ref Range    ABO/Rh O POS     Antibody Screen NEG    PREPARE RBC (CROSSMATCH), 2 Units   Result Value Ref Range    Product Code Blood Bank O0829O19     Description Blood Bank       Red Blood Cells, Apheresis, Irradiated, Leuko-reduced    Unit Number [id number]     Dispense Status Blood Bank issued     Product Code Blood Bank [id number]     Description Blood Bank       Red Blood Cells, Apheresis, Irradiated, Leuko-reduced    Unit Number [id number]     Dispense Status Blood Bank selected     Product Code Blood Bank [id number]     Description Blood Bank       Red Blood Cells, Apheresis, Irradiated, Leuko-reduced    Unit Number [id number]     Dispense Status Blood Bank selected    PREPARE PLATELETS, 1 Product   Result Value Ref Range    Product Code Blood Bank [id number]     Description Blood Bank      Unit Number [id number]     Dispense Status Blood Bank selected          Procedures  Procedures      MDM  Patient seen and evaluated. Relevant records reviewed. - Patient is 79 y.o. male presented for abnormalities on CBC as noted above. Confirmation lab work obtained is severely thrombocytopenic normocytic anemia. Also has evidence of VINNY but is in the process of transitioning to hospice care. Does not want to be admitted to the hospital.  I called and discussed lab work with his hematologist Dr. Robin Cronin who recommends transfusion of 2 units of RBCs and 1 of platelets and plan for discharge home. Patient agreeable to plan expressed understanding of plan. There is been delayed to receiving blood products as they need to be irradiated. I am at the end of my shift. Patient's care be signed out to oncoming physician with plan for disposition home after repeat lab work following transfusion.  - I have a low concern for  other emergent process, and do not see indication for further work-up in the ER, as it is unlikely  and poses more risk than benefit. - I discussed the results, including any incidental findings, with patient. Questions answered. We agreed to d/c. Patient/family agreeable to plan and express understanding of plan. Clinical Impression:  1. Thrombocytopenia ([address]    2. Normocytic anemia          Disposition:  Discharge to home in guarded condition. Blood pressure (!) 118/56, pulse 75, temperature 97.5 °F (36.4 °C), temperature source Oral, resp. rate 18, height 6' 1\" (1.854 m), weight 160 lb (72.6 kg), SpO2 96 %. Patient was given scripts for the following medications. I counseled patient how to take these medications. New Prescriptions    No medications on file       Disposition referral (if applicable):  No follow-up provider specified. I, Wilmer Powell, am the primary attending of record and contributed the majority of evaluation and treatment of emergent care for this encounter. Total critical care time is 0 minutes, which excludes separately billable procedures and updating family. Time spent is specifically for management of the presenting complaint and symptoms initially, direct bedside care, reevaluation, review of records, and consultation. There was a high probability of clinically significant life-threatening deterioration in the patient's condition, which required my urgent intervention. This chart was generated in part by using Dragon Dictation system and may contain errors related to that system including errors in grammar, punctuation, and spelling, as well as words and phrases that may be inappropriate. If there are any questions or concerns please feel free to contact the dictating provider for clarification.      Annel Shaikh MD  [address] MD  10/05/22 4271

## 2022-10-05 NOTE — ED NOTES
Received report from [facility]. Patient resting in bed. Awake and alert. No distress noted. Bed in low position. Side rails x2. Call light in reach.       Patricio Lin RN  10/05/22 3245

## 2022-10-05 NOTE — TELEPHONE ENCOUNTER
Spoke with Daughter Jennifer Nicole to confirm appointment and she informed me that her Dad is at the Hospice stage and will not be doing Watchman and would like to cancel his appointment. I sent a note to Dr. Isaiah Hein to inform the change in patients health status.

## 2022-10-05 NOTE — CONSENT
Informed Consent for Blood Component Transfusion Note    I have discussed with the patient the rationale for blood component transfusion; its benefits in treating or preventing fatigue, organ damage, or death; and its risk which includes mild transfusion reactions, rare risk of blood borne infection, or more serious but rare reactions. I have discussed the alternatives to transfusion, including the risk and consequences of not receiving transfusion. The patient had an opportunity to ask questions and had agreed to proceed with transfusion of blood components.     Electronically signed by Debbie Madden MD on 10/5/22 at 10:46 AM EDT

## 2022-10-06 ENCOUNTER — CARE COORDINATION (OUTPATIENT)
Dept: CASE MANAGEMENT | Age: 82
End: 2022-10-06

## 2022-10-06 LAB
ANISOCYTOSIS: ABNORMAL
BASOPHILS ABSOLUTE: 0 K/UL (ref 0–0.2)
BASOPHILS RELATIVE PERCENT: 0 %
BLASTS RELATIVE PERCENT: 30 %
EOSINOPHILS ABSOLUTE: 0 K/UL (ref 0–0.6)
EOSINOPHILS RELATIVE PERCENT: 0 %
HCT VFR BLD CALC: 18.8 % (ref 40.5–52.5)
HEMATOLOGY PATH CONSULT: NORMAL
HEMATOLOGY PATH CONSULT: YES
HEMOGLOBIN: 6.3 G/DL (ref 13.5–17.5)
HYPOCHROMIA: ABNORMAL
LYMPHOCYTES ABSOLUTE: 2.4 K/UL (ref 1–5.1)
LYMPHOCYTES RELATIVE PERCENT: 40 %
MCH RBC QN AUTO: 30 PG (ref 26–34)
MCHC RBC AUTO-ENTMCNC: 33.8 G/DL (ref 31–36)
MCV RBC AUTO: 88.8 FL (ref 80–100)
MONOCYTES ABSOLUTE: 1.5 K/UL (ref 0–1.3)
MONOCYTES RELATIVE PERCENT: 24 %
MONONUCLEAR UNIDENTIFIED CELLS: 0 %
NEUTROPHILS ABSOLUTE: 0.4 K/UL (ref 1.7–7.7)
NEUTROPHILS RELATIVE PERCENT: 6 %
PDW BLD-RTO: 18.8 % (ref 12.4–15.4)
PLATELET # BLD: 5 K/UL (ref 135–450)
PLATELET SLIDE REVIEW: ABNORMAL
PMV BLD AUTO: 11.1 FL (ref 5–10.5)
POIKILOCYTES: ABNORMAL
RBC # BLD: 2.11 M/UL (ref 4.2–5.9)
SLIDE REVIEW: ABNORMAL
WBC # BLD: 6.1 K/UL (ref 4–11)

## 2022-10-06 NOTE — CARE COORDINATION
[facility] ED Follow Up Call    10/6/2022    Patient: Milady Corona Patient : 1940   MRN: [id number]  Reason for Admission: blood tranfusion  Discharge Date: 10/5/22    Patient answered call and verified . Patient pleasant and agreeable to transition call. Patient stated that he was instructed to go to ER for blood transfusion due to dangerous low numbers. Patient arrived at [facility] and stated he had to [facility] with them\" to left him out at 7pm. CTN noted background noise and patient request to call CTN back.  CTN provided call back number       Care Transitions ED Follow Up    Care Transitions Interventions  Do you have any ongoing symptoms?: No   Do you have all of your prescriptions and are they filled?: Yes   Were you discharged with any Home Care or Post Acute Services or do you currently have any active services?: No         Do you have any needs or concerns that I can assist you with?: No   Identified Barriers: None

## 2022-10-07 ENCOUNTER — TELEPHONE (OUTPATIENT)
Dept: CASE MANAGEMENT | Age: 82
End: 2022-10-07

## 2022-10-07 NOTE — TELEPHONE ENCOUNTER
Imaging Report CT Chest Abd Pelvis with f/u imaging recommendations    7 mm right solid pulmonary nodule. Recommend a non-contrast Chest CT at 6-12   months.  If patient is high risk for malignancy, recommend an additional   non-contrast Chest CT at 18-24 months; if patient is low risk for malignancy   a non-contrast Chest CT at 18-24 months is optional.     Mckenna Medeiros  Lung Navigation R.T.(R)

## 2022-10-13 ENCOUNTER — CARE COORDINATION (OUTPATIENT)
Dept: CASE MANAGEMENT | Age: 82
End: 2022-10-13

## 2022-10-13 NOTE — CARE COORDINATION
[facility] Follow Up Call    10/13/2022    Patient: Maureen Thapa  Patient : 1940   MRN: [id number]  Reason for Admission: pancytopenia, s/p blood transfusion  Discharge Date: 10/5/22 RARS: Readmission Risk Score: 22.4         Spoke with: XNEIA    Second and final attempt to reach patient via phone for transition call. VM left stating purpose of call along with my contact information requesting a return call. Episode ended d/t unsuccessful contact & end of transition period. It is noted in ED notes that patient is transitioning into hospice care. Jeannette Ayoub LPN [address] [place] [address]  Care Transitions  [phone]      Care Transitions Subsequent and Final Call    Subsequent and Final Calls  Care Transitions Interventions  Other Interventions:              Follow Up  Future Appointments   Date Time Provider Candi Lake   2023 10:45 AM MD Bre Patel [facility]       [facility]

## 2022-10-21 NOTE — TELEPHONE ENCOUNTER
Caller: BETHANIE ORDONEZ     Relationship: SELF     Best call back number: [phone]    What is the best time to reach you: ANY     Who are you requesting to speak with (clinical staff, provider,  specific staff member): PROVIDER OR PROVIDER STAFF     Do you know the name of the person who called: YES     What was the call regarding: PATIENT HAD BILATERAL KNEE INJECTION 01/10/2022 AND THE PATIENT IS NOW HAVING INCREASED PAIN IN THE RIGHT KNEE                Left vm to return call. Card mailed to contact office.

## 2023-06-12 NOTE — PROGRESS NOTES
Pt here via w/c with family for 1 unit of PRBC's  Pt without c/o's today  Current H&H is 6.5 & 20.0 Informed consent has been received from Dr. Shailesh Mei and is in the chart   Blood consent obtained  IV # 22 started in inner [facility] on 2nd attempt per myself   Pt  jessica well  pt refused pre meds of tylenol 650 mg and benadryl 25 mg  Unit of PRBC's started at 60 cc hr  IV site unremarkable  Pt jessica well  Will stay with pt for 15 mins to monitor for signs of adverse reactions  Blankets have been applied for comfort Subjective   Sebastian Liao is a 60 y.o. male  Diabetes (F/U. FBS A1C ), Nausea (RF phenergan), GERD (RF omeprazole), Allergic Rhinitis (RF Flonase and Claritin), and Back Pain (RF Flexeril)      History of Present Illness    The following portions of the patient's history were reviewed and updated as appropriate: allergies, current medications, past social history and problem list    Review of Systems    Objective     Vitals:    06/12/23 0907   BP: 126/78   Pulse: 82   Resp: 16   SpO2: 98%       Physical Exam    Assessment & Plan     Diagnoses and all orders for this visit:    1. Type 2 diabetes mellitus with hyperglycemia, without long-term current use of insulin (Primary)  -     POC Glycosylated Hemoglobin (Hb A1C)  -     POCT Glucose

## 2025-06-09 NOTE — PROGRESS NOTES
Physical Therapy  Facility/Department: [facility] C4 PCU  Daily Treatment Note  NAME: Stacia Corona  : 1940  MRN: [id number]    Date of Service: 2022    Discharge Recommendations:  Home with Home health PT, 24 hour supervision or assist   PT Equipment Recommendations  Equipment Needed: No  Other: pt owns 4WW and transport chair    Patient Diagnosis(es): The primary encounter diagnosis was Neutropenic fever ([facility] [address] Diagnoses of Acute respiratory failure with hypoxia (HCC) and Severe sepsis ([facility] [address] were also pertinent to this visit. Assessment   Assessment: Patient seen for gait training and transfers. Patient cleared by RN for therapy participation this date. Patient agreeable to therapy. Patient completed transfers with SBA and ambulates with RW and CGA. Pt demos abandoning the RW, questions whether the RW should be turned 180 degrees (when it should not), and requires max cues to maintain RICKI within RW. Per family, pt has 24/7 assistance, Ax2 when climbing steps into home, and they feel he is at his baseline with mobility and that he will be safe at home with their care. P.T .will continue to follow throughout LOS. Recommending DC to home with 24/7 assistance and home PT. Activity Tolerance: Patient limited by fatigue;Treatment limited secondary to decreased cognition  Equipment Needed: No  Other: pt owns 4WW and transport chair     Plan    Plan  Plan: 3-5 times per week  Specific Instructions for Next Treatment: Mobility and endurance training  Current Treatment Recommendations: Strengthening; Functional mobility training;Gait training; Endurance training;Stair training;Home exercise program;Therapeutic activities; Equipment evaluation, education, & procurement;Patient/Caregiver education & training;Balance training;Transfer training; Safety education & training     Restrictions  Restrictions/Precautions  Restrictions/Precautions: Up as Tolerated, Fall Risk, General Precautions  Position Activity Program_ID:[id number]   Access Code: [email or web address]  URL: [email or web address]  Date: 06-  Prepared By: Bruna Bishop    Program Notes      Exercises      - Seated Long Arc Quad - 1 x daily - 7 x weekly - 2-3 sets - 10 reps      - Supine Gluteal Sets - 1 x daily - 7 x weekly - 3 sets - 10 reps - 5 hold      - Supine Quadricep Sets -  x daily -  x weekly - 2 sets - 10 reps - 5 hold      - Hooklying Clamshell with Resistance - 1 x daily - 7 x weekly - 3 sets - 10 reps      - Supine Hip Adduction Isometric with Ball - 1 x daily - 7 x weekly - 2-3 sets - 10 reps      - Supine Heel Slide - 1 x daily - 7 x weekly - 2 sets - 10 reps      - Standing Hip Extension with Chair - 1 x daily - 7 x weekly - 2 sets - 10 reps      - Standing Hip Abduction with Anterior Support -  x daily -  x weekly - 2 sets - 10 reps      - Standing Marching - 1 x daily - 7 x weekly - 3 sets - 10 reps      - Standing Heel Raise - 1 x daily - 7 x weekly - 3 sets - 10 reps       Restriction  Other position/activity restrictions: 4L O2, lo     Subjective    Subjective  Subjective: Pt in bed, agreeable to therapy  Pain: Pt denies pain  Orientation  Overall Orientation Status: Impaired  Orientation Level: Oriented to person;Disoriented to place; Disoriented to time;Oriented to situation  Cognition  Overall Cognitive Status: Exceptions  Arousal/Alertness: Delayed responses to stimuli  Following Commands: Follows one step commands consistently  Attention Span: Appears intact  Safety Judgement: Decreased awareness of need for assistance;Decreased awareness of need for safety  Problem Solving: Assistance required to implement solutions;Assistance required to generate solutions;Decreased awareness of errors;Assistance required to correct errors made  Insights: Decreased awareness of deficits  Initiation: Requires cues for some  Sequencing: Requires cues for some     Objective   Vitals 91%, 73 bpm, 119/59; 137/65 following ambulation (pt had reported dizziness with gait)     Bed Mobility Training  Supine to Sit: Stand-by assistance (HOB elevated)  Transfer Training  Transfer Training: Yes  Overall Level of Assistance: Stand-by assistance  Sit to Stand: Stand-by assistance  Stand to Sit: Stand-by assistance  Gait Training  Gait Training: Yes  Gait  Overall Level of Assistance: Contact-guard assistance  Base of Support: Narrowed  Speed/Lainey: Slow;Shuffled  Step Length: Right shortened;Left shortened  Gait Abnormalities: Decreased step clearance  Distance (ft):  (2x 15 ft, 1x 75 ft)  Assistive Device: Gait belt;Walker, rolling        Pt stood at toilet ~1 min for toileting. Pt missing toilet ~50% of the time and requires cueing and assistance for need for cleanup, therapist assists to change socks. Safety Devices  Type of Devices: All fall risk precautions in place;Gait belt;Left in chair;Patient at risk for falls;Call light within reach; Chair alarm in place;Nurse notified Goals  Short Term Goals  Time Frame for Short term goals: one week unless otherwsie stated  Short term goal 1: Patient will perform bed mobility Mod IND  Short term goal 2: Patient will perform sit <> stand transfers Mod IND to AAD  Short term goal 3: Patient will ambulate 100 ft with RW Mod IND  Short term goal 4: Patient will negotiate 6 stairs with B rails and supervision  Short term goal 5: Patient will be IND with LE HEP to increase functional strength to maximize functional mobility    Education  Patient Education  Education Given To: Patient; Family  Education Provided: Role of Therapy; Fall Prevention Strategies;Transfer Training;Energy Conservation;Plan of Care;Precautions;Orientation  Education Provided Comments: Pt educated on importance of OOB mobility, safe hand placement with transfers, and maintaining RICKI within RW - requires reinforcement  Education Method: Verbal  Barriers to Learning: Cognition  Education Outcome: Verbalized understanding;Continued education needed    Therapy Time   Individual Concurrent Group Co-treatment   Time In 0833         Time Out 0902         Minutes 29         Timed Code Treatment Minutes: 29 Minutes     If pt is unable to be seen after this session, please let this note serve as discharge summary. Please see case management note for discharge disposition. Thank you.     Peña Willis, PT